# Patient Record
Sex: MALE | Race: WHITE | Employment: OTHER | URBAN - METROPOLITAN AREA
[De-identification: names, ages, dates, MRNs, and addresses within clinical notes are randomized per-mention and may not be internally consistent; named-entity substitution may affect disease eponyms.]

---

## 2018-05-24 ENCOUNTER — HOSPITAL ENCOUNTER (OUTPATIENT)
Dept: MRI IMAGING | Age: 73
Discharge: HOME | End: 2018-05-24
Attending: ORTHOPAEDIC SURGERY
Payer: MEDICARE

## 2018-05-24 DIAGNOSIS — M47.816 LUMBAR SPONDYLOSIS: ICD-10-CM

## 2018-05-24 PROCEDURE — 72148 MRI LUMBAR SPINE W/O DYE: CPT

## 2018-08-27 ENCOUNTER — HOSPITAL ENCOUNTER (OUTPATIENT)
Dept: CT IMAGING | Age: 73
Discharge: HOME | End: 2018-08-27
Attending: FAMILY MEDICINE
Payer: MEDICARE

## 2018-08-27 DIAGNOSIS — Z72.0 TOBACCO USE: ICD-10-CM

## 2018-08-27 PROCEDURE — G0297 LDCT FOR LUNG CA SCREEN: HCPCS

## 2018-09-17 NOTE — H&P
Michael Neves M.D.  [phone]            History and Physical       NAME:  Rachael Miranda   :      MRN:   [id number]       Referring Physician:  Zbigniew De Los Santos DO      Consult Date: 2018 5:30 PM    Chief Complaint:  Change in bowel function    History of Present Illness:  Mr. La Woodruff is a 75year old gentleman who is being seen today in consultation per request of Dr. Chris Mcdaniel for change in bowel habits. States he noted that he became lethargic and lack of appetitie. This was associated with bloating. This occured about 3-4 weeks ago. He has always had regular and daily BM's first thing in the mornings. However over the past few weeks he noted increase in frequency of stools and going about 3-4 times/day. Stools were less in quantity. Denies any blood in stool. Denies any abdominal pain or weight loss. This has eased back to its normal behavior now. Denies any changes to his medication regimen. He was given Abx a few day prior to all this happening for a toe infection. Denies any fevers or chills. His last colonscopy was over 20 years ago. PMH:  No past medical history on file. PSH:  No past surgical history on file. Allergies: Allergies not on file    Home Medications:  Cannot display prior to admission medications because the patient has not been admitted in this contact. Hospital Medications:  No current facility-administered medications for this encounter. No current outpatient prescriptions on file. Social History:  Social History   Substance Use Topics    Smoking status: Not on file    Smokeless tobacco: Not on file    Alcohol use Not on file       Family History:  No family history on file.           Review of Systems:      Constitutional: negative fever, negative chills, negative weight loss  Eyes:   negative visual changes  ENT:   negative sore throat, tongue or lip swelling  Respiratory:  negative cough, negative dyspnea  Cards:  negative for chest pain, palpitations, lower extremity edema  GI:   See HPI  :  negative for frequency, dysuria  Integument:  negative for rash and pruritus  Heme:  negative for easy bruising and gum/nose bleeding  Musculoskel: negative for myalgias,  back pain and muscle weakness  Neuro: negative for headaches, dizziness, vertigo  Psych:  negative for feelings of anxiety, depression       Objective:   No data found. EXAM:     NEURO-a&o   HEENT-wnl   LUNGS-clear    COR-regular rate and rhythym     ABD-soft , no tenderness, no rebound, good bs     EXT-no edema     Data Review     No results for input(s): WBC, HGB, HCT, PLT, HGBEXT, HCTEXT, PLTEXT in the last 72 hours. No results for input(s): NA, K, CL, CO2, BUN, CREA, GLU, PHOS, CA in the last 72 hours. No results for input(s): SGOT, GPT, AP, TBIL, TP, ALB, GLOB, GGT, AML, LPSE in the last 72 hours. No lab exists for component: AMYP, HLPSE  No results for input(s): INR, PTP, APTT in the last 72 hours. No lab exists for component: INREXT    There is no problem list on file for this patient. Assessment:   · Change in bowel function   Plan:   · Colonoscopy today.      Signed By: Jw Son MD     9/17/2018  5:30 PM

## 2018-09-20 ENCOUNTER — HOSPITAL ENCOUNTER (OUTPATIENT)
Age: 73
Setting detail: OUTPATIENT SURGERY
Discharge: HOME | End: 2018-09-20
Attending: INTERNAL MEDICINE | Admitting: INTERNAL MEDICINE
Payer: MEDICARE

## 2018-09-20 ENCOUNTER — ANESTHESIA EVENT (OUTPATIENT)
Dept: ENDOSCOPY | Age: 73
End: 2018-09-20
Payer: MEDICARE

## 2018-09-20 ENCOUNTER — ANESTHESIA (OUTPATIENT)
Dept: ENDOSCOPY | Age: 73
End: 2018-09-20
Payer: MEDICARE

## 2018-09-20 VITALS
HEART RATE: 80 BPM | WEIGHT: 232 LBS | TEMPERATURE: 98 F | HEIGHT: 72 IN | RESPIRATION RATE: 23 BRPM | DIASTOLIC BLOOD PRESSURE: 88 MMHG | OXYGEN SATURATION: 100 % | BODY MASS INDEX: 31.42 KG/M2 | SYSTOLIC BLOOD PRESSURE: 106 MMHG

## 2018-09-20 PROCEDURE — 76040000019: Performed by: INTERNAL MEDICINE

## 2018-09-20 PROCEDURE — 88305 TISSUE EXAM BY PATHOLOGIST: CPT | Performed by: INTERNAL MEDICINE

## 2018-09-20 PROCEDURE — 74011250636 HC RX REV CODE- 250/636: Performed by: INTERNAL MEDICINE

## 2018-09-20 PROCEDURE — 76060000031 HC ANESTHESIA FIRST 0.5 HR: Performed by: INTERNAL MEDICINE

## 2018-09-20 PROCEDURE — 74011250636 HC RX REV CODE- 250/636

## 2018-09-20 PROCEDURE — 77030009426 HC FCPS BIOP ENDOSC BSC -B: Performed by: INTERNAL MEDICINE

## 2018-09-20 PROCEDURE — 77030013992 HC SNR POLYP ENDOSC BSC -B: Performed by: INTERNAL MEDICINE

## 2018-09-20 RX ORDER — AMLODIPINE BESYLATE 10 MG/1
10 TABLET ORAL DAILY
COMMUNITY
End: 2020-01-01

## 2018-09-20 RX ORDER — MIDAZOLAM HYDROCHLORIDE 1 MG/ML
.25-5 INJECTION, SOLUTION INTRAMUSCULAR; INTRAVENOUS
Status: DISCONTINUED | OUTPATIENT
Start: 2018-09-20 | End: 2018-09-20 | Stop reason: HOSPADM

## 2018-09-20 RX ORDER — PRAVASTATIN SODIUM 20 MG/1
20 TABLET ORAL
COMMUNITY

## 2018-09-20 RX ORDER — SODIUM CHLORIDE 9 MG/ML
50 INJECTION, SOLUTION INTRAVENOUS CONTINUOUS
Status: DISCONTINUED | OUTPATIENT
Start: 2018-09-20 | End: 2018-09-20 | Stop reason: HOSPADM

## 2018-09-20 RX ORDER — ATROPINE SULFATE 0.1 MG/ML
0.4 INJECTION INTRAVENOUS
Status: DISCONTINUED | OUTPATIENT
Start: 2018-09-20 | End: 2018-09-20 | Stop reason: HOSPADM

## 2018-09-20 RX ORDER — NALOXONE HYDROCHLORIDE 0.4 MG/ML
0.4 INJECTION, SOLUTION INTRAMUSCULAR; INTRAVENOUS; SUBCUTANEOUS
Status: DISCONTINUED | OUTPATIENT
Start: 2018-09-20 | End: 2018-09-20 | Stop reason: HOSPADM

## 2018-09-20 RX ORDER — METOPROLOL SUCCINATE 200 MG/1
200 TABLET, EXTENDED RELEASE ORAL DAILY
COMMUNITY
End: 2020-01-01

## 2018-09-20 RX ORDER — FLUMAZENIL 0.1 MG/ML
0.2 INJECTION INTRAVENOUS
Status: DISCONTINUED | OUTPATIENT
Start: 2018-09-20 | End: 2018-09-20 | Stop reason: HOSPADM

## 2018-09-20 RX ORDER — PROPOFOL 10 MG/ML
INJECTION, EMULSION INTRAVENOUS
Status: DISCONTINUED | OUTPATIENT
Start: 2018-09-20 | End: 2018-09-20 | Stop reason: HOSPADM

## 2018-09-20 RX ORDER — EPINEPHRINE 0.1 MG/ML
1 INJECTION INTRACARDIAC; INTRAVENOUS
Status: DISCONTINUED | OUTPATIENT
Start: 2018-09-20 | End: 2018-09-20 | Stop reason: HOSPADM

## 2018-09-20 RX ORDER — DEXTROMETHORPHAN/PSEUDOEPHED 2.5-7.5/.8
1.2 DROPS ORAL
Status: DISCONTINUED | OUTPATIENT
Start: 2018-09-20 | End: 2018-09-20 | Stop reason: HOSPADM

## 2018-09-20 RX ORDER — BENAZEPRIL HYDROCHLORIDE 20 MG/1
20 TABLET ORAL DAILY
COMMUNITY
End: 2020-01-01

## 2018-09-20 RX ORDER — PROPOFOL 10 MG/ML
INJECTION, EMULSION INTRAVENOUS AS NEEDED
Status: DISCONTINUED | OUTPATIENT
Start: 2018-09-20 | End: 2018-09-20 | Stop reason: HOSPADM

## 2018-09-20 RX ADMIN — PROPOFOL 100 MG: 10 INJECTION, EMULSION INTRAVENOUS at 09:24

## 2018-09-20 RX ADMIN — SODIUM CHLORIDE 50 ML/HR: 900 INJECTION, SOLUTION INTRAVENOUS at 08:40

## 2018-09-20 RX ADMIN — PROPOFOL 125 MCG/KG/MIN: 10 INJECTION, EMULSION INTRAVENOUS at 09:25

## 2018-09-20 RX ADMIN — PROPOFOL 20 MG: 10 INJECTION, EMULSION INTRAVENOUS at 09:25

## 2018-09-20 NOTE — ANESTHESIA POSTPROCEDURE EVALUATION
Post-Anesthesia Evaluation and Assessment Patient: Ryan Mac MRN: [id number]  SSN: [id number] YOB: 1945  Age: 75 y.o. Sex: male Cardiovascular Function/Vital Signs Visit Vitals  /65  Pulse 89  Temp 36.7 °C (98 °F)  Resp (!) 31  
 Ht 6' (1.829 m)  Wt 105.2 kg (232 lb)  SpO2 100%  BMI 31.46 kg/m2 Patient is status post MAC anesthesia for Procedure(s): 
COLONOSCOPY 
ENDOSCOPIC POLYPECTOMY 
COLON BIOPSY. Nausea/Vomiting: None Postoperative hydration reviewed and adequate. Pain: 
Pain Scale 1: Numeric (0 - 10) (09/20/18 1009) Pain Intensity 1: 0 (09/20/18 1009) Managed Neurological Status: At baseline Mental Status and Level of Consciousness: Arousable Pulmonary Status:  
O2 Device: Room air (09/20/18 1009) Adequate oxygenation and airway patent Complications related to anesthesia: None Post-anesthesia assessment completed. No concerns Signed By: Robert Williamson MD   
 September 20, 2018

## 2018-09-20 NOTE — PROCEDURES
Craig Boeck, M.D.  [phone]            2018          Colonoscopy Operative Report  Cornelio Burris  :  1945  [place] Medical Record Number:  [id number]      Indications:  Change in bowel habits     :  Jeannette Ford MD    Referring Provider: Sandie Whyte DO    Sedation:  MAC anesthesia Propofol    Pre-Procedural Exam:      Airway: clear,  No airway problems anticipated  Heart: RRR, without gallops or rubs  Lungs: clear bilaterally without wheezes, crackles, or rhonchi  Abdomen: soft, nontender, nondistended, bowel sounds present  Mental Status: awake, alert and oriented to person, place and time     Procedure Details:  After informed consent was obtained with all risks and benefits of procedure explained and preoperative exam completed, the patient was taken to the endoscopy suite and placed in the left lateral decubitus position. Upon sequential sedation as per above, a digital rectal exam was performed. The Olympus videocolonoscope  was inserted in the rectum and carefully advanced to the cecum, which was identified by the ileocecal valve and appendiceal orifice. The quality of preparation was excellent. The colonoscope was slowly withdrawn with careful inspection and evaluation between folds. Retroflexion in the rectum was performed.     Findings:     Cecum: normal  Ascending Colon: 5  Sessile polyp(s), the largest 8 mm in size;  Transverse Colon: normal  Descending Colon: 5  Sessile polyp(s), the largest 8 mm in size;  Sigmoid: normal  Rectum: normal    Interventions:  biopsy of colon randomly to rule out microscopic colitis  10 complete polypectomy were performed using cold snare  and the polyps were  retrieved    Specimen Removed:  specimen #1, 8 mm in size, located in the ascending colon and the descending colon removed by cold snare and retrieved for pathology  random biopsies obtained to rule out microscopic colitis    Complications: None.     EBL:  None. Impression:  10 polyps removed using a cold snare. Random colon biopsies were also obtained to rule out microscopic colitis    Recommendations:  -Await pathology. -Repeat colonoscopy in 3 years.   -High fiber diet.    -Resume normal medication(s). Discharge Disposition:  Home in the company of a  when able to ambulate.     Maine [place] MD  9/20/2018  9:50 AM

## 2018-09-20 NOTE — PERIOP NOTES
2634 Procedure being performed under MAC; FERNIE Longo at bedside monitoring patient. See anesthesia notes. 7548 Endoscope was pre-cleaned at bedside immediately following procedure by Jarrod Barahona. 4461 Patient received Propofol, per FERNIE Longo. Care of patient assumed from the anesthesia provider. Patient tolerated procedure well. Abdomen remains soft and non tender post procedure, no complaints or indication of discomfort noted at this time. See anesthesia note. Patient transferred to Endoscopy Recovery and report given to recovery nurse.

## 2018-09-20 NOTE — ROUTINE PROCESS
Leland Console  1945  [id number]    Situation:  Verbal report received from: Yaya Osorio RN  Procedure: Procedure(s):  COLONOSCOPY  ENDOSCOPIC POLYPECTOMY    Background:    Preoperative diagnosis: CHANGE IN BOWELS  Postoperative diagnosis: Ascending and descending colon polyp removed by cold snare x 10    :  Dr. Zeinab Meraz  Assistant(s): Endoscopy Technician-1: Andre Benjamin  Endoscopy RN-1: Jennifer Orozco RN    Specimens:   ID Type Source Tests Collected by Time Destination   1 : Ascending and Descending colon polyps Preservative   Ludy Rashid MD 9/20/2018 0935 Pathology   2 : Random colon polyp Preservative   Ludy Rashid MD 9/20/2018 3745 Pathology       Assessment:  Intra-procedure medications     Anesthesia gave intra-procedure sedation and medications, see anesthesia flow sheet yes    Intravenous fluids: NS@ KVO     Vital signs stable     Abdominal assessment: round and soft     Recommendation:  Discharge patient per MD order. Family or Friend   Permission to share finding with family or friend yes    Endoscopy discharge instructions have been reviewed and given to patient and spouse. The patient and spouse verbalized understanding and acceptance of instructions.

## 2018-09-20 NOTE — DISCHARGE INSTRUCTIONS
[facility]. Yong Horta M.D.  [phone]          COLON DISCHARGE INSTRUCTIONS       2018    Iesha Aldana  :  1945  [place] Medical Record Number:  [id number]      COLONOSCOPY FINDINGS:  Your colonoscopy showed: 10 polyps. DISCOMFORT:  Redness at IV site- apply warm compress to area; if redness or soreness persist- contact your physician  There may be a slight amount of blood passed from the rectum  Gaseous discomfort- walking, belching will help relieve any discomfort  You may not operate a vehicle for 12 hours  You may not engage in an occupation involving machinery or appliances for rest of today  You may not drink alcoholic beverages for at least 12 hours  Avoid making any critical decisions for at least 24 hour  DIET:   High fiber diet. - however -  remember your colon is empty and a heavy meal will produce gas. Avoid these foods:  vegetables, fried / greasy foods, carbonated drinks for today     ACTIVITY:  You may resume your normal daily activities it is recommended that you spend the remainder of the day resting -  avoid any strenuous activity. CALL M.D. ANY SIGN OF:   Increasing pain, nausea, vomiting  Abdominal distension (swelling)  New increased bleeding (oral or rectal)  Fever (chills)  Pain in chest area  Bloody discharge from nose or mouth   Shortness of breath    Follow-up Instructions:   Call Dr. Gerhardt Daunt if any questions or problems. Telephone # [phone]  Biopsy results will be available in  5 to 7 days  Should have a repeat colonoscopy in 3 years.      May resume Xarelto in 48 hours if no bleeding  See Dr. Gerhardt Daunt in the office in 2 months

## 2018-09-20 NOTE — IP AVS SNAPSHOT
[address] [phone] Patient: Esteban Zafar MRN: [id number] WKK: About your hospitalization You were admitted on:  2018 You last received care in the:  [facility] ENDOSCOPY You were discharged on:  2018 Why you were hospitalized Your primary diagnosis was:  Not on File Follow-up Information None Discharge Orders None A check lubna indicates which time of day the medication should be taken. My Medications ASK your doctor about these medications Instructions Each Dose to Equal  
 Morning Noon Evening Bedtime  
 amLODIPine 10 mg tablet Commonly known as:  Robyn Pace Your last dose was: Your next dose is: Take 10 mg by mouth daily. 10 mg  
    
   
   
   
  
 benazepril 20 mg tablet Commonly known as:  LOTENSIN Your last dose was: Your next dose is: Take 20 mg by mouth daily. 20 mg  
    
   
   
   
  
 metoprolol succinate 200 mg XL tablet Commonly known as:  TOPROL-XL Your last dose was: Your next dose is: Take 200 mg by mouth daily. 200 mg  
    
   
   
   
  
 pravastatin 20 mg tablet Commonly known as:  PRAVACHOL Your last dose was: Your next dose is: Take 20 mg by mouth nightly. 20 mg XARELTO 20 mg Tab tablet Generic drug:  rivaroxaban Your last dose was: Your next dose is: Take 20 mg by mouth daily. 20 mg Discharge Instructions [address] [facility]. [facility] Dave Spear M.D. 
[phone] COLON DISCHARGE INSTRUCTIONS 
    
2018 Esteban Zafar :  1945 [place] Medical Record Number:  [id number] COLONOSCOPY FINDINGS: 
Your colonoscopy showed: 10 polyps.  
 
DISCOMFORT: 
Redness at IV site- apply warm compress to area; if redness or soreness persist- contact your physician There may be a slight amount of blood passed from the rectum Gaseous discomfort- walking, belching will help relieve any discomfort You may not operate a vehicle for 12 hours You may not engage in an occupation involving machinery or appliances for rest of today You may not drink alcoholic beverages for at least 12 hours Avoid making any critical decisions for at least 24 hour DIET: 
 High fiber diet.  however -  remember your colon is empty and a heavy meal will produce gas. Avoid these foods:  vegetables, fried / greasy foods, carbonated drinks for today ACTIVITY: 
You may resume your normal daily activities it is recommended that you spend the remainder of the day resting -  avoid any strenuous activity. CALL M.D. ANY SIGN OF: Increasing pain, nausea, vomiting Abdominal distension (swelling) New increased bleeding (oral or rectal) Fever (chills) Pain in chest area Bloody discharge from nose or mouth Shortness of breath Follow-up Instructions: 
 Call Dr. Joanne Villar if any questions or problems. Telephone # [phone] Biopsy results will be available in  5 to 7 days Should have a repeat colonoscopy in 3 years. May resume Xarelto in 48 hours if no bleeding See Dr. Joanne Villar in the office in 2 months Introducing [facility]! [employer] introduces OpenDNS patient portal. Now you can access parts of your medical record, email your doctor's office, and request medication refills online. 1. In your internet browser, go to [email or web address] 2. Click on the First Time User? Click Here link in the Sign In box. You will see the New Member Sign Up page. 3. Enter your OpenDNS Access Code exactly as it appears below. You will not need to use this code after youve completed the sign-up process.  If you do not sign up before the expiration date, you must request a new code. · Bravof[employer] Access Code: [email or web address] Expires: 11/25/2018  7:49 AM 
 
4. Enter the last four digits of your Social Security Number (xxxx) and Date of Birth (mm/dd/yyyy) as indicated and click Submit. You will be taken to the next sign-up page. 5. Create a Bravofly ID. This will be your Bravofly login ID and cannot be changed, so think of one that is secure and easy to remember. 6. Create a Bravofly password. You can change your password at any time. 7. Enter your Password Reset Question and Answer. This can be used at a later time if you forget your password. 8. Enter your e-mail address. You will receive e-mail notification when new information is available in [address] 9. Click Sign Up. You can now view and download portions of your medical record. 10. Click the Download Summary menu link to download a portable copy of your medical information. If you have questions, please visit the Frequently Asked Questions section of the Bravofly website. Remember, Bravofly is NOT to be used for urgent needs. For medical emergencies, dial 911. Now available from your iPhone and Android! Introducing Jamie Pickens As a [employer] patient, I wanted to make you aware of our electronic visit tool called Jamie Pickens. [employer] 24/7 allows you to connect within minutes with a medical provider 24 hours a day, seven days a week via a mobile device or tablet or logging into a secure website from your computer. You can access Jamie [employer] from anywhere in the [place]. A virtual visit might be right for you when you have a simple condition and feel like you just dont want to get out of bed, or cant get away from work for an appointment, when your regular [employer] provider is not available (evenings, weekends or holidays), or when youre out of town and need minor care.   Electronic visits cost only $49 and if the Jamie Pickens provider determines a prescription is needed to treat your condition, one can be electronically transmitted to a nearby pharmacy*. Please take a moment to enroll today if you have not already done so. The enrollment process is free and takes just a few minutes. To enroll, please download the [employer] 24/7 hugo to your tablet or phone, or visit [email or web address] to enroll on your computer. And, as an [address] [place] [address] patient with a [employer] account, the results of your visits will be scanned into your electronic medical record and your primary care provider will be able to view the scanned results. We urge you to continue to see your regular [employer] provider for your ongoing medical care. And while your primary care provider may not be the one available when you seek a [employer] virtual visit, the peace of mind you get from getting a real diagnosis real time can be priceless. For more information on [employer], view our Frequently Asked Questions (FAQs) at [email or web address]. Sincerely, 
 
Elizabeth Peraza MD 
Chief Medical Officer [employer] *:  certain medications cannot be prescribed via [employer] Providers Seen During Your Hospitalization Provider Specialty Primary office phone Namita Martinez MD Gastroenterology [phone] Your Primary Care Physician (PCP) Primary Care Physician Office Phone Office Fax Elizabeth Fritz [phone] You are allergic to the following Allergen Reactions Codeine Rash Recent Documentation Height Weight BMI Smoking Status 1.829 m 105.2 kg 31.46 kg/m2 Smoker, Current Status Unknown Emergency Contacts Name Discharge Info Relation Home Work Mobile Mandie Peñaloza DISCHARGE CAREGIVER [3] Spouse [3] [phone] Patient Belongings  The following personal items are in your possession at time of discharge: 
  Dental Appliances: None Visual Aid: Glasses, With patient Please provide this summary of care documentation to your next provider. Signatures-by signing, you are acknowledging that this After Visit Summary has been reviewed with you and you have received a copy. Patient Signature:  ____________________________________________________________ Date:  ____________________________________________________________  
  
[facility] Provider Signature:  ____________________________________________________________ Date:  ____________________________________________________________

## 2018-09-20 NOTE — ANESTHESIA PREPROCEDURE EVALUATION
Anesthetic History No history of anesthetic complications Review of Systems / Medical History Patient summary reviewed and pertinent labs reviewed Pulmonary Within defined limits Neuro/Psych Within defined limits Cardiovascular Hypertension Dysrhythmias : atrial fibrillation Exercise tolerance: >4 METS 
  
GI/Hepatic/Renal 
Within defined limits Endo/Other Arthritis Other Findings Physical Exam 
 
Airway Mallampati: III 
TM Distance: 4 - 6 cm Neck ROM: normal range of motion Mouth opening: Normal 
 
 Cardiovascular Rhythm: irregular Rate: normal 
 
 
 
 Dental 
No notable dental hx Pulmonary Breath sounds clear to auscultation Abdominal 
GI exam deferred Other Findings Anesthetic Plan ASA: 3 Anesthesia type: MAC Induction: Intravenous Anesthetic plan and risks discussed with: Patient

## 2019-03-05 ENCOUNTER — HOSPITAL ENCOUNTER (OUTPATIENT)
Dept: CT IMAGING | Age: 74
Discharge: HOME | End: 2019-03-05
Attending: INTERNAL MEDICINE
Payer: MEDICARE

## 2019-03-05 DIAGNOSIS — R91.1 SOLITARY PULMONARY NODULE: ICD-10-CM

## 2019-03-05 PROCEDURE — 71250 CT THORAX DX C-: CPT

## 2020-01-01 ENCOUNTER — DOCUMENTATION ONLY (OUTPATIENT)
Dept: CARDIOLOGY CLINIC | Age: 75
End: 2020-01-01

## 2020-01-01 ENCOUNTER — APPOINTMENT (OUTPATIENT)
Dept: GENERAL RADIOLOGY | Age: 75
DRG: 870 | End: 2020-01-01
Attending: INTERNAL MEDICINE
Payer: MEDICARE

## 2020-01-01 ENCOUNTER — APPOINTMENT (OUTPATIENT)
Dept: INTERVENTIONAL RADIOLOGY/VASCULAR | Age: 75
DRG: 870 | End: 2020-01-01
Attending: INTERNAL MEDICINE
Payer: MEDICARE

## 2020-01-01 ENCOUNTER — PATIENT OUTREACH (OUTPATIENT)
Dept: CASE MANAGEMENT | Age: 75
End: 2020-01-01

## 2020-01-01 ENCOUNTER — APPOINTMENT (OUTPATIENT)
Dept: CT IMAGING | Age: 75
DRG: 870 | End: 2020-01-01
Attending: INTERNAL MEDICINE
Payer: MEDICARE

## 2020-01-01 ENCOUNTER — APPOINTMENT (OUTPATIENT)
Dept: GENERAL RADIOLOGY | Age: 75
DRG: 603 | End: 2020-01-01
Attending: INTERNAL MEDICINE
Payer: MEDICARE

## 2020-01-01 ENCOUNTER — HOSPITAL ENCOUNTER (INPATIENT)
Age: 75
LOS: 1 days | DRG: 871 | End: 2020-12-03
Attending: INTERNAL MEDICINE | Admitting: INTERNAL MEDICINE
Payer: OTHER MISCELLANEOUS

## 2020-01-01 ENCOUNTER — TELEPHONE (OUTPATIENT)
Dept: CARDIOLOGY CLINIC | Age: 75
End: 2020-01-01

## 2020-01-01 ENCOUNTER — ANESTHESIA EVENT (OUTPATIENT)
Dept: CARDIOLOGY UNIT | Age: 75
DRG: 870 | End: 2020-01-01
Payer: MEDICARE

## 2020-01-01 ENCOUNTER — ANESTHESIA EVENT (OUTPATIENT)
Dept: ENDOSCOPY | Age: 75
DRG: 870 | End: 2020-01-01
Payer: MEDICARE

## 2020-01-01 ENCOUNTER — APPOINTMENT (OUTPATIENT)
Dept: GENERAL RADIOLOGY | Age: 75
DRG: 603 | End: 2020-01-01
Attending: FAMILY MEDICINE
Payer: MEDICARE

## 2020-01-01 ENCOUNTER — HOSPITAL ENCOUNTER (INPATIENT)
Age: 75
LOS: 9 days | Discharge: NURSING, REHAB OR LONG-TERM CARE | DRG: 603 | End: 2020-08-20
Attending: EMERGENCY MEDICINE | Admitting: INTERNAL MEDICINE
Payer: MEDICARE

## 2020-01-01 ENCOUNTER — APPOINTMENT (OUTPATIENT)
Dept: GENERAL RADIOLOGY | Age: 75
DRG: 603 | End: 2020-01-01
Attending: EMERGENCY MEDICINE
Payer: MEDICARE

## 2020-01-01 ENCOUNTER — APPOINTMENT (OUTPATIENT)
Dept: ULTRASOUND IMAGING | Age: 75
DRG: 870 | End: 2020-01-01
Attending: INTERNAL MEDICINE
Payer: MEDICARE

## 2020-01-01 ENCOUNTER — APPOINTMENT (OUTPATIENT)
Dept: GENERAL RADIOLOGY | Age: 75
DRG: 870 | End: 2020-01-01
Attending: EMERGENCY MEDICINE
Payer: MEDICARE

## 2020-01-01 ENCOUNTER — ANESTHESIA (OUTPATIENT)
Dept: CARDIOLOGY UNIT | Age: 75
DRG: 870 | End: 2020-01-01
Payer: MEDICARE

## 2020-01-01 ENCOUNTER — OFFICE VISIT (OUTPATIENT)
Dept: CARDIOLOGY CLINIC | Age: 75
End: 2020-01-01

## 2020-01-01 ENCOUNTER — APPOINTMENT (OUTPATIENT)
Dept: NON INVASIVE DIAGNOSTICS | Age: 75
DRG: 870 | End: 2020-01-01
Attending: INTERNAL MEDICINE
Payer: MEDICARE

## 2020-01-01 ENCOUNTER — APPOINTMENT (OUTPATIENT)
Dept: ENDOSCOPY | Age: 75
DRG: 870 | End: 2020-01-01
Attending: INTERNAL MEDICINE
Payer: MEDICARE

## 2020-01-01 ENCOUNTER — HOSPITAL ENCOUNTER (OUTPATIENT)
Dept: VASCULAR SURGERY | Age: 75
Discharge: HOME | End: 2020-05-01
Attending: FAMILY MEDICINE
Payer: MEDICARE

## 2020-01-01 ENCOUNTER — APPOINTMENT (OUTPATIENT)
Dept: NON INVASIVE DIAGNOSTICS | Age: 75
DRG: 603 | End: 2020-01-01
Attending: NURSE PRACTITIONER
Payer: MEDICARE

## 2020-01-01 ENCOUNTER — HOSPITAL ENCOUNTER (INPATIENT)
Age: 75
LOS: 17 days | Discharge: HOSPICE | DRG: 870 | End: 2020-12-02
Attending: STUDENT IN AN ORGANIZED HEALTH CARE EDUCATION/TRAINING PROGRAM | Admitting: INTERNAL MEDICINE
Payer: MEDICARE

## 2020-01-01 ENCOUNTER — APPOINTMENT (OUTPATIENT)
Dept: CT IMAGING | Age: 75
DRG: 870 | End: 2020-01-01
Attending: EMERGENCY MEDICINE
Payer: MEDICARE

## 2020-01-01 ENCOUNTER — OFFICE VISIT (OUTPATIENT)
Dept: CARDIOLOGY CLINIC | Age: 75
End: 2020-01-01
Payer: MEDICARE

## 2020-01-01 ENCOUNTER — HOSPITAL ENCOUNTER (OUTPATIENT)
Dept: GENERAL RADIOLOGY | Age: 75
Discharge: HOME | End: 2020-06-05
Payer: MEDICARE

## 2020-01-01 ENCOUNTER — ANESTHESIA (OUTPATIENT)
Dept: ENDOSCOPY | Age: 75
DRG: 870 | End: 2020-01-01
Payer: MEDICARE

## 2020-01-01 VITALS
HEART RATE: 101 BPM | SYSTOLIC BLOOD PRESSURE: 108 MMHG | TEMPERATURE: 99 F | OXYGEN SATURATION: 90 % | DIASTOLIC BLOOD PRESSURE: 50 MMHG | RESPIRATION RATE: 30 BRPM

## 2020-01-01 VITALS
WEIGHT: 213.63 LBS | SYSTOLIC BLOOD PRESSURE: 134 MMHG | BODY MASS INDEX: 27.42 KG/M2 | RESPIRATION RATE: 30 BRPM | HEART RATE: 96 BPM | HEIGHT: 74 IN | TEMPERATURE: 99.1 F | DIASTOLIC BLOOD PRESSURE: 69 MMHG | OXYGEN SATURATION: 94 %

## 2020-01-01 VITALS
DIASTOLIC BLOOD PRESSURE: 82 MMHG | WEIGHT: 242.6 LBS | SYSTOLIC BLOOD PRESSURE: 138 MMHG | HEART RATE: 104 BPM | HEIGHT: 72 IN | BODY MASS INDEX: 32.86 KG/M2

## 2020-01-01 VITALS
WEIGHT: 227.29 LBS | HEART RATE: 91 BPM | BODY MASS INDEX: 30.79 KG/M2 | OXYGEN SATURATION: 96 % | DIASTOLIC BLOOD PRESSURE: 65 MMHG | HEIGHT: 72 IN | SYSTOLIC BLOOD PRESSURE: 114 MMHG | TEMPERATURE: 98.2 F | RESPIRATION RATE: 18 BRPM

## 2020-01-01 VITALS
TEMPERATURE: 96.9 F | HEIGHT: 72 IN | HEART RATE: 78 BPM | WEIGHT: 207 LBS | OXYGEN SATURATION: 98 % | BODY MASS INDEX: 28.04 KG/M2 | SYSTOLIC BLOOD PRESSURE: 110 MMHG | DIASTOLIC BLOOD PRESSURE: 78 MMHG

## 2020-01-01 DIAGNOSIS — N39.0 URINARY TRACT INFECTION ASSOCIATED WITH INDWELLING URETHRAL CATHETER, INITIAL ENCOUNTER (HCC): ICD-10-CM

## 2020-01-01 DIAGNOSIS — T83.511A URINARY TRACT INFECTION ASSOCIATED WITH INDWELLING URETHRAL CATHETER, INITIAL ENCOUNTER (HCC): ICD-10-CM

## 2020-01-01 DIAGNOSIS — E78.5 DYSLIPIDEMIA: ICD-10-CM

## 2020-01-01 DIAGNOSIS — I10 HTN (HYPERTENSION), BENIGN: ICD-10-CM

## 2020-01-01 DIAGNOSIS — E83.52 HYPERCALCEMIA: ICD-10-CM

## 2020-01-01 DIAGNOSIS — I82.A22 DVT OF AXILLARY VEIN, CHRONIC, LEFT (HCC): ICD-10-CM

## 2020-01-01 DIAGNOSIS — L03.114 CELLULITIS OF LEFT UPPER EXTREMITY: Primary | ICD-10-CM

## 2020-01-01 DIAGNOSIS — I48.19 PERSISTENT ATRIAL FIBRILLATION (HCC): Primary | ICD-10-CM

## 2020-01-01 DIAGNOSIS — I10 ESSENTIAL HYPERTENSION: ICD-10-CM

## 2020-01-01 DIAGNOSIS — I48.20 CHRONIC ATRIAL FIBRILLATION (HCC): ICD-10-CM

## 2020-01-01 DIAGNOSIS — A41.9 SEPSIS, DUE TO UNSPECIFIED ORGANISM, UNSPECIFIED WHETHER ACUTE ORGAN DYSFUNCTION PRESENT (HCC): Primary | ICD-10-CM

## 2020-01-01 DIAGNOSIS — N28.9 RENAL INSUFFICIENCY: ICD-10-CM

## 2020-01-01 DIAGNOSIS — R60.0 LOCALIZED EDEMA: ICD-10-CM

## 2020-01-01 LAB
25(OH)D3 SERPL-MCNC: 20.7 NG/ML (ref 30–100)
ABO + RH BLD: NORMAL
ACE SERPL-CCNC: 29 U/L
ALBUMIN SERPL ELPH-MCNC: 3.2 G/DL
ALBUMIN SERPL-MCNC: 1.4 G/DL (ref 3.5–5)
ALBUMIN SERPL-MCNC: 1.4 G/DL (ref 3.5–5)
ALBUMIN SERPL-MCNC: 1.6 G/DL (ref 3.5–5)
ALBUMIN SERPL-MCNC: 1.7 G/DL (ref 3.5–5)
ALBUMIN SERPL-MCNC: 1.9 G/DL (ref 3.5–5)
ALBUMIN SERPL-MCNC: 2.1 G/DL (ref 3.5–5)
ALBUMIN SERPL-MCNC: 2.2 G/DL (ref 3.5–5)
ALBUMIN SERPL-MCNC: 2.5 G/DL (ref 3.5–5)
ALBUMIN SERPL-MCNC: 2.6 G/DL (ref 3.5–5)
ALBUMIN SERPL-MCNC: 2.7 G/DL (ref 3.5–5)
ALBUMIN SERPL-MCNC: 2.8 G/DL (ref 3.5–5)
ALBUMIN SERPL-MCNC: 2.9 G/DL (ref 3.5–5)
ALBUMIN SERPL-MCNC: 2.9 G/DL (ref 3.5–5)
ALBUMIN SERPL-MCNC: 3.1 G/DL (ref 3.5–5)
ALBUMIN SERPL-MCNC: 3.2 G/DL (ref 3.5–5)
ALBUMIN/GLOB SERPL: 0.3 {RATIO} (ref 1.1–2.2)
ALBUMIN/GLOB SERPL: 0.4 {RATIO} (ref 1.1–2.2)
ALBUMIN/GLOB SERPL: 0.7 {RATIO} (ref 1.1–2.2)
ALBUMIN/GLOB SERPL: 0.7 {RATIO} (ref 1.1–2.2)
ALBUMIN/GLOB SERPL: 0.9 {RATIO} (ref 1.1–2.2)
ALBUMIN/GLOB SERPL: 1 {RATIO}
ALP SERPL-CCNC: 104 U/L (ref 45–117)
ALP SERPL-CCNC: 105 U/L (ref 45–117)
ALP SERPL-CCNC: 105 U/L (ref 45–117)
ALP SERPL-CCNC: 113 U/L (ref 45–117)
ALP SERPL-CCNC: 115 U/L (ref 45–117)
ALP SERPL-CCNC: 120 U/L (ref 45–117)
ALP SERPL-CCNC: 122 U/L (ref 45–117)
ALP SERPL-CCNC: 125 U/L (ref 45–117)
ALP SERPL-CCNC: 130 U/L (ref 45–117)
ALP SERPL-CCNC: 141 U/L (ref 45–117)
ALP SERPL-CCNC: 97 U/L (ref 45–117)
ALPHA1 GLOB SERPL ELPH-MCNC: 0.3 G/DL
ALPHA2 GLOB SERPL ELPH-MCNC: 1.1 G/DL
ALT SERPL-CCNC: 10 U/L (ref 12–78)
ALT SERPL-CCNC: 11 U/L (ref 12–78)
ALT SERPL-CCNC: 12 U/L (ref 12–78)
ALT SERPL-CCNC: 13 U/L (ref 12–78)
ALT SERPL-CCNC: 19 U/L (ref 12–78)
ALT SERPL-CCNC: 20 U/L (ref 12–78)
ALT SERPL-CCNC: 21 U/L (ref 12–78)
ALT SERPL-CCNC: 22 U/L (ref 12–78)
ALT SERPL-CCNC: 47 U/L (ref 12–78)
ALT SERPL-CCNC: 61 U/L (ref 12–78)
ALT SERPL-CCNC: 62 U/L (ref 12–78)
AMMONIA PLAS-SCNC: 11 UMOL/L
AMYLASE FLD-CCNC: 25 U/L
ANION GAP SERPL CALC-SCNC: 10 MMOL/L (ref 5–15)
ANION GAP SERPL CALC-SCNC: 11 MMOL/L (ref 5–15)
ANION GAP SERPL CALC-SCNC: 12 MMOL/L (ref 5–15)
ANION GAP SERPL CALC-SCNC: 13 MMOL/L (ref 5–15)
ANION GAP SERPL CALC-SCNC: 5 MMOL/L (ref 5–15)
ANION GAP SERPL CALC-SCNC: 5 MMOL/L (ref 5–15)
ANION GAP SERPL CALC-SCNC: 6 MMOL/L (ref 5–15)
ANION GAP SERPL CALC-SCNC: 7 MMOL/L (ref 5–15)
ANION GAP SERPL CALC-SCNC: 8 MMOL/L (ref 5–15)
ANION GAP SERPL CALC-SCNC: 9 MMOL/L (ref 5–15)
APPEARANCE UR: ABNORMAL
APPEARANCE UR: ABNORMAL
APTT PPP: 35.5 SEC (ref 23–35.7)
APTT PPP: 49.8 SEC (ref 23–35.7)
APTT PPP: 65.2 SEC (ref 23–35.7)
ARTERIAL PATENCY WRIST A: ABNORMAL
ARTERIAL PATENCY WRIST A: NORMAL
ARTERIAL PATENCY WRIST A: YES
ARTERIAL PATENCY WRIST A: YES
AST SERPL W P-5'-P-CCNC: 173 U/L (ref 15–37)
AST SERPL W P-5'-P-CCNC: 25 U/L (ref 15–37)
AST SERPL W P-5'-P-CCNC: 28 U/L (ref 15–37)
AST SERPL W P-5'-P-CCNC: 373 U/L (ref 15–37)
AST SERPL W P-5'-P-CCNC: 381 U/L (ref 15–37)
AST SERPL W P-5'-P-CCNC: 42 U/L (ref 15–37)
AST SERPL W P-5'-P-CCNC: 43 U/L (ref 15–37)
AST SERPL-CCNC: 21 U/L (ref 15–37)
AST SERPL-CCNC: 22 U/L (ref 15–37)
AST SERPL-CCNC: 23 U/L (ref 15–37)
AST SERPL-CCNC: 25 U/L (ref 15–37)
ATRIAL RATE: 125 BPM
ATRIAL RATE: 136 BPM
ATRIAL RATE: 141 BPM
B-GLOBULIN SERPL ELPH-MCNC: 0.6 G/DL
BACTERIA SPEC CULT: ABNORMAL
BACTERIA SPEC CULT: NORMAL
BACTERIA URNS QL MICRO: ABNORMAL /HPF
BACTERIA URNS QL MICRO: NEGATIVE /HPF
BASE DEFICIT BLDA-SCNC: 0.1 MMOL/L (ref 0–2)
BASE DEFICIT BLDA-SCNC: 0.3 MMOL/L (ref 0–2)
BASE DEFICIT BLDA-SCNC: 12.7 MMOL/L (ref 0–2)
BASE DEFICIT BLDA-SCNC: 13.7 MMOL/L (ref 0–2)
BASE DEFICIT BLDA-SCNC: 16 MMOL/L (ref 0–2)
BASE DEFICIT BLDA-SCNC: 2 MMOL/L (ref 0–2)
BASE DEFICIT BLDA-SCNC: 2.1 MMOL/L (ref 0–2)
BASE DEFICIT BLDA-SCNC: 2.5 MMOL/L (ref 0–2)
BASE DEFICIT BLDA-SCNC: 5.6 MMOL/L (ref 0–2)
BASE DEFICIT BLDA-SCNC: 5.7 MMOL/L (ref 0–2)
BASE DEFICIT BLDA-SCNC: 6.2 MMOL/L (ref 0–2)
BASE DEFICIT BLDA-SCNC: 8.8 MMOL/L (ref 0–2)
BASE EXCESS BLDA CALC-SCNC: 1.8 MMOL/L (ref 0–2)
BASOPHILS # BLD: 0 K/UL (ref 0–0.1)
BASOPHILS # BLD: 0.1 K/UL (ref 0–0.1)
BASOPHILS # BLD: 0.2 K/UL (ref 0–0.1)
BASOPHILS # BLD: 0.3 K/UL (ref 0–0.1)
BASOPHILS # BLD: 0.4 K/UL (ref 0–0.1)
BASOPHILS # BLD: 0.5 K/UL (ref 0–0.1)
BASOPHILS NFR BLD: 0 % (ref 0–1)
BASOPHILS NFR BLD: 1 % (ref 0–1)
BASOPHILS NFR BLD: 2 % (ref 0–1)
BASOPHILS NFR BLD: 2 % (ref 0–1)
BASOPHILS NFR BLD: 4 % (ref 0–1)
BASOPHILS NFR BLD: 6 % (ref 0–1)
BASOPHILS NFR BLD: 9 % (ref 0–1)
BDY SITE: ABNORMAL
BDY SITE: NORMAL
BILIRUB DIRECT SERPL-MCNC: 0.2 MG/DL (ref 0–0.2)
BILIRUB DIRECT SERPL-MCNC: 0.5 MG/DL (ref 0–0.2)
BILIRUB DIRECT SERPL-MCNC: 0.8 MG/DL (ref 0–0.2)
BILIRUB SERPL-MCNC: 0.4 MG/DL (ref 0.2–1)
BILIRUB SERPL-MCNC: 0.5 MG/DL (ref 0.2–1)
BILIRUB SERPL-MCNC: 0.6 MG/DL (ref 0.2–1)
BILIRUB SERPL-MCNC: 0.6 MG/DL (ref 0.2–1)
BILIRUB SERPL-MCNC: 0.7 MG/DL (ref 0.2–1)
BILIRUB SERPL-MCNC: 0.8 MG/DL (ref 0.2–1)
BILIRUB SERPL-MCNC: 1.1 MG/DL (ref 0.2–1)
BILIRUB SERPL-MCNC: 1.4 MG/DL (ref 0.2–1)
BILIRUB SERPL-MCNC: 1.4 MG/DL (ref 0.2–1)
BILIRUB UR QL: NEGATIVE
BILIRUB UR QL: NEGATIVE
BLASTS NFR BLD MANUAL: 2 %
BLD PROD TYP BPU: NORMAL
BLOOD GROUP ANTIBODIES SERPL: NEGATIVE
BNP SERPL-MCNC: 1781 PG/ML
BNP SERPL-MCNC: 2010 PG/ML
BNP SERPL-MCNC: 2686 PG/ML
BNP SERPL-MCNC: 3248 PG/ML
BNP SERPL-MCNC: ABNORMAL PG/ML
BNP SERPL-MCNC: ABNORMAL PG/ML
BODY FLD TYPE: NORMAL
BPU ID: NORMAL
BUN SERPL-MCNC: 11 MG/DL (ref 6–20)
BUN SERPL-MCNC: 11 MG/DL (ref 6–20)
BUN SERPL-MCNC: 12 MG/DL (ref 6–20)
BUN SERPL-MCNC: 12 MG/DL (ref 6–20)
BUN SERPL-MCNC: 14 MG/DL (ref 6–20)
BUN SERPL-MCNC: 16 MG/DL (ref 6–20)
BUN SERPL-MCNC: 17 MG/DL (ref 6–20)
BUN SERPL-MCNC: 24 MG/DL (ref 6–20)
BUN SERPL-MCNC: 25 MG/DL (ref 6–20)
BUN SERPL-MCNC: 26 MG/DL (ref 6–20)
BUN SERPL-MCNC: 26 MG/DL (ref 6–20)
BUN SERPL-MCNC: 32 MG/DL (ref 6–20)
BUN SERPL-MCNC: 35 MG/DL (ref 6–20)
BUN SERPL-MCNC: 36 MG/DL (ref 6–20)
BUN SERPL-MCNC: 38 MG/DL (ref 6–20)
BUN SERPL-MCNC: 39 MG/DL (ref 6–20)
BUN SERPL-MCNC: 42 MG/DL (ref 6–20)
BUN SERPL-MCNC: 51 MG/DL (ref 6–20)
BUN SERPL-MCNC: 52 MG/DL (ref 6–20)
BUN SERPL-MCNC: 53 MG/DL (ref 6–20)
BUN SERPL-MCNC: 53 MG/DL (ref 6–20)
BUN SERPL-MCNC: 54 MG/DL (ref 6–20)
BUN SERPL-MCNC: 58 MG/DL (ref 6–20)
BUN SERPL-MCNC: 58 MG/DL (ref 6–20)
BUN SERPL-MCNC: 59 MG/DL (ref 6–20)
BUN SERPL-MCNC: 60 MG/DL (ref 6–20)
BUN SERPL-MCNC: 60 MG/DL (ref 6–20)
BUN SERPL-MCNC: 61 MG/DL (ref 6–20)
BUN/CREAT SERPL: 12 (ref 12–20)
BUN/CREAT SERPL: 12 (ref 12–20)
BUN/CREAT SERPL: 13 (ref 12–20)
BUN/CREAT SERPL: 14 (ref 12–20)
BUN/CREAT SERPL: 15 (ref 12–20)
BUN/CREAT SERPL: 16 (ref 12–20)
BUN/CREAT SERPL: 16 (ref 12–20)
BUN/CREAT SERPL: 18 (ref 12–20)
BUN/CREAT SERPL: 21 (ref 12–20)
BUN/CREAT SERPL: 22 (ref 12–20)
BUN/CREAT SERPL: 26 (ref 12–20)
BUN/CREAT SERPL: 30 (ref 12–20)
BUN/CREAT SERPL: 34 (ref 12–20)
BUN/CREAT SERPL: 35 (ref 12–20)
BUN/CREAT SERPL: 38 (ref 12–20)
BUN/CREAT SERPL: 39 (ref 12–20)
BUN/CREAT SERPL: 39 (ref 12–20)
BUN/CREAT SERPL: 43 (ref 12–20)
BUN/CREAT SERPL: 46 (ref 12–20)
BUN/CREAT SERPL: 47 (ref 12–20)
BUN/CREAT SERPL: 47 (ref 12–20)
CA-I BLD-MCNC: 10 MG/DL (ref 8.5–10.1)
CA-I BLD-MCNC: 10.4 MG/DL (ref 8.5–10.1)
CA-I BLD-MCNC: 10.7 MG/DL (ref 8.5–10.1)
CA-I BLD-MCNC: 11.8 MG/DL (ref 8.5–10.1)
CA-I BLD-MCNC: 12.2 MG/DL (ref 8.5–10.1)
CA-I BLD-MCNC: 13 MG/DL (ref 8.5–10.1)
CA-I BLD-MCNC: 13.7 MG/DL (ref 8.5–10.1)
CA-I BLD-MCNC: 6.1 MG/DL (ref 8.5–10.1)
CA-I BLD-MCNC: 6.2 MG/DL (ref 8.5–10.1)
CA-I BLD-MCNC: 6.3 MG/DL (ref 8.5–10.1)
CA-I BLD-MCNC: 6.4 MG/DL (ref 8.5–10.1)
CA-I BLD-MCNC: 6.4 MG/DL (ref 8.5–10.1)
CA-I BLD-MCNC: 6.5 MG/DL (ref 8.5–10.1)
CA-I BLD-MCNC: 6.6 MG/DL (ref 8.5–10.1)
CA-I BLD-MCNC: 7 MG/DL (ref 8.5–10.1)
CA-I BLD-MCNC: 7.4 MG/DL (ref 8.5–10.1)
CA-I BLD-MCNC: 7.9 MG/DL (ref 8.5–10.1)
CA-I BLD-MCNC: 8.6 MG/DL (ref 8.5–10.1)
CA-I BLD-MCNC: 8.6 MG/DL (ref 8.5–10.1)
CA-I BLD-MCNC: 9.8 MG/DL (ref 8.5–10.1)
CA-I BLD-MCNC: 9.9 MG/DL (ref 8.5–10.1)
CALCIUM SERPL-MCNC: 10.2 MG/DL (ref 8.5–10.1)
CALCIUM SERPL-MCNC: 8.5 MG/DL (ref 8.5–10.1)
CALCIUM SERPL-MCNC: 8.6 MG/DL (ref 8.5–10.1)
CALCIUM SERPL-MCNC: 8.7 MG/DL (ref 8.5–10.1)
CALCIUM SERPL-MCNC: 9 MG/DL (ref 8.5–10.1)
CALCIUM SERPL-MCNC: 9 MG/DL (ref 8.5–10.1)
CALCIUM SERPL-MCNC: 9.5 MG/DL (ref 8.5–10.1)
CALCIUM SERPL-MCNC: 9.7 MG/DL (ref 8.5–10.1)
CALCIUM SERPL-MCNC: 9.8 MG/DL (ref 8.5–10.1)
CALCIUM UR-MCNC: 9.2 MG/DL
CALCULATED R AXIS, ECG10: 73 DEGREES
CALCULATED R AXIS, ECG10: 81 DEGREES
CALCULATED R AXIS, ECG10: 86 DEGREES
CALCULATED T AXIS, ECG11: -71 DEGREES
CALCULATED T AXIS, ECG11: 43 DEGREES
CALCULATED T AXIS, ECG11: 49 DEGREES
CANCER AG19-9 SERPL-ACNC: NORMAL U/ML
CEA SERPL-MCNC: 0.5 NG/ML
CHLORIDE SERPL-SCNC: 103 MMOL/L (ref 97–108)
CHLORIDE SERPL-SCNC: 104 MMOL/L (ref 97–108)
CHLORIDE SERPL-SCNC: 104 MMOL/L (ref 97–108)
CHLORIDE SERPL-SCNC: 105 MMOL/L (ref 97–108)
CHLORIDE SERPL-SCNC: 108 MMOL/L (ref 97–108)
CHLORIDE SERPL-SCNC: 108 MMOL/L (ref 97–108)
CHLORIDE SERPL-SCNC: 109 MMOL/L (ref 97–108)
CHLORIDE SERPL-SCNC: 110 MMOL/L (ref 97–108)
CHLORIDE SERPL-SCNC: 110 MMOL/L (ref 97–108)
CHLORIDE SERPL-SCNC: 111 MMOL/L (ref 97–108)
CHLORIDE SERPL-SCNC: 112 MMOL/L (ref 97–108)
CHLORIDE SERPL-SCNC: 113 MMOL/L (ref 97–108)
CHLORIDE SERPL-SCNC: 113 MMOL/L (ref 97–108)
CHLORIDE SERPL-SCNC: 114 MMOL/L (ref 97–108)
CHLORIDE SERPL-SCNC: 115 MMOL/L (ref 97–108)
CHLORIDE SERPL-SCNC: 116 MMOL/L (ref 97–108)
CHLORIDE SERPL-SCNC: 117 MMOL/L (ref 97–108)
CHLORIDE SERPL-SCNC: 118 MMOL/L (ref 97–108)
CHLORIDE SERPL-SCNC: 119 MMOL/L (ref 97–108)
CHLORIDE SERPL-SCNC: 124 MMOL/L (ref 97–108)
CHLORIDE SERPL-SCNC: 125 MMOL/L (ref 97–108)
CHLORIDE SERPL-SCNC: 126 MMOL/L (ref 97–108)
CHLORIDE SERPL-SCNC: 127 MMOL/L (ref 97–108)
CK SERPL-CCNC: 15 U/L (ref 39–308)
CO2 SERPL-SCNC: 15 MMOL/L (ref 21–32)
CO2 SERPL-SCNC: 16 MMOL/L (ref 21–32)
CO2 SERPL-SCNC: 17 MMOL/L (ref 21–32)
CO2 SERPL-SCNC: 19 MMOL/L (ref 21–32)
CO2 SERPL-SCNC: 19 MMOL/L (ref 21–32)
CO2 SERPL-SCNC: 20 MMOL/L (ref 21–32)
CO2 SERPL-SCNC: 20 MMOL/L (ref 21–32)
CO2 SERPL-SCNC: 21 MMOL/L (ref 21–32)
CO2 SERPL-SCNC: 21 MMOL/L (ref 21–32)
CO2 SERPL-SCNC: 22 MMOL/L (ref 21–32)
CO2 SERPL-SCNC: 23 MMOL/L (ref 21–32)
CO2 SERPL-SCNC: 24 MMOL/L (ref 21–32)
CO2 SERPL-SCNC: 25 MMOL/L (ref 21–32)
CO2 SERPL-SCNC: 25 MMOL/L (ref 21–32)
CO2 SERPL-SCNC: 26 MMOL/L (ref 21–32)
CO2 SERPL-SCNC: 27 MMOL/L (ref 21–32)
CO2 SERPL-SCNC: 28 MMOL/L (ref 21–32)
CO2 SERPL-SCNC: 28 MMOL/L (ref 21–32)
CO2 SERPL-SCNC: 30 MMOL/L (ref 21–32)
COLONY COUNT,CNT: ABNORMAL
COLOR UR: ABNORMAL
COLOR UR: ABNORMAL
COMMENT, HOLDF: NORMAL
COMMENT, HOLDF: NORMAL
CREAT SERPL-MCNC: 0.78 MG/DL (ref 0.7–1.3)
CREAT SERPL-MCNC: 0.8 MG/DL (ref 0.7–1.3)
CREAT SERPL-MCNC: 0.82 MG/DL (ref 0.7–1.3)
CREAT SERPL-MCNC: 0.85 MG/DL (ref 0.7–1.3)
CREAT SERPL-MCNC: 0.94 MG/DL (ref 0.7–1.3)
CREAT SERPL-MCNC: 0.95 MG/DL (ref 0.7–1.3)
CREAT SERPL-MCNC: 1.03 MG/DL (ref 0.7–1.3)
CREAT SERPL-MCNC: 1.08 MG/DL (ref 0.7–1.3)
CREAT SERPL-MCNC: 1.09 MG/DL (ref 0.7–1.3)
CREAT SERPL-MCNC: 1.12 MG/DL (ref 0.7–1.3)
CREAT SERPL-MCNC: 1.12 MG/DL (ref 0.7–1.3)
CREAT SERPL-MCNC: 1.29 MG/DL (ref 0.7–1.3)
CREAT SERPL-MCNC: 1.32 MG/DL (ref 0.7–1.3)
CREAT SERPL-MCNC: 1.32 MG/DL (ref 0.7–1.3)
CREAT SERPL-MCNC: 1.4 MG/DL (ref 0.7–1.3)
CREAT SERPL-MCNC: 1.41 MG/DL (ref 0.7–1.3)
CREAT SERPL-MCNC: 1.43 MG/DL (ref 0.7–1.3)
CREAT SERPL-MCNC: 1.59 MG/DL (ref 0.7–1.3)
CREAT SERPL-MCNC: 1.67 MG/DL (ref 0.7–1.3)
CREAT SERPL-MCNC: 1.74 MG/DL (ref 0.7–1.3)
CREAT SERPL-MCNC: 1.86 MG/DL (ref 0.7–1.3)
CREAT SERPL-MCNC: 1.9 MG/DL (ref 0.7–1.3)
CREAT SERPL-MCNC: 1.94 MG/DL (ref 0.7–1.3)
CREAT SERPL-MCNC: 1.95 MG/DL (ref 0.7–1.3)
CREAT SERPL-MCNC: 1.96 MG/DL (ref 0.7–1.3)
CREAT SERPL-MCNC: 1.97 MG/DL (ref 0.7–1.3)
CREAT SERPL-MCNC: 2.03 MG/DL (ref 0.7–1.3)
CREAT SERPL-MCNC: 2.04 MG/DL (ref 0.7–1.3)
CREAT SERPL-MCNC: 2.04 MG/DL (ref 0.7–1.3)
CREAT SERPL-MCNC: 2.05 MG/DL (ref 0.7–1.3)
CREAT SERPL-MCNC: 2.06 MG/DL (ref 0.7–1.3)
CREAT SERPL-MCNC: 2.09 MG/DL (ref 0.7–1.3)
CREAT SERPL-MCNC: 2.1 MG/DL (ref 0.7–1.3)
CREAT SERPL-MCNC: 2.18 MG/DL (ref 0.7–1.3)
CREAT UR-MCNC: 35 MG/DL
CROSSMATCH RESULT,%XM: NORMAL
CRP SERPL-MCNC: 10.5 MG/DL (ref 0–0.6)
CRP SERPL-MCNC: 2.84 MG/DL (ref 0–0.6)
CRP SERPL-MCNC: 2.87 MG/DL (ref 0–0.6)
CRP SERPL-MCNC: 20.1 MG/DL (ref 0–0.6)
CRP SERPL-MCNC: 28.3 MG/DL (ref 0–0.6)
CRP SERPL-MCNC: 3.07 MG/DL (ref 0–0.6)
CRP SERPL-MCNC: 3.13 MG/DL (ref 0–0.6)
CRP SERPL-MCNC: 3.61 MG/DL (ref 0–0.6)
CRP SERPL-MCNC: 31.4 MG/DL (ref 0–0.6)
CRP SERPL-MCNC: 35.9 MG/DL (ref 0–0.6)
CRP SERPL-MCNC: 4.59 MG/DL (ref 0–0.6)
CRP SERPL-MCNC: 6.69 MG/DL (ref 0–0.6)
DATE LAST DOSE: 0
DATE LAST DOSE: 0
DATE LAST DOSE: ABNORMAL
DATE LAST DOSE: NORMAL
DIAGNOSIS, 93000: NORMAL
DIFFERENTIAL METHOD BLD: ABNORMAL
DIGOXIN SERPL-MCNC: 0.4 NG/ML (ref 0.9–2)
DIGOXIN SERPL-MCNC: 0.4 NG/ML (ref 0.9–2)
DIGOXIN SERPL-MCNC: 1.3 NG/ML (ref 0.9–2)
DIGOXIN SERPL-MCNC: 1.9 NG/ML (ref 0.9–2)
DIGOXIN SERPL-MCNC: 2.3 NG/ML (ref 0.9–2)
DIGOXIN SERPL-MCNC: 2.4 NG/ML (ref 0.9–2)
ECHO AO ROOT DIAM: 3.19 CM
ECHO AV AREA PEAK VELOCITY: 1.46 CM2
ECHO AV AREA VTI: 1.41 CM2
ECHO AV AREA/BSA PEAK VELOCITY: 0.6 CM2/M2
ECHO AV AREA/BSA VTI: 0.6 CM2/M2
ECHO AV MEAN GRADIENT: 5.88 MMHG
ECHO AV PEAK GRADIENT: 12 MMHG
ECHO AV PEAK GRADIENT: 13.66 MMHG
ECHO AV PEAK VELOCITY: 184.82 CM/S
ECHO AV VTI: 25.97 CM
ECHO EST RA PRESSURE: 15 MMHG
ECHO IVC PROX: 2 CM
ECHO LA AREA 4C: 17.11 CM2
ECHO LA MAJOR AXIS: 3.99 CM
ECHO LA MINOR AXIS: 1.77 CM
ECHO LA VOL 2C: 38.99 ML (ref 18–58)
ECHO LA VOL 4C: 38.42 ML (ref 18–58)
ECHO LA VOL BP: 45.44 ML (ref 18–58)
ECHO LA VOL/BSA BIPLANE: 20.21 ML/M2 (ref 16–28)
ECHO LA VOLUME INDEX A2C: 17.34 ML/M2 (ref 16–28)
ECHO LA VOLUME INDEX A4C: 17.09 ML/M2 (ref 16–28)
ECHO LV E' LATERAL VELOCITY: 10.93 CENTIMETER/SECOND
ECHO LV E' SEPTAL VELOCITY: 10.18 CENTIMETER/SECOND
ECHO LV E' SEPTAL VELOCITY: 13 CM/S
ECHO LV EDV A2C: 86.9 CM3
ECHO LV EJECTION FRACTION A2C: 31 %
ECHO LV EJECTION FRACTION BIPLANE: 59.4 % (ref 55–100)
ECHO LV ESV A2C: 28.1 CM3
ECHO LV INTERNAL DIMENSION DIASTOLIC: 4.43 CM (ref 4.2–5.9)
ECHO LV INTERNAL DIMENSION DIASTOLIC: 5.22 CM (ref 4.2–5.9)
ECHO LV INTERNAL DIMENSION SYSTOLIC: 3.04 CM
ECHO LV INTERNAL DIMENSION SYSTOLIC: 3.57 CM
ECHO LV IVSD: 0.72 CM (ref 0.6–1)
ECHO LV IVSD: 1.02 CM (ref 0.6–1)
ECHO LV MASS 2D: 132.5 G (ref 88–224)
ECHO LV MASS 2D: 158.9 G (ref 88–224)
ECHO LV MASS INDEX 2D: 58.9 G/M2 (ref 49–115)
ECHO LV MASS INDEX 2D: 72.4 G/M2 (ref 49–115)
ECHO LV POSTERIOR WALL DIASTOLIC: 0.76 CM (ref 0.6–1)
ECHO LV POSTERIOR WALL DIASTOLIC: 1.07 CM (ref 0.6–1)
ECHO LVOT DIAM: 1.99 CM
ECHO LVOT PEAK GRADIENT: 2 MMHG
ECHO LVOT PEAK GRADIENT: 2.99 MMHG
ECHO LVOT PEAK VELOCITY: 86.47 CM/S
ECHO LVOT SV: 36.7 ML
ECHO LVOT VTI: 11.79 CM
ECHO MV A VELOCITY: 0.35 CENTIMETER/SECOND
ECHO MV A VELOCITY: 33.3 CM/S
ECHO MV AREA PHT: 3.75 CM2
ECHO MV E DECELERATION TIME (DT): 0.2 S
ECHO MV E DECELERATION TIME (DT): 130 MS
ECHO MV E VELOCITY: 112 CM/S
ECHO MV E VELOCITY: 82.97 CENTIMETER/SECOND
ECHO MV E/A RATIO: 3.36
ECHO MV E/E' SEPTAL: 8.62
ECHO MV PRESSURE HALF TIME (PHT): 0.06 S
ECHO MV REGURGITANT VTIA: 186 CM
ECHO PV MAX VELOCITY: 102.57 CM/S
ECHO PV PEAK INSTANTANEOUS GRADIENT SYSTOLIC: 4.21 MMHG
ECHO PV PEAK INSTANTANEOUS GRADIENT SYSTOLIC: 6 MMHG
ECHO PV REGURGITANT MAX VELOCITY: 126 CM/S
ECHO PV REGURGITANT MAX VELOCITY: 176 CM/S
ECHO PV REGURGITANT MAX VELOCITY: 534 CM/S
ECHO PV REGURGITANT MAX VELOCITY: 67.9 CM/S
ECHO RIGHT VENTRICULAR SYSTOLIC PRESSURE (RVSP): 61 MMHG
ECHO RV INTERNAL DIMENSION: 4.39 CM
ECHO RV INTERNAL DIMENSION: 4.98 CM
ECHO RV TAPSE: 1.4 CM (ref 1.5–2)
ECHO RV TAPSE: 1.69 CM (ref 1.5–2)
ECHO TV MAX VELOCITY: 339 CM/S
ECHO TV MEAN GRADIENT: 81 MMHG
ECHO TV REGURGITANT MAX VELOCITY: 271.79 CM/S
ECHO TV REGURGITANT PEAK GRADIENT: 29.55 MMHG
ECHO TV REGURGITANT PEAK GRADIENT: 46 MMHG
EOSINOPHIL # BLD: 0 K/UL (ref 0–0.4)
EOSINOPHIL # BLD: 0.1 K/UL (ref 0–0.4)
EOSINOPHIL # BLD: 0.2 K/UL (ref 0–0.4)
EOSINOPHIL # BLD: 0.2 K/UL (ref 0–0.4)
EOSINOPHIL # BLD: 0.3 K/UL (ref 0–0.4)
EOSINOPHIL # BLD: 0.4 K/UL (ref 0–0.4)
EOSINOPHIL # BLD: 0.4 K/UL (ref 0–0.4)
EOSINOPHIL # BLD: 0.5 K/UL (ref 0–0.4)
EOSINOPHIL NFR BLD: 0 % (ref 0–7)
EOSINOPHIL NFR BLD: 1 % (ref 0–7)
EOSINOPHIL NFR BLD: 1 % (ref 0–7)
EOSINOPHIL NFR BLD: 2 % (ref 0–7)
EOSINOPHIL NFR BLD: 2 % (ref 0–7)
EOSINOPHIL NFR BLD: 3 % (ref 0–7)
EOSINOPHIL NFR BLD: 4 % (ref 0–7)
EOSINOPHIL NFR BLD: 5 % (ref 0–7)
EOSINOPHIL NFR BLD: 6 % (ref 0–7)
EOSINOPHIL NFR BLD: 7 % (ref 0–7)
EOSINOPHIL NFR BLD: 9 % (ref 0–7)
EPAP/CPAP/PEEP, PAPEEP: 5
ERYTHROCYTE [DISTWIDTH] IN BLOOD BY AUTOMATED COUNT: 15.6 % (ref 11.5–14.5)
ERYTHROCYTE [DISTWIDTH] IN BLOOD BY AUTOMATED COUNT: 15.9 % (ref 11.5–14.5)
ERYTHROCYTE [DISTWIDTH] IN BLOOD BY AUTOMATED COUNT: 16 % (ref 11.5–14.5)
ERYTHROCYTE [DISTWIDTH] IN BLOOD BY AUTOMATED COUNT: 16.1 % (ref 11.5–14.5)
ERYTHROCYTE [DISTWIDTH] IN BLOOD BY AUTOMATED COUNT: 16.1 % (ref 11.5–14.5)
ERYTHROCYTE [DISTWIDTH] IN BLOOD BY AUTOMATED COUNT: 16.2 % (ref 11.5–14.5)
ERYTHROCYTE [DISTWIDTH] IN BLOOD BY AUTOMATED COUNT: 16.4 % (ref 11.5–14.5)
ERYTHROCYTE [DISTWIDTH] IN BLOOD BY AUTOMATED COUNT: 18.3 % (ref 11.5–14.5)
ERYTHROCYTE [DISTWIDTH] IN BLOOD BY AUTOMATED COUNT: 18.5 % (ref 11.5–14.5)
ERYTHROCYTE [DISTWIDTH] IN BLOOD BY AUTOMATED COUNT: 18.5 % (ref 11.5–14.5)
ERYTHROCYTE [DISTWIDTH] IN BLOOD BY AUTOMATED COUNT: 18.8 % (ref 11.5–14.5)
ERYTHROCYTE [DISTWIDTH] IN BLOOD BY AUTOMATED COUNT: 18.9 % (ref 11.5–14.5)
ERYTHROCYTE [DISTWIDTH] IN BLOOD BY AUTOMATED COUNT: 19 % (ref 11.5–14.5)
ERYTHROCYTE [DISTWIDTH] IN BLOOD BY AUTOMATED COUNT: 19 % (ref 11.5–14.5)
ERYTHROCYTE [DISTWIDTH] IN BLOOD BY AUTOMATED COUNT: 19.1 % (ref 11.5–14.5)
ERYTHROCYTE [DISTWIDTH] IN BLOOD BY AUTOMATED COUNT: 19.2 % (ref 11.5–14.5)
ERYTHROCYTE [DISTWIDTH] IN BLOOD BY AUTOMATED COUNT: 19.4 % (ref 11.5–14.5)
ERYTHROCYTE [DISTWIDTH] IN BLOOD BY AUTOMATED COUNT: 19.4 % (ref 11.5–14.5)
ERYTHROCYTE [DISTWIDTH] IN BLOOD BY AUTOMATED COUNT: 19.5 % (ref 11.5–14.5)
ERYTHROCYTE [DISTWIDTH] IN BLOOD BY AUTOMATED COUNT: 19.5 % (ref 11.5–14.5)
ERYTHROCYTE [DISTWIDTH] IN BLOOD BY AUTOMATED COUNT: 19.6 % (ref 11.5–14.5)
ERYTHROCYTE [DISTWIDTH] IN BLOOD BY AUTOMATED COUNT: 19.9 % (ref 11.5–14.5)
ERYTHROCYTE [DISTWIDTH] IN BLOOD BY AUTOMATED COUNT: 21.4 % (ref 11.5–14.5)
ERYTHROCYTE [DISTWIDTH] IN BLOOD BY AUTOMATED COUNT: 22.1 % (ref 11.5–14.5)
ERYTHROCYTE [DISTWIDTH] IN BLOOD BY AUTOMATED COUNT: 22.7 % (ref 11.5–14.5)
FERRITIN SERPL-MCNC: 1237 NG/ML (ref 26–388)
FERRITIN SERPL-MCNC: 458 NG/ML (ref 26–388)
FIBRINOGEN PPP-MCNC: 529 MG/DL (ref 220–535)
FIO2 ON VENT: 21 %
FIO2 ON VENT: 28 %
FIO2 ON VENT: 28 %
FIO2 ON VENT: 30 %
FIO2 ON VENT: 45 %
FOLATE SERPL-MCNC: 19 NG/ML (ref 5–21)
FOLATE SERPL-MCNC: 9.4 NG/ML (ref 5–21)
FOLATE SERPL-MCNC: 9.8 NG/ML (ref 5–21)
GAMMA GLOB SERPL ELPH-MCNC: 1.2 G/DL
GAS FLOW.O2 SETTING OXYMISER: 12 L/MIN
GAS FLOW.O2 SETTING OXYMISER: 15 L/MIN
GAS FLOW.O2 SETTING OXYMISER: 18 L/MIN
GAS FLOW.O2 SETTING OXYMISER: 20 L/MIN
GAS FLOW.O2 SETTING OXYMISER: 26 L/MIN
GAS FLOW.O2 SETTING OXYMISER: 30 L/MIN
GAS FLOW.O2 SETTING OXYMISER: 30 L/MIN
GLOBULIN SER CALC-MCNC: 3.2 G/DL
GLOBULIN SER CALC-MCNC: 3.2 G/DL (ref 2–4)
GLOBULIN SER CALC-MCNC: 3.8 G/DL (ref 2–4)
GLOBULIN SER CALC-MCNC: 3.8 G/DL (ref 2–4)
GLOBULIN SER CALC-MCNC: 4.4 G/DL (ref 2–4)
GLOBULIN SER CALC-MCNC: 4.5 G/DL (ref 2–4)
GLOBULIN SER CALC-MCNC: 4.6 G/DL (ref 2–4)
GLOBULIN SER CALC-MCNC: 4.6 G/DL (ref 2–4)
GLOBULIN SER CALC-MCNC: 4.8 G/DL (ref 2–4)
GLOBULIN SER CALC-MCNC: 4.9 G/DL (ref 2–4)
GLOBULIN SER CALC-MCNC: 5.7 G/DL (ref 2–4)
GLOBULIN SER CALC-MCNC: 6 G/DL (ref 2–4)
GLUCOSE BLD STRIP.AUTO-MCNC: 100 MG/DL (ref 65–100)
GLUCOSE BLD STRIP.AUTO-MCNC: 102 MG/DL (ref 65–100)
GLUCOSE BLD STRIP.AUTO-MCNC: 103 MG/DL (ref 65–100)
GLUCOSE BLD STRIP.AUTO-MCNC: 104 MG/DL (ref 65–100)
GLUCOSE BLD STRIP.AUTO-MCNC: 105 MG/DL (ref 65–100)
GLUCOSE BLD STRIP.AUTO-MCNC: 107 MG/DL (ref 65–100)
GLUCOSE BLD STRIP.AUTO-MCNC: 108 MG/DL (ref 65–100)
GLUCOSE BLD STRIP.AUTO-MCNC: 111 MG/DL (ref 65–100)
GLUCOSE BLD STRIP.AUTO-MCNC: 112 MG/DL (ref 65–100)
GLUCOSE BLD STRIP.AUTO-MCNC: 113 MG/DL (ref 65–100)
GLUCOSE BLD STRIP.AUTO-MCNC: 114 MG/DL (ref 65–100)
GLUCOSE BLD STRIP.AUTO-MCNC: 114 MG/DL (ref 65–100)
GLUCOSE BLD STRIP.AUTO-MCNC: 115 MG/DL (ref 65–100)
GLUCOSE BLD STRIP.AUTO-MCNC: 118 MG/DL (ref 65–100)
GLUCOSE BLD STRIP.AUTO-MCNC: 119 MG/DL (ref 65–100)
GLUCOSE BLD STRIP.AUTO-MCNC: 121 MG/DL (ref 65–100)
GLUCOSE BLD STRIP.AUTO-MCNC: 124 MG/DL (ref 65–100)
GLUCOSE BLD STRIP.AUTO-MCNC: 125 MG/DL (ref 65–100)
GLUCOSE BLD STRIP.AUTO-MCNC: 127 MG/DL (ref 65–100)
GLUCOSE BLD STRIP.AUTO-MCNC: 129 MG/DL (ref 65–100)
GLUCOSE BLD STRIP.AUTO-MCNC: 129 MG/DL (ref 65–100)
GLUCOSE BLD STRIP.AUTO-MCNC: 130 MG/DL (ref 65–100)
GLUCOSE BLD STRIP.AUTO-MCNC: 131 MG/DL (ref 65–100)
GLUCOSE BLD STRIP.AUTO-MCNC: 131 MG/DL (ref 65–100)
GLUCOSE BLD STRIP.AUTO-MCNC: 135 MG/DL (ref 65–100)
GLUCOSE BLD STRIP.AUTO-MCNC: 135 MG/DL (ref 65–100)
GLUCOSE BLD STRIP.AUTO-MCNC: 139 MG/DL (ref 65–100)
GLUCOSE BLD STRIP.AUTO-MCNC: 142 MG/DL (ref 65–100)
GLUCOSE BLD STRIP.AUTO-MCNC: 144 MG/DL (ref 65–100)
GLUCOSE BLD STRIP.AUTO-MCNC: 146 MG/DL (ref 65–100)
GLUCOSE BLD STRIP.AUTO-MCNC: 146 MG/DL (ref 65–100)
GLUCOSE BLD STRIP.AUTO-MCNC: 160 MG/DL (ref 65–100)
GLUCOSE BLD STRIP.AUTO-MCNC: 163 MG/DL (ref 65–100)
GLUCOSE BLD STRIP.AUTO-MCNC: 163 MG/DL (ref 65–100)
GLUCOSE BLD STRIP.AUTO-MCNC: 166 MG/DL (ref 65–100)
GLUCOSE BLD STRIP.AUTO-MCNC: 179 MG/DL (ref 65–100)
GLUCOSE BLD STRIP.AUTO-MCNC: 186 MG/DL (ref 65–100)
GLUCOSE BLD STRIP.AUTO-MCNC: 186 MG/DL (ref 65–100)
GLUCOSE BLD STRIP.AUTO-MCNC: 187 MG/DL (ref 65–100)
GLUCOSE BLD STRIP.AUTO-MCNC: 190 MG/DL (ref 65–100)
GLUCOSE BLD STRIP.AUTO-MCNC: 194 MG/DL (ref 65–100)
GLUCOSE BLD STRIP.AUTO-MCNC: 195 MG/DL (ref 65–100)
GLUCOSE BLD STRIP.AUTO-MCNC: 53 MG/DL (ref 65–100)
GLUCOSE BLD STRIP.AUTO-MCNC: 65 MG/DL (ref 65–100)
GLUCOSE BLD STRIP.AUTO-MCNC: 78 MG/DL (ref 65–100)
GLUCOSE BLD STRIP.AUTO-MCNC: 83 MG/DL (ref 65–100)
GLUCOSE BLD STRIP.AUTO-MCNC: 87 MG/DL (ref 65–100)
GLUCOSE BLD STRIP.AUTO-MCNC: 87 MG/DL (ref 65–100)
GLUCOSE BLD STRIP.AUTO-MCNC: 90 MG/DL (ref 65–100)
GLUCOSE BLD STRIP.AUTO-MCNC: 95 MG/DL (ref 65–100)
GLUCOSE BLD STRIP.AUTO-MCNC: 95 MG/DL (ref 65–100)
GLUCOSE FLD-MCNC: 120 MG/DL
GLUCOSE SERPL-MCNC: 102 MG/DL (ref 65–100)
GLUCOSE SERPL-MCNC: 109 MG/DL (ref 65–100)
GLUCOSE SERPL-MCNC: 113 MG/DL (ref 65–100)
GLUCOSE SERPL-MCNC: 114 MG/DL (ref 65–100)
GLUCOSE SERPL-MCNC: 119 MG/DL (ref 65–100)
GLUCOSE SERPL-MCNC: 120 MG/DL (ref 65–100)
GLUCOSE SERPL-MCNC: 122 MG/DL (ref 65–100)
GLUCOSE SERPL-MCNC: 122 MG/DL (ref 65–100)
GLUCOSE SERPL-MCNC: 124 MG/DL (ref 65–100)
GLUCOSE SERPL-MCNC: 125 MG/DL (ref 65–100)
GLUCOSE SERPL-MCNC: 127 MG/DL (ref 65–100)
GLUCOSE SERPL-MCNC: 128 MG/DL (ref 65–100)
GLUCOSE SERPL-MCNC: 130 MG/DL (ref 65–100)
GLUCOSE SERPL-MCNC: 132 MG/DL (ref 65–100)
GLUCOSE SERPL-MCNC: 140 MG/DL (ref 65–100)
GLUCOSE SERPL-MCNC: 147 MG/DL (ref 65–100)
GLUCOSE SERPL-MCNC: 154 MG/DL (ref 65–100)
GLUCOSE SERPL-MCNC: 157 MG/DL (ref 65–100)
GLUCOSE SERPL-MCNC: 158 MG/DL (ref 65–100)
GLUCOSE SERPL-MCNC: 162 MG/DL (ref 65–100)
GLUCOSE SERPL-MCNC: 168 MG/DL (ref 65–100)
GLUCOSE SERPL-MCNC: 40 MG/DL (ref 65–100)
GLUCOSE SERPL-MCNC: 61 MG/DL (ref 65–100)
GLUCOSE SERPL-MCNC: 74 MG/DL (ref 65–100)
GLUCOSE SERPL-MCNC: 74 MG/DL (ref 65–100)
GLUCOSE SERPL-MCNC: 75 MG/DL (ref 65–100)
GLUCOSE SERPL-MCNC: 78 MG/DL (ref 65–100)
GLUCOSE SERPL-MCNC: 81 MG/DL (ref 65–100)
GLUCOSE SERPL-MCNC: 87 MG/DL (ref 65–100)
GLUCOSE SERPL-MCNC: 88 MG/DL (ref 65–100)
GLUCOSE SERPL-MCNC: 89 MG/DL (ref 65–100)
GLUCOSE SERPL-MCNC: 92 MG/DL (ref 65–100)
GLUCOSE SERPL-MCNC: 98 MG/DL (ref 65–100)
GLUCOSE SERPL-MCNC: 98 MG/DL (ref 65–100)
GLUCOSE UR STRIP.AUTO-MCNC: 50 MG/DL
GLUCOSE UR STRIP.AUTO-MCNC: NEGATIVE MG/DL
GRAM STN SPEC: NORMAL
HAPTOGLOB SERPL-MCNC: 255 MG/DL (ref 30–200)
HAPTOGLOB SERPL-MCNC: 363 MG/DL (ref 30–200)
HCO3 BLDA-SCNC: 12 MMOL/L (ref 22–26)
HCO3 BLDA-SCNC: 13 MMOL/L (ref 22–26)
HCO3 BLDA-SCNC: 14 MMOL/L (ref 22–26)
HCO3 BLDA-SCNC: 17 MMOL/L (ref 22–26)
HCO3 BLDA-SCNC: 19 MMOL/L (ref 22–26)
HCO3 BLDA-SCNC: 19 MMOL/L (ref 22–26)
HCO3 BLDA-SCNC: 20 MMOL/L (ref 22–26)
HCO3 BLDA-SCNC: 22 MMOL/L (ref 22–26)
HCO3 BLDA-SCNC: 23 MMOL/L (ref 22–26)
HCO3 BLDA-SCNC: 23 MMOL/L (ref 22–26)
HCO3 BLDA-SCNC: 24 MMOL/L (ref 22–26)
HCO3 BLDA-SCNC: 24 MMOL/L (ref 22–26)
HCO3 BLDA-SCNC: 26 MMOL/L (ref 22–26)
HCT VFR BLD AUTO: 21.4 % (ref 36.6–50.3)
HCT VFR BLD AUTO: 21.9 % (ref 36.6–50.3)
HCT VFR BLD AUTO: 23.2 % (ref 36.6–50.3)
HCT VFR BLD AUTO: 24.5 % (ref 36.6–50.3)
HCT VFR BLD AUTO: 25.9 % (ref 36.6–50.3)
HCT VFR BLD AUTO: 27.1 % (ref 36.6–50.3)
HCT VFR BLD AUTO: 27.3 % (ref 36.6–50.3)
HCT VFR BLD AUTO: 27.4 % (ref 36.6–50.3)
HCT VFR BLD AUTO: 27.7 % (ref 36.6–50.3)
HCT VFR BLD AUTO: 27.8 % (ref 36.6–50.3)
HCT VFR BLD AUTO: 27.8 % (ref 36.6–50.3)
HCT VFR BLD AUTO: 28.1 % (ref 36.6–50.3)
HCT VFR BLD AUTO: 28.2 % (ref 36.6–50.3)
HCT VFR BLD AUTO: 28.3 % (ref 36.6–50.3)
HCT VFR BLD AUTO: 28.4 % (ref 36.6–50.3)
HCT VFR BLD AUTO: 28.7 % (ref 36.6–50.3)
HCT VFR BLD AUTO: 28.9 % (ref 36.6–50.3)
HCT VFR BLD AUTO: 29 % (ref 36.6–50.3)
HCT VFR BLD AUTO: 29.5 % (ref 36.6–50.3)
HCT VFR BLD AUTO: 29.5 % (ref 36.6–50.3)
HCT VFR BLD AUTO: 29.6 % (ref 36.6–50.3)
HCT VFR BLD AUTO: 29.7 % (ref 36.6–50.3)
HCT VFR BLD AUTO: 30.1 % (ref 36.6–50.3)
HCT VFR BLD AUTO: 30.4 % (ref 36.6–50.3)
HCT VFR BLD AUTO: 30.5 % (ref 36.6–50.3)
HCT VFR BLD AUTO: 31.3 % (ref 36.6–50.3)
HCT VFR BLD AUTO: 31.4 % (ref 36.6–50.3)
HCT VFR BLD AUTO: 31.6 % (ref 36.6–50.3)
HCT VFR BLD AUTO: 31.8 % (ref 36.6–50.3)
HCT VFR BLD AUTO: 33 % (ref 36.6–50.3)
HEALTH STATUS, XMCV2T: NORMAL
HEMOCCULT STL QL: NEGATIVE
HGB BLD-MCNC: 10 G/DL (ref 12.1–17)
HGB BLD-MCNC: 10.3 G/DL (ref 12.1–17)
HGB BLD-MCNC: 6.7 G/DL (ref 12.1–17)
HGB BLD-MCNC: 6.8 G/DL (ref 12.1–17)
HGB BLD-MCNC: 6.8 G/DL (ref 12.1–17)
HGB BLD-MCNC: 7.3 G/DL (ref 12.1–17)
HGB BLD-MCNC: 8.1 G/DL (ref 12.1–17)
HGB BLD-MCNC: 8.4 G/DL (ref 12.1–17)
HGB BLD-MCNC: 8.5 G/DL (ref 12.1–17)
HGB BLD-MCNC: 8.5 G/DL (ref 12.1–17)
HGB BLD-MCNC: 8.6 G/DL (ref 12.1–17)
HGB BLD-MCNC: 8.7 G/DL (ref 12.1–17)
HGB BLD-MCNC: 8.8 G/DL (ref 12.1–17)
HGB BLD-MCNC: 8.9 G/DL (ref 12.1–17)
HGB BLD-MCNC: 9 G/DL (ref 12.1–17)
HGB BLD-MCNC: 9 G/DL (ref 12.1–17)
HGB BLD-MCNC: 9.1 G/DL (ref 12.1–17)
HGB BLD-MCNC: 9.3 G/DL (ref 12.1–17)
HGB BLD-MCNC: 9.4 G/DL (ref 12.1–17)
HGB BLD-MCNC: 9.5 G/DL (ref 12.1–17)
HGB BLD-MCNC: 9.6 G/DL (ref 12.1–17)
HGB BLD-MCNC: 9.7 G/DL (ref 12.1–17)
HGB UR QL STRIP: ABNORMAL
HGB UR QL STRIP: ABNORMAL
HYALINE CASTS URNS QL MICRO: >20 /LPF (ref 0–5)
IGA SERPL-MCNC: 306 MG/DL (ref 70–400)
IGA SERPL-MCNC: 313 MG/DL
IGG SERPL-MCNC: 1271 MG/DL
IGG SERPL-MCNC: 1680 MG/DL (ref 700–1600)
IGM SERPL-MCNC: 109 MG/DL
IGM SERPL-MCNC: 145 MG/DL (ref 40–230)
IMM GRANULOCYTES # BLD AUTO: 0 K/UL
IMM GRANULOCYTES # BLD AUTO: 2 K/UL (ref 0–0.04)
IMM GRANULOCYTES # BLD AUTO: 2.6 K/UL (ref 0–0.04)
IMM GRANULOCYTES # BLD AUTO: 3.9 K/UL (ref 0–0.04)
IMM GRANULOCYTES NFR BLD AUTO: 0 %
IMM GRANULOCYTES NFR BLD AUTO: 0 % (ref 0–0.5)
IMM GRANULOCYTES NFR BLD AUTO: 16 % (ref 0–0.5)
IMM GRANULOCYTES NFR BLD AUTO: 20 % (ref 0–0.5)
IMM GRANULOCYTES NFR BLD AUTO: 25 % (ref 0–0.5)
INR PPP: 1.5 (ref 0.9–1.1)
INR PPP: 1.5 (ref 0.9–1.1)
INR PPP: 1.9 (ref 0.9–1.1)
INR PPP: 2 (ref 0.9–1.1)
IPAP/PIP, IPAPIP: 0
IRON SATN MFR SERPL: 18 % (ref 20–50)
IRON SATN MFR SERPL: 21 % (ref 20–50)
IRON SATN MFR SERPL: 29 % (ref 20–50)
IRON SERPL-MCNC: 20 UG/DL (ref 35–150)
IRON SERPL-MCNC: 28 UG/DL (ref 35–150)
IRON SERPL-MCNC: 29 UG/DL (ref 35–150)
KAPPA LC FREE SER-MCNC: NORMAL MG/L
KAPPA LC FREE/LAMBDA FREE SER: NORMAL {RATIO}
KETONES UR QL STRIP.AUTO: 5 MG/DL
KETONES UR QL STRIP.AUTO: NEGATIVE MG/DL
LA VOL DISK BP: 40.92 ML (ref 18–58)
LACTATE SERPL-SCNC: 1.2 MMOL/L (ref 0.4–2)
LACTATE SERPL-SCNC: 2.6 MMOL/L (ref 0.4–2)
LACTATE SERPL-SCNC: 2.7 MMOL/L (ref 0.4–2)
LACTATE SERPL-SCNC: 3.8 MMOL/L (ref 0.4–2)
LACTATE SERPL-SCNC: 4.2 MMOL/L (ref 0.4–2)
LACTATE SERPL-SCNC: 4.5 MMOL/L (ref 0.4–2)
LACTATE SERPL-SCNC: 6.1 MMOL/L (ref 0.4–2)
LACTATE SERPL-SCNC: 6.3 MMOL/L (ref 0.4–2)
LAMBDA LC FREE SERPL-MCNC: NORMAL MG/L
LDH FLD L TO P-CCNC: 155 U/L
LDH SERPL L TO P-CCNC: 162 U/L (ref 85–241)
LEUKOCYTE ESTERASE UR QL STRIP.AUTO: ABNORMAL
LEUKOCYTE ESTERASE UR QL STRIP.AUTO: ABNORMAL
LVOT MG: 1.28 MMHG
LYMPHOCYTES # BLD: 1.1 K/UL (ref 0.8–3.5)
LYMPHOCYTES # BLD: 1.4 K/UL (ref 0.8–3.5)
LYMPHOCYTES # BLD: 1.5 K/UL (ref 0.8–3.5)
LYMPHOCYTES # BLD: 1.5 K/UL (ref 0.8–3.5)
LYMPHOCYTES # BLD: 1.8 K/UL (ref 0.8–3.5)
LYMPHOCYTES # BLD: 1.8 K/UL (ref 0.8–3.5)
LYMPHOCYTES # BLD: 1.9 K/UL (ref 0.8–3.5)
LYMPHOCYTES # BLD: 1.9 K/UL (ref 0.8–3.5)
LYMPHOCYTES # BLD: 2 K/UL (ref 0.8–3.5)
LYMPHOCYTES # BLD: 2.2 K/UL (ref 0.8–3.5)
LYMPHOCYTES # BLD: 2.3 K/UL (ref 0.8–3.5)
LYMPHOCYTES # BLD: 2.3 K/UL (ref 0.8–3.5)
LYMPHOCYTES # BLD: 2.4 K/UL (ref 0.8–3.5)
LYMPHOCYTES # BLD: 2.7 K/UL (ref 0.8–3.5)
LYMPHOCYTES # BLD: 3 K/UL (ref 0.8–3.5)
LYMPHOCYTES # BLD: 3.2 K/UL (ref 0.8–3.5)
LYMPHOCYTES # BLD: 3.8 K/UL (ref 0.8–3.5)
LYMPHOCYTES NFR BLD: 10 % (ref 12–49)
LYMPHOCYTES NFR BLD: 13 % (ref 12–49)
LYMPHOCYTES NFR BLD: 20 % (ref 12–49)
LYMPHOCYTES NFR BLD: 22 % (ref 12–49)
LYMPHOCYTES NFR BLD: 23 % (ref 12–49)
LYMPHOCYTES NFR BLD: 27 % (ref 12–49)
LYMPHOCYTES NFR BLD: 28 % (ref 12–49)
LYMPHOCYTES NFR BLD: 28 % (ref 12–49)
LYMPHOCYTES NFR BLD: 31 % (ref 12–49)
LYMPHOCYTES NFR BLD: 34 % (ref 12–49)
LYMPHOCYTES NFR BLD: 36 % (ref 12–49)
LYMPHOCYTES NFR BLD: 40 % (ref 12–49)
LYMPHOCYTES NFR BLD: 42 % (ref 12–49)
LYMPHOCYTES NFR BLD: 43 % (ref 12–49)
LYMPHOCYTES NFR BLD: 43 % (ref 12–49)
LYMPHOCYTES NFR BLD: 46 % (ref 12–49)
LYMPHOCYTES NFR BLD: 9 % (ref 12–49)
M PROTEIN SERPL ELPH-MCNC: 0.5 G/DL
MAGNESIUM SERPL-MCNC: 1.5 MG/DL (ref 1.6–2.4)
MAGNESIUM SERPL-MCNC: 1.7 MG/DL (ref 1.6–2.4)
MAGNESIUM SERPL-MCNC: 1.7 MG/DL (ref 1.6–2.4)
MAGNESIUM SERPL-MCNC: 1.8 MG/DL (ref 1.6–2.4)
MAGNESIUM SERPL-MCNC: 1.9 MG/DL (ref 1.6–2.4)
MAGNESIUM SERPL-MCNC: 2 MG/DL (ref 1.6–2.4)
MAGNESIUM SERPL-MCNC: 2.1 MG/DL (ref 1.6–2.4)
MCH RBC QN AUTO: 23.8 PG (ref 26–34)
MCH RBC QN AUTO: 24.3 PG (ref 26–34)
MCH RBC QN AUTO: 24.8 PG (ref 26–34)
MCH RBC QN AUTO: 24.9 PG (ref 26–34)
MCH RBC QN AUTO: 25.2 PG (ref 26–34)
MCH RBC QN AUTO: 25.5 PG (ref 26–34)
MCH RBC QN AUTO: 25.6 PG (ref 26–34)
MCH RBC QN AUTO: 25.6 PG (ref 26–34)
MCH RBC QN AUTO: 25.7 PG (ref 26–34)
MCH RBC QN AUTO: 25.8 PG (ref 26–34)
MCH RBC QN AUTO: 26 PG (ref 26–34)
MCH RBC QN AUTO: 26.1 PG (ref 26–34)
MCH RBC QN AUTO: 26.2 PG (ref 26–34)
MCH RBC QN AUTO: 26.4 PG (ref 26–34)
MCH RBC QN AUTO: 26.5 PG (ref 26–34)
MCH RBC QN AUTO: 26.7 PG (ref 26–34)
MCH RBC QN AUTO: 27.3 PG (ref 26–34)
MCH RBC QN AUTO: 27.4 PG (ref 26–34)
MCH RBC QN AUTO: 27.6 PG (ref 26–34)
MCH RBC QN AUTO: 27.7 PG (ref 26–34)
MCH RBC QN AUTO: 27.9 PG (ref 26–34)
MCH RBC QN AUTO: 28 PG (ref 26–34)
MCH RBC QN AUTO: 28.1 PG (ref 26–34)
MCH RBC QN AUTO: 28.3 PG (ref 26–34)
MCH RBC QN AUTO: 28.7 PG (ref 26–34)
MCHC RBC AUTO-ENTMCNC: 28.7 G/DL (ref 30–36.5)
MCHC RBC AUTO-ENTMCNC: 29.3 G/DL (ref 30–36.5)
MCHC RBC AUTO-ENTMCNC: 29.8 G/DL (ref 30–36.5)
MCHC RBC AUTO-ENTMCNC: 29.8 G/DL (ref 30–36.5)
MCHC RBC AUTO-ENTMCNC: 29.9 G/DL (ref 30–36.5)
MCHC RBC AUTO-ENTMCNC: 30 G/DL (ref 30–36.5)
MCHC RBC AUTO-ENTMCNC: 30.1 G/DL (ref 30–36.5)
MCHC RBC AUTO-ENTMCNC: 30.3 G/DL (ref 30–36.5)
MCHC RBC AUTO-ENTMCNC: 30.4 G/DL (ref 30–36.5)
MCHC RBC AUTO-ENTMCNC: 30.4 G/DL (ref 30–36.5)
MCHC RBC AUTO-ENTMCNC: 30.8 G/DL (ref 30–36.5)
MCHC RBC AUTO-ENTMCNC: 30.9 G/DL (ref 30–36.5)
MCHC RBC AUTO-ENTMCNC: 30.9 G/DL (ref 30–36.5)
MCHC RBC AUTO-ENTMCNC: 31 G/DL (ref 30–36.5)
MCHC RBC AUTO-ENTMCNC: 31.1 G/DL (ref 30–36.5)
MCHC RBC AUTO-ENTMCNC: 31.2 G/DL (ref 30–36.5)
MCHC RBC AUTO-ENTMCNC: 31.3 G/DL (ref 30–36.5)
MCHC RBC AUTO-ENTMCNC: 31.7 G/DL (ref 30–36.5)
MCHC RBC AUTO-ENTMCNC: 31.7 G/DL (ref 30–36.5)
MCHC RBC AUTO-ENTMCNC: 31.8 G/DL (ref 30–36.5)
MCHC RBC AUTO-ENTMCNC: 31.8 G/DL (ref 30–36.5)
MCHC RBC AUTO-ENTMCNC: 31.9 G/DL (ref 30–36.5)
MCV RBC AUTO: 79.8 FL (ref 80–99)
MCV RBC AUTO: 80.3 FL (ref 80–99)
MCV RBC AUTO: 81.1 FL (ref 80–99)
MCV RBC AUTO: 81.1 FL (ref 80–99)
MCV RBC AUTO: 81.5 FL (ref 80–99)
MCV RBC AUTO: 82 FL (ref 80–99)
MCV RBC AUTO: 82.5 FL (ref 80–99)
MCV RBC AUTO: 82.5 FL (ref 80–99)
MCV RBC AUTO: 82.9 FL (ref 80–99)
MCV RBC AUTO: 83.3 FL (ref 80–99)
MCV RBC AUTO: 83.3 FL (ref 80–99)
MCV RBC AUTO: 85.3 FL (ref 80–99)
MCV RBC AUTO: 86.3 FL (ref 80–99)
MCV RBC AUTO: 87.1 FL (ref 80–99)
MCV RBC AUTO: 87.1 FL (ref 80–99)
MCV RBC AUTO: 87.2 FL (ref 80–99)
MCV RBC AUTO: 87.3 FL (ref 80–99)
MCV RBC AUTO: 88.1 FL (ref 80–99)
MCV RBC AUTO: 88.1 FL (ref 80–99)
MCV RBC AUTO: 88.3 FL (ref 80–99)
MCV RBC AUTO: 89.1 FL (ref 80–99)
MCV RBC AUTO: 89.2 FL (ref 80–99)
MCV RBC AUTO: 89.6 FL (ref 80–99)
MCV RBC AUTO: 89.7 FL (ref 80–99)
MCV RBC AUTO: 89.8 FL (ref 80–99)
MCV RBC AUTO: 90.5 FL (ref 80–99)
MCV RBC AUTO: 91.3 FL (ref 80–99)
METAMYELOCYTES NFR BLD MANUAL: 1 %
METAMYELOCYTES NFR BLD MANUAL: 16 %
METAMYELOCYTES NFR BLD MANUAL: 2 %
METAMYELOCYTES NFR BLD MANUAL: 3 %
METAMYELOCYTES NFR BLD MANUAL: 7 %
MONOCYTES # BLD: 0.1 K/UL (ref 0–1)
MONOCYTES # BLD: 0.1 K/UL (ref 0–1)
MONOCYTES # BLD: 0.2 K/UL (ref 0–1)
MONOCYTES # BLD: 0.3 K/UL (ref 0–1)
MONOCYTES # BLD: 0.4 K/UL (ref 0–1)
MONOCYTES # BLD: 0.5 K/UL (ref 0–1)
MONOCYTES # BLD: 0.7 K/UL (ref 0–1)
MONOCYTES # BLD: 0.8 K/UL (ref 0–1)
MONOCYTES # BLD: 0.8 K/UL (ref 0–1)
MONOCYTES # BLD: 0.9 K/UL (ref 0–1)
MONOCYTES # BLD: 1 K/UL (ref 0–1)
MONOCYTES # BLD: 1.2 K/UL (ref 0–1)
MONOCYTES # BLD: 1.2 K/UL (ref 0–1)
MONOCYTES # BLD: 1.5 K/UL (ref 0–1)
MONOCYTES # BLD: 1.6 K/UL (ref 0–1)
MONOCYTES # BLD: 1.7 K/UL (ref 0–1)
MONOCYTES # BLD: 3.3 K/UL (ref 0–1)
MONOCYTES NFR BLD: 1 % (ref 5–13)
MONOCYTES NFR BLD: 12 % (ref 5–13)
MONOCYTES NFR BLD: 14 % (ref 5–13)
MONOCYTES NFR BLD: 14 % (ref 5–13)
MONOCYTES NFR BLD: 15 % (ref 5–13)
MONOCYTES NFR BLD: 15 % (ref 5–13)
MONOCYTES NFR BLD: 16 % (ref 5–13)
MONOCYTES NFR BLD: 17 % (ref 5–13)
MONOCYTES NFR BLD: 2 % (ref 5–13)
MONOCYTES NFR BLD: 2 % (ref 5–13)
MONOCYTES NFR BLD: 23 % (ref 5–13)
MONOCYTES NFR BLD: 25 % (ref 5–13)
MONOCYTES NFR BLD: 27 % (ref 5–13)
MONOCYTES NFR BLD: 27 % (ref 5–13)
MONOCYTES NFR BLD: 3 % (ref 5–13)
MONOCYTES NFR BLD: 4 % (ref 5–13)
MONOCYTES NFR BLD: 5 % (ref 5–13)
MONOCYTES NFR BLD: 7 % (ref 5–13)
MONOCYTES NFR BLD: 7 % (ref 5–13)
MONOCYTES NFR BLD: 8 % (ref 5–13)
MUCOUS THREADS URNS QL MICRO: ABNORMAL /LPF
MUCOUS THREADS URNS QL MICRO: ABNORMAL /LPF
MYELOCYTES NFR BLD MANUAL: 1 %
MYELOCYTES NFR BLD MANUAL: 1 %
MYELOCYTES NFR BLD MANUAL: 11 %
MYELOCYTES NFR BLD MANUAL: 2 %
MYELOCYTES NFR BLD MANUAL: 24 %
MYELOCYTES NFR BLD MANUAL: 24 %
MYELOCYTES NFR BLD MANUAL: 3 %
MYELOCYTES NFR BLD MANUAL: 5 %
NEUTS BAND NFR BLD MANUAL: 1 % (ref 0–6)
NEUTS BAND NFR BLD MANUAL: 11 % (ref 0–6)
NEUTS BAND NFR BLD MANUAL: 2 % (ref 0–6)
NEUTS BAND NFR BLD MANUAL: 2 % (ref 0–6)
NEUTS BAND NFR BLD MANUAL: 4 % (ref 0–6)
NEUTS BAND NFR BLD MANUAL: 9 % (ref 0–6)
NEUTS SEG # BLD: 1.7 K/UL (ref 1.8–8)
NEUTS SEG # BLD: 1.8 K/UL (ref 1.8–8)
NEUTS SEG # BLD: 1.9 K/UL (ref 1.8–8)
NEUTS SEG # BLD: 10.8 K/UL (ref 1.8–8)
NEUTS SEG # BLD: 11.2 K/UL (ref 1.8–8)
NEUTS SEG # BLD: 11.4 K/UL (ref 1.8–8)
NEUTS SEG # BLD: 12.8 K/UL (ref 1.8–8)
NEUTS SEG # BLD: 15.5 K/UL (ref 1.8–8)
NEUTS SEG # BLD: 2 K/UL (ref 1.8–8)
NEUTS SEG # BLD: 2 K/UL (ref 1.8–8)
NEUTS SEG # BLD: 2.1 K/UL (ref 1.8–8)
NEUTS SEG # BLD: 2.4 K/UL (ref 1.8–8)
NEUTS SEG # BLD: 2.4 K/UL (ref 1.8–8)
NEUTS SEG # BLD: 2.5 K/UL (ref 1.8–8)
NEUTS SEG # BLD: 2.8 K/UL (ref 1.8–8)
NEUTS SEG # BLD: 3.4 K/UL (ref 1.8–8)
NEUTS SEG # BLD: 7.2 K/UL (ref 1.8–8)
NEUTS SEG # BLD: 7.9 K/UL (ref 1.8–8)
NEUTS SEG # BLD: 9.2 K/UL (ref 1.8–8)
NEUTS SEG NFR BLD: 26 % (ref 32–75)
NEUTS SEG NFR BLD: 26 % (ref 32–75)
NEUTS SEG NFR BLD: 28 % (ref 32–75)
NEUTS SEG NFR BLD: 31 % (ref 32–75)
NEUTS SEG NFR BLD: 32 % (ref 32–75)
NEUTS SEG NFR BLD: 33 % (ref 32–75)
NEUTS SEG NFR BLD: 34 % (ref 32–75)
NEUTS SEG NFR BLD: 36 % (ref 32–75)
NEUTS SEG NFR BLD: 37 % (ref 32–75)
NEUTS SEG NFR BLD: 37 % (ref 32–75)
NEUTS SEG NFR BLD: 44 % (ref 32–75)
NEUTS SEG NFR BLD: 47 % (ref 32–75)
NEUTS SEG NFR BLD: 49 % (ref 32–75)
NEUTS SEG NFR BLD: 55 % (ref 32–75)
NEUTS SEG NFR BLD: 62 % (ref 32–75)
NEUTS SEG NFR BLD: 63 % (ref 32–75)
NEUTS SEG NFR BLD: 66 % (ref 32–75)
NEUTS SEG NFR BLD: 72 % (ref 32–75)
NEUTS SEG NFR BLD: 75 % (ref 32–75)
NEUTS SEG NFR BLD: 82 % (ref 32–75)
NEUTS SEG NFR BLD: 84 % (ref 32–75)
NEUTS SEG NFR BLD: 87 % (ref 32–75)
NITRITE UR QL STRIP.AUTO: NEGATIVE
NITRITE UR QL STRIP.AUTO: NEGATIVE
NRBC # BLD: 0 K/UL (ref 0–0.01)
NRBC # BLD: 0.03 K/UL (ref 0–0.01)
NRBC # BLD: 0.12 K/UL (ref 0–0.01)
NRBC # BLD: 0.15 K/UL
NRBC # BLD: 0.15 K/UL (ref 0–0.01)
NRBC # BLD: 0.19 K/UL (ref 0–0.01)
NRBC # BLD: 0.27 K/UL
NRBC # BLD: 0.27 K/UL (ref 0–0.01)
NRBC # BLD: 0.36 K/UL
NRBC # BLD: 0.49 K/UL (ref 0–0.01)
NRBC # BLD: 0.6 K/UL (ref 0–0.01)
NRBC # BLD: 0.71 K/UL
NRBC # BLD: 0.78 K/UL (ref 0–0.01)
NRBC BLD MANUAL-RTO: 3 PER 100 WBC
NRBC BLD MANUAL-RTO: 4 PER 100 WBC
NRBC BLD MANUAL-RTO: 5 PER 100 WBC
NRBC BLD MANUAL-RTO: 7 PER 100 WBC
NRBC BLD-RTO: 0 PER 100 WBC
NRBC BLD-RTO: 0.2 PER 100 WBC
NRBC BLD-RTO: 1.4 PER 100 WBC
NRBC BLD-RTO: 1.8 PER 100 WBC
NRBC BLD-RTO: 2.2 PER 100 WBC
NRBC BLD-RTO: 2.8 PER 100 WBC
NRBC BLD-RTO: 4 PER 100 WBC
NRBC BLD-RTO: 6.9 PER 100 WBC
NRBC BLD-RTO: 6.9 PER 100 WBC
OTHER URINE MICRO,5051: 7
PCO2 BLDA: 23 MMHG (ref 35–45)
PCO2 BLDA: 30 MMHG (ref 35–45)
PCO2 BLDA: 30 MMHG (ref 35–45)
PCO2 BLDA: 31 MMHG (ref 35–45)
PCO2 BLDA: 32 MMHG (ref 35–45)
PCO2 BLDA: 33 MMHG (ref 35–45)
PCO2 BLDA: 36 MMHG (ref 35–45)
PCO2 BLDA: 38 MMHG (ref 35–45)
PCO2 BLDA: 40 MMHG (ref 35–45)
PCO2 BLDA: 42 MMHG (ref 35–45)
PCO2 BLDA: 42 MMHG (ref 35–45)
PCO2 BLDA: 48 MMHG (ref 35–45)
PCO2 BLDA: 9 MMHG (ref 35–45)
PERFORMED BY, TECHID: ABNORMAL
PERFORMED BY, TECHID: NORMAL
PH BLDA: 7.21 [PH] (ref 7.35–7.45)
PH BLDA: 7.23 [PH] (ref 7.35–7.45)
PH BLDA: 7.26 [PH] (ref 7.35–7.45)
PH BLDA: 7.35 [PH] (ref 7.35–7.45)
PH BLDA: 7.35 [PH] (ref 7.35–7.45)
PH BLDA: 7.38 [PH] (ref 7.35–7.45)
PH BLDA: 7.39 [PH] (ref 7.35–7.45)
PH BLDA: 7.39 [PH] (ref 7.35–7.45)
PH BLDA: 7.4 [PH] (ref 7.35–7.45)
PH BLDA: 7.42 [PH] (ref 7.35–7.45)
PH BLDA: 7.45 [PH] (ref 7.35–7.45)
PH BLDA: 7.48 [PH] (ref 7.35–7.45)
PH BLDA: 7.52 [PH] (ref 7.35–7.45)
PH FLD: 7 [PH]
PH UR STRIP: 5 [PH] (ref 5–8)
PH UR STRIP: 5 [PH] (ref 5–8)
PHOSPHATE SERPL-MCNC: 1.5 MG/DL (ref 2.6–4.7)
PHOSPHATE SERPL-MCNC: 1.8 MG/DL (ref 2.6–4.7)
PHOSPHATE SERPL-MCNC: 1.8 MG/DL (ref 2.6–4.7)
PHOSPHATE SERPL-MCNC: 2.4 MG/DL (ref 2.6–4.7)
PHOSPHATE SERPL-MCNC: 2.4 MG/DL (ref 2.6–4.7)
PHOSPHATE SERPL-MCNC: 2.5 MG/DL (ref 2.6–4.7)
PHOSPHATE SERPL-MCNC: 3.3 MG/DL (ref 2.6–4.7)
PHOSPHATE SERPL-MCNC: 3.3 MG/DL (ref 2.6–4.7)
PHOSPHATE SERPL-MCNC: 3.4 MG/DL (ref 2.6–4.7)
PHOSPHATE SERPL-MCNC: 3.6 MG/DL (ref 2.6–4.7)
PHOSPHATE SERPL-MCNC: 3.6 MG/DL (ref 2.6–4.7)
PHOSPHATE SERPL-MCNC: 3.7 MG/DL (ref 2.6–4.7)
PHOSPHATE SERPL-MCNC: 3.7 MG/DL (ref 2.6–4.7)
PHOSPHATE SERPL-MCNC: 3.8 MG/DL (ref 2.6–4.7)
PHOSPHATE SERPL-MCNC: 3.9 MG/DL (ref 2.6–4.7)
PHOSPHATE SERPL-MCNC: 4.2 MG/DL (ref 2.6–4.7)
PHOSPHATE SERPL-MCNC: 5 MG/DL (ref 2.6–4.7)
PHOSPHATE SERPL-MCNC: 5.4 MG/DL (ref 2.6–4.7)
PHOSPHATE SERPL-MCNC: 5.6 MG/DL (ref 2.6–4.7)
PLATELET # BLD AUTO: 100 K/UL (ref 150–400)
PLATELET # BLD AUTO: 105 K/UL (ref 150–400)
PLATELET # BLD AUTO: 113 K/UL (ref 150–400)
PLATELET # BLD AUTO: 122 K/UL (ref 150–400)
PLATELET # BLD AUTO: 163 K/UL (ref 150–400)
PLATELET # BLD AUTO: 169 K/UL (ref 150–400)
PLATELET # BLD AUTO: 199 K/UL (ref 150–400)
PLATELET # BLD AUTO: 205 K/UL (ref 150–400)
PLATELET # BLD AUTO: 214 K/UL (ref 150–400)
PLATELET # BLD AUTO: 240 K/UL (ref 150–400)
PLATELET # BLD AUTO: 258 K/UL (ref 150–400)
PLATELET # BLD AUTO: 267 K/UL (ref 150–400)
PLATELET # BLD AUTO: 273 K/UL (ref 150–400)
PLATELET # BLD AUTO: 301 K/UL (ref 150–400)
PLATELET # BLD AUTO: 303 K/UL (ref 150–400)
PLATELET # BLD AUTO: 32 K/UL (ref 150–400)
PLATELET # BLD AUTO: 35 K/UL (ref 150–400)
PLATELET # BLD AUTO: 41 K/UL (ref 150–400)
PLATELET # BLD AUTO: 47 K/UL (ref 150–400)
PLATELET # BLD AUTO: 58 K/UL (ref 150–400)
PLATELET # BLD AUTO: 62 K/UL (ref 150–400)
PLATELET # BLD AUTO: 71 K/UL (ref 150–400)
PLATELET # BLD AUTO: 76 K/UL (ref 150–400)
PLATELET # BLD AUTO: 90 K/UL (ref 150–400)
PLATELET # BLD AUTO: 92 K/UL (ref 150–400)
PLATELET COMMENTS,PCOM: ABNORMAL
PMV BLD AUTO: 10 FL (ref 8.9–12.9)
PMV BLD AUTO: 10.2 FL (ref 8.9–12.9)
PMV BLD AUTO: 10.2 FL (ref 8.9–12.9)
PMV BLD AUTO: 10.3 FL (ref 8.9–12.9)
PMV BLD AUTO: 10.4 FL (ref 8.9–12.9)
PMV BLD AUTO: 10.4 FL (ref 8.9–12.9)
PMV BLD AUTO: 10.5 FL (ref 8.9–12.9)
PMV BLD AUTO: 10.5 FL (ref 8.9–12.9)
PMV BLD AUTO: 10.6 FL (ref 8.9–12.9)
PMV BLD AUTO: 10.7 FL (ref 8.9–12.9)
PMV BLD AUTO: 10.9 FL (ref 8.9–12.9)
PMV BLD AUTO: 9.8 FL (ref 8.9–12.9)
PO2 BLDA: 100 MMHG (ref 75–100)
PO2 BLDA: 106 MMHG (ref 75–100)
PO2 BLDA: 140 MMHG (ref 75–100)
PO2 BLDA: 162 MMHG (ref 75–100)
PO2 BLDA: 40 MMHG (ref 75–100)
PO2 BLDA: 56 MMHG (ref 75–100)
PO2 BLDA: 61 MMHG (ref 75–100)
PO2 BLDA: 71 MMHG (ref 75–100)
PO2 BLDA: 76 MMHG (ref 75–100)
PO2 BLDA: 88 MMHG (ref 75–100)
PO2 BLDA: 88 MMHG (ref 75–100)
PO2 BLDA: 89 MMHG (ref 75–100)
PO2 BLDA: 93 MMHG (ref 75–100)
POTASSIUM SERPL-SCNC: 2.3 MMOL/L (ref 3.5–5.1)
POTASSIUM SERPL-SCNC: 2.5 MMOL/L (ref 3.5–5.1)
POTASSIUM SERPL-SCNC: 2.6 MMOL/L (ref 3.5–5.1)
POTASSIUM SERPL-SCNC: 2.6 MMOL/L (ref 3.5–5.1)
POTASSIUM SERPL-SCNC: 2.8 MMOL/L (ref 3.5–5.1)
POTASSIUM SERPL-SCNC: 2.9 MMOL/L (ref 3.5–5.1)
POTASSIUM SERPL-SCNC: 3 MMOL/L (ref 3.5–5.1)
POTASSIUM SERPL-SCNC: 3.1 MMOL/L (ref 3.5–5.1)
POTASSIUM SERPL-SCNC: 3.1 MMOL/L (ref 3.5–5.1)
POTASSIUM SERPL-SCNC: 3.2 MMOL/L (ref 3.5–5.1)
POTASSIUM SERPL-SCNC: 3.2 MMOL/L (ref 3.5–5.1)
POTASSIUM SERPL-SCNC: 3.3 MMOL/L (ref 3.5–5.1)
POTASSIUM SERPL-SCNC: 3.4 MMOL/L (ref 3.5–5.1)
POTASSIUM SERPL-SCNC: 3.4 MMOL/L (ref 3.5–5.1)
POTASSIUM SERPL-SCNC: 3.6 MMOL/L (ref 3.5–5.1)
POTASSIUM SERPL-SCNC: 3.7 MMOL/L (ref 3.5–5.1)
POTASSIUM SERPL-SCNC: 3.8 MMOL/L (ref 3.5–5.1)
POTASSIUM SERPL-SCNC: 3.9 MMOL/L (ref 3.5–5.1)
POTASSIUM SERPL-SCNC: 4.4 MMOL/L (ref 3.5–5.1)
POTASSIUM SERPL-SCNC: 4.6 MMOL/L (ref 3.5–5.1)
PRESSURE SUPPORT SETTING VENT: 10 CM[H2O]
PRESSURE SUPPORT SETTING VENT: 10 CM[H2O]
PRESSURE SUPPORT SETTING VENT: 15 CM[H2O]
PRESSURE SUPPORT SETTING VENT: 15 CM[H2O]
PRESSURE SUPPORT SETTING VENT: 8 CM[H2O]
PROCALCITONIN SERPL-MCNC: 0.18 NG/ML
PROCALCITONIN SERPL-MCNC: 0.22 NG/ML
PROCALCITONIN SERPL-MCNC: 0.33 NG/ML
PROCALCITONIN SERPL-MCNC: 0.62 NG/ML
PROCALCITONIN SERPL-MCNC: 1.09 NG/ML
PROCALCITONIN SERPL-MCNC: 1.54 NG/ML
PROCALCITONIN SERPL-MCNC: 12.3 NG/ML
PROCALCITONIN SERPL-MCNC: 3.48 NG/ML
PROCALCITONIN SERPL-MCNC: 5.42 NG/ML
PROCALCITONIN SERPL-MCNC: 5.52 NG/ML
PROCALCITONIN SERPL-MCNC: 6.39 NG/ML
PROCALCITONIN SERPL-MCNC: 8.18 NG/ML
PROMYELOCYTES NFR BLD MANUAL: 14 %
PROMYELOCYTES NFR BLD MANUAL: 4 %
PROMYELOCYTES NFR BLD MANUAL: 8 %
PROT FLD-MCNC: 2.6 G/DL
PROT PATTERN SERPL IFE-IMP: ABNORMAL
PROT SERPL-MCNC: 6 G/DL (ref 6.4–8.2)
PROT SERPL-MCNC: 6.2 G/DL (ref 6.4–8.2)
PROT SERPL-MCNC: 6.2 G/DL (ref 6.4–8.2)
PROT SERPL-MCNC: 6.4 G/DL
PROT SERPL-MCNC: 6.4 G/DL (ref 6.4–8.2)
PROT SERPL-MCNC: 6.5 G/DL (ref 6.4–8.2)
PROT SERPL-MCNC: 6.5 G/DL (ref 6.4–8.2)
PROT SERPL-MCNC: 6.8 G/DL (ref 6.4–8.2)
PROT SERPL-MCNC: 7.8 G/DL (ref 6.4–8.2)
PROT SERPL-MCNC: 8.2 G/DL (ref 6.4–8.2)
PROT UR STRIP-MCNC: 100 MG/DL
PROT UR STRIP-MCNC: 100 MG/DL
PROT UR-MCNC: 70 MG/DL (ref 0–11.9)
PROT/CREAT UR-RTO: 2
PROTHROMBIN TIME: 17.6 SEC (ref 11.9–14.7)
PROTHROMBIN TIME: 18.4 SEC (ref 11.9–14.7)
PROTHROMBIN TIME: 21.9 SEC (ref 11.9–14.7)
PROTHROMBIN TIME: 22.6 SEC (ref 11.9–14.7)
PSA SERPL-MCNC: 0.1 NG/ML (ref 0.01–4)
PTH-INTACT SERPL-MCNC: 234.8 PG/ML (ref 18.4–88)
PTH-INTACT SERPL-MCNC: <6.3 PG/ML (ref 18.4–88)
PTH-INTACT SERPL-MCNC: <6.3 PG/ML (ref 18.4–88)
Q-T INTERVAL, ECG07: 338 MS
Q-T INTERVAL, ECG07: 358 MS
Q-T INTERVAL, ECG07: 410 MS
QRS DURATION, ECG06: 104 MS
QRS DURATION, ECG06: 108 MS
QRS DURATION, ECG06: 114 MS
QTC CALCULATION (BEZET), ECG08: 473 MS
QTC CALCULATION (BEZET), ECG08: 514 MS
QTC CALCULATION (BEZET), ECG08: 598 MS
RBC # BLD AUTO: 2.7 M/UL (ref 4.1–5.7)
RBC # BLD AUTO: 2.86 M/UL (ref 4.1–5.7)
RBC # BLD AUTO: 2.94 M/UL (ref 4.1–5.7)
RBC # BLD AUTO: 2.97 M/UL (ref 4.1–5.7)
RBC # BLD AUTO: 3.04 M/UL (ref 4.1–5.7)
RBC # BLD AUTO: 3.1 M/UL (ref 4.1–5.7)
RBC # BLD AUTO: 3.14 M/UL (ref 4.1–5.7)
RBC # BLD AUTO: 3.15 M/UL (ref 4.1–5.7)
RBC # BLD AUTO: 3.15 M/UL (ref 4.1–5.7)
RBC # BLD AUTO: 3.17 M/UL (ref 4.1–5.7)
RBC # BLD AUTO: 3.33 M/UL (ref 4.1–5.7)
RBC # BLD AUTO: 3.36 M/UL (ref 4.1–5.7)
RBC # BLD AUTO: 3.36 M/UL (ref 4.1–5.7)
RBC # BLD AUTO: 3.39 M/UL (ref 4.1–5.7)
RBC # BLD AUTO: 3.41 M/UL (ref 4.1–5.7)
RBC # BLD AUTO: 3.42 M/UL (ref 4.1–5.7)
RBC # BLD AUTO: 3.44 M/UL (ref 4.1–5.7)
RBC # BLD AUTO: 3.45 M/UL (ref 4.1–5.7)
RBC # BLD AUTO: 3.48 M/UL (ref 4.1–5.7)
RBC # BLD AUTO: 3.49 M/UL (ref 4.1–5.7)
RBC # BLD AUTO: 3.56 M/UL (ref 4.1–5.7)
RBC # BLD AUTO: 3.62 M/UL (ref 4.1–5.7)
RBC # BLD AUTO: 3.63 M/UL (ref 4.1–5.7)
RBC # BLD AUTO: 3.65 M/UL (ref 4.1–5.7)
RBC # BLD AUTO: 3.65 M/UL (ref 4.1–5.7)
RBC # BLD AUTO: 3.72 M/UL (ref 4.1–5.7)
RBC # BLD AUTO: 3.96 M/UL (ref 4.1–5.7)
RBC #/AREA URNS HPF: >100 /HPF (ref 0–5)
RBC #/AREA URNS HPF: ABNORMAL /HPF (ref 0–5)
RBC MORPH BLD: ABNORMAL
REPORTED DOSE,DOSE: 0 UNITS
REPORTED DOSE,DOSE: 0 UNITS
REPORTED DOSE,DOSE: ABNORMAL UNITS
REPORTED DOSE,DOSE: NORMAL UNITS
REPORTED DOSE/TIME,TMG: ABNORMAL
RETICS # AUTO: 0.04 M/UL (ref 0.06–0.1)
RETICS # AUTO: 0.07 M/UL (ref 0.03–0.1)
RETICS/RBC NFR AUTO: 1.2 % (ref 0.7–2.1)
RETICS/RBC NFR AUTO: 2.3 % (ref 0.7–2.1)
SAMPLES BEING HELD,HOLD: NORMAL
SAMPLES BEING HELD,HOLD: NORMAL
SAO2 % BLD: 100 %
SAO2 % BLD: 74 %
SAO2 % BLD: 88 %
SAO2 % BLD: 93 %
SAO2 % BLD: 95 %
SAO2 % BLD: 97 %
SAO2 % BLD: 98 %
SAO2 % BLD: 99 %
SAO2 % BLD: 99 %
SAO2% DEVICE SAO2% SENSOR NAME: ABNORMAL
SAO2% DEVICE SAO2% SENSOR NAME: NORMAL
SARS-COV-2, COV2: NORMAL
SARS-COV-2, COV2: NOT DETECTED
SARS-COV-2, COV2NT: NOT DETECTED
SERVICE CMNT-IMP: ABNORMAL
SERVICE CMNT-IMP: NORMAL
SODIUM SERPL-SCNC: 135 MMOL/L (ref 136–145)
SODIUM SERPL-SCNC: 135 MMOL/L (ref 136–145)
SODIUM SERPL-SCNC: 136 MMOL/L (ref 136–145)
SODIUM SERPL-SCNC: 137 MMOL/L (ref 136–145)
SODIUM SERPL-SCNC: 137 MMOL/L (ref 136–145)
SODIUM SERPL-SCNC: 138 MMOL/L (ref 136–145)
SODIUM SERPL-SCNC: 138 MMOL/L (ref 136–145)
SODIUM SERPL-SCNC: 139 MMOL/L (ref 136–145)
SODIUM SERPL-SCNC: 139 MMOL/L (ref 136–145)
SODIUM SERPL-SCNC: 140 MMOL/L (ref 136–145)
SODIUM SERPL-SCNC: 141 MMOL/L (ref 136–145)
SODIUM SERPL-SCNC: 141 MMOL/L (ref 136–145)
SODIUM SERPL-SCNC: 143 MMOL/L (ref 136–145)
SODIUM SERPL-SCNC: 144 MMOL/L (ref 136–145)
SODIUM SERPL-SCNC: 144 MMOL/L (ref 136–145)
SODIUM SERPL-SCNC: 145 MMOL/L (ref 136–145)
SODIUM SERPL-SCNC: 146 MMOL/L (ref 136–145)
SODIUM SERPL-SCNC: 148 MMOL/L (ref 136–145)
SODIUM SERPL-SCNC: 149 MMOL/L (ref 136–145)
SODIUM SERPL-SCNC: 150 MMOL/L (ref 136–145)
SODIUM SERPL-SCNC: 151 MMOL/L (ref 136–145)
SODIUM SERPL-SCNC: 152 MMOL/L (ref 136–145)
SODIUM SERPL-SCNC: 152 MMOL/L (ref 136–145)
SODIUM SERPL-SCNC: 153 MMOL/L (ref 136–145)
SODIUM SERPL-SCNC: 154 MMOL/L (ref 136–145)
SOURCE, COVRS: NORMAL
SP GR UR REFRACTOMETRY: 1.01 (ref 1–1.03)
SP GR UR REFRACTOMETRY: 1.01 (ref 1–1.03)
SPECIAL REQUESTS,SREQ: ABNORMAL
SPECIAL REQUESTS,SREQ: NORMAL
SPECIMEN EXP DATE BLD: NORMAL
SPECIMEN SITE: NORMAL
SPECIMEN SITE: NORMAL
SPECIMEN SOURCE FLD: NORMAL
SPECIMEN SOURCE, FCOV2M: NORMAL
SPECIMEN TYPE, XMCV1T: NORMAL
STATUS OF UNIT,%ST: NORMAL
T4 FREE SERPL-MCNC: 0.9 NG/DL (ref 0.8–1.5)
THERAPEUTIC RANGE,PTTT: ABNORMAL SEC (ref 68–109)
THERAPEUTIC RANGE,PTTT: ABNORMAL SEC (ref 68–109)
THERAPEUTIC RANGE,PTTT: NORMAL SEC (ref 68–109)
TIBC SERPL-MCNC: 100 UG/DL (ref 250–450)
TIBC SERPL-MCNC: 113 UG/DL (ref 250–450)
TIBC SERPL-MCNC: 132 UG/DL (ref 250–450)
TRANSFUSION STATUS PATIENT QL: NORMAL
TROPONIN I SERPL-MCNC: <0.05 NG/ML
TROPONIN I SERPL-MCNC: <0.05 NG/ML
TSH SERPL DL<=0.05 MIU/L-ACNC: 2.54 UIU/ML (ref 0.36–3.74)
TSH SERPL DL<=0.05 MIU/L-ACNC: 4.33 UIU/ML (ref 0.36–3.74)
TSH SERPL DL<=0.05 MIU/L-ACNC: 4.39 UIU/ML (ref 0.36–3.74)
UNIT DIVISION, %UDIV: 0
URATE SERPL-MCNC: 9.7 MG/DL (ref 3.5–7.2)
UROBILINOGEN UR QL STRIP.AUTO: 0.1 EU/DL (ref 0.1–1)
UROBILINOGEN UR QL STRIP.AUTO: 2 EU/DL (ref 0.1–1)
VANCOMYCIN SERPL-MCNC: 13.4 UG/ML
VANCOMYCIN SERPL-MCNC: 19.6 UG/ML
VANCOMYCIN SERPL-MCNC: 19.6 UG/ML
VANCOMYCIN SERPL-MCNC: 23.8 UG/ML
VANCOMYCIN TROUGH SERPL-MCNC: 14.6 UG/ML (ref 5–10)
VANCOMYCIN TROUGH SERPL-MCNC: 19.4 UG/ML (ref 5–10)
VENTILATION MODE VENT: ABNORMAL
VENTILATION MODE VENT: NORMAL
VENTRICULAR RATE, ECG03: 105 BPM
VENTRICULAR RATE, ECG03: 128 BPM
VENTRICULAR RATE, ECG03: 139 BPM
VIT B12 SERPL-MCNC: 1533 PG/ML (ref 193–986)
VIT B12 SERPL-MCNC: >2000 PG/ML (ref 193–986)
VIT B12 SERPL-MCNC: >2000 PG/ML (ref 193–986)
VT SETTING VENT: 500 ML
VT SETTING VENT: 550 ML
WBC # BLD AUTO: 11.4 K/UL (ref 4.1–11.1)
WBC # BLD AUTO: 12.5 K/UL (ref 4.1–11.1)
WBC # BLD AUTO: 13.1 K/UL (ref 4.1–11.1)
WBC # BLD AUTO: 13.3 K/UL (ref 4.1–11.1)
WBC # BLD AUTO: 14.2 K/UL (ref 4.1–11.1)
WBC # BLD AUTO: 14.9 K/UL (ref 4.1–11.1)
WBC # BLD AUTO: 14.9 K/UL (ref 4.1–11.1)
WBC # BLD AUTO: 15.6 K/UL (ref 4.1–11.1)
WBC # BLD AUTO: 15.8 K/UL (ref 4.1–11.1)
WBC # BLD AUTO: 16 K/UL (ref 4.1–11.1)
WBC # BLD AUTO: 20.1 K/UL (ref 4.1–11.1)
WBC # BLD AUTO: 20.7 K/UL (ref 4.1–11.1)
WBC # BLD AUTO: 4.8 K/UL (ref 4.1–11.1)
WBC # BLD AUTO: 5 K/UL (ref 4.1–11.1)
WBC # BLD AUTO: 5.1 K/UL (ref 4.1–11.1)
WBC # BLD AUTO: 5.2 K/UL (ref 4.1–11.1)
WBC # BLD AUTO: 5.4 K/UL (ref 4.1–11.1)
WBC # BLD AUTO: 5.4 K/UL (ref 4.1–11.1)
WBC # BLD AUTO: 5.5 K/UL (ref 4.1–11.1)
WBC # BLD AUTO: 5.5 K/UL (ref 4.1–11.1)
WBC # BLD AUTO: 6.1 K/UL (ref 4.1–11.1)
WBC # BLD AUTO: 6.2 K/UL (ref 4.1–11.1)
WBC # BLD AUTO: 6.4 K/UL (ref 4.1–11.1)
WBC # BLD AUTO: 6.4 K/UL (ref 4.1–11.1)
WBC # BLD AUTO: 6.5 K/UL (ref 4.1–11.1)
WBC # BLD AUTO: 6.7 K/UL (ref 4.1–11.1)
WBC # BLD AUTO: 8.9 K/UL (ref 4.1–11.1)
WBC MORPH BLD: ABNORMAL
WBC URNS QL MICRO: >100 /HPF (ref 0–4)
WBC URNS QL MICRO: ABNORMAL /HPF (ref 0–4)

## 2020-01-01 PROCEDURE — 74011250636 HC RX REV CODE- 250/636: Performed by: INTERNAL MEDICINE

## 2020-01-01 PROCEDURE — 36415 COLL VENOUS BLD VENIPUNCTURE: CPT

## 2020-01-01 PROCEDURE — 74011000250 HC RX REV CODE- 250: Performed by: NURSE PRACTITIONER

## 2020-01-01 PROCEDURE — 80069 RENAL FUNCTION PANEL: CPT

## 2020-01-01 PROCEDURE — 84443 ASSAY THYROID STIM HORMONE: CPT

## 2020-01-01 PROCEDURE — 74011250637 HC RX REV CODE- 250/637: Performed by: NURSE PRACTITIONER

## 2020-01-01 PROCEDURE — 74011000258 HC RX REV CODE- 258: Performed by: EMERGENCY MEDICINE

## 2020-01-01 PROCEDURE — 74011250637 HC RX REV CODE- 250/637: Performed by: INTERNAL MEDICINE

## 2020-01-01 PROCEDURE — 74011000250 HC RX REV CODE- 250: Performed by: INTERNAL MEDICINE

## 2020-01-01 PROCEDURE — 85018 HEMOGLOBIN: CPT

## 2020-01-01 PROCEDURE — 0D9670Z DRAINAGE OF STOMACH WITH DRAINAGE DEVICE, VIA NATURAL OR ARTIFICIAL OPENING: ICD-10-PCS | Performed by: RADIOLOGY

## 2020-01-01 PROCEDURE — 65660000000 HC RM CCU STEPDOWN

## 2020-01-01 PROCEDURE — 71045 X-RAY EXAM CHEST 1 VIEW: CPT

## 2020-01-01 PROCEDURE — 94003 VENT MGMT INPAT SUBQ DAY: CPT

## 2020-01-01 PROCEDURE — 99231 SBSQ HOSP IP/OBS SF/LOW 25: CPT | Performed by: INTERNAL MEDICINE

## 2020-01-01 PROCEDURE — 82140 ASSAY OF AMMONIA: CPT

## 2020-01-01 PROCEDURE — 83605 ASSAY OF LACTIC ACID: CPT

## 2020-01-01 PROCEDURE — 80162 ASSAY OF DIGOXIN TOTAL: CPT

## 2020-01-01 PROCEDURE — 74011250636 HC RX REV CODE- 250/636: Performed by: EMERGENCY MEDICINE

## 2020-01-01 PROCEDURE — 83735 ASSAY OF MAGNESIUM: CPT

## 2020-01-01 PROCEDURE — P9059 PLASMA, FRZ BETWEEN 8-24HOUR: HCPCS

## 2020-01-01 PROCEDURE — 96365 THER/PROPH/DIAG IV INF INIT: CPT

## 2020-01-01 PROCEDURE — 94760 N-INVAS EAR/PLS OXIMETRY 1: CPT

## 2020-01-01 PROCEDURE — 85025 COMPLETE CBC W/AUTO DIFF WBC: CPT

## 2020-01-01 PROCEDURE — 74011000258 HC RX REV CODE- 258: Performed by: INTERNAL MEDICINE

## 2020-01-01 PROCEDURE — C9113 INJ PANTOPRAZOLE SODIUM, VIA: HCPCS | Performed by: INTERNAL MEDICINE

## 2020-01-01 PROCEDURE — 05HM33Z INSERTION OF INFUSION DEVICE INTO RIGHT INTERNAL JUGULAR VEIN, PERCUTANEOUS APPROACH: ICD-10-PCS | Performed by: RADIOLOGY

## 2020-01-01 PROCEDURE — 83970 ASSAY OF PARATHORMONE: CPT

## 2020-01-01 PROCEDURE — 80053 COMPREHEN METABOLIC PANEL: CPT

## 2020-01-01 PROCEDURE — 65610000006 HC RM INTENSIVE CARE

## 2020-01-01 PROCEDURE — 83883 ASSAY NEPHELOMETRY NOT SPEC: CPT

## 2020-01-01 PROCEDURE — 82803 BLOOD GASES ANY COMBINATION: CPT

## 2020-01-01 PROCEDURE — 86923 COMPATIBILITY TEST ELECTRIC: CPT

## 2020-01-01 PROCEDURE — 84156 ASSAY OF PROTEIN URINE: CPT

## 2020-01-01 PROCEDURE — 93306 TTE W/DOPPLER COMPLETE: CPT

## 2020-01-01 PROCEDURE — 94640 AIRWAY INHALATION TREATMENT: CPT

## 2020-01-01 PROCEDURE — 70450 CT HEAD/BRAIN W/O DYE: CPT

## 2020-01-01 PROCEDURE — 84145 PROCALCITONIN (PCT): CPT

## 2020-01-01 PROCEDURE — 85384 FIBRINOGEN ACTIVITY: CPT

## 2020-01-01 PROCEDURE — P9047 ALBUMIN (HUMAN), 25%, 50ML: HCPCS | Performed by: INTERNAL MEDICINE

## 2020-01-01 PROCEDURE — C1751 CATH, INF, PER/CENT/MIDLINE: HCPCS

## 2020-01-01 PROCEDURE — 99233 SBSQ HOSP IP/OBS HIGH 50: CPT | Performed by: INTERNAL MEDICINE

## 2020-01-01 PROCEDURE — 93005 ELECTROCARDIOGRAM TRACING: CPT

## 2020-01-01 PROCEDURE — 80202 ASSAY OF VANCOMYCIN: CPT

## 2020-01-01 PROCEDURE — 36600 WITHDRAWAL OF ARTERIAL BLOOD: CPT

## 2020-01-01 PROCEDURE — 77010033678 HC OXYGEN DAILY

## 2020-01-01 PROCEDURE — 74011000258 HC RX REV CODE- 258: Performed by: NURSE ANESTHETIST, CERTIFIED REGISTERED

## 2020-01-01 PROCEDURE — 83880 ASSAY OF NATRIURETIC PEPTIDE: CPT

## 2020-01-01 PROCEDURE — 87070 CULTURE OTHR SPECIMN AEROBIC: CPT

## 2020-01-01 PROCEDURE — 97116 GAIT TRAINING THERAPY: CPT

## 2020-01-01 PROCEDURE — 82962 GLUCOSE BLOOD TEST: CPT

## 2020-01-01 PROCEDURE — 82150 ASSAY OF AMYLASE: CPT

## 2020-01-01 PROCEDURE — P9016 RBC LEUKOCYTES REDUCED: HCPCS

## 2020-01-01 PROCEDURE — 84153 ASSAY OF PSA TOTAL: CPT

## 2020-01-01 PROCEDURE — C1894 INTRO/SHEATH, NON-LASER: HCPCS

## 2020-01-01 PROCEDURE — 86140 C-REACTIVE PROTEIN: CPT

## 2020-01-01 PROCEDURE — G8417 CALC BMI ABV UP PARAM F/U: HCPCS | Performed by: SPECIALIST

## 2020-01-01 PROCEDURE — 80048 BASIC METABOLIC PNL TOTAL CA: CPT

## 2020-01-01 PROCEDURE — 77030012699 HC VLV SUC CNTRL OCOA -A: Performed by: INTERNAL MEDICINE

## 2020-01-01 PROCEDURE — 97530 THERAPEUTIC ACTIVITIES: CPT

## 2020-01-01 PROCEDURE — 99223 1ST HOSP IP/OBS HIGH 75: CPT | Performed by: INTERNAL MEDICINE

## 2020-01-01 PROCEDURE — 97110 THERAPEUTIC EXERCISES: CPT

## 2020-01-01 PROCEDURE — 73090 X-RAY EXAM OF FOREARM: CPT

## 2020-01-01 PROCEDURE — 85610 PROTHROMBIN TIME: CPT

## 2020-01-01 PROCEDURE — 96375 TX/PRO/DX INJ NEW DRUG ADDON: CPT

## 2020-01-01 PROCEDURE — G0463 HOSPITAL OUTPT CLINIC VISIT: HCPCS | Performed by: SPECIALIST

## 2020-01-01 PROCEDURE — 86900 BLOOD TYPING SEROLOGIC ABO: CPT

## 2020-01-01 PROCEDURE — 74011250636 HC RX REV CODE- 250/636: Performed by: NURSE PRACTITIONER

## 2020-01-01 PROCEDURE — 86301 IMMUNOASSAY TUMOR CA 19-9: CPT

## 2020-01-01 PROCEDURE — 74011250636 HC RX REV CODE- 250/636: Performed by: HOSPITALIST

## 2020-01-01 PROCEDURE — 76937 US GUIDE VASCULAR ACCESS: CPT

## 2020-01-01 PROCEDURE — 71250 CT THORAX DX C-: CPT

## 2020-01-01 PROCEDURE — 83615 LACTATE (LD) (LDH) ENZYME: CPT

## 2020-01-01 PROCEDURE — 82728 ASSAY OF FERRITIN: CPT

## 2020-01-01 PROCEDURE — 77030005305 HC CATH NG LPZ BARD -A: Performed by: INTERNAL MEDICINE

## 2020-01-01 PROCEDURE — 3E033XZ INTRODUCTION OF VASOPRESSOR INTO PERIPHERAL VEIN, PERCUTANEOUS APPROACH: ICD-10-PCS | Performed by: INTERNAL MEDICINE

## 2020-01-01 PROCEDURE — 65270000029 HC RM PRIVATE

## 2020-01-01 PROCEDURE — G8752 SYS BP LESS 140: HCPCS | Performed by: SPECIALIST

## 2020-01-01 PROCEDURE — 0B968ZZ DRAINAGE OF RIGHT LOWER LOBE BRONCHUS, VIA NATURAL OR ARTIFICIAL OPENING ENDOSCOPIC: ICD-10-PCS | Performed by: INTERNAL MEDICINE

## 2020-01-01 PROCEDURE — 84100 ASSAY OF PHOSPHORUS: CPT

## 2020-01-01 PROCEDURE — 97162 PT EVAL MOD COMPLEX 30 MIN: CPT

## 2020-01-01 PROCEDURE — 94400 HC END TIDAL CO2 RESPONSE CURVE: CPT

## 2020-01-01 PROCEDURE — 80076 HEPATIC FUNCTION PANEL: CPT

## 2020-01-01 PROCEDURE — 74011250636 HC RX REV CODE- 250/636

## 2020-01-01 PROCEDURE — 94664 DEMO&/EVAL PT USE INHALER: CPT

## 2020-01-01 PROCEDURE — 76060000032 HC ANESTHESIA 0.5 TO 1 HR: Performed by: INTERNAL MEDICINE

## 2020-01-01 PROCEDURE — 30233R1 TRANSFUSION OF NONAUTOLOGOUS PLATELETS INTO PERIPHERAL VEIN, PERCUTANEOUS APPROACH: ICD-10-PCS | Performed by: INTERNAL MEDICINE

## 2020-01-01 PROCEDURE — 82306 VITAMIN D 25 HYDROXY: CPT

## 2020-01-01 PROCEDURE — 82550 ASSAY OF CK (CPK): CPT

## 2020-01-01 PROCEDURE — 77030011256 HC DRSG MEPILEX <16IN NO BORD MOLN -A

## 2020-01-01 PROCEDURE — 83986 ASSAY PH BODY FLUID NOS: CPT

## 2020-01-01 PROCEDURE — 87635 SARS-COV-2 COVID-19 AMP PRB: CPT

## 2020-01-01 PROCEDURE — 30233K1 TRANSFUSION OF NONAUTOLOGOUS FROZEN PLASMA INTO PERIPHERAL VEIN, PERCUTANEOUS APPROACH: ICD-10-PCS | Performed by: INTERNAL MEDICINE

## 2020-01-01 PROCEDURE — 82784 ASSAY IGA/IGD/IGG/IGM EACH: CPT

## 2020-01-01 PROCEDURE — G9717 DOC PT DX DEP/BP F/U NT REQ: HCPCS | Performed by: SPECIALIST

## 2020-01-01 PROCEDURE — 99232 SBSQ HOSP IP/OBS MODERATE 35: CPT | Performed by: INTERNAL MEDICINE

## 2020-01-01 PROCEDURE — 2709999900 HC NON-CHARGEABLE SUPPLY: Performed by: INTERNAL MEDICINE

## 2020-01-01 PROCEDURE — 88108 CYTOPATH CONCENTRATE TECH: CPT

## 2020-01-01 PROCEDURE — 82746 ASSAY OF FOLIC ACID SERUM: CPT

## 2020-01-01 PROCEDURE — 87040 BLOOD CULTURE FOR BACTERIA: CPT

## 2020-01-01 PROCEDURE — 77030041074 HC DRSG AG OPTIFRM MDII -A

## 2020-01-01 PROCEDURE — G8536 NO DOC ELDER MAL SCRN: HCPCS | Performed by: SPECIALIST

## 2020-01-01 PROCEDURE — 84550 ASSAY OF BLOOD/URIC ACID: CPT

## 2020-01-01 PROCEDURE — 83540 ASSAY OF IRON: CPT

## 2020-01-01 PROCEDURE — 74011000258 HC RX REV CODE- 258: Performed by: NURSE PRACTITIONER

## 2020-01-01 PROCEDURE — 99285 EMERGENCY DEPT VISIT HI MDM: CPT

## 2020-01-01 PROCEDURE — 99214 OFFICE O/P EST MOD 30 MIN: CPT | Performed by: SPECIALIST

## 2020-01-01 PROCEDURE — 97535 SELF CARE MNGMENT TRAINING: CPT

## 2020-01-01 PROCEDURE — 97165 OT EVAL LOW COMPLEX 30 MIN: CPT

## 2020-01-01 PROCEDURE — 30233N1 TRANSFUSION OF NONAUTOLOGOUS RED BLOOD CELLS INTO PERIPHERAL VEIN, PERCUTANEOUS APPROACH: ICD-10-PCS | Performed by: INTERNAL MEDICINE

## 2020-01-01 PROCEDURE — 87086 URINE CULTURE/COLONY COUNT: CPT

## 2020-01-01 PROCEDURE — 36430 TRANSFUSION BLD/BLD COMPNT: CPT

## 2020-01-01 PROCEDURE — 87106 FUNGI IDENTIFICATION YEAST: CPT

## 2020-01-01 PROCEDURE — 85730 THROMBOPLASTIN TIME PARTIAL: CPT

## 2020-01-01 PROCEDURE — 77030027138 HC INCENT SPIROMETER -A

## 2020-01-01 PROCEDURE — 87102 FUNGUS ISOLATION CULTURE: CPT

## 2020-01-01 PROCEDURE — 1101F PT FALLS ASSESS-DOCD LE1/YR: CPT | Performed by: SPECIALIST

## 2020-01-01 PROCEDURE — 85027 COMPLETE CBC AUTOMATED: CPT

## 2020-01-01 PROCEDURE — 82378 CARCINOEMBRYONIC ANTIGEN: CPT

## 2020-01-01 PROCEDURE — 74019 RADEX ABDOMEN 2 VIEWS: CPT

## 2020-01-01 PROCEDURE — 5A1955Z RESPIRATORY VENTILATION, GREATER THAN 96 CONSECUTIVE HOURS: ICD-10-PCS | Performed by: INTERNAL MEDICINE

## 2020-01-01 PROCEDURE — 84157 ASSAY OF PROTEIN OTHER: CPT

## 2020-01-01 PROCEDURE — 87077 CULTURE AEROBIC IDENTIFY: CPT

## 2020-01-01 PROCEDURE — 83010 ASSAY OF HAPTOGLOBIN QUANT: CPT

## 2020-01-01 PROCEDURE — 84165 PROTEIN E-PHORESIS SERUM: CPT

## 2020-01-01 PROCEDURE — 03HY32Z INSERTION OF MONITORING DEVICE INTO UPPER ARTERY, PERCUTANEOUS APPROACH: ICD-10-PCS | Performed by: INTERNAL MEDICINE

## 2020-01-01 PROCEDURE — 77030029684 HC NEB SM VOL KT MONA -A

## 2020-01-01 PROCEDURE — 82272 OCCULT BLD FECES 1-3 TESTS: CPT

## 2020-01-01 PROCEDURE — 74018 RADEX ABDOMEN 1 VIEW: CPT

## 2020-01-01 PROCEDURE — 74011250636 HC RX REV CODE- 250/636: Performed by: NURSE ANESTHETIST, CERTIFIED REGISTERED

## 2020-01-01 PROCEDURE — 82164 ANGIOTENSIN I ENZYME TEST: CPT

## 2020-01-01 PROCEDURE — 82945 GLUCOSE OTHER FLUID: CPT

## 2020-01-01 PROCEDURE — 74011250636 HC RX REV CODE- 250/636: Performed by: FAMILY MEDICINE

## 2020-01-01 PROCEDURE — 76770 US EXAM ABDO BACK WALL COMP: CPT

## 2020-01-01 PROCEDURE — 36573 INSJ PICC RS&I 5 YR+: CPT | Performed by: FAMILY MEDICINE

## 2020-01-01 PROCEDURE — 84484 ASSAY OF TROPONIN QUANT: CPT

## 2020-01-01 PROCEDURE — 82310 ASSAY OF CALCIUM: CPT

## 2020-01-01 PROCEDURE — 0BH17EZ INSERTION OF ENDOTRACHEAL AIRWAY INTO TRACHEA, VIA NATURAL OR ARTIFICIAL OPENING: ICD-10-PCS | Performed by: INTERNAL MEDICINE

## 2020-01-01 PROCEDURE — 76040000007: Performed by: INTERNAL MEDICINE

## 2020-01-01 PROCEDURE — 74011000258 HC RX REV CODE- 258: Performed by: STUDENT IN AN ORGANIZED HEALTH CARE EDUCATION/TRAINING PROGRAM

## 2020-01-01 PROCEDURE — 81001 URINALYSIS AUTO W/SCOPE: CPT

## 2020-01-01 PROCEDURE — 85045 AUTOMATED RETICULOCYTE COUNT: CPT

## 2020-01-01 PROCEDURE — 77030041070 HC DRSG ALG MDII -A

## 2020-01-01 PROCEDURE — 74011250636 HC RX REV CODE- 250/636: Performed by: STUDENT IN AN ORGANIZED HEALTH CARE EDUCATION/TRAINING PROGRAM

## 2020-01-01 PROCEDURE — 87116 MYCOBACTERIA CULTURE: CPT

## 2020-01-01 PROCEDURE — G8754 DIAS BP LESS 90: HCPCS | Performed by: SPECIALIST

## 2020-01-01 PROCEDURE — 82607 VITAMIN B-12: CPT

## 2020-01-01 PROCEDURE — P9035 PLATELET PHERES LEUKOREDUCED: HCPCS

## 2020-01-01 PROCEDURE — 84439 ASSAY OF FREE THYROXINE: CPT

## 2020-01-01 PROCEDURE — 74011000250 HC RX REV CODE- 250

## 2020-01-01 PROCEDURE — 77030040393 HC DRSG OPTIFOAM GENT MDII -B

## 2020-01-01 PROCEDURE — 88305 TISSUE EXAM BY PATHOLOGIST: CPT

## 2020-01-01 PROCEDURE — 87015 SPECIMEN INFECT AGNT CONCNTJ: CPT

## 2020-01-01 PROCEDURE — 93971 EXTREMITY STUDY: CPT

## 2020-01-01 PROCEDURE — C1729 CATH, DRAINAGE: HCPCS

## 2020-01-01 PROCEDURE — 87186 SC STD MICRODIL/AGAR DIL: CPT

## 2020-01-01 PROCEDURE — G8427 DOCREV CUR MEDS BY ELIG CLIN: HCPCS | Performed by: SPECIALIST

## 2020-01-01 PROCEDURE — 02HV33Z INSERTION OF INFUSION DEVICE INTO SUPERIOR VENA CAVA, PERCUTANEOUS APPROACH: ICD-10-PCS | Performed by: INTERNAL MEDICINE

## 2020-01-01 PROCEDURE — 87205 SMEAR GRAM STAIN: CPT

## 2020-01-01 PROCEDURE — 94002 VENT MGMT INPAT INIT DAY: CPT

## 2020-01-01 RX ORDER — SERTRALINE HYDROCHLORIDE 50 MG/1
50 TABLET, FILM COATED ORAL DAILY
COMMUNITY

## 2020-01-01 RX ORDER — LORAZEPAM 2 MG/ML
1 INJECTION INTRAMUSCULAR
Status: DISCONTINUED | OUTPATIENT
Start: 2020-01-01 | End: 2020-01-01 | Stop reason: HOSPADM

## 2020-01-01 RX ORDER — IPRATROPIUM BROMIDE AND ALBUTEROL SULFATE 2.5; .5 MG/3ML; MG/3ML
3 SOLUTION RESPIRATORY (INHALATION)
Status: DISCONTINUED | OUTPATIENT
Start: 2020-01-01 | End: 2020-01-01

## 2020-01-01 RX ORDER — POTASSIUM CHLORIDE 7.45 MG/ML
10 INJECTION INTRAVENOUS
Status: ACTIVE | OUTPATIENT
Start: 2020-01-01 | End: 2020-01-01

## 2020-01-01 RX ORDER — SODIUM CHLORIDE 9 MG/ML
75 INJECTION, SOLUTION INTRAVENOUS CONTINUOUS
Status: DISCONTINUED | OUTPATIENT
Start: 2020-01-01 | End: 2020-01-01

## 2020-01-01 RX ORDER — DILTIAZEM HCL/D5W 125 MG/125
15 PLASTIC BAG, INJECTION (ML) INTRAVENOUS ONCE
Status: COMPLETED | OUTPATIENT
Start: 2020-01-01 | End: 2020-01-01

## 2020-01-01 RX ORDER — SODIUM BICARBONATE 1 MEQ/ML
100 SYRINGE (ML) INTRAVENOUS
Status: COMPLETED | OUTPATIENT
Start: 2020-01-01 | End: 2020-01-01

## 2020-01-01 RX ORDER — POTASSIUM CHLORIDE 7.45 MG/ML
10 INJECTION INTRAVENOUS
Status: COMPLETED | OUTPATIENT
Start: 2020-01-01 | End: 2020-01-01

## 2020-01-01 RX ORDER — PROPOFOL 10 MG/ML
INJECTION, EMULSION INTRAVENOUS
Status: COMPLETED
Start: 2020-01-01 | End: 2020-01-01

## 2020-01-01 RX ORDER — NOREPINEPHRINE BITARTRATE/D5W 8 MG/250ML
.5-16 PLASTIC BAG, INJECTION (ML) INTRAVENOUS
Status: DISCONTINUED | OUTPATIENT
Start: 2020-01-01 | End: 2020-01-01

## 2020-01-01 RX ORDER — DOCUSATE SODIUM 100 MG/1
100 CAPSULE, LIQUID FILLED ORAL 2 TIMES DAILY
Status: DISCONTINUED | OUTPATIENT
Start: 2020-01-01 | End: 2020-01-01 | Stop reason: HOSPADM

## 2020-01-01 RX ORDER — FUROSEMIDE 10 MG/ML
40 INJECTION INTRAMUSCULAR; INTRAVENOUS EVERY 12 HOURS
Status: DISCONTINUED | OUTPATIENT
Start: 2020-01-01 | End: 2020-01-01

## 2020-01-01 RX ORDER — TRAMADOL HYDROCHLORIDE 50 MG/1
50 TABLET ORAL
Status: DISCONTINUED | OUTPATIENT
Start: 2020-01-01 | End: 2020-01-01 | Stop reason: HOSPADM

## 2020-01-01 RX ORDER — SCOLOPAMINE TRANSDERMAL SYSTEM 1 MG/1
1 PATCH, EXTENDED RELEASE TRANSDERMAL
Status: DISCONTINUED | OUTPATIENT
Start: 2020-01-01 | End: 2020-01-01 | Stop reason: HOSPADM

## 2020-01-01 RX ORDER — DIGOXIN 125 MCG
0.12 TABLET ORAL DAILY
Status: DISCONTINUED | OUTPATIENT
Start: 2020-01-01 | End: 2020-01-01

## 2020-01-01 RX ORDER — GUAIFENESIN 100 MG/5ML
81 LIQUID (ML) ORAL DAILY
Status: DISCONTINUED | OUTPATIENT
Start: 2020-01-01 | End: 2020-01-01

## 2020-01-01 RX ORDER — LOPERAMIDE HCL 2 MG
2 TABLET ORAL
COMMUNITY

## 2020-01-01 RX ORDER — FUROSEMIDE 10 MG/ML
40 INJECTION INTRAMUSCULAR; INTRAVENOUS ONCE
Status: COMPLETED | OUTPATIENT
Start: 2020-01-01 | End: 2020-01-01

## 2020-01-01 RX ORDER — SODIUM CHLORIDE AND POTASSIUM CHLORIDE .9; .15 G/100ML; G/100ML
SOLUTION INTRAVENOUS CONTINUOUS
Status: DISCONTINUED | OUTPATIENT
Start: 2020-01-01 | End: 2020-01-01

## 2020-01-01 RX ORDER — DEXTROSE MONOHYDRATE 100 MG/ML
30 INJECTION, SOLUTION INTRAVENOUS CONTINUOUS
Status: DISCONTINUED | OUTPATIENT
Start: 2020-01-01 | End: 2020-01-01

## 2020-01-01 RX ORDER — SPIRONOLACTONE 25 MG/1
25 TABLET ORAL 2 TIMES DAILY
Status: DISCONTINUED | OUTPATIENT
Start: 2020-01-01 | End: 2020-01-01

## 2020-01-01 RX ORDER — METOCLOPRAMIDE HYDROCHLORIDE 5 MG/ML
10 INJECTION INTRAMUSCULAR; INTRAVENOUS EVERY 6 HOURS
Status: DISCONTINUED | OUTPATIENT
Start: 2020-01-01 | End: 2020-01-01

## 2020-01-01 RX ORDER — DOCUSATE SODIUM 50 MG/5ML
100 LIQUID ORAL DAILY
Status: DISCONTINUED | OUTPATIENT
Start: 2020-01-01 | End: 2020-01-01

## 2020-01-01 RX ORDER — METOPROLOL TARTRATE 50 MG/1
50 TABLET ORAL 2 TIMES DAILY
Status: DISCONTINUED | OUTPATIENT
Start: 2020-01-01 | End: 2020-01-01 | Stop reason: HOSPADM

## 2020-01-01 RX ORDER — SODIUM CHLORIDE 0.9 % (FLUSH) 0.9 %
5-40 SYRINGE (ML) INJECTION EVERY 8 HOURS
Status: DISCONTINUED | OUTPATIENT
Start: 2020-01-01 | End: 2020-01-01 | Stop reason: HOSPADM

## 2020-01-01 RX ORDER — ACETAMINOPHEN 650 MG/1
650 SUPPOSITORY RECTAL
Status: DISCONTINUED | OUTPATIENT
Start: 2020-01-01 | End: 2020-01-01 | Stop reason: HOSPADM

## 2020-01-01 RX ORDER — DIGOXIN 0.25 MG/ML
INJECTION INTRAMUSCULAR; INTRAVENOUS
Status: COMPLETED
Start: 2020-01-01 | End: 2020-01-01

## 2020-01-01 RX ORDER — METOPROLOL TARTRATE 25 MG/1
50 TABLET, FILM COATED ORAL 2 TIMES DAILY
Qty: 60 TAB | Refills: 0 | Status: SHIPPED
Start: 2020-01-01

## 2020-01-01 RX ORDER — DOCUSATE SODIUM 100 MG/1
100 CAPSULE, LIQUID FILLED ORAL 2 TIMES DAILY
COMMUNITY

## 2020-01-01 RX ORDER — ACETAMINOPHEN 650 MG/1
650 SUPPOSITORY RECTAL
Status: DISCONTINUED | OUTPATIENT
Start: 2020-01-01 | End: 2020-01-01

## 2020-01-01 RX ORDER — DOXYCYCLINE 100 MG/1
100 TABLET ORAL 2 TIMES DAILY
COMMUNITY
End: 2020-01-01

## 2020-01-01 RX ORDER — CALCIUM CARBONATE 200(500)MG
400 TABLET,CHEWABLE ORAL EVERY 6 HOURS
Status: DISPENSED | OUTPATIENT
Start: 2020-01-01 | End: 2020-01-01

## 2020-01-01 RX ORDER — POTASSIUM CHLORIDE 7.45 MG/ML
INJECTION INTRAVENOUS
Status: COMPLETED
Start: 2020-01-01 | End: 2020-01-01

## 2020-01-01 RX ORDER — LORAZEPAM 2 MG/ML
INJECTION INTRAMUSCULAR
Status: COMPLETED
Start: 2020-01-01 | End: 2020-01-01

## 2020-01-01 RX ORDER — DIGOXIN 0.25 MG/ML
125 INJECTION INTRAMUSCULAR; INTRAVENOUS DAILY
Status: DISCONTINUED | OUTPATIENT
Start: 2020-01-01 | End: 2020-01-01

## 2020-01-01 RX ORDER — TRAMADOL HYDROCHLORIDE 50 MG/1
50 TABLET ORAL
Status: ON HOLD | COMMUNITY
End: 2020-01-01 | Stop reason: SDUPTHER

## 2020-01-01 RX ORDER — PRAVASTATIN SODIUM 40 MG/1
20 TABLET ORAL
Status: DISCONTINUED | OUTPATIENT
Start: 2020-01-01 | End: 2020-01-01

## 2020-01-01 RX ORDER — FUROSEMIDE 10 MG/ML
20 INJECTION INTRAMUSCULAR; INTRAVENOUS DAILY
Status: DISCONTINUED | OUTPATIENT
Start: 2020-01-01 | End: 2020-01-01

## 2020-01-01 RX ORDER — SODIUM CHLORIDE 9 MG/ML
250 INJECTION, SOLUTION INTRAVENOUS AS NEEDED
Status: DISCONTINUED | OUTPATIENT
Start: 2020-01-01 | End: 2020-01-01 | Stop reason: SDUPTHER

## 2020-01-01 RX ORDER — DOCUSATE SODIUM 100 MG/1
100 CAPSULE, LIQUID FILLED ORAL 2 TIMES DAILY
Status: DISCONTINUED | OUTPATIENT
Start: 2020-01-01 | End: 2020-01-01

## 2020-01-01 RX ORDER — DOXYCYCLINE HYCLATE 100 MG
100 TABLET ORAL EVERY 12 HOURS
Qty: 14 TAB | Refills: 0 | Status: SHIPPED
Start: 2020-01-01 | End: 2020-01-01

## 2020-01-01 RX ORDER — POTASSIUM CHLORIDE 1.5 G/1.77G
40 POWDER, FOR SOLUTION ORAL EVERY 8 HOURS
Status: DISCONTINUED | OUTPATIENT
Start: 2020-01-01 | End: 2020-01-01

## 2020-01-01 RX ORDER — METOPROLOL TARTRATE 25 MG/1
25 TABLET, FILM COATED ORAL 2 TIMES DAILY
Status: ON HOLD | COMMUNITY
End: 2020-01-01 | Stop reason: SDUPTHER

## 2020-01-01 RX ORDER — MORPHINE SULFATE 2 MG/ML
2 INJECTION, SOLUTION INTRAMUSCULAR; INTRAVENOUS
Status: DISCONTINUED | OUTPATIENT
Start: 2020-01-01 | End: 2020-01-01 | Stop reason: HOSPADM

## 2020-01-01 RX ORDER — IPRATROPIUM BROMIDE 0.5 MG/2.5ML
0.5 SOLUTION RESPIRATORY (INHALATION)
Status: DISCONTINUED | OUTPATIENT
Start: 2020-01-01 | End: 2020-01-01 | Stop reason: HOSPADM

## 2020-01-01 RX ORDER — PANTOPRAZOLE SODIUM 40 MG/1
40 GRANULE, DELAYED RELEASE ORAL
Status: DISCONTINUED | OUTPATIENT
Start: 2020-01-01 | End: 2020-01-01

## 2020-01-01 RX ORDER — SERTRALINE HYDROCHLORIDE 50 MG/1
50 TABLET, FILM COATED ORAL DAILY
Status: DISCONTINUED | OUTPATIENT
Start: 2020-01-01 | End: 2020-01-01 | Stop reason: HOSPADM

## 2020-01-01 RX ORDER — POTASSIUM CHLORIDE 7.45 MG/ML
10 INJECTION INTRAVENOUS
Status: DISPENSED | OUTPATIENT
Start: 2020-01-01 | End: 2020-01-01

## 2020-01-01 RX ORDER — POTASSIUM CHLORIDE 1.5 G/1.77G
20 POWDER, FOR SOLUTION ORAL EVERY 12 HOURS
Status: DISCONTINUED | OUTPATIENT
Start: 2020-01-01 | End: 2020-01-01

## 2020-01-01 RX ORDER — POTASSIUM CHLORIDE 7.45 MG/ML
10 INJECTION INTRAVENOUS
Status: DISCONTINUED | OUTPATIENT
Start: 2020-01-01 | End: 2020-01-01

## 2020-01-01 RX ORDER — DILTIAZEM HYDROCHLORIDE 30 MG/1
60 TABLET, FILM COATED ORAL
Status: DISCONTINUED | OUTPATIENT
Start: 2020-01-01 | End: 2020-01-01

## 2020-01-01 RX ORDER — POLYETHYLENE GLYCOL 3350 17 G/17G
17 POWDER, FOR SOLUTION ORAL DAILY PRN
Status: DISCONTINUED | OUTPATIENT
Start: 2020-01-01 | End: 2020-01-01 | Stop reason: HOSPADM

## 2020-01-01 RX ORDER — SODIUM CHLORIDE 9 MG/ML
INJECTION, SOLUTION INTRAVENOUS
Status: DISCONTINUED | OUTPATIENT
Start: 2020-01-01 | End: 2020-01-01 | Stop reason: HOSPADM

## 2020-01-01 RX ORDER — FUROSEMIDE 10 MG/ML
40 INJECTION INTRAMUSCULAR; INTRAVENOUS DAILY
Status: DISCONTINUED | OUTPATIENT
Start: 2020-01-01 | End: 2020-01-01

## 2020-01-01 RX ORDER — ZOLEDRONIC ACID 5 MG/100ML
5 INJECTION, SOLUTION INTRAVENOUS ONCE
Status: DISCONTINUED | OUTPATIENT
Start: 2020-01-01 | End: 2020-01-01

## 2020-01-01 RX ORDER — METOPROLOL TARTRATE 5 MG/5ML
5 INJECTION INTRAVENOUS
Status: DISCONTINUED | OUTPATIENT
Start: 2020-01-01 | End: 2020-01-01 | Stop reason: HOSPADM

## 2020-01-01 RX ORDER — ALBUTEROL SULFATE 1.25 MG/3ML
1.25 SOLUTION RESPIRATORY (INHALATION)
Qty: 30 NEBULE | Refills: 0 | Status: SHIPPED
Start: 2020-01-01

## 2020-01-01 RX ORDER — CHLORHEXIDINE GLUCONATE 1.2 MG/ML
15 RINSE ORAL EVERY 12 HOURS
Status: DISCONTINUED | OUTPATIENT
Start: 2020-01-01 | End: 2020-01-01

## 2020-01-01 RX ORDER — ALBUMIN HUMAN 250 G/1000ML
25 SOLUTION INTRAVENOUS EVERY 6 HOURS
Status: COMPLETED | OUTPATIENT
Start: 2020-01-01 | End: 2020-01-01

## 2020-01-01 RX ORDER — FUROSEMIDE 10 MG/ML
40 INJECTION INTRAMUSCULAR; INTRAVENOUS 2 TIMES DAILY
Status: DISCONTINUED | OUTPATIENT
Start: 2020-01-01 | End: 2020-01-01

## 2020-01-01 RX ORDER — DILTIAZEM HCL/D5W 125 MG/125
5 PLASTIC BAG, INJECTION (ML) INTRAVENOUS
Status: DISCONTINUED | OUTPATIENT
Start: 2020-01-01 | End: 2020-01-01

## 2020-01-01 RX ORDER — CALCIUM CARBONATE 200(500)MG
400 TABLET,CHEWABLE ORAL 2 TIMES DAILY
Status: DISCONTINUED | OUTPATIENT
Start: 2020-01-01 | End: 2020-01-01

## 2020-01-01 RX ORDER — POTASSIUM CHLORIDE 20 MEQ/1
80 TABLET, EXTENDED RELEASE ORAL
Status: DISCONTINUED | OUTPATIENT
Start: 2020-01-01 | End: 2020-01-01

## 2020-01-01 RX ORDER — DILTIAZEM HYDROCHLORIDE 120 MG/1
240 CAPSULE, COATED, EXTENDED RELEASE ORAL DAILY
Status: DISCONTINUED | OUTPATIENT
Start: 2020-01-01 | End: 2020-01-01

## 2020-01-01 RX ORDER — ALBUMIN HUMAN 250 G/1000ML
12.5 SOLUTION INTRAVENOUS EVERY 12 HOURS
Status: COMPLETED | OUTPATIENT
Start: 2020-01-01 | End: 2020-01-01

## 2020-01-01 RX ORDER — FUROSEMIDE 20 MG/1
20 TABLET ORAL DAILY
Qty: 30 TAB | Refills: 0 | Status: SHIPPED | OUTPATIENT
Start: 2020-01-01

## 2020-01-01 RX ORDER — CALCIUM GLUCONATE 20 MG/ML
2 INJECTION, SOLUTION INTRAVENOUS EVERY 8 HOURS
Status: COMPLETED | OUTPATIENT
Start: 2020-01-01 | End: 2020-01-01

## 2020-01-01 RX ORDER — METOCLOPRAMIDE HYDROCHLORIDE 5 MG/ML
10 INJECTION INTRAMUSCULAR; INTRAVENOUS
Status: DISCONTINUED | OUTPATIENT
Start: 2020-01-01 | End: 2020-01-01

## 2020-01-01 RX ORDER — DILTIAZEM HYDROCHLORIDE 30 MG/1
30 TABLET, FILM COATED ORAL
Status: DISCONTINUED | OUTPATIENT
Start: 2020-01-01 | End: 2020-01-01

## 2020-01-01 RX ORDER — POTASSIUM CHLORIDE 750 MG/1
40 TABLET, FILM COATED, EXTENDED RELEASE ORAL
Status: COMPLETED | OUTPATIENT
Start: 2020-01-01 | End: 2020-01-01

## 2020-01-01 RX ORDER — DILTIAZEM HYDROCHLORIDE 240 MG/1
240 CAPSULE, COATED, EXTENDED RELEASE ORAL DAILY
Qty: 30 CAP | Refills: 0 | Status: SHIPPED | OUTPATIENT
Start: 2020-01-01

## 2020-01-01 RX ORDER — NYSTATIN 100000 [USP'U]/ML
1 SUSPENSION ORAL 4 TIMES DAILY
COMMUNITY

## 2020-01-01 RX ORDER — METOPROLOL TARTRATE 5 MG/5ML
5 INJECTION INTRAVENOUS EVERY 6 HOURS
Status: DISCONTINUED | OUTPATIENT
Start: 2020-01-01 | End: 2020-01-01

## 2020-01-01 RX ORDER — HYDROCORTISONE SODIUM SUCCINATE 100 MG/2ML
50 INJECTION, POWDER, FOR SOLUTION INTRAMUSCULAR; INTRAVENOUS EVERY 8 HOURS
Status: DISCONTINUED | OUTPATIENT
Start: 2020-01-01 | End: 2020-01-01

## 2020-01-01 RX ORDER — MIDAZOLAM HYDROCHLORIDE 1 MG/ML
INJECTION, SOLUTION INTRAMUSCULAR; INTRAVENOUS
Status: DISPENSED
Start: 2020-01-01 | End: 2020-01-01

## 2020-01-01 RX ORDER — LISINOPRIL 5 MG/1
2.5 TABLET ORAL 2 TIMES DAILY
COMMUNITY
End: 2020-01-01

## 2020-01-01 RX ORDER — ONDANSETRON 2 MG/ML
4 INJECTION INTRAMUSCULAR; INTRAVENOUS
Status: DISCONTINUED | OUTPATIENT
Start: 2020-01-01 | End: 2020-01-01

## 2020-01-01 RX ORDER — DIGOXIN 0.25 MG/ML
125 INJECTION INTRAMUSCULAR; INTRAVENOUS
Status: COMPLETED | OUTPATIENT
Start: 2020-01-01 | End: 2020-01-01

## 2020-01-01 RX ORDER — DILTIAZEM HYDROCHLORIDE 5 MG/ML
10 INJECTION INTRAVENOUS ONCE
Status: COMPLETED | OUTPATIENT
Start: 2020-01-01 | End: 2020-01-01

## 2020-01-01 RX ORDER — SODIUM CHLORIDE, SODIUM LACTATE, POTASSIUM CHLORIDE, CALCIUM CHLORIDE 600; 310; 30; 20 MG/100ML; MG/100ML; MG/100ML; MG/100ML
125 INJECTION, SOLUTION INTRAVENOUS CONTINUOUS
Status: DISCONTINUED | OUTPATIENT
Start: 2020-01-01 | End: 2020-01-01

## 2020-01-01 RX ORDER — FLUTICASONE PROPIONATE 50 MCG
2 SPRAY, SUSPENSION (ML) NASAL DAILY
Status: DISCONTINUED | OUTPATIENT
Start: 2020-01-01 | End: 2020-01-01 | Stop reason: HOSPADM

## 2020-01-01 RX ORDER — CALCITONIN SALMON 200 [USP'U]/ML
4 INJECTION, SOLUTION INTRAMUSCULAR; SUBCUTANEOUS EVERY 12 HOURS
Status: DISPENSED | OUTPATIENT
Start: 2020-01-01 | End: 2020-01-01

## 2020-01-01 RX ORDER — ACETAMINOPHEN 500 MG
500 TABLET ORAL
COMMUNITY

## 2020-01-01 RX ORDER — SODIUM BICARBONATE 1 MEQ/ML
100 SYRINGE (ML) INTRAVENOUS ONCE
Status: COMPLETED | OUTPATIENT
Start: 2020-01-01 | End: 2020-01-01

## 2020-01-01 RX ORDER — SODIUM CHLORIDE 9 MG/ML
125 INJECTION, SOLUTION INTRAVENOUS CONTINUOUS
Status: DISCONTINUED | OUTPATIENT
Start: 2020-01-01 | End: 2020-01-01

## 2020-01-01 RX ORDER — ONDANSETRON 2 MG/ML
4 INJECTION INTRAMUSCULAR; INTRAVENOUS
Status: DISCONTINUED | OUTPATIENT
Start: 2020-01-01 | End: 2020-01-01 | Stop reason: HOSPADM

## 2020-01-01 RX ORDER — DILTIAZEM HYDROCHLORIDE 240 MG/1
240 CAPSULE, COATED, EXTENDED RELEASE ORAL DAILY
Status: DISCONTINUED | OUTPATIENT
Start: 2020-01-01 | End: 2020-01-01 | Stop reason: HOSPADM

## 2020-01-01 RX ORDER — CALCIUM GLUCONATE 20 MG/ML
1 INJECTION, SOLUTION INTRAVENOUS ONCE
Status: COMPLETED | OUTPATIENT
Start: 2020-01-01 | End: 2020-01-01

## 2020-01-01 RX ORDER — MIDAZOLAM HYDROCHLORIDE 1 MG/ML
INJECTION, SOLUTION INTRAMUSCULAR; INTRAVENOUS AS NEEDED
Status: DISCONTINUED | OUTPATIENT
Start: 2020-01-01 | End: 2020-01-01 | Stop reason: HOSPADM

## 2020-01-01 RX ORDER — ALBUTEROL SULFATE 90 UG/1
2 AEROSOL, METERED RESPIRATORY (INHALATION)
Status: DISCONTINUED | OUTPATIENT
Start: 2020-01-01 | End: 2020-01-01

## 2020-01-01 RX ORDER — AMIODARONE HYDROCHLORIDE 150 MG/3ML
150 INJECTION, SOLUTION INTRAVENOUS ONCE
Status: DISCONTINUED | OUTPATIENT
Start: 2020-01-01 | End: 2020-01-01 | Stop reason: DRUGHIGH

## 2020-01-01 RX ORDER — CALCIUM GLUCONATE 20 MG/ML
1 INJECTION, SOLUTION INTRAVENOUS EVERY 6 HOURS
Status: COMPLETED | OUTPATIENT
Start: 2020-01-01 | End: 2020-01-01

## 2020-01-01 RX ORDER — CIPROFLOXACIN 500 MG/1
500 TABLET ORAL 2 TIMES DAILY
COMMUNITY
End: 2020-01-01

## 2020-01-01 RX ORDER — HYDROCORTISONE SODIUM SUCCINATE 100 MG/2ML
50 INJECTION, POWDER, FOR SOLUTION INTRAMUSCULAR; INTRAVENOUS EVERY 12 HOURS
Status: DISCONTINUED | OUTPATIENT
Start: 2020-01-01 | End: 2020-01-01

## 2020-01-01 RX ORDER — LEVALBUTEROL INHALATION SOLUTION 1.25 MG/3ML
1.25 SOLUTION RESPIRATORY (INHALATION)
Status: DISCONTINUED | OUTPATIENT
Start: 2020-01-01 | End: 2020-01-01 | Stop reason: HOSPADM

## 2020-01-01 RX ORDER — METOPROLOL TARTRATE 25 MG/1
25 TABLET, FILM COATED ORAL 2 TIMES DAILY
Status: DISCONTINUED | OUTPATIENT
Start: 2020-01-01 | End: 2020-01-01

## 2020-01-01 RX ORDER — DIGOXIN 125 MCG
0.12 TABLET ORAL DAILY
Qty: 30 TAB | Refills: 0 | Status: ON HOLD
Start: 2020-01-01 | End: 2020-01-01 | Stop reason: SDUPTHER

## 2020-01-01 RX ORDER — FUROSEMIDE 20 MG/1
20 TABLET ORAL
Status: DISCONTINUED | OUTPATIENT
Start: 2020-01-01 | End: 2020-01-01 | Stop reason: HOSPADM

## 2020-01-01 RX ORDER — METOPROLOL TARTRATE 50 MG/1
100 TABLET ORAL 2 TIMES DAILY
Status: DISCONTINUED | OUTPATIENT
Start: 2020-01-01 | End: 2020-01-01

## 2020-01-01 RX ORDER — DOCUSATE SODIUM 100 MG/1
100 CAPSULE, LIQUID FILLED ORAL DAILY
Status: DISCONTINUED | OUTPATIENT
Start: 2020-01-01 | End: 2020-01-01

## 2020-01-01 RX ORDER — MINERAL OIL
OIL (ML) MISCELLANEOUS AS NEEDED
Status: DISCONTINUED | OUTPATIENT
Start: 2020-01-01 | End: 2020-01-01

## 2020-01-01 RX ORDER — METOPROLOL TARTRATE 50 MG/1
50 TABLET ORAL 2 TIMES DAILY
Status: DISCONTINUED | OUTPATIENT
Start: 2020-01-01 | End: 2020-01-01

## 2020-01-01 RX ORDER — RISPERIDONE 2 MG/1
2 TABLET, FILM COATED ORAL
Status: DISCONTINUED | OUTPATIENT
Start: 2020-01-01 | End: 2020-01-01

## 2020-01-01 RX ORDER — DEXTROSE MONOHYDRATE 50 MG/ML
150 INJECTION, SOLUTION INTRAVENOUS CONTINUOUS
Status: DISCONTINUED | OUTPATIENT
Start: 2020-01-01 | End: 2020-01-01

## 2020-01-01 RX ORDER — GUAIFENESIN 600 MG/1
600 TABLET, EXTENDED RELEASE ORAL 2 TIMES DAILY
COMMUNITY

## 2020-01-01 RX ORDER — POTASSIUM CHLORIDE 1.5 G/1.77G
20 POWDER, FOR SOLUTION ORAL EVERY 8 HOURS
Status: DISCONTINUED | OUTPATIENT
Start: 2020-01-01 | End: 2020-01-01

## 2020-01-01 RX ORDER — CALCIUM GLUCONATE 20 MG/ML
2 INJECTION, SOLUTION INTRAVENOUS ONCE
Status: COMPLETED | OUTPATIENT
Start: 2020-01-01 | End: 2020-01-01

## 2020-01-01 RX ORDER — TRAMADOL HYDROCHLORIDE 50 MG/1
50 TABLET ORAL 2 TIMES DAILY
COMMUNITY
End: 2020-01-01

## 2020-01-01 RX ORDER — MORPHINE SULFATE 2 MG/ML
2 INJECTION, SOLUTION INTRAMUSCULAR; INTRAVENOUS
Status: DISCONTINUED | OUTPATIENT
Start: 2020-01-01 | End: 2020-01-01

## 2020-01-01 RX ORDER — LANOLIN ALCOHOL/MO/W.PET/CERES
100 CREAM (GRAM) TOPICAL DAILY
Status: DISCONTINUED | OUTPATIENT
Start: 2020-01-01 | End: 2020-01-01

## 2020-01-01 RX ORDER — PROPOFOL 10 MG/ML
0-50 VIAL (ML) INTRAVENOUS
Status: DISCONTINUED | OUTPATIENT
Start: 2020-01-01 | End: 2020-01-01

## 2020-01-01 RX ORDER — MAGNESIUM SULFATE 1 G/100ML
1 INJECTION INTRAVENOUS ONCE
Status: COMPLETED | OUTPATIENT
Start: 2020-01-01 | End: 2020-01-01

## 2020-01-01 RX ORDER — AMMONIUM LACTATE 12 G/100G
LOTION TOPICAL DAILY
Status: DISCONTINUED | OUTPATIENT
Start: 2020-01-01 | End: 2020-01-01 | Stop reason: HOSPADM

## 2020-01-01 RX ORDER — DIGOXIN 0.25 MG/ML
125 INJECTION INTRAMUSCULAR; INTRAVENOUS EVERY 6 HOURS
Status: COMPLETED | OUTPATIENT
Start: 2020-01-01 | End: 2020-01-01

## 2020-01-01 RX ORDER — ALBUTEROL SULFATE 90 UG/1
2 AEROSOL, METERED RESPIRATORY (INHALATION)
COMMUNITY

## 2020-01-01 RX ORDER — PRAVASTATIN SODIUM 20 MG/1
20 TABLET ORAL
Status: DISCONTINUED | OUTPATIENT
Start: 2020-01-01 | End: 2020-01-01 | Stop reason: HOSPADM

## 2020-01-01 RX ORDER — ASPIRIN 325 MG/1
100 TABLET, FILM COATED ORAL DAILY
Status: DISCONTINUED | OUTPATIENT
Start: 2020-01-01 | End: 2020-01-01

## 2020-01-01 RX ORDER — SPIRONOLACTONE 25 MG/1
25 TABLET ORAL
Status: COMPLETED | OUTPATIENT
Start: 2020-01-01 | End: 2020-01-01

## 2020-01-01 RX ORDER — AMMONIUM LACTATE 12 G/100G
LOTION TOPICAL
Qty: 1 BOTTLE | Refills: 0 | Status: SHIPPED
Start: 2020-01-01

## 2020-01-01 RX ORDER — FOLIC ACID 1 MG/1
1 TABLET ORAL DAILY
COMMUNITY

## 2020-01-01 RX ORDER — IPRATROPIUM BROMIDE AND ALBUTEROL SULFATE 2.5; .5 MG/3ML; MG/3ML
3 SOLUTION RESPIRATORY (INHALATION) EVERY 6 HOURS
Qty: 30 NEBULE | Refills: 0 | Status: SHIPPED
Start: 2020-01-01 | End: 2020-01-01

## 2020-01-01 RX ORDER — LEVALBUTEROL INHALATION SOLUTION 1.25 MG/3ML
1.25 SOLUTION RESPIRATORY (INHALATION)
Status: DISCONTINUED | OUTPATIENT
Start: 2020-01-01 | End: 2020-01-01

## 2020-01-01 RX ORDER — GUAIFENESIN 600 MG/1
600 TABLET, EXTENDED RELEASE ORAL EVERY 12 HOURS
Status: DISCONTINUED | OUTPATIENT
Start: 2020-01-01 | End: 2020-01-01 | Stop reason: HOSPADM

## 2020-01-01 RX ORDER — ASPIRIN 81 MG/1
TABLET ORAL DAILY
COMMUNITY
End: 2020-01-01

## 2020-01-01 RX ORDER — DEXTROSE MONOHYDRATE 50 MG/ML
100 INJECTION, SOLUTION INTRAVENOUS CONTINUOUS
Status: DISCONTINUED | OUTPATIENT
Start: 2020-01-01 | End: 2020-01-01

## 2020-01-01 RX ORDER — FUROSEMIDE 10 MG/ML
40 INJECTION INTRAMUSCULAR; INTRAVENOUS
Status: COMPLETED | OUTPATIENT
Start: 2020-01-01 | End: 2020-01-01

## 2020-01-01 RX ORDER — CALCIUM GLUCONATE 20 MG/ML
1 INJECTION, SOLUTION INTRAVENOUS EVERY 8 HOURS
Status: DISPENSED | OUTPATIENT
Start: 2020-01-01 | End: 2020-01-01

## 2020-01-01 RX ORDER — POTASSIUM CHLORIDE 1500 MG/1
10 TABLET, FILM COATED, EXTENDED RELEASE ORAL 2 TIMES DAILY
COMMUNITY
End: 2020-01-01

## 2020-01-01 RX ORDER — FUROSEMIDE 20 MG/1
20 TABLET ORAL DAILY
Status: DISCONTINUED | OUTPATIENT
Start: 2020-01-01 | End: 2020-01-01

## 2020-01-01 RX ORDER — ACETAMINOPHEN 325 MG/1
650 TABLET ORAL
Status: DISCONTINUED | OUTPATIENT
Start: 2020-01-01 | End: 2020-01-01

## 2020-01-01 RX ORDER — TRAMADOL HYDROCHLORIDE 50 MG/1
50 TABLET ORAL
Qty: 30 TAB | Refills: 0 | Status: SHIPPED | OUTPATIENT
Start: 2020-01-01 | End: 2020-01-01

## 2020-01-01 RX ORDER — POTASSIUM CHLORIDE 750 MG/1
20 TABLET, FILM COATED, EXTENDED RELEASE ORAL
Status: COMPLETED | OUTPATIENT
Start: 2020-01-01 | End: 2020-01-01

## 2020-01-01 RX ORDER — BISACODYL 5 MG
5 TABLET, DELAYED RELEASE (ENTERIC COATED) ORAL
COMMUNITY

## 2020-01-01 RX ORDER — GLYCOPYRROLATE 1 MG/1
1 TABLET ORAL 3 TIMES DAILY
Status: DISPENSED | OUTPATIENT
Start: 2020-01-01 | End: 2020-01-01

## 2020-01-01 RX ORDER — IPRATROPIUM BROMIDE 0.5 MG/2.5ML
0.5 SOLUTION RESPIRATORY (INHALATION)
Status: DISCONTINUED | OUTPATIENT
Start: 2020-01-01 | End: 2020-01-01

## 2020-01-01 RX ORDER — FUROSEMIDE 20 MG/1
TABLET ORAL
COMMUNITY
Start: 2020-01-01 | End: 2020-01-01

## 2020-01-01 RX ORDER — DILTIAZEM HCL/D5W 125 MG/125
0-15 PLASTIC BAG, INJECTION (ML) INTRAVENOUS
Status: DISCONTINUED | OUTPATIENT
Start: 2020-01-01 | End: 2020-01-01

## 2020-01-01 RX ORDER — DEXTROSE 50 % IN WATER (D50W) INTRAVENOUS SYRINGE
Status: COMPLETED
Start: 2020-01-01 | End: 2020-01-01

## 2020-01-01 RX ORDER — ALBUMIN HUMAN 250 G/1000ML
25 SOLUTION INTRAVENOUS ONCE
Status: COMPLETED | OUTPATIENT
Start: 2020-01-01 | End: 2020-01-01

## 2020-01-01 RX ORDER — HEPARIN SODIUM 10000 [USP'U]/ML
5000 INJECTION, SOLUTION INTRAVENOUS; SUBCUTANEOUS EVERY 8 HOURS
Status: DISCONTINUED | OUTPATIENT
Start: 2020-01-01 | End: 2020-01-01

## 2020-01-01 RX ORDER — PROPOFOL 10 MG/ML
INJECTION, EMULSION INTRAVENOUS AS NEEDED
Status: DISCONTINUED | OUTPATIENT
Start: 2020-01-01 | End: 2020-01-01 | Stop reason: HOSPADM

## 2020-01-01 RX ORDER — FOLIC ACID 1 MG/1
1 TABLET ORAL DAILY
Status: DISCONTINUED | OUTPATIENT
Start: 2020-01-01 | End: 2020-01-01

## 2020-01-01 RX ORDER — METOPROLOL TARTRATE 5 MG/5ML
2.5 INJECTION INTRAVENOUS EVERY 6 HOURS
Status: DISCONTINUED | OUTPATIENT
Start: 2020-01-01 | End: 2020-01-01

## 2020-01-01 RX ORDER — SERTRALINE HYDROCHLORIDE 50 MG/1
50 TABLET, FILM COATED ORAL DAILY
Status: DISCONTINUED | OUTPATIENT
Start: 2020-01-01 | End: 2020-01-01

## 2020-01-01 RX ORDER — ACETAMINOPHEN 325 MG/1
650 TABLET ORAL
Status: DISCONTINUED | OUTPATIENT
Start: 2020-01-01 | End: 2020-01-01 | Stop reason: HOSPADM

## 2020-01-01 RX ORDER — VANCOMYCIN/0.9 % SOD CHLORIDE 1.5G/250ML
1500 PLASTIC BAG, INJECTION (ML) INTRAVENOUS EVERY 12 HOURS
Status: DISCONTINUED | OUTPATIENT
Start: 2020-01-01 | End: 2020-01-01

## 2020-01-01 RX ORDER — ASPIRIN 325 MG/1
100 TABLET, FILM COATED ORAL DAILY
Status: DISCONTINUED | OUTPATIENT
Start: 2020-01-01 | End: 2020-01-01 | Stop reason: HOSPADM

## 2020-01-01 RX ORDER — SODIUM CHLORIDE TAB 1 GM 1 G
1 TAB MISCELLANEOUS 2 TIMES DAILY
COMMUNITY
End: 2020-01-01

## 2020-01-01 RX ORDER — LIDOCAINE HYDROCHLORIDE 10 MG/ML
INJECTION INFILTRATION; PERINEURAL AS NEEDED
Status: DISCONTINUED | OUTPATIENT
Start: 2020-01-01 | End: 2020-01-01

## 2020-01-01 RX ORDER — POTASSIUM CHLORIDE 1.5 G/1.77G
40 POWDER, FOR SOLUTION ORAL
Status: COMPLETED | OUTPATIENT
Start: 2020-01-01 | End: 2020-01-01

## 2020-01-01 RX ORDER — MIDAZOLAM IN 0.9 % SOD.CHLORID 1 MG/ML
0-10 PLASTIC BAG, INJECTION (ML) INTRAVENOUS
Status: DISCONTINUED | OUTPATIENT
Start: 2020-01-01 | End: 2020-01-01

## 2020-01-01 RX ORDER — HYDROXYZINE PAMOATE 25 MG/1
25 CAPSULE ORAL
Status: DISCONTINUED | OUTPATIENT
Start: 2020-01-01 | End: 2020-01-01

## 2020-01-01 RX ORDER — DOXYCYCLINE HYCLATE 100 MG
100 TABLET ORAL EVERY 12 HOURS
Status: DISCONTINUED | OUTPATIENT
Start: 2020-01-01 | End: 2020-01-01 | Stop reason: HOSPADM

## 2020-01-01 RX ORDER — ARFORMOTEROL TARTRATE 15 UG/2ML
15 SOLUTION RESPIRATORY (INHALATION)
Status: DISCONTINUED | OUTPATIENT
Start: 2020-01-01 | End: 2020-01-01 | Stop reason: HOSPADM

## 2020-01-01 RX ORDER — PROMETHAZINE HYDROCHLORIDE 25 MG/1
12.5 TABLET ORAL
Status: DISCONTINUED | OUTPATIENT
Start: 2020-01-01 | End: 2020-01-01 | Stop reason: HOSPADM

## 2020-01-01 RX ORDER — HYDROCORTISONE SODIUM SUCCINATE 100 MG/2ML
50 INJECTION, POWDER, FOR SOLUTION INTRAMUSCULAR; INTRAVENOUS EVERY 6 HOURS
Status: DISCONTINUED | OUTPATIENT
Start: 2020-01-01 | End: 2020-01-01

## 2020-01-01 RX ORDER — DEXTROSE MONOHYDRATE 50 MG/ML
50 INJECTION, SOLUTION INTRAVENOUS CONTINUOUS
Status: DISCONTINUED | OUTPATIENT
Start: 2020-01-01 | End: 2020-01-01

## 2020-01-01 RX ORDER — SODIUM CHLORIDE, SODIUM LACTATE, POTASSIUM CHLORIDE, CALCIUM CHLORIDE 600; 310; 30; 20 MG/100ML; MG/100ML; MG/100ML; MG/100ML
75 INJECTION, SOLUTION INTRAVENOUS CONTINUOUS
Status: DISCONTINUED | OUTPATIENT
Start: 2020-01-01 | End: 2020-01-01

## 2020-01-01 RX ORDER — SODIUM CHLORIDE 450 MG/100ML
100 INJECTION, SOLUTION INTRAVENOUS CONTINUOUS
Status: DISCONTINUED | OUTPATIENT
Start: 2020-01-01 | End: 2020-01-01

## 2020-01-01 RX ORDER — BUDESONIDE 0.5 MG/2ML
500 INHALANT ORAL
Status: DISCONTINUED | OUTPATIENT
Start: 2020-01-01 | End: 2020-01-01 | Stop reason: HOSPADM

## 2020-01-01 RX ORDER — DIGOXIN 125 MCG
0.12 TABLET ORAL DAILY
Status: DISCONTINUED | OUTPATIENT
Start: 2020-01-01 | End: 2020-01-01 | Stop reason: HOSPADM

## 2020-01-01 RX ORDER — BISACODYL 5 MG
5 TABLET, DELAYED RELEASE (ENTERIC COATED) ORAL DAILY PRN
Status: DISCONTINUED | OUTPATIENT
Start: 2020-01-01 | End: 2020-01-01 | Stop reason: HOSPADM

## 2020-01-01 RX ORDER — MEGESTROL ACETATE 40 MG/ML
400 SUSPENSION ORAL DAILY
COMMUNITY

## 2020-01-01 RX ORDER — ALBUMIN HUMAN 250 G/1000ML
25 SOLUTION INTRAVENOUS EVERY 6 HOURS
Status: DISCONTINUED | OUTPATIENT
Start: 2020-01-01 | End: 2020-01-01

## 2020-01-01 RX ORDER — FUROSEMIDE 10 MG/ML
INJECTION INTRAMUSCULAR; INTRAVENOUS
Status: COMPLETED
Start: 2020-01-01 | End: 2020-01-01

## 2020-01-01 RX ORDER — SODIUM CHLORIDE 0.9 % (FLUSH) 0.9 %
5-40 SYRINGE (ML) INJECTION AS NEEDED
Status: DISCONTINUED | OUTPATIENT
Start: 2020-01-01 | End: 2020-01-01 | Stop reason: HOSPADM

## 2020-01-01 RX ORDER — FOLIC ACID 1 MG/1
1 TABLET ORAL DAILY
Status: DISCONTINUED | OUTPATIENT
Start: 2020-01-01 | End: 2020-01-01 | Stop reason: HOSPADM

## 2020-01-01 RX ORDER — CETIRIZINE HCL 10 MG
10 TABLET ORAL DAILY
Status: DISCONTINUED | OUTPATIENT
Start: 2020-01-01 | End: 2020-01-01 | Stop reason: HOSPADM

## 2020-01-01 RX ORDER — LANOLIN ALCOHOL/MO/W.PET/CERES
100 CREAM (GRAM) TOPICAL DAILY
COMMUNITY

## 2020-01-01 RX ORDER — HYDROMORPHONE HYDROCHLORIDE 1 MG/ML
0.5 INJECTION, SOLUTION INTRAMUSCULAR; INTRAVENOUS; SUBCUTANEOUS ONCE
Status: COMPLETED | OUTPATIENT
Start: 2020-01-01 | End: 2020-01-01

## 2020-01-01 RX ADMIN — SODIUM CHLORIDE 40 MG: 9 INJECTION, SOLUTION INTRAMUSCULAR; INTRAVENOUS; SUBCUTANEOUS at 08:54

## 2020-01-01 RX ADMIN — POTASSIUM CHLORIDE 40 MEQ: 1.5 FOR SOLUTION ORAL at 13:16

## 2020-01-01 RX ADMIN — SODIUM CHLORIDE 40 MG: 9 INJECTION, SOLUTION INTRAMUSCULAR; INTRAVENOUS; SUBCUTANEOUS at 21:55

## 2020-01-01 RX ADMIN — PANTOPRAZOLE SODIUM 40 MG: 40 INJECTION, POWDER, FOR SOLUTION INTRAVENOUS at 21:02

## 2020-01-01 RX ADMIN — SODIUM CHLORIDE 125 ML/HR: 9 INJECTION, SOLUTION INTRAVENOUS at 22:28

## 2020-01-01 RX ADMIN — METOCLOPRAMIDE HYDROCHLORIDE 10 MG: 5 INJECTION INTRAMUSCULAR; INTRAVENOUS at 17:37

## 2020-01-01 RX ADMIN — FUROSEMIDE 40 MG: 10 INJECTION, SOLUTION INTRAMUSCULAR; INTRAVENOUS at 10:26

## 2020-01-01 RX ADMIN — DEXTROSE MONOHYDRATE 125 ML/HR: 50 INJECTION, SOLUTION INTRAVENOUS at 00:06

## 2020-01-01 RX ADMIN — TRAMADOL HYDROCHLORIDE 50 MG: 50 TABLET, FILM COATED ORAL at 16:07

## 2020-01-01 RX ADMIN — HYDROCORTISONE SODIUM SUCCINATE 50 MG: 100 INJECTION, POWDER, FOR SOLUTION INTRAMUSCULAR; INTRAVENOUS at 22:37

## 2020-01-01 RX ADMIN — FUROSEMIDE 40 MG: 10 INJECTION, SOLUTION INTRAMUSCULAR; INTRAVENOUS at 10:19

## 2020-01-01 RX ADMIN — ZOLEDRONIC ACID 4 MG: 4 INJECTION, SOLUTION, CONCENTRATE INTRAVENOUS at 15:27

## 2020-01-01 RX ADMIN — MIDAZOLAM 3 MG/HR: 5 INJECTION INTRAMUSCULAR; INTRAVENOUS at 11:00

## 2020-01-01 RX ADMIN — VANCOMYCIN HYDROCHLORIDE 1500 MG: 10 INJECTION, POWDER, LYOPHILIZED, FOR SOLUTION INTRAVENOUS at 12:27

## 2020-01-01 RX ADMIN — DOCUSATE SODIUM 100 MG: 100 CAPSULE, LIQUID FILLED ORAL at 10:17

## 2020-01-01 RX ADMIN — SODIUM CHLORIDE 800 MG: 9 INJECTION, SOLUTION INTRAVENOUS at 10:15

## 2020-01-01 RX ADMIN — MORPHINE SULFATE 2 MG: 2 INJECTION, SOLUTION INTRAMUSCULAR; INTRAVENOUS at 20:40

## 2020-01-01 RX ADMIN — SERTRALINE HYDROCHLORIDE 50 MG: 50 TABLET, FILM COATED ORAL at 09:04

## 2020-01-01 RX ADMIN — ARFORMOTEROL TARTRATE 15 MCG: 15 SOLUTION RESPIRATORY (INHALATION) at 07:26

## 2020-01-01 RX ADMIN — POTASSIUM CHLORIDE 10 MEQ: 7.46 INJECTION, SOLUTION INTRAVENOUS at 12:21

## 2020-01-01 RX ADMIN — Medication 10 ML: at 17:45

## 2020-01-01 RX ADMIN — CHLORHEXIDINE GLUCONATE 0.12% ORAL RINSE 15 ML: 1.2 LIQUID ORAL at 11:41

## 2020-01-01 RX ADMIN — DILTIAZEM HYDROCHLORIDE 30 MG: 30 TABLET, FILM COATED ORAL at 11:13

## 2020-01-01 RX ADMIN — DOCUSATE SODIUM 100 MG: 100 CAPSULE, LIQUID FILLED ORAL at 09:03

## 2020-01-01 RX ADMIN — APIXABAN 5 MG: 5 TABLET, FILM COATED ORAL at 17:07

## 2020-01-01 RX ADMIN — MORPHINE SULFATE 2 MG: 2 INJECTION, SOLUTION INTRAMUSCULAR; INTRAVENOUS at 08:12

## 2020-01-01 RX ADMIN — PANTOPRAZOLE SODIUM 40 MG: 40 INJECTION, POWDER, FOR SOLUTION INTRAVENOUS at 20:10

## 2020-01-01 RX ADMIN — PRAVASTATIN SODIUM 20 MG: 20 TABLET ORAL at 21:08

## 2020-01-01 RX ADMIN — GUAIFENESIN 600 MG: 600 TABLET ORAL at 21:16

## 2020-01-01 RX ADMIN — RISPERIDONE 2 MG: 2 TABLET ORAL at 22:27

## 2020-01-01 RX ADMIN — HYDROCORTISONE SODIUM SUCCINATE 50 MG: 100 INJECTION, POWDER, FOR SOLUTION INTRAMUSCULAR; INTRAVENOUS at 21:46

## 2020-01-01 RX ADMIN — Medication 10 ML: at 21:52

## 2020-01-01 RX ADMIN — ACETAMINOPHEN 650 MG: 325 TABLET ORAL at 04:35

## 2020-01-01 RX ADMIN — METOCLOPRAMIDE HYDROCHLORIDE 10 MG: 5 INJECTION INTRAMUSCULAR; INTRAVENOUS at 06:43

## 2020-01-01 RX ADMIN — CETIRIZINE HYDROCHLORIDE 10 MG: 10 TABLET, FILM COATED ORAL at 08:18

## 2020-01-01 RX ADMIN — POTASSIUM CHLORIDE 10 MEQ: 7.46 INJECTION, SOLUTION INTRAVENOUS at 13:43

## 2020-01-01 RX ADMIN — PRAVASTATIN SODIUM 20 MG: 40 TABLET ORAL at 00:21

## 2020-01-01 RX ADMIN — Medication 10 ML: at 22:51

## 2020-01-01 RX ADMIN — DOCUSATE SODIUM 100 MG: 100 CAPSULE, LIQUID FILLED ORAL at 17:21

## 2020-01-01 RX ADMIN — DIGOXIN 125 MCG: 250 INJECTION, SOLUTION INTRAMUSCULAR; INTRAVENOUS; PARENTERAL at 09:50

## 2020-01-01 RX ADMIN — METOCLOPRAMIDE HYDROCHLORIDE 10 MG: 5 INJECTION INTRAMUSCULAR; INTRAVENOUS at 01:56

## 2020-01-01 RX ADMIN — DEXTROSE MONOHYDRATE 150 ML/HR: 50 INJECTION, SOLUTION INTRAVENOUS at 11:08

## 2020-01-01 RX ADMIN — SODIUM BICARBONATE: 84 INJECTION, SOLUTION INTRAVENOUS at 20:20

## 2020-01-01 RX ADMIN — SODIUM CHLORIDE 1000 ML: 9 INJECTION, SOLUTION INTRAVENOUS at 15:49

## 2020-01-01 RX ADMIN — SERTRALINE HYDROCHLORIDE 50 MG: 50 TABLET ORAL at 08:59

## 2020-01-01 RX ADMIN — GLYCOPYRROLATE 1 MG: 1 TABLET ORAL at 15:12

## 2020-01-01 RX ADMIN — SPIRONOLACTONE 25 MG: 25 TABLET ORAL at 15:37

## 2020-01-01 RX ADMIN — CHLORHEXIDINE GLUCONATE 0.12% ORAL RINSE 15 ML: 1.2 LIQUID ORAL at 20:41

## 2020-01-01 RX ADMIN — FENTANYL CITRATE 100 MCG/HR: 50 INJECTION, SOLUTION INTRAMUSCULAR; INTRAVENOUS at 20:37

## 2020-01-01 RX ADMIN — FOLIC ACID 1 MG: 1 TABLET ORAL at 09:22

## 2020-01-01 RX ADMIN — HYDROCORTISONE SODIUM SUCCINATE 50 MG: 100 INJECTION, POWDER, FOR SOLUTION INTRAMUSCULAR; INTRAVENOUS at 13:44

## 2020-01-01 RX ADMIN — FENTANYL CITRATE 200 MCG/HR: 50 INJECTION, SOLUTION INTRAMUSCULAR; INTRAVENOUS at 19:19

## 2020-01-01 RX ADMIN — HYDROCORTISONE SODIUM SUCCINATE 50 MG: 100 INJECTION, POWDER, FOR SOLUTION INTRAMUSCULAR; INTRAVENOUS at 15:26

## 2020-01-01 RX ADMIN — MEROPENEM 1 G: 1 INJECTION, POWDER, FOR SOLUTION INTRAVENOUS at 00:46

## 2020-01-01 RX ADMIN — GLYCOPYRROLATE 1 MG: 1 TABLET ORAL at 10:19

## 2020-01-01 RX ADMIN — DIGOXIN 125 MCG: 250 INJECTION, SOLUTION INTRAMUSCULAR; INTRAVENOUS; PARENTERAL at 08:12

## 2020-01-01 RX ADMIN — APIXABAN 5 MG: 5 TABLET, FILM COATED ORAL at 17:14

## 2020-01-01 RX ADMIN — LORAZEPAM 1 MG: 2 INJECTION INTRAMUSCULAR; INTRAVENOUS at 18:17

## 2020-01-01 RX ADMIN — POTASSIUM CHLORIDE 10 MEQ: 7.46 INJECTION, SOLUTION INTRAVENOUS at 13:00

## 2020-01-01 RX ADMIN — MORPHINE SULFATE 2 MG: 2 INJECTION, SOLUTION INTRAMUSCULAR; INTRAVENOUS at 07:43

## 2020-01-01 RX ADMIN — FENTANYL CITRATE 90 MCG/HR: 50 INJECTION, SOLUTION INTRAMUSCULAR; INTRAVENOUS at 20:35

## 2020-01-01 RX ADMIN — ACETAMINOPHEN 650 MG: 650 SUPPOSITORY RECTAL at 05:12

## 2020-01-01 RX ADMIN — METOPROLOL TARTRATE 50 MG: 50 TABLET, FILM COATED ORAL at 17:07

## 2020-01-01 RX ADMIN — POTASSIUM CHLORIDE 10 MEQ: 7.46 INJECTION, SOLUTION INTRAVENOUS at 08:14

## 2020-01-01 RX ADMIN — METOPROLOL TARTRATE 25 MG: 25 TABLET, FILM COATED ORAL at 21:04

## 2020-01-01 RX ADMIN — DOCUSATE SODIUM 100 MG: 100 CAPSULE, LIQUID FILLED ORAL at 08:17

## 2020-01-01 RX ADMIN — SODIUM CHLORIDE 40 MG: 9 INJECTION, SOLUTION INTRAMUSCULAR; INTRAVENOUS; SUBCUTANEOUS at 09:12

## 2020-01-01 RX ADMIN — FOLIC ACID 1 MG: 1 TABLET ORAL at 10:19

## 2020-01-01 RX ADMIN — MORPHINE SULFATE 2 MG: 2 INJECTION, SOLUTION INTRAMUSCULAR; INTRAVENOUS at 02:17

## 2020-01-01 RX ADMIN — CHLORHEXIDINE GLUCONATE 0.12% ORAL RINSE 15 ML: 1.2 LIQUID ORAL at 09:24

## 2020-01-01 RX ADMIN — METOCLOPRAMIDE HYDROCHLORIDE 10 MG: 5 INJECTION INTRAMUSCULAR; INTRAVENOUS at 17:00

## 2020-01-01 RX ADMIN — SERTRALINE HYDROCHLORIDE 50 MG: 50 TABLET, FILM COATED ORAL at 08:27

## 2020-01-01 RX ADMIN — Medication 10 ML: at 21:15

## 2020-01-01 RX ADMIN — METOPROLOL TARTRATE 100 MG: 50 TABLET, FILM COATED ORAL at 08:27

## 2020-01-01 RX ADMIN — METOPROLOL TARTRATE 25 MG: 25 TABLET, FILM COATED ORAL at 08:13

## 2020-01-01 RX ADMIN — FLUTICASONE PROPIONATE 2 SPRAY: 50 SPRAY, METERED NASAL at 08:39

## 2020-01-01 RX ADMIN — ACETAMINOPHEN 650 MG: 325 TABLET ORAL at 21:08

## 2020-01-01 RX ADMIN — DOCUSATE SODIUM 100 MG: 100 CAPSULE, LIQUID FILLED ORAL at 09:22

## 2020-01-01 RX ADMIN — CALCIUM GLUCONATE 1000 MG: 20 INJECTION, SOLUTION INTRAVENOUS at 14:05

## 2020-01-01 RX ADMIN — THIAMINE HCL TAB 100 MG 100 MG: 100 TAB at 08:12

## 2020-01-01 RX ADMIN — DEXMEDETOMIDINE HYDROCHLORIDE 0.8 MCG/KG/HR: 100 INJECTION, SOLUTION, CONCENTRATE INTRAVENOUS at 14:50

## 2020-01-01 RX ADMIN — MEROPENEM 1 G: 1 INJECTION, POWDER, FOR SOLUTION INTRAVENOUS at 01:56

## 2020-01-01 RX ADMIN — SODIUM CHLORIDE 40 MG: 9 INJECTION, SOLUTION INTRAMUSCULAR; INTRAVENOUS; SUBCUTANEOUS at 20:40

## 2020-01-01 RX ADMIN — MORPHINE SULFATE 2 MG: 2 INJECTION, SOLUTION INTRAMUSCULAR; INTRAVENOUS at 07:39

## 2020-01-01 RX ADMIN — POTASSIUM CHLORIDE 10 MEQ: 7.46 INJECTION, SOLUTION INTRAVENOUS at 21:19

## 2020-01-01 RX ADMIN — FUROSEMIDE 40 MG: 10 INJECTION, SOLUTION INTRAMUSCULAR; INTRAVENOUS at 08:59

## 2020-01-01 RX ADMIN — PRAVASTATIN SODIUM 20 MG: 40 TABLET ORAL at 22:32

## 2020-01-01 RX ADMIN — METOPROLOL TARTRATE 50 MG: 50 TABLET, FILM COATED ORAL at 18:13

## 2020-01-01 RX ADMIN — POTASSIUM CHLORIDE 40 MEQ: 750 TABLET, FILM COATED, EXTENDED RELEASE ORAL at 09:15

## 2020-01-01 RX ADMIN — SODIUM CHLORIDE 40 MG: 9 INJECTION, SOLUTION INTRAMUSCULAR; INTRAVENOUS; SUBCUTANEOUS at 21:58

## 2020-01-01 RX ADMIN — SODIUM CHLORIDE 125 ML/HR: 9 INJECTION, SOLUTION INTRAVENOUS at 07:50

## 2020-01-01 RX ADMIN — MEROPENEM 1 G: 1 INJECTION, POWDER, FOR SOLUTION INTRAVENOUS at 00:31

## 2020-01-01 RX ADMIN — POTASSIUM CHLORIDE AND SODIUM CHLORIDE 75 ML/HR: 900; 150 INJECTION, SOLUTION INTRAVENOUS at 17:04

## 2020-01-01 RX ADMIN — DEXTROSE MONOHYDRATE 150 ML/HR: 50 INJECTION, SOLUTION INTRAVENOUS at 21:08

## 2020-01-01 RX ADMIN — Medication 10 ML: at 05:06

## 2020-01-01 RX ADMIN — SODIUM BICARBONATE 100 MEQ: 84 INJECTION INTRAVENOUS at 11:02

## 2020-01-01 RX ADMIN — SODIUM CHLORIDE 1000 ML: 9 INJECTION, SOLUTION INTRAVENOUS at 03:11

## 2020-01-01 RX ADMIN — HYDROCORTISONE SODIUM SUCCINATE 50 MG: 100 INJECTION, POWDER, FOR SOLUTION INTRAMUSCULAR; INTRAVENOUS at 05:47

## 2020-01-01 RX ADMIN — POTASSIUM CHLORIDE 20 MEQ: 1.5 FOR SOLUTION ORAL at 13:44

## 2020-01-01 RX ADMIN — FOLIC ACID 1 MG: 1 TABLET ORAL at 09:02

## 2020-01-01 RX ADMIN — PIPERACILLIN AND TAZOBACTAM 3.38 G: 3; .375 INJECTION, POWDER, LYOPHILIZED, FOR SOLUTION INTRAVENOUS at 03:47

## 2020-01-01 RX ADMIN — POTASSIUM CHLORIDE 10 MEQ: 7.46 INJECTION, SOLUTION INTRAVENOUS at 08:57

## 2020-01-01 RX ADMIN — DIGOXIN 125 MCG: 250 INJECTION, SOLUTION INTRAMUSCULAR; INTRAVENOUS; PARENTERAL at 17:02

## 2020-01-01 RX ADMIN — CHLORHEXIDINE GLUCONATE 0.12% ORAL RINSE 15 ML: 1.2 LIQUID ORAL at 21:55

## 2020-01-01 RX ADMIN — DOCUSATE SODIUM 100 MG: 100 CAPSULE, LIQUID FILLED ORAL at 21:55

## 2020-01-01 RX ADMIN — Medication 17.5 MCG/MIN: at 04:35

## 2020-01-01 RX ADMIN — MIDAZOLAM 4 MG/HR: 5 INJECTION INTRAMUSCULAR; INTRAVENOUS at 19:33

## 2020-01-01 RX ADMIN — FUROSEMIDE 40 MG: 10 INJECTION, SOLUTION INTRAMUSCULAR; INTRAVENOUS at 17:01

## 2020-01-01 RX ADMIN — POTASSIUM CHLORIDE 10 MEQ: 7.46 INJECTION, SOLUTION INTRAVENOUS at 16:01

## 2020-01-01 RX ADMIN — LEVALBUTEROL HYDROCHLORIDE 1.25 MG: 1.25 SOLUTION RESPIRATORY (INHALATION) at 07:50

## 2020-01-01 RX ADMIN — GUAIFENESIN 600 MG: 600 TABLET ORAL at 09:03

## 2020-01-01 RX ADMIN — CALCIUM CARBONATE (ANTACID) CHEW TAB 500 MG 400 MG: 500 CHEW TAB at 21:46

## 2020-01-01 RX ADMIN — METOPROLOL TARTRATE 75 MG: 25 TABLET, FILM COATED ORAL at 09:05

## 2020-01-01 RX ADMIN — POTASSIUM CHLORIDE 20 MEQ: 1.5 FOR SOLUTION ORAL at 21:20

## 2020-01-01 RX ADMIN — Medication 10 ML: at 13:56

## 2020-01-01 RX ADMIN — POTASSIUM CHLORIDE 10 MEQ: 7.46 INJECTION, SOLUTION INTRAVENOUS at 09:30

## 2020-01-01 RX ADMIN — Medication 10 ML: at 06:04

## 2020-01-01 RX ADMIN — CHLORHEXIDINE GLUCONATE 0.12% ORAL RINSE 15 ML: 1.2 LIQUID ORAL at 20:21

## 2020-01-01 RX ADMIN — CHLORHEXIDINE GLUCONATE 0.12% ORAL RINSE 15 ML: 1.2 LIQUID ORAL at 20:10

## 2020-01-01 RX ADMIN — DOCUSATE SODIUM 100 MG: 100 CAPSULE, LIQUID FILLED ORAL at 17:45

## 2020-01-01 RX ADMIN — DIGOXIN 125 MCG: 250 INJECTION, SOLUTION INTRAMUSCULAR; INTRAVENOUS; PARENTERAL at 15:09

## 2020-01-01 RX ADMIN — DIGOXIN 125 MCG: 0.25 INJECTION INTRAMUSCULAR; INTRAVENOUS at 11:16

## 2020-01-01 RX ADMIN — POTASSIUM CHLORIDE 10 MEQ: 7.46 INJECTION, SOLUTION INTRAVENOUS at 10:15

## 2020-01-01 RX ADMIN — POTASSIUM CHLORIDE 10 MEQ: 7.46 INJECTION, SOLUTION INTRAVENOUS at 10:45

## 2020-01-01 RX ADMIN — Medication 100 MG: at 09:05

## 2020-01-01 RX ADMIN — DEXMEDETOMIDINE HYDROCHLORIDE 0.4 MCG/KG/HR: 100 INJECTION, SOLUTION, CONCENTRATE INTRAVENOUS at 00:18

## 2020-01-01 RX ADMIN — GUAIFENESIN 600 MG: 600 TABLET ORAL at 08:18

## 2020-01-01 RX ADMIN — Medication: at 11:03

## 2020-01-01 RX ADMIN — ACETAMINOPHEN 650 MG: 325 TABLET ORAL at 12:12

## 2020-01-01 RX ADMIN — MORPHINE SULFATE 2 MG: 2 INJECTION, SOLUTION INTRAMUSCULAR; INTRAVENOUS at 06:55

## 2020-01-01 RX ADMIN — DILTIAZEM HYDROCHLORIDE 10 MG: 5 INJECTION, SOLUTION INTRAVENOUS at 02:00

## 2020-01-01 RX ADMIN — DEXTROSE MONOHYDRATE 50 ML/HR: 50 INJECTION, SOLUTION INTRAVENOUS at 13:09

## 2020-01-01 RX ADMIN — DOCUSATE SODIUM 100 MG: 100 CAPSULE, LIQUID FILLED ORAL at 21:32

## 2020-01-01 RX ADMIN — BUDESONIDE 500 MCG: 0.5 SUSPENSION RESPIRATORY (INHALATION) at 07:26

## 2020-01-01 RX ADMIN — Medication 10 ML: at 06:42

## 2020-01-01 RX ADMIN — DIGOXIN 125 MCG: 250 INJECTION, SOLUTION INTRAMUSCULAR; INTRAVENOUS; PARENTERAL at 09:22

## 2020-01-01 RX ADMIN — PIPERACILLIN AND TAZOBACTAM 3.38 G: 3; .375 INJECTION, POWDER, LYOPHILIZED, FOR SOLUTION INTRAVENOUS at 13:39

## 2020-01-01 RX ADMIN — ALBUMIN (HUMAN) 12.5 G: 0.25 INJECTION, SOLUTION INTRAVENOUS at 21:19

## 2020-01-01 RX ADMIN — CALCIUM CARBONATE (ANTACID) CHEW TAB 500 MG 400 MG: 500 CHEW TAB at 21:58

## 2020-01-01 RX ADMIN — FOLIC ACID 1 MG: 1 TABLET ORAL at 08:18

## 2020-01-01 RX ADMIN — FENTANYL CITRATE 125 MCG/HR: 50 INJECTION, SOLUTION INTRAMUSCULAR; INTRAVENOUS at 20:15

## 2020-01-01 RX ADMIN — THIAMINE HCL TAB 100 MG 100 MG: 100 TAB at 09:13

## 2020-01-01 RX ADMIN — FLUTICASONE PROPIONATE 2 SPRAY: 50 SPRAY, METERED NASAL at 08:28

## 2020-01-01 RX ADMIN — CALCITONIN SALMON 370 INT'L UNITS: 200 INJECTION, SOLUTION INTRAMUSCULAR; SUBCUTANEOUS at 21:38

## 2020-01-01 RX ADMIN — METOCLOPRAMIDE HYDROCHLORIDE 10 MG: 5 INJECTION INTRAMUSCULAR; INTRAVENOUS at 05:01

## 2020-01-01 RX ADMIN — Medication 0.5 MCG/MIN: at 20:00

## 2020-01-01 RX ADMIN — POTASSIUM CHLORIDE 10 MEQ: 7.46 INJECTION, SOLUTION INTRAVENOUS at 11:23

## 2020-01-01 RX ADMIN — POTASSIUM CHLORIDE 10 MEQ: 7.46 INJECTION, SOLUTION INTRAVENOUS at 12:00

## 2020-01-01 RX ADMIN — CALCIUM CARBONATE (ANTACID) CHEW TAB 500 MG 400 MG: 500 CHEW TAB at 08:12

## 2020-01-01 RX ADMIN — Medication 10 MCG/MIN: at 09:06

## 2020-01-01 RX ADMIN — FOLIC ACID 1 MG: 1 TABLET ORAL at 08:12

## 2020-01-01 RX ADMIN — CALCIUM CARBONATE (ANTACID) CHEW TAB 500 MG 400 MG: 500 CHEW TAB at 21:55

## 2020-01-01 RX ADMIN — RISPERIDONE 2 MG: 2 TABLET ORAL at 22:32

## 2020-01-01 RX ADMIN — PRAVASTATIN SODIUM 20 MG: 20 TABLET ORAL at 21:04

## 2020-01-01 RX ADMIN — Medication 100 MG: at 09:21

## 2020-01-01 RX ADMIN — THIAMINE HCL TAB 100 MG 100 MG: 100 TAB at 09:22

## 2020-01-01 RX ADMIN — METOPROLOL TARTRATE 50 MG: 50 TABLET, FILM COATED ORAL at 17:22

## 2020-01-01 RX ADMIN — THIAMINE HCL TAB 100 MG 100 MG: 100 TAB at 10:19

## 2020-01-01 RX ADMIN — POTASSIUM CHLORIDE 10 MEQ: 7.46 INJECTION, SOLUTION INTRAVENOUS at 14:44

## 2020-01-01 RX ADMIN — POTASSIUM CHLORIDE 10 MEQ: 7.46 INJECTION, SOLUTION INTRAVENOUS at 11:58

## 2020-01-01 RX ADMIN — FLUTICASONE PROPIONATE 2 SPRAY: 50 SPRAY, METERED NASAL at 08:18

## 2020-01-01 RX ADMIN — GLYCOPYRROLATE 1 MG: 1 TABLET ORAL at 21:56

## 2020-01-01 RX ADMIN — POTASSIUM CHLORIDE 10 MEQ: 7.46 INJECTION, SOLUTION INTRAVENOUS at 19:25

## 2020-01-01 RX ADMIN — HYDROCORTISONE SODIUM SUCCINATE 50 MG: 100 INJECTION, POWDER, FOR SOLUTION INTRAMUSCULAR; INTRAVENOUS at 05:01

## 2020-01-01 RX ADMIN — FENTANYL CITRATE 70 MCG/HR: 50 INJECTION, SOLUTION INTRAMUSCULAR; INTRAVENOUS at 10:26

## 2020-01-01 RX ADMIN — METOCLOPRAMIDE HYDROCHLORIDE 10 MG: 5 INJECTION INTRAMUSCULAR; INTRAVENOUS at 11:43

## 2020-01-01 RX ADMIN — IPRATROPIUM BROMIDE 0.5 MG: 0.5 SOLUTION RESPIRATORY (INHALATION) at 07:50

## 2020-01-01 RX ADMIN — POTASSIUM CHLORIDE 20 MEQ: 1.5 FOR SOLUTION ORAL at 21:46

## 2020-01-01 RX ADMIN — DILTIAZEM HYDROCHLORIDE 30 MG: 30 TABLET, FILM COATED ORAL at 17:07

## 2020-01-01 RX ADMIN — GUAIFENESIN 600 MG: 600 TABLET ORAL at 22:06

## 2020-01-01 RX ADMIN — METOPROLOL TARTRATE 5 MG: 5 INJECTION INTRAVENOUS at 17:41

## 2020-01-01 RX ADMIN — POTASSIUM CHLORIDE 40 MEQ: 750 TABLET, FILM COATED, EXTENDED RELEASE ORAL at 08:27

## 2020-01-01 RX ADMIN — FUROSEMIDE 20 MG: 20 TABLET ORAL at 17:22

## 2020-01-01 RX ADMIN — SODIUM CHLORIDE 100 ML/HR: 4.5 INJECTION, SOLUTION INTRAVENOUS at 13:06

## 2020-01-01 RX ADMIN — ALBUMIN (HUMAN) 25 G: 0.25 INJECTION, SOLUTION INTRAVENOUS at 05:27

## 2020-01-01 RX ADMIN — PRAVASTATIN SODIUM 20 MG: 40 TABLET ORAL at 21:34

## 2020-01-01 RX ADMIN — MORPHINE SULFATE 2 MG: 2 INJECTION, SOLUTION INTRAMUSCULAR; INTRAVENOUS at 10:04

## 2020-01-01 RX ADMIN — Medication 100 MG: at 08:38

## 2020-01-01 RX ADMIN — CALCIUM CARBONATE (ANTACID) CHEW TAB 500 MG 400 MG: 500 CHEW TAB at 09:48

## 2020-01-01 RX ADMIN — PIPERACILLIN AND TAZOBACTAM 3.38 G: 3; .375 INJECTION, POWDER, LYOPHILIZED, FOR SOLUTION INTRAVENOUS at 21:16

## 2020-01-01 RX ADMIN — POTASSIUM CHLORIDE 10 MEQ: 7.46 INJECTION, SOLUTION INTRAVENOUS at 09:59

## 2020-01-01 RX ADMIN — PROPOFOL 20 MCG/KG/MIN: 10 INJECTION, EMULSION INTRAVENOUS at 02:58

## 2020-01-01 RX ADMIN — POTASSIUM CHLORIDE 10 MEQ: 7.46 INJECTION, SOLUTION INTRAVENOUS at 11:27

## 2020-01-01 RX ADMIN — SERTRALINE HYDROCHLORIDE 50 MG: 50 TABLET, FILM COATED ORAL at 09:03

## 2020-01-01 RX ADMIN — FENTANYL CITRATE 200 MCG/HR: 50 INJECTION, SOLUTION INTRAMUSCULAR; INTRAVENOUS at 12:07

## 2020-01-01 RX ADMIN — CHLORHEXIDINE GLUCONATE 0.12% ORAL RINSE 15 ML: 1.2 LIQUID ORAL at 09:19

## 2020-01-01 RX ADMIN — LEVALBUTEROL HYDROCHLORIDE 1.25 MG: 1.25 SOLUTION RESPIRATORY (INHALATION) at 20:26

## 2020-01-01 RX ADMIN — Medication: at 08:39

## 2020-01-01 RX ADMIN — PHENYLEPHRINE HYDROCHLORIDE 30 MCG/MIN: 10 INJECTION INTRAVENOUS at 16:15

## 2020-01-01 RX ADMIN — LORAZEPAM 1 MG: 2 INJECTION INTRAMUSCULAR; INTRAVENOUS at 12:08

## 2020-01-01 RX ADMIN — CETIRIZINE HYDROCHLORIDE 10 MG: 10 TABLET, FILM COATED ORAL at 08:38

## 2020-01-01 RX ADMIN — FUROSEMIDE 40 MG: 10 INJECTION INTRAMUSCULAR; INTRAVENOUS at 07:31

## 2020-01-01 RX ADMIN — POTASSIUM CHLORIDE 10 MEQ: 7.46 INJECTION, SOLUTION INTRAVENOUS at 14:05

## 2020-01-01 RX ADMIN — FOLIC ACID 1 MG: 1 TABLET ORAL at 09:12

## 2020-01-01 RX ADMIN — DOXYCYCLINE HYCLATE 100 MG: 100 TABLET, COATED ORAL at 11:34

## 2020-01-01 RX ADMIN — MEROPENEM 1 G: 1 INJECTION, POWDER, FOR SOLUTION INTRAVENOUS at 12:13

## 2020-01-01 RX ADMIN — APIXABAN 5 MG: 5 TABLET, FILM COATED ORAL at 17:21

## 2020-01-01 RX ADMIN — VASOPRESSIN 0.04 UNITS/MIN: 20 INJECTION INTRAVENOUS at 11:36

## 2020-01-01 RX ADMIN — IPRATROPIUM BROMIDE 0.5 MG: 0.5 SOLUTION RESPIRATORY (INHALATION) at 07:21

## 2020-01-01 RX ADMIN — METOCLOPRAMIDE HYDROCHLORIDE 10 MG: 5 INJECTION INTRAMUSCULAR; INTRAVENOUS at 17:43

## 2020-01-01 RX ADMIN — Medication 10 ML: at 17:48

## 2020-01-01 RX ADMIN — POTASSIUM CHLORIDE 20 MEQ: 1.5 FOR SOLUTION ORAL at 17:43

## 2020-01-01 RX ADMIN — APIXABAN 5 MG: 5 TABLET, FILM COATED ORAL at 08:38

## 2020-01-01 RX ADMIN — ALBUMIN (HUMAN) 12.5 G: 0.25 INJECTION, SOLUTION INTRAVENOUS at 08:56

## 2020-01-01 RX ADMIN — PANTOPRAZOLE SODIUM 40 MG: 40 INJECTION, POWDER, FOR SOLUTION INTRAVENOUS at 11:23

## 2020-01-01 RX ADMIN — SODIUM CHLORIDE 100 ML/HR: 4.5 INJECTION, SOLUTION INTRAVENOUS at 16:01

## 2020-01-01 RX ADMIN — METOPROLOL TARTRATE 75 MG: 25 TABLET, FILM COATED ORAL at 09:00

## 2020-01-01 RX ADMIN — FOLIC ACID 1 MG: 1 TABLET ORAL at 10:17

## 2020-01-01 RX ADMIN — HEPARIN SODIUM 5000 UNITS: 10000 INJECTION, SOLUTION INTRAVENOUS; SUBCUTANEOUS at 17:03

## 2020-01-01 RX ADMIN — FENTANYL CITRATE 175 MCG/HR: 50 INJECTION, SOLUTION INTRAMUSCULAR; INTRAVENOUS at 16:44

## 2020-01-01 RX ADMIN — FUROSEMIDE 20 MG: 20 TABLET ORAL at 06:42

## 2020-01-01 RX ADMIN — DEXTROSE MONOHYDRATE 25 G: 25 INJECTION, SOLUTION INTRAVENOUS at 05:34

## 2020-01-01 RX ADMIN — CETIRIZINE HYDROCHLORIDE 10 MG: 10 TABLET, FILM COATED ORAL at 08:27

## 2020-01-01 RX ADMIN — FOLIC ACID 1 MG: 1 TABLET ORAL at 08:27

## 2020-01-01 RX ADMIN — HYDROCORTISONE SODIUM SUCCINATE 50 MG: 100 INJECTION, POWDER, FOR SOLUTION INTRAMUSCULAR; INTRAVENOUS at 14:04

## 2020-01-01 RX ADMIN — AMIODARONE HYDROCHLORIDE 150 MG: 50 INJECTION, SOLUTION INTRAVENOUS at 08:47

## 2020-01-01 RX ADMIN — CHLORHEXIDINE GLUCONATE 0.12% ORAL RINSE 15 ML: 1.2 LIQUID ORAL at 08:12

## 2020-01-01 RX ADMIN — CALCIUM GLUCONATE 1000 MG: 20 INJECTION, SOLUTION INTRAVENOUS at 20:18

## 2020-01-01 RX ADMIN — Medication 2.5 MCG/MIN: at 07:42

## 2020-01-01 RX ADMIN — GUAIFENESIN 600 MG: 600 TABLET ORAL at 20:44

## 2020-01-01 RX ADMIN — PIPERACILLIN AND TAZOBACTAM 3.38 G: 3; .375 INJECTION, POWDER, LYOPHILIZED, FOR SOLUTION INTRAVENOUS at 02:41

## 2020-01-01 RX ADMIN — PIPERACILLIN AND TAZOBACTAM 3.38 G: 3; .375 INJECTION, POWDER, LYOPHILIZED, FOR SOLUTION INTRAVENOUS at 04:35

## 2020-01-01 RX ADMIN — MEROPENEM 1 G: 1 INJECTION, POWDER, FOR SOLUTION INTRAVENOUS at 06:40

## 2020-01-01 RX ADMIN — SODIUM CHLORIDE 1000 ML: 900 INJECTION, SOLUTION INTRAVENOUS at 21:16

## 2020-01-01 RX ADMIN — HYDROCORTISONE SODIUM SUCCINATE 50 MG: 100 INJECTION, POWDER, FOR SOLUTION INTRAMUSCULAR; INTRAVENOUS at 21:00

## 2020-01-01 RX ADMIN — METOCLOPRAMIDE HYDROCHLORIDE 10 MG: 5 INJECTION INTRAMUSCULAR; INTRAVENOUS at 11:41

## 2020-01-01 RX ADMIN — BUDESONIDE 500 MCG: 0.5 SUSPENSION RESPIRATORY (INHALATION) at 20:34

## 2020-01-01 RX ADMIN — GLYCOPYRROLATE 1 MG: 1 TABLET ORAL at 21:55

## 2020-01-01 RX ADMIN — FUROSEMIDE 40 MG: 10 INJECTION, SOLUTION INTRAMUSCULAR; INTRAVENOUS at 21:18

## 2020-01-01 RX ADMIN — PANTOPRAZOLE SODIUM 40 MG: 40 GRANULE, DELAYED RELEASE ORAL at 06:34

## 2020-01-01 RX ADMIN — MEROPENEM 1 G: 1 INJECTION, POWDER, FOR SOLUTION INTRAVENOUS at 00:29

## 2020-01-01 RX ADMIN — APIXABAN 5 MG: 5 TABLET, FILM COATED ORAL at 08:12

## 2020-01-01 RX ADMIN — DIGOXIN 125 MCG: 250 INJECTION, SOLUTION INTRAMUSCULAR; INTRAVENOUS; PARENTERAL at 09:40

## 2020-01-01 RX ADMIN — CHLORHEXIDINE GLUCONATE 0.12% ORAL RINSE 15 ML: 1.2 LIQUID ORAL at 08:59

## 2020-01-01 RX ADMIN — FUROSEMIDE 40 MG: 10 INJECTION, SOLUTION INTRAMUSCULAR; INTRAVENOUS at 09:49

## 2020-01-01 RX ADMIN — POTASSIUM CHLORIDE 10 MEQ: 7.46 INJECTION, SOLUTION INTRAVENOUS at 17:38

## 2020-01-01 RX ADMIN — AMIODARONE HYDROCHLORIDE 150 MG: 50 INJECTION, SOLUTION INTRAVENOUS at 12:54

## 2020-01-01 RX ADMIN — CALCIUM CARBONATE (ANTACID) CHEW TAB 500 MG 400 MG: 500 CHEW TAB at 13:16

## 2020-01-01 RX ADMIN — METOCLOPRAMIDE HYDROCHLORIDE 10 MG: 5 INJECTION INTRAMUSCULAR; INTRAVENOUS at 00:56

## 2020-01-01 RX ADMIN — PIPERACILLIN AND TAZOBACTAM 2.25 G: 2; .25 INJECTION, POWDER, LYOPHILIZED, FOR SOLUTION INTRAVENOUS at 01:48

## 2020-01-01 RX ADMIN — PROPOFOL 40 MCG/KG/MIN: 10 INJECTION, EMULSION INTRAVENOUS at 20:16

## 2020-01-01 RX ADMIN — MEROPENEM 1 G: 1 INJECTION, POWDER, FOR SOLUTION INTRAVENOUS at 18:05

## 2020-01-01 RX ADMIN — FENTANYL CITRATE 100 MCG/HR: 50 INJECTION, SOLUTION INTRAMUSCULAR; INTRAVENOUS at 04:43

## 2020-01-01 RX ADMIN — RISPERIDONE 2 MG: 2 TABLET ORAL at 00:21

## 2020-01-01 RX ADMIN — CALCIUM CARBONATE (ANTACID) CHEW TAB 500 MG 400 MG: 500 CHEW TAB at 01:55

## 2020-01-01 RX ADMIN — FENTANYL CITRATE 100 MCG/HR: 50 INJECTION, SOLUTION INTRAMUSCULAR; INTRAVENOUS at 19:10

## 2020-01-01 RX ADMIN — THIAMINE HCL TAB 100 MG 100 MG: 100 TAB at 08:01

## 2020-01-01 RX ADMIN — Medication 10 ML: at 21:08

## 2020-01-01 RX ADMIN — IPRATROPIUM BROMIDE 0.5 MG: 0.5 SOLUTION RESPIRATORY (INHALATION) at 13:29

## 2020-01-01 RX ADMIN — PROPOFOL 30 MG: 10 INJECTION, EMULSION INTRAVENOUS at 15:59

## 2020-01-01 RX ADMIN — SERTRALINE HYDROCHLORIDE 50 MG: 50 TABLET, FILM COATED ORAL at 08:12

## 2020-01-01 RX ADMIN — FENTANYL CITRATE 75 MCG/HR: 50 INJECTION, SOLUTION INTRAMUSCULAR; INTRAVENOUS at 23:33

## 2020-01-01 RX ADMIN — FENTANYL CITRATE 200 MCG/HR: 50 INJECTION, SOLUTION INTRAMUSCULAR; INTRAVENOUS at 08:56

## 2020-01-01 RX ADMIN — VANCOMYCIN HYDROCHLORIDE 750 MG: 750 INJECTION, POWDER, LYOPHILIZED, FOR SOLUTION INTRAVENOUS at 10:26

## 2020-01-01 RX ADMIN — SPIRONOLACTONE 25 MG: 25 TABLET ORAL at 20:40

## 2020-01-01 RX ADMIN — PRAVASTATIN SODIUM 20 MG: 20 TABLET ORAL at 21:50

## 2020-01-01 RX ADMIN — Medication 10 ML: at 14:25

## 2020-01-01 RX ADMIN — SERTRALINE HYDROCHLORIDE 50 MG: 50 TABLET ORAL at 09:49

## 2020-01-01 RX ADMIN — HYDROCORTISONE SODIUM SUCCINATE 50 MG: 100 INJECTION, POWDER, FOR SOLUTION INTRAMUSCULAR; INTRAVENOUS at 15:24

## 2020-01-01 RX ADMIN — CHLORHEXIDINE GLUCONATE 0.12% ORAL RINSE 15 ML: 1.2 LIQUID ORAL at 09:43

## 2020-01-01 RX ADMIN — LEVALBUTEROL HYDROCHLORIDE 1.25 MG: 1.25 SOLUTION RESPIRATORY (INHALATION) at 13:28

## 2020-01-01 RX ADMIN — SODIUM CHLORIDE 75 ML/HR: 900 INJECTION, SOLUTION INTRAVENOUS at 03:14

## 2020-01-01 RX ADMIN — METOPROLOL TARTRATE 50 MG: 50 TABLET, FILM COATED ORAL at 09:02

## 2020-01-01 RX ADMIN — SODIUM CHLORIDE 800 MG: 9 INJECTION, SOLUTION INTRAVENOUS at 09:30

## 2020-01-01 RX ADMIN — APIXABAN 5 MG: 5 TABLET, FILM COATED ORAL at 18:09

## 2020-01-01 RX ADMIN — SODIUM CHLORIDE 100 ML/HR: 4.5 INJECTION, SOLUTION INTRAVENOUS at 16:32

## 2020-01-01 RX ADMIN — MORPHINE SULFATE 2 MG: 2 INJECTION, SOLUTION INTRAMUSCULAR; INTRAVENOUS at 12:26

## 2020-01-01 RX ADMIN — MORPHINE SULFATE 2 MG: 2 INJECTION, SOLUTION INTRAMUSCULAR; INTRAVENOUS at 07:58

## 2020-01-01 RX ADMIN — CALCIUM GLUCONATE 2 G: 20 INJECTION, SOLUTION INTRAVENOUS at 12:08

## 2020-01-01 RX ADMIN — FENTANYL CITRATE 150 MCG/HR: 50 INJECTION, SOLUTION INTRAMUSCULAR; INTRAVENOUS at 13:02

## 2020-01-01 RX ADMIN — SPIRONOLACTONE 25 MG: 25 TABLET ORAL at 08:13

## 2020-01-01 RX ADMIN — PANTOPRAZOLE SODIUM 40 MG: 40 INJECTION, POWDER, FOR SOLUTION INTRAVENOUS at 11:14

## 2020-01-01 RX ADMIN — APIXABAN 5 MG: 5 TABLET, FILM COATED ORAL at 17:46

## 2020-01-01 RX ADMIN — METOPROLOL TARTRATE 100 MG: 50 TABLET, FILM COATED ORAL at 17:14

## 2020-01-01 RX ADMIN — PRAVASTATIN SODIUM 20 MG: 40 TABLET ORAL at 21:55

## 2020-01-01 RX ADMIN — DIGOXIN 125 MCG: 250 INJECTION, SOLUTION INTRAMUSCULAR; INTRAVENOUS; PARENTERAL at 17:54

## 2020-01-01 RX ADMIN — POTASSIUM CHLORIDE 40 MEQ: 750 TABLET, FILM COATED, EXTENDED RELEASE ORAL at 08:38

## 2020-01-01 RX ADMIN — Medication 10 ML: at 22:07

## 2020-01-01 RX ADMIN — PRAVASTATIN SODIUM 20 MG: 40 TABLET ORAL at 21:00

## 2020-01-01 RX ADMIN — METOCLOPRAMIDE HYDROCHLORIDE 10 MG: 5 INJECTION INTRAMUSCULAR; INTRAVENOUS at 17:08

## 2020-01-01 RX ADMIN — SODIUM CHLORIDE 100 ML/HR: 4.5 INJECTION, SOLUTION INTRAVENOUS at 03:00

## 2020-01-01 RX ADMIN — Medication 10 ML: at 19:07

## 2020-01-01 RX ADMIN — SODIUM CHLORIDE 40 MG: 9 INJECTION, SOLUTION INTRAMUSCULAR; INTRAVENOUS; SUBCUTANEOUS at 09:21

## 2020-01-01 RX ADMIN — METOCLOPRAMIDE HYDROCHLORIDE 10 MG: 5 INJECTION INTRAMUSCULAR; INTRAVENOUS at 17:50

## 2020-01-01 RX ADMIN — POTASSIUM CHLORIDE 10 MEQ: 7.46 INJECTION, SOLUTION INTRAVENOUS at 08:44

## 2020-01-01 RX ADMIN — MEROPENEM 1 G: 1 INJECTION, POWDER, FOR SOLUTION INTRAVENOUS at 17:21

## 2020-01-01 RX ADMIN — Medication 100 MG: at 08:18

## 2020-01-01 RX ADMIN — PANTOPRAZOLE SODIUM 40 MG: 40 GRANULE, DELAYED RELEASE ORAL at 07:46

## 2020-01-01 RX ADMIN — IPRATROPIUM BROMIDE AND ALBUTEROL SULFATE 3 ML: .5; 3 SOLUTION RESPIRATORY (INHALATION) at 19:55

## 2020-01-01 RX ADMIN — AMIODARONE HYDROCHLORIDE 1 MG/MIN: 50 INJECTION, SOLUTION INTRAVENOUS at 09:07

## 2020-01-01 RX ADMIN — POTASSIUM CHLORIDE 10 MEQ: 7.46 INJECTION, SOLUTION INTRAVENOUS at 12:52

## 2020-01-01 RX ADMIN — SODIUM CHLORIDE, SODIUM LACTATE, POTASSIUM CHLORIDE, AND CALCIUM CHLORIDE 75 ML/HR: 600; 310; 30; 20 INJECTION, SOLUTION INTRAVENOUS at 09:16

## 2020-01-01 RX ADMIN — FENTANYL CITRATE 150 MCG/HR: 50 INJECTION, SOLUTION INTRAMUSCULAR; INTRAVENOUS at 21:14

## 2020-01-01 RX ADMIN — FENTANYL CITRATE 50 MCG/HR: 50 INJECTION, SOLUTION INTRAMUSCULAR; INTRAVENOUS at 08:12

## 2020-01-01 RX ADMIN — ALBUMIN (HUMAN) 25 G: 0.25 INJECTION, SOLUTION INTRAVENOUS at 08:57

## 2020-01-01 RX ADMIN — Medication 5 MCG/MIN: at 00:27

## 2020-01-01 RX ADMIN — SODIUM BICARBONATE 100 MEQ: 84 INJECTION INTRAVENOUS at 20:20

## 2020-01-01 RX ADMIN — PIPERACILLIN AND TAZOBACTAM 3.38 G: 3; .375 INJECTION, POWDER, LYOPHILIZED, FOR SOLUTION INTRAVENOUS at 10:17

## 2020-01-01 RX ADMIN — SODIUM CHLORIDE 40 MG: 9 INJECTION, SOLUTION INTRAMUSCULAR; INTRAVENOUS; SUBCUTANEOUS at 08:13

## 2020-01-01 RX ADMIN — FENTANYL CITRATE 125 MCG/HR: 50 INJECTION, SOLUTION INTRAMUSCULAR; INTRAVENOUS at 17:37

## 2020-01-01 RX ADMIN — HYDROXYZINE PAMOATE 25 MG: 25 CAPSULE ORAL at 18:57

## 2020-01-01 RX ADMIN — GLYCOPYRROLATE 1 MG: 1 TABLET ORAL at 21:33

## 2020-01-01 RX ADMIN — POTASSIUM CHLORIDE 10 MEQ: 7.46 INJECTION, SOLUTION INTRAVENOUS at 10:57

## 2020-01-01 RX ADMIN — APIXABAN 5 MG: 5 TABLET, FILM COATED ORAL at 17:45

## 2020-01-01 RX ADMIN — FOLIC ACID 1 MG: 1 TABLET ORAL at 09:05

## 2020-01-01 RX ADMIN — POTASSIUM CHLORIDE 10 MEQ: 7.46 INJECTION, SOLUTION INTRAVENOUS at 16:18

## 2020-01-01 RX ADMIN — PRAVASTATIN SODIUM 20 MG: 40 TABLET ORAL at 22:17

## 2020-01-01 RX ADMIN — CETIRIZINE HYDROCHLORIDE 10 MG: 10 TABLET, FILM COATED ORAL at 11:15

## 2020-01-01 RX ADMIN — VASOPRESSIN 0.04 UNITS/MIN: 20 INJECTION INTRAVENOUS at 20:33

## 2020-01-01 RX ADMIN — PRAVASTATIN SODIUM 20 MG: 40 TABLET ORAL at 21:20

## 2020-01-01 RX ADMIN — ALBUMIN (HUMAN) 12.5 G: 0.25 INJECTION, SOLUTION INTRAVENOUS at 08:24

## 2020-01-01 RX ADMIN — Medication 100 MG: at 08:12

## 2020-01-01 RX ADMIN — POTASSIUM CHLORIDE 10 MEQ: 7.46 INJECTION, SOLUTION INTRAVENOUS at 15:05

## 2020-01-01 RX ADMIN — PRAVASTATIN SODIUM 20 MG: 40 TABLET ORAL at 21:58

## 2020-01-01 RX ADMIN — VANCOMYCIN HYDROCHLORIDE 1500 MG: 10 INJECTION, POWDER, LYOPHILIZED, FOR SOLUTION INTRAVENOUS at 11:15

## 2020-01-01 RX ADMIN — DILTIAZEM HYDROCHLORIDE 60 MG: 30 TABLET, FILM COATED ORAL at 06:42

## 2020-01-01 RX ADMIN — PIPERACILLIN AND TAZOBACTAM 3.38 G: 3; .375 INJECTION, POWDER, LYOPHILIZED, FOR SOLUTION INTRAVENOUS at 20:10

## 2020-01-01 RX ADMIN — HYDROCORTISONE SODIUM SUCCINATE 50 MG: 100 INJECTION, POWDER, FOR SOLUTION INTRAMUSCULAR; INTRAVENOUS at 21:20

## 2020-01-01 RX ADMIN — DEXTROSE MONOHYDRATE 30 ML/HR: 100 INJECTION, SOLUTION INTRAVENOUS at 07:41

## 2020-01-01 RX ADMIN — HYDROCORTISONE SODIUM SUCCINATE 50 MG: 100 INJECTION, POWDER, FOR SOLUTION INTRAMUSCULAR; INTRAVENOUS at 15:11

## 2020-01-01 RX ADMIN — SODIUM CHLORIDE 40 MG: 9 INJECTION, SOLUTION INTRAMUSCULAR; INTRAVENOUS; SUBCUTANEOUS at 21:46

## 2020-01-01 RX ADMIN — PIPERACILLIN AND TAZOBACTAM 2.25 G: 2; .25 INJECTION, POWDER, LYOPHILIZED, FOR SOLUTION INTRAVENOUS at 09:07

## 2020-01-01 RX ADMIN — POTASSIUM CHLORIDE 10 MEQ: 7.46 INJECTION, SOLUTION INTRAVENOUS at 14:53

## 2020-01-01 RX ADMIN — Medication 10 ML: at 21:04

## 2020-01-01 RX ADMIN — SODIUM CHLORIDE 800 MG: 9 INJECTION, SOLUTION INTRAVENOUS at 09:24

## 2020-01-01 RX ADMIN — PROPOFOL 30 MCG/KG/MIN: 10 INJECTION, EMULSION INTRAVENOUS at 22:03

## 2020-01-01 RX ADMIN — PIPERACILLIN AND TAZOBACTAM 3.38 G: 3; .375 INJECTION, POWDER, LYOPHILIZED, FOR SOLUTION INTRAVENOUS at 02:35

## 2020-01-01 RX ADMIN — POTASSIUM CHLORIDE 10 MEQ: 7.46 INJECTION, SOLUTION INTRAVENOUS at 11:03

## 2020-01-01 RX ADMIN — POTASSIUM CHLORIDE 20 MEQ: 1.5 FOR SOLUTION ORAL at 21:30

## 2020-01-01 RX ADMIN — AMIODARONE HYDROCHLORIDE 1 MG/MIN: 50 INJECTION, SOLUTION INTRAVENOUS at 17:36

## 2020-01-01 RX ADMIN — CALCIUM CARBONATE (ANTACID) CHEW TAB 500 MG 400 MG: 500 CHEW TAB at 20:39

## 2020-01-01 RX ADMIN — Medication 5 MG/HR: at 02:34

## 2020-01-01 RX ADMIN — MIDAZOLAM 3 MG/HR: 5 INJECTION INTRAMUSCULAR; INTRAVENOUS at 06:45

## 2020-01-01 RX ADMIN — SERTRALINE HYDROCHLORIDE 50 MG: 50 TABLET ORAL at 08:01

## 2020-01-01 RX ADMIN — DIGOXIN 0.12 MG: 125 TABLET ORAL at 08:17

## 2020-01-01 RX ADMIN — ALBUMIN (HUMAN) 25 G: 0.25 INJECTION, SOLUTION INTRAVENOUS at 07:46

## 2020-01-01 RX ADMIN — PANTOPRAZOLE SODIUM 40 MG: 40 INJECTION, POWDER, FOR SOLUTION INTRAVENOUS at 09:30

## 2020-01-01 RX ADMIN — MORPHINE SULFATE 2 MG: 2 INJECTION, SOLUTION INTRAMUSCULAR; INTRAVENOUS at 17:22

## 2020-01-01 RX ADMIN — POTASSIUM CHLORIDE 10 MEQ: 7.46 INJECTION, SOLUTION INTRAVENOUS at 11:00

## 2020-01-01 RX ADMIN — METOCLOPRAMIDE HYDROCHLORIDE 10 MG: 5 INJECTION INTRAMUSCULAR; INTRAVENOUS at 05:20

## 2020-01-01 RX ADMIN — DOCUSATE SODIUM 100 MG: 100 CAPSULE, LIQUID FILLED ORAL at 21:00

## 2020-01-01 RX ADMIN — HYDROCORTISONE SODIUM SUCCINATE 50 MG: 100 INJECTION, POWDER, FOR SOLUTION INTRAMUSCULAR; INTRAVENOUS at 07:46

## 2020-01-01 RX ADMIN — PRAVASTATIN SODIUM 20 MG: 40 TABLET ORAL at 22:27

## 2020-01-01 RX ADMIN — DOCUSATE SODIUM 100 MG: 100 CAPSULE, LIQUID FILLED ORAL at 09:04

## 2020-01-01 RX ADMIN — MORPHINE SULFATE 2 MG: 2 INJECTION, SOLUTION INTRAMUSCULAR; INTRAVENOUS at 14:43

## 2020-01-01 RX ADMIN — METOCLOPRAMIDE HYDROCHLORIDE 10 MG: 5 INJECTION INTRAMUSCULAR; INTRAVENOUS at 23:51

## 2020-01-01 RX ADMIN — DILTIAZEM HYDROCHLORIDE 30 MG: 30 TABLET, FILM COATED ORAL at 08:38

## 2020-01-01 RX ADMIN — DILTIAZEM HYDROCHLORIDE 60 MG: 30 TABLET, FILM COATED ORAL at 17:22

## 2020-01-01 RX ADMIN — BUDESONIDE 500 MCG: 0.5 SUSPENSION RESPIRATORY (INHALATION) at 07:58

## 2020-01-01 RX ADMIN — FUROSEMIDE 40 MG: 10 INJECTION, SOLUTION INTRAMUSCULAR; INTRAVENOUS at 08:24

## 2020-01-01 RX ADMIN — GUAIFENESIN 600 MG: 600 TABLET ORAL at 11:03

## 2020-01-01 RX ADMIN — IPRATROPIUM BROMIDE 0.5 MG: 0.5 SOLUTION RESPIRATORY (INHALATION) at 14:23

## 2020-01-01 RX ADMIN — PIPERACILLIN AND TAZOBACTAM 3.38 G: 3; .375 INJECTION, POWDER, LYOPHILIZED, FOR SOLUTION INTRAVENOUS at 03:14

## 2020-01-01 RX ADMIN — METOCLOPRAMIDE HYDROCHLORIDE 10 MG: 5 INJECTION INTRAMUSCULAR; INTRAVENOUS at 10:19

## 2020-01-01 RX ADMIN — MORPHINE SULFATE 2 MG: 2 INJECTION, SOLUTION INTRAMUSCULAR; INTRAVENOUS at 16:35

## 2020-01-01 RX ADMIN — DIGOXIN 125 MCG: 250 INJECTION, SOLUTION INTRAMUSCULAR; INTRAVENOUS; PARENTERAL at 08:43

## 2020-01-01 RX ADMIN — Medication 100 MG: at 10:17

## 2020-01-01 RX ADMIN — GLYCOPYRROLATE 1 MG: 1 TABLET ORAL at 09:22

## 2020-01-01 RX ADMIN — Medication 10 MCG/MIN: at 01:26

## 2020-01-01 RX ADMIN — FUROSEMIDE 40 MG: 10 INJECTION, SOLUTION INTRAMUSCULAR; INTRAVENOUS at 08:56

## 2020-01-01 RX ADMIN — ALBUMIN (HUMAN) 12.5 G: 0.25 INJECTION, SOLUTION INTRAVENOUS at 20:40

## 2020-01-01 RX ADMIN — FOLIC ACID 1 MG: 1 TABLET ORAL at 09:43

## 2020-01-01 RX ADMIN — METOCLOPRAMIDE HYDROCHLORIDE 10 MG: 5 INJECTION INTRAMUSCULAR; INTRAVENOUS at 07:45

## 2020-01-01 RX ADMIN — Medication 10 ML: at 03:14

## 2020-01-01 RX ADMIN — CALCIUM GLUCONATE 2 G: 20 INJECTION, SOLUTION INTRAVENOUS at 21:31

## 2020-01-01 RX ADMIN — SODIUM CHLORIDE 40 MG: 9 INJECTION, SOLUTION INTRAMUSCULAR; INTRAVENOUS; SUBCUTANEOUS at 22:00

## 2020-01-01 RX ADMIN — PANTOPRAZOLE SODIUM 40 MG: 40 GRANULE, DELAYED RELEASE ORAL at 07:00

## 2020-01-01 RX ADMIN — DEXMEDETOMIDINE HYDROCHLORIDE 0.5 MCG/KG/HR: 100 INJECTION, SOLUTION, CONCENTRATE INTRAVENOUS at 17:26

## 2020-01-01 RX ADMIN — PHYTONADIONE 10 MG: 10 INJECTION, EMULSION INTRAMUSCULAR; INTRAVENOUS; SUBCUTANEOUS at 13:29

## 2020-01-01 RX ADMIN — FENTANYL CITRATE 150 MCG/HR: 50 INJECTION, SOLUTION INTRAMUSCULAR; INTRAVENOUS at 18:31

## 2020-01-01 RX ADMIN — POTASSIUM CHLORIDE 10 MEQ: 7.46 INJECTION, SOLUTION INTRAVENOUS at 13:39

## 2020-01-01 RX ADMIN — Medication 15 MG/HR: at 04:30

## 2020-01-01 RX ADMIN — FENTANYL CITRATE 200 MCG/HR: 50 INJECTION, SOLUTION INTRAMUSCULAR; INTRAVENOUS at 03:48

## 2020-01-01 RX ADMIN — APIXABAN 5 MG: 5 TABLET, FILM COATED ORAL at 10:17

## 2020-01-01 RX ADMIN — VANCOMYCIN HYDROCHLORIDE 2000 MG: 10 INJECTION, POWDER, LYOPHILIZED, FOR SOLUTION INTRAVENOUS at 22:09

## 2020-01-01 RX ADMIN — HYDROCORTISONE SODIUM SUCCINATE 50 MG: 100 INJECTION, POWDER, FOR SOLUTION INTRAMUSCULAR; INTRAVENOUS at 07:45

## 2020-01-01 RX ADMIN — Medication 10 ML: at 21:16

## 2020-01-01 RX ADMIN — POTASSIUM CHLORIDE 10 MEQ: 7.46 INJECTION, SOLUTION INTRAVENOUS at 09:39

## 2020-01-01 RX ADMIN — POTASSIUM CHLORIDE 10 MEQ: 7.46 INJECTION, SOLUTION INTRAVENOUS at 09:14

## 2020-01-01 RX ADMIN — PROMETHAZINE HYDROCHLORIDE 12.5 MG: 25 TABLET ORAL at 12:54

## 2020-01-01 RX ADMIN — PHENYLEPHRINE HYDROCHLORIDE 35 MCG/MIN: 10 INJECTION INTRAVENOUS at 01:53

## 2020-01-01 RX ADMIN — MEROPENEM 1 G: 1 INJECTION, POWDER, FOR SOLUTION INTRAVENOUS at 12:53

## 2020-01-01 RX ADMIN — PROPOFOL 35 MCG/KG/MIN: 10 INJECTION, EMULSION INTRAVENOUS at 09:41

## 2020-01-01 RX ADMIN — PRAVASTATIN SODIUM 20 MG: 20 TABLET ORAL at 21:16

## 2020-01-01 RX ADMIN — LEVALBUTEROL HYDROCHLORIDE 1.25 MG: 1.25 SOLUTION RESPIRATORY (INHALATION) at 07:21

## 2020-01-01 RX ADMIN — CHLORHEXIDINE GLUCONATE 0.12% ORAL RINSE 15 ML: 1.2 LIQUID ORAL at 08:24

## 2020-01-01 RX ADMIN — PRAVASTATIN SODIUM 20 MG: 20 TABLET ORAL at 21:14

## 2020-01-01 RX ADMIN — SERTRALINE HYDROCHLORIDE 50 MG: 50 TABLET ORAL at 08:55

## 2020-01-01 RX ADMIN — LORAZEPAM 1 MG: 2 INJECTION INTRAMUSCULAR; INTRAVENOUS at 07:58

## 2020-01-01 RX ADMIN — MEROPENEM 1 G: 1 INJECTION, POWDER, FOR SOLUTION INTRAVENOUS at 17:00

## 2020-01-01 RX ADMIN — METOCLOPRAMIDE HYDROCHLORIDE 10 MG: 5 INJECTION INTRAMUSCULAR; INTRAVENOUS at 05:47

## 2020-01-01 RX ADMIN — MAGNESIUM SULFATE HEPTAHYDRATE 1 G: 1 INJECTION, SOLUTION INTRAVENOUS at 09:16

## 2020-01-01 RX ADMIN — SODIUM CHLORIDE 800 MG: 9 INJECTION, SOLUTION INTRAVENOUS at 20:50

## 2020-01-01 RX ADMIN — POTASSIUM CHLORIDE 10 MEQ: 7.46 INJECTION, SOLUTION INTRAVENOUS at 15:39

## 2020-01-01 RX ADMIN — DIGOXIN 125 MCG: 250 INJECTION, SOLUTION INTRAMUSCULAR; INTRAVENOUS; PARENTERAL at 08:13

## 2020-01-01 RX ADMIN — METOPROLOL TARTRATE 25 MG: 25 TABLET, FILM COATED ORAL at 08:40

## 2020-01-01 RX ADMIN — METOPROLOL TARTRATE 100 MG: 50 TABLET, FILM COATED ORAL at 17:45

## 2020-01-01 RX ADMIN — DIGOXIN 125 MCG: 0.25 INJECTION INTRAMUSCULAR; INTRAVENOUS at 09:03

## 2020-01-01 RX ADMIN — FENTANYL CITRATE 125 MCG/HR: 50 INJECTION, SOLUTION INTRAMUSCULAR; INTRAVENOUS at 22:30

## 2020-01-01 RX ADMIN — CALCIUM GLUCONATE 1000 MG: 20 INJECTION, SOLUTION INTRAVENOUS at 13:16

## 2020-01-01 RX ADMIN — POTASSIUM CHLORIDE 10 MEQ: 7.46 INJECTION, SOLUTION INTRAVENOUS at 10:37

## 2020-01-01 RX ADMIN — PROPOFOL 30 MCG/KG/MIN: 10 INJECTION, EMULSION INTRAVENOUS at 03:54

## 2020-01-01 RX ADMIN — SODIUM CHLORIDE 800 MG: 9 INJECTION, SOLUTION INTRAVENOUS at 09:14

## 2020-01-01 RX ADMIN — CHLORHEXIDINE GLUCONATE 0.12% ORAL RINSE 15 ML: 1.2 LIQUID ORAL at 10:19

## 2020-01-01 RX ADMIN — PROPOFOL 40 MCG/KG/MIN: 10 INJECTION, EMULSION INTRAVENOUS at 01:15

## 2020-01-01 RX ADMIN — METOCLOPRAMIDE HYDROCHLORIDE 10 MG: 5 INJECTION INTRAMUSCULAR; INTRAVENOUS at 00:23

## 2020-01-01 RX ADMIN — FENTANYL CITRATE 100 MCG/HR: 50 INJECTION, SOLUTION INTRAMUSCULAR; INTRAVENOUS at 11:36

## 2020-01-01 RX ADMIN — RISPERIDONE 2 MG: 2 TABLET ORAL at 22:17

## 2020-01-01 RX ADMIN — FOLIC ACID 1 MG: 1 TABLET ORAL at 08:55

## 2020-01-01 RX ADMIN — FLUTICASONE PROPIONATE 2 SPRAY: 50 SPRAY, METERED NASAL at 09:04

## 2020-01-01 RX ADMIN — GLYCOPYRROLATE 1 MG: 1 TABLET ORAL at 09:49

## 2020-01-01 RX ADMIN — MORPHINE SULFATE 2 MG: 2 INJECTION, SOLUTION INTRAMUSCULAR; INTRAVENOUS at 19:06

## 2020-01-01 RX ADMIN — CALCIUM CARBONATE (ANTACID) CHEW TAB 500 MG 400 MG: 500 CHEW TAB at 09:12

## 2020-01-01 RX ADMIN — Medication 10 MCG/MIN: at 11:36

## 2020-01-01 RX ADMIN — DOCUSATE SODIUM 100 MG: 100 CAPSULE, LIQUID FILLED ORAL at 21:01

## 2020-01-01 RX ADMIN — MIDAZOLAM 2 MG/HR: 5 INJECTION INTRAMUSCULAR; INTRAVENOUS at 10:00

## 2020-01-01 RX ADMIN — CALCIUM CARBONATE (ANTACID) CHEW TAB 500 MG 400 MG: 500 CHEW TAB at 09:22

## 2020-01-01 RX ADMIN — ALBUMIN (HUMAN) 25 G: 0.25 INJECTION, SOLUTION INTRAVENOUS at 20:37

## 2020-01-01 RX ADMIN — DIGOXIN 125 MCG: 250 INJECTION, SOLUTION INTRAMUSCULAR; INTRAVENOUS; PARENTERAL at 13:48

## 2020-01-01 RX ADMIN — POTASSIUM CHLORIDE 10 MEQ: 7.46 INJECTION, SOLUTION INTRAVENOUS at 15:00

## 2020-01-01 RX ADMIN — IPRATROPIUM BROMIDE 0.5 MG: 0.5 SOLUTION RESPIRATORY (INHALATION) at 20:26

## 2020-01-01 RX ADMIN — LORAZEPAM 1 MG: 2 INJECTION INTRAMUSCULAR; INTRAVENOUS at 22:42

## 2020-01-01 RX ADMIN — Medication 10 ML: at 06:23

## 2020-01-01 RX ADMIN — POTASSIUM CHLORIDE 10 MEQ: 7.46 INJECTION, SOLUTION INTRAVENOUS at 12:13

## 2020-01-01 RX ADMIN — ALBUMIN (HUMAN) 25 G: 0.25 INJECTION, SOLUTION INTRAVENOUS at 00:25

## 2020-01-01 RX ADMIN — FENTANYL CITRATE 175 MCG/HR: 50 INJECTION, SOLUTION INTRAMUSCULAR; INTRAVENOUS at 05:26

## 2020-01-01 RX ADMIN — POTASSIUM CHLORIDE 10 MEQ: 7.46 INJECTION, SOLUTION INTRAVENOUS at 12:19

## 2020-01-01 RX ADMIN — MORPHINE SULFATE 2 MG: 2 INJECTION, SOLUTION INTRAMUSCULAR; INTRAVENOUS at 19:22

## 2020-01-01 RX ADMIN — PANTOPRAZOLE SODIUM 40 MG: 40 GRANULE, DELAYED RELEASE ORAL at 09:43

## 2020-01-01 RX ADMIN — MIDAZOLAM HYDROCHLORIDE 2 MG: 2 INJECTION, SOLUTION INTRAMUSCULAR; INTRAVENOUS at 15:56

## 2020-01-01 RX ADMIN — Medication 10 ML: at 21:47

## 2020-01-01 RX ADMIN — METOPROLOL TARTRATE 50 MG: 50 TABLET, FILM COATED ORAL at 08:17

## 2020-01-01 RX ADMIN — MORPHINE SULFATE 2 MG: 2 INJECTION, SOLUTION INTRAMUSCULAR; INTRAVENOUS at 11:00

## 2020-01-01 RX ADMIN — VANCOMYCIN HYDROCHLORIDE 1000 MG: 1 INJECTION, POWDER, LYOPHILIZED, FOR SOLUTION INTRAVENOUS at 12:14

## 2020-01-01 RX ADMIN — SODIUM CHLORIDE 75 ML/HR: 900 INJECTION, SOLUTION INTRAVENOUS at 18:55

## 2020-01-01 RX ADMIN — VANCOMYCIN HYDROCHLORIDE 1500 MG: 10 INJECTION, POWDER, LYOPHILIZED, FOR SOLUTION INTRAVENOUS at 11:18

## 2020-01-01 RX ADMIN — GUAIFENESIN 600 MG: 600 TABLET ORAL at 08:39

## 2020-01-01 RX ADMIN — SODIUM CHLORIDE 40 MG: 9 INJECTION, SOLUTION INTRAMUSCULAR; INTRAVENOUS; SUBCUTANEOUS at 10:19

## 2020-01-01 RX ADMIN — POTASSIUM CHLORIDE 20 MEQ: 750 TABLET, FILM COATED, EXTENDED RELEASE ORAL at 09:43

## 2020-01-01 RX ADMIN — ALBUMIN (HUMAN) 25 G: 0.25 INJECTION, SOLUTION INTRAVENOUS at 11:29

## 2020-01-01 RX ADMIN — CHLORHEXIDINE GLUCONATE 0.12% ORAL RINSE 15 ML: 1.2 LIQUID ORAL at 21:32

## 2020-01-01 RX ADMIN — Medication 100 MG: at 08:27

## 2020-01-01 RX ADMIN — ALBUMIN (HUMAN) 25 G: 0.25 INJECTION, SOLUTION INTRAVENOUS at 17:51

## 2020-01-01 RX ADMIN — APIXABAN 5 MG: 5 TABLET, FILM COATED ORAL at 18:13

## 2020-01-01 RX ADMIN — PROPOFOL 35 MCG/KG/MIN: 10 INJECTION, EMULSION INTRAVENOUS at 21:22

## 2020-01-01 RX ADMIN — CHLORHEXIDINE GLUCONATE 0.12% ORAL RINSE 15 ML: 1.2 LIQUID ORAL at 21:00

## 2020-01-01 RX ADMIN — LORAZEPAM 1 MG: 2 INJECTION INTRAMUSCULAR; INTRAVENOUS at 10:04

## 2020-01-01 RX ADMIN — PRAVASTATIN SODIUM: 40 TABLET ORAL at 21:05

## 2020-01-01 RX ADMIN — FOLIC ACID 1 MG: 1 TABLET ORAL at 09:21

## 2020-01-01 RX ADMIN — FOLIC ACID 1 MG: 1 TABLET ORAL at 08:59

## 2020-01-01 RX ADMIN — CALCIUM CARBONATE (ANTACID) CHEW TAB 500 MG 400 MG: 500 CHEW TAB at 08:55

## 2020-01-01 RX ADMIN — Medication 10 ML: at 18:56

## 2020-01-01 RX ADMIN — PIPERACILLIN AND TAZOBACTAM 3.38 G: 3; .375 INJECTION, POWDER, LYOPHILIZED, FOR SOLUTION INTRAVENOUS at 12:27

## 2020-01-01 RX ADMIN — SODIUM CHLORIDE: 9 INJECTION, SOLUTION INTRAVENOUS at 17:07

## 2020-01-01 RX ADMIN — CALCITONIN SALMON 370 INT'L UNITS: 200 INJECTION, SOLUTION INTRAMUSCULAR; SUBCUTANEOUS at 09:16

## 2020-01-01 RX ADMIN — HYDROCORTISONE SODIUM SUCCINATE 50 MG: 100 INJECTION, POWDER, FOR SOLUTION INTRAMUSCULAR; INTRAVENOUS at 17:51

## 2020-01-01 RX ADMIN — MIDAZOLAM 1 MG/HR: 5 INJECTION INTRAMUSCULAR; INTRAVENOUS at 20:41

## 2020-01-01 RX ADMIN — CALCIUM CARBONATE (ANTACID) CHEW TAB 500 MG 400 MG: 500 CHEW TAB at 10:19

## 2020-01-01 RX ADMIN — METOCLOPRAMIDE HYDROCHLORIDE 10 MG: 5 INJECTION INTRAMUSCULAR; INTRAVENOUS at 12:28

## 2020-01-01 RX ADMIN — PRAVASTATIN SODIUM 20 MG: 20 TABLET ORAL at 22:06

## 2020-01-01 RX ADMIN — POTASSIUM CHLORIDE 10 MEQ: 7.46 INJECTION, SOLUTION INTRAVENOUS at 13:21

## 2020-01-01 RX ADMIN — PANTOPRAZOLE SODIUM 40 MG: 40 INJECTION, POWDER, FOR SOLUTION INTRAVENOUS at 20:20

## 2020-01-01 RX ADMIN — HYDROCORTISONE SODIUM SUCCINATE 50 MG: 100 INJECTION, POWDER, FOR SOLUTION INTRAMUSCULAR; INTRAVENOUS at 21:55

## 2020-01-01 RX ADMIN — MEROPENEM 1 G: 1 INJECTION, POWDER, FOR SOLUTION INTRAVENOUS at 17:44

## 2020-01-01 RX ADMIN — METOCLOPRAMIDE HYDROCHLORIDE 10 MG: 5 INJECTION INTRAMUSCULAR; INTRAVENOUS at 11:59

## 2020-01-01 RX ADMIN — LORAZEPAM 1 MG: 2 INJECTION INTRAMUSCULAR; INTRAVENOUS at 04:26

## 2020-01-01 RX ADMIN — CHLORHEXIDINE GLUCONATE 0.12% ORAL RINSE 15 ML: 1.2 LIQUID ORAL at 21:01

## 2020-01-01 RX ADMIN — METOCLOPRAMIDE HYDROCHLORIDE 10 MG: 5 INJECTION INTRAMUSCULAR; INTRAVENOUS at 23:33

## 2020-01-01 RX ADMIN — POTASSIUM CHLORIDE 10 MEQ: 7.46 INJECTION, SOLUTION INTRAVENOUS at 09:43

## 2020-01-01 RX ADMIN — PIPERACILLIN AND TAZOBACTAM 3.38 G: 3; .375 INJECTION, POWDER, LYOPHILIZED, FOR SOLUTION INTRAVENOUS at 18:14

## 2020-01-01 RX ADMIN — Medication 10 MCG/MIN: at 20:01

## 2020-01-01 RX ADMIN — MIDAZOLAM 3 MG/HR: 5 INJECTION INTRAMUSCULAR; INTRAVENOUS at 01:20

## 2020-01-01 RX ADMIN — HYDROCORTISONE SODIUM SUCCINATE 50 MG: 100 INJECTION, POWDER, FOR SOLUTION INTRAMUSCULAR; INTRAVENOUS at 15:34

## 2020-01-01 RX ADMIN — TRAMADOL HYDROCHLORIDE 50 MG: 50 TABLET, FILM COATED ORAL at 09:14

## 2020-01-01 RX ADMIN — MORPHINE SULFATE 2 MG: 2 INJECTION, SOLUTION INTRAMUSCULAR; INTRAVENOUS at 15:09

## 2020-01-01 RX ADMIN — METOCLOPRAMIDE HYDROCHLORIDE 10 MG: 5 INJECTION INTRAMUSCULAR; INTRAVENOUS at 12:08

## 2020-01-01 RX ADMIN — PROPOFOL 10 MCG/KG/MIN: 10 INJECTION, EMULSION INTRAVENOUS at 18:06

## 2020-01-01 RX ADMIN — Medication: at 08:18

## 2020-01-01 RX ADMIN — DOCUSATE SODIUM 100 MG: 100 CAPSULE, LIQUID FILLED ORAL at 17:46

## 2020-01-01 RX ADMIN — DOCUSATE SODIUM 100 MG: 100 CAPSULE, LIQUID FILLED ORAL at 08:12

## 2020-01-01 RX ADMIN — MEROPENEM 1 G: 1 INJECTION, POWDER, FOR SOLUTION INTRAVENOUS at 00:25

## 2020-01-01 RX ADMIN — DIGOXIN 125 MCG: 0.25 INJECTION INTRAMUSCULAR; INTRAVENOUS at 17:21

## 2020-01-01 RX ADMIN — CHLORHEXIDINE GLUCONATE 0.12% ORAL RINSE 15 ML: 1.2 LIQUID ORAL at 20:39

## 2020-01-01 RX ADMIN — FUROSEMIDE 40 MG: 10 INJECTION, SOLUTION INTRAMUSCULAR; INTRAVENOUS at 20:41

## 2020-01-01 RX ADMIN — POTASSIUM CHLORIDE 10 MEQ: 7.46 INJECTION, SOLUTION INTRAVENOUS at 12:08

## 2020-01-01 RX ADMIN — METOCLOPRAMIDE HYDROCHLORIDE 10 MG: 5 INJECTION INTRAMUSCULAR; INTRAVENOUS at 15:37

## 2020-01-01 RX ADMIN — THIAMINE HCL TAB 100 MG 100 MG: 100 TAB at 09:43

## 2020-01-01 RX ADMIN — METOPROLOL TARTRATE 5 MG: 5 INJECTION INTRAVENOUS at 00:26

## 2020-01-01 RX ADMIN — MEROPENEM 1 G: 1 INJECTION, POWDER, FOR SOLUTION INTRAVENOUS at 11:54

## 2020-01-01 RX ADMIN — VANCOMYCIN HYDROCHLORIDE 1250 MG: 1.25 INJECTION, POWDER, LYOPHILIZED, FOR SOLUTION INTRAVENOUS at 17:35

## 2020-01-01 RX ADMIN — CALCIUM GLUCONATE 2 G: 20 INJECTION, SOLUTION INTRAVENOUS at 17:07

## 2020-01-01 RX ADMIN — PIPERACILLIN AND TAZOBACTAM 3.38 G: 3; .375 INJECTION, POWDER, LYOPHILIZED, FOR SOLUTION INTRAVENOUS at 18:54

## 2020-01-01 RX ADMIN — HYDROCORTISONE SODIUM SUCCINATE 50 MG: 100 INJECTION, POWDER, FOR SOLUTION INTRAMUSCULAR; INTRAVENOUS at 06:43

## 2020-01-01 RX ADMIN — PIPERACILLIN AND TAZOBACTAM 3.38 G: 3; .375 INJECTION, POWDER, LYOPHILIZED, FOR SOLUTION INTRAVENOUS at 04:30

## 2020-01-01 RX ADMIN — METOPROLOL TARTRATE 25 MG: 25 TABLET, FILM COATED ORAL at 03:07

## 2020-01-01 RX ADMIN — SODIUM CHLORIDE 250 ML: 9 INJECTION, SOLUTION INTRAVENOUS at 15:20

## 2020-01-01 RX ADMIN — MEROPENEM 1 G: 1 INJECTION, POWDER, FOR SOLUTION INTRAVENOUS at 05:34

## 2020-01-01 RX ADMIN — MORPHINE SULFATE 2 MG: 2 INJECTION, SOLUTION INTRAMUSCULAR; INTRAVENOUS at 05:52

## 2020-01-01 RX ADMIN — MEROPENEM 1 G: 1 INJECTION, POWDER, FOR SOLUTION INTRAVENOUS at 11:41

## 2020-01-01 RX ADMIN — HEPARIN SODIUM 5000 UNITS: 10000 INJECTION, SOLUTION INTRAVENOUS; SUBCUTANEOUS at 23:16

## 2020-01-01 RX ADMIN — FENTANYL CITRATE 150 MCG/HR: 50 INJECTION, SOLUTION INTRAMUSCULAR; INTRAVENOUS at 07:44

## 2020-01-01 RX ADMIN — METOCLOPRAMIDE HYDROCHLORIDE 10 MG: 5 INJECTION INTRAMUSCULAR; INTRAVENOUS at 05:19

## 2020-01-01 RX ADMIN — HYDROCORTISONE SODIUM SUCCINATE 50 MG: 100 INJECTION, POWDER, FOR SOLUTION INTRAMUSCULAR; INTRAVENOUS at 13:26

## 2020-01-01 RX ADMIN — POTASSIUM CHLORIDE 10 MEQ: 7.46 INJECTION, SOLUTION INTRAVENOUS at 10:31

## 2020-01-01 RX ADMIN — METOPROLOL TARTRATE 5 MG: 5 INJECTION INTRAVENOUS at 00:17

## 2020-01-01 RX ADMIN — SERTRALINE HYDROCHLORIDE 50 MG: 50 TABLET, FILM COATED ORAL at 09:21

## 2020-01-01 RX ADMIN — FUROSEMIDE 40 MG: 10 INJECTION, SOLUTION INTRAMUSCULAR; INTRAVENOUS at 07:31

## 2020-01-01 RX ADMIN — HYDROCORTISONE SODIUM SUCCINATE 50 MG: 100 INJECTION, POWDER, FOR SOLUTION INTRAMUSCULAR; INTRAVENOUS at 06:41

## 2020-01-01 RX ADMIN — APIXABAN 5 MG: 5 TABLET, FILM COATED ORAL at 09:03

## 2020-01-01 RX ADMIN — PIPERACILLIN AND TAZOBACTAM 3.38 G: 3; .375 INJECTION, POWDER, LYOPHILIZED, FOR SOLUTION INTRAVENOUS at 17:48

## 2020-01-01 RX ADMIN — PROPOFOL 30 MCG/KG/MIN: 10 INJECTION, EMULSION INTRAVENOUS at 04:21

## 2020-01-01 RX ADMIN — FUROSEMIDE 40 MG: 10 INJECTION, SOLUTION INTRAMUSCULAR; INTRAVENOUS at 21:59

## 2020-01-01 RX ADMIN — PIPERACILLIN AND TAZOBACTAM 3.38 G: 3; .375 INJECTION, POWDER, LYOPHILIZED, FOR SOLUTION INTRAVENOUS at 11:11

## 2020-01-01 RX ADMIN — MEROPENEM 1 G: 1 INJECTION, POWDER, FOR SOLUTION INTRAVENOUS at 05:22

## 2020-01-01 RX ADMIN — LORAZEPAM 1 MG: 2 INJECTION INTRAMUSCULAR; INTRAVENOUS at 13:57

## 2020-01-01 RX ADMIN — SODIUM CHLORIDE 40 MG: 9 INJECTION, SOLUTION INTRAMUSCULAR; INTRAVENOUS; SUBCUTANEOUS at 21:33

## 2020-01-01 RX ADMIN — HYDROMORPHONE HYDROCHLORIDE 0.5 MG: 1 INJECTION, SOLUTION INTRAMUSCULAR; INTRAVENOUS; SUBCUTANEOUS at 14:05

## 2020-01-01 RX ADMIN — ARFORMOTEROL TARTRATE 15 MCG: 15 SOLUTION RESPIRATORY (INHALATION) at 07:58

## 2020-01-01 RX ADMIN — POTASSIUM CHLORIDE 20 MEQ: 1.5 FOR SOLUTION ORAL at 05:53

## 2020-01-01 RX ADMIN — SERTRALINE HYDROCHLORIDE 50 MG: 50 TABLET ORAL at 09:22

## 2020-01-01 RX ADMIN — SODIUM CHLORIDE 800 MG: 9 INJECTION, SOLUTION INTRAVENOUS at 21:30

## 2020-01-01 RX ADMIN — CHLORHEXIDINE GLUCONATE 0.12% ORAL RINSE 15 ML: 1.2 LIQUID ORAL at 09:22

## 2020-01-01 RX ADMIN — DIGOXIN 125 MCG: 250 INJECTION, SOLUTION INTRAMUSCULAR; INTRAVENOUS; PARENTERAL at 08:07

## 2020-01-01 RX ADMIN — FOLIC ACID 1 MG: 1 TABLET ORAL at 08:38

## 2020-01-01 RX ADMIN — VANCOMYCIN HYDROCHLORIDE 1250 MG: 1.25 INJECTION, POWDER, LYOPHILIZED, FOR SOLUTION INTRAVENOUS at 12:21

## 2020-01-01 RX ADMIN — CALCIUM GLUCONATE 1000 MG: 20 INJECTION, SOLUTION INTRAVENOUS at 12:57

## 2020-01-01 RX ADMIN — PRAVASTATIN SODIUM 20 MG: 40 TABLET ORAL at 22:15

## 2020-01-01 RX ADMIN — SODIUM CHLORIDE 800 MG: 9 INJECTION, SOLUTION INTRAVENOUS at 21:08

## 2020-01-01 RX ADMIN — HYDROCORTISONE SODIUM SUCCINATE 50 MG: 100 INJECTION, POWDER, FOR SOLUTION INTRAMUSCULAR; INTRAVENOUS at 05:26

## 2020-01-01 RX ADMIN — CALCIUM GLUCONATE 2 G: 20 INJECTION, SOLUTION INTRAVENOUS at 08:08

## 2020-01-01 RX ADMIN — SODIUM CHLORIDE: 9 INJECTION, SOLUTION INTRAVENOUS at 15:51

## 2020-01-01 RX ADMIN — METOCLOPRAMIDE HYDROCHLORIDE 10 MG: 5 INJECTION INTRAMUSCULAR; INTRAVENOUS at 11:02

## 2020-01-01 RX ADMIN — MORPHINE SULFATE 2 MG: 2 INJECTION, SOLUTION INTRAMUSCULAR; INTRAVENOUS at 13:57

## 2020-01-01 RX ADMIN — SODIUM CHLORIDE 100 ML/HR: 4.5 INJECTION, SOLUTION INTRAVENOUS at 22:31

## 2020-01-01 RX ADMIN — FLUTICASONE PROPIONATE 2 SPRAY: 50 SPRAY, METERED NASAL at 11:15

## 2020-01-01 RX ADMIN — SERTRALINE HYDROCHLORIDE 50 MG: 50 TABLET ORAL at 09:13

## 2020-01-01 RX ADMIN — FUROSEMIDE 40 MG: 10 INJECTION, SOLUTION INTRAMUSCULAR; INTRAVENOUS at 09:21

## 2020-01-01 RX ADMIN — VANCOMYCIN HYDROCHLORIDE 750 MG: 750 INJECTION, POWDER, LYOPHILIZED, FOR SOLUTION INTRAVENOUS at 11:28

## 2020-01-01 RX ADMIN — PRAVASTATIN SODIUM 20 MG: 20 TABLET ORAL at 21:47

## 2020-01-01 RX ADMIN — ACETAMINOPHEN 650 MG: 325 TABLET ORAL at 23:08

## 2020-01-01 RX ADMIN — GLYCOPYRROLATE 1 MG: 1 TABLET ORAL at 15:37

## 2020-01-01 RX ADMIN — FENTANYL CITRATE 150 MCG/HR: 50 INJECTION, SOLUTION INTRAMUSCULAR; INTRAVENOUS at 10:45

## 2020-01-01 RX ADMIN — DEXTROSE MONOHYDRATE 125 ML/HR: 50 INJECTION, SOLUTION INTRAVENOUS at 08:19

## 2020-01-01 RX ADMIN — CHLORHEXIDINE GLUCONATE 0.12% ORAL RINSE 15 ML: 1.2 LIQUID ORAL at 21:18

## 2020-01-01 RX ADMIN — ACETAMINOPHEN 650 MG: 650 SUPPOSITORY RECTAL at 12:25

## 2020-01-01 RX ADMIN — LEVALBUTEROL HYDROCHLORIDE 1.25 MG: 1.25 SOLUTION RESPIRATORY (INHALATION) at 14:23

## 2020-01-01 RX ADMIN — PROPOFOL 30 MCG/KG/MIN: 10 INJECTION, EMULSION INTRAVENOUS at 17:40

## 2020-01-01 RX ADMIN — PRAVASTATIN SODIUM: 40 TABLET ORAL at 21:01

## 2020-01-01 RX ADMIN — FENTANYL CITRATE 125 MCG/HR: 50 INJECTION, SOLUTION INTRAMUSCULAR; INTRAVENOUS at 03:17

## 2020-01-01 RX ADMIN — MEROPENEM 1 G: 1 INJECTION, POWDER, FOR SOLUTION INTRAVENOUS at 06:43

## 2020-01-01 RX ADMIN — PROPOFOL 40 MCG/KG/MIN: 10 INJECTION, EMULSION INTRAVENOUS at 05:21

## 2020-01-01 RX ADMIN — POTASSIUM CHLORIDE 20 MEQ: 1.5 FOR SOLUTION ORAL at 05:19

## 2020-01-01 RX ADMIN — VANCOMYCIN HYDROCHLORIDE 1500 MG: 10 INJECTION, POWDER, LYOPHILIZED, FOR SOLUTION INTRAVENOUS at 23:04

## 2020-01-01 RX ADMIN — FUROSEMIDE 40 MG: 10 INJECTION, SOLUTION INTRAMUSCULAR; INTRAVENOUS at 15:24

## 2020-01-01 RX ADMIN — ACETAMINOPHEN 650 MG: 325 TABLET, FILM COATED ORAL at 14:03

## 2020-01-01 RX ADMIN — PROPOFOL 20 MCG/KG/MIN: 10 INJECTION, EMULSION INTRAVENOUS at 09:21

## 2020-01-01 RX ADMIN — DEXMEDETOMIDINE HYDROCHLORIDE 0.4 MCG/KG/HR: 100 INJECTION, SOLUTION, CONCENTRATE INTRAVENOUS at 08:41

## 2020-01-01 RX ADMIN — DOCUSATE SODIUM 100 MG: 100 CAPSULE, LIQUID FILLED ORAL at 09:21

## 2020-01-01 RX ADMIN — SERTRALINE HYDROCHLORIDE 50 MG: 50 TABLET, FILM COATED ORAL at 10:17

## 2020-01-01 RX ADMIN — ONDANSETRON 4 MG: 2 INJECTION INTRAMUSCULAR; INTRAVENOUS at 17:15

## 2020-01-01 RX ADMIN — SERTRALINE HYDROCHLORIDE 50 MG: 50 TABLET, FILM COATED ORAL at 08:18

## 2020-01-01 RX ADMIN — DOCUSATE SODIUM 100 MG: 100 CAPSULE, LIQUID FILLED ORAL at 08:27

## 2020-01-01 RX ADMIN — Medication 10 ML: at 05:28

## 2020-01-01 RX ADMIN — SERTRALINE HYDROCHLORIDE 50 MG: 50 TABLET ORAL at 08:13

## 2020-01-01 RX ADMIN — ALBUMIN (HUMAN) 25 G: 0.25 INJECTION, SOLUTION INTRAVENOUS at 00:22

## 2020-01-01 RX ADMIN — DEXTROSE MONOHYDRATE 30 ML/HR: 100 INJECTION, SOLUTION INTRAVENOUS at 00:31

## 2020-01-01 RX ADMIN — POTASSIUM CHLORIDE 10 MEQ: 7.46 INJECTION, SOLUTION INTRAVENOUS at 15:27

## 2020-01-01 RX ADMIN — HYDROCORTISONE SODIUM SUCCINATE 50 MG: 100 INJECTION, POWDER, FOR SOLUTION INTRAMUSCULAR; INTRAVENOUS at 21:33

## 2020-01-01 RX ADMIN — HYDROCORTISONE SODIUM SUCCINATE 50 MG: 100 INJECTION, POWDER, FOR SOLUTION INTRAMUSCULAR; INTRAVENOUS at 06:34

## 2020-01-01 RX ADMIN — HYDROCORTISONE SODIUM SUCCINATE 50 MG: 100 INJECTION, POWDER, FOR SOLUTION INTRAMUSCULAR; INTRAVENOUS at 11:52

## 2020-01-01 RX ADMIN — THIAMINE HCL TAB 100 MG 100 MG: 100 TAB at 08:54

## 2020-01-01 RX ADMIN — PRAVASTATIN SODIUM 20 MG: 40 TABLET ORAL at 21:46

## 2020-01-01 RX ADMIN — RISPERIDONE 2 MG: 2 TABLET ORAL at 21:01

## 2020-01-01 RX ADMIN — RISPERIDONE 2 MG: 2 TABLET ORAL at 22:15

## 2020-01-01 RX ADMIN — SPIRONOLACTONE 25 MG: 25 TABLET ORAL at 15:12

## 2020-01-01 RX ADMIN — HYDROCORTISONE SODIUM SUCCINATE 50 MG: 100 INJECTION, POWDER, FOR SOLUTION INTRAMUSCULAR; INTRAVENOUS at 22:31

## 2020-01-01 RX ADMIN — PIPERACILLIN AND TAZOBACTAM 2.25 G: 2; .25 INJECTION, POWDER, LYOPHILIZED, FOR SOLUTION INTRAVENOUS at 23:16

## 2020-01-01 RX ADMIN — SODIUM CHLORIDE 800 MG: 9 INJECTION, SOLUTION INTRAVENOUS at 21:17

## 2020-01-01 RX ADMIN — FENTANYL CITRATE 50 MCG/HR: 50 INJECTION, SOLUTION INTRAMUSCULAR; INTRAVENOUS at 17:07

## 2020-01-01 RX ADMIN — MEROPENEM 1 G: 1 INJECTION, POWDER, FOR SOLUTION INTRAVENOUS at 13:06

## 2020-01-01 RX ADMIN — POTASSIUM CHLORIDE 10 MEQ: 7.46 INJECTION, SOLUTION INTRAVENOUS at 08:50

## 2020-01-01 RX ADMIN — SODIUM CHLORIDE 125 ML/HR: 9 INJECTION, SOLUTION INTRAVENOUS at 15:21

## 2020-01-01 RX ADMIN — PROPOFOL 45 MCG/KG/MIN: 10 INJECTION, EMULSION INTRAVENOUS at 14:05

## 2020-01-01 RX ADMIN — POTASSIUM CHLORIDE 10 MEQ: 7.46 INJECTION, SOLUTION INTRAVENOUS at 13:26

## 2020-01-01 RX ADMIN — METOCLOPRAMIDE HYDROCHLORIDE 10 MG: 5 INJECTION INTRAMUSCULAR; INTRAVENOUS at 18:05

## 2020-01-01 RX ADMIN — PIPERACILLIN AND TAZOBACTAM 3.38 G: 3; .375 INJECTION, POWDER, LYOPHILIZED, FOR SOLUTION INTRAVENOUS at 15:19

## 2020-01-01 RX ADMIN — SODIUM BICARBONATE: 84 INJECTION, SOLUTION INTRAVENOUS at 09:10

## 2020-01-01 RX ADMIN — METOCLOPRAMIDE HYDROCHLORIDE 10 MG: 5 INJECTION INTRAMUSCULAR; INTRAVENOUS at 18:31

## 2020-01-01 RX ADMIN — HYDROCORTISONE SODIUM SUCCINATE 50 MG: 100 INJECTION, POWDER, FOR SOLUTION INTRAMUSCULAR; INTRAVENOUS at 14:06

## 2020-01-01 RX ADMIN — DILTIAZEM HYDROCHLORIDE 60 MG: 30 TABLET, FILM COATED ORAL at 06:43

## 2020-01-01 RX ADMIN — PROPOFOL 30 MCG/KG/MIN: 10 INJECTION, EMULSION INTRAVENOUS at 08:12

## 2020-01-01 RX ADMIN — CALCIUM GLUCONATE 1000 MG: 20 INJECTION, SOLUTION INTRAVENOUS at 09:02

## 2020-01-01 RX ADMIN — THIAMINE HCL TAB 100 MG 100 MG: 100 TAB at 09:49

## 2020-01-01 RX ADMIN — SODIUM CHLORIDE 100 ML/HR: 4.5 INJECTION, SOLUTION INTRAVENOUS at 02:30

## 2020-01-01 RX ADMIN — HYDROCORTISONE SODIUM SUCCINATE 50 MG: 100 INJECTION, POWDER, FOR SOLUTION INTRAMUSCULAR; INTRAVENOUS at 06:00

## 2020-01-01 RX ADMIN — MIDAZOLAM 1 MG/HR: 5 INJECTION INTRAMUSCULAR; INTRAVENOUS at 11:10

## 2020-01-01 RX ADMIN — SODIUM CHLORIDE 40 MG: 9 INJECTION, SOLUTION INTRAMUSCULAR; INTRAVENOUS; SUBCUTANEOUS at 09:49

## 2020-01-01 RX ADMIN — ARFORMOTEROL TARTRATE 15 MCG: 15 SOLUTION RESPIRATORY (INHALATION) at 20:34

## 2020-01-01 RX ADMIN — METOCLOPRAMIDE HYDROCHLORIDE 10 MG: 5 INJECTION INTRAMUSCULAR; INTRAVENOUS at 15:11

## 2020-01-01 RX ADMIN — DILTIAZEM HYDROCHLORIDE 60 MG: 30 TABLET, FILM COATED ORAL at 11:16

## 2020-01-01 RX ADMIN — Medication 10 ML: at 06:00

## 2020-01-01 RX ADMIN — POTASSIUM CHLORIDE 10 MEQ: 7.46 INJECTION, SOLUTION INTRAVENOUS at 07:52

## 2020-01-01 RX ADMIN — FOLIC ACID 1 MG: 1 TABLET ORAL at 08:01

## 2020-01-01 RX ADMIN — MEROPENEM 1 G: 1 INJECTION, POWDER, FOR SOLUTION INTRAVENOUS at 05:20

## 2020-01-01 RX ADMIN — Medication 16 MCG/MIN: at 10:37

## 2020-01-01 RX ADMIN — MEROPENEM 1 G: 1 INJECTION, POWDER, FOR SOLUTION INTRAVENOUS at 05:53

## 2020-01-01 RX ADMIN — HYDROCORTISONE SODIUM SUCCINATE 50 MG: 100 INJECTION, POWDER, FOR SOLUTION INTRAMUSCULAR; INTRAVENOUS at 05:20

## 2020-01-01 RX ADMIN — Medication: at 09:03

## 2020-01-01 RX ADMIN — GLYCOPYRROLATE 1 MG: 1 TABLET ORAL at 15:09

## 2020-01-01 RX ADMIN — PIPERACILLIN AND TAZOBACTAM 3.38 G: 3; .375 INJECTION, POWDER, LYOPHILIZED, FOR SOLUTION INTRAVENOUS at 12:50

## 2020-01-01 RX ADMIN — METOPROLOL TARTRATE 75 MG: 25 TABLET, FILM COATED ORAL at 07:46

## 2020-01-01 RX ADMIN — MEROPENEM 1 G: 1 INJECTION, POWDER, FOR SOLUTION INTRAVENOUS at 11:12

## 2020-01-01 RX ADMIN — Medication 10 ML: at 05:29

## 2020-01-01 RX ADMIN — FENTANYL CITRATE 125 MCG/HR: 50 INJECTION, SOLUTION INTRAMUSCULAR; INTRAVENOUS at 15:09

## 2020-01-01 RX ADMIN — CALCIUM CARBONATE (ANTACID) CHEW TAB 500 MG 400 MG: 500 CHEW TAB at 21:30

## 2020-01-01 RX ADMIN — Medication 10 MG/HR: at 05:24

## 2020-01-01 RX ADMIN — GLYCOPYRROLATE 1 MG: 1 TABLET ORAL at 15:24

## 2020-01-01 RX ADMIN — IPRATROPIUM BROMIDE AND ALBUTEROL SULFATE 3 ML: .5; 3 SOLUTION RESPIRATORY (INHALATION) at 13:02

## 2020-01-01 RX ADMIN — SODIUM CHLORIDE 800 MG: 9 INJECTION, SOLUTION INTRAVENOUS at 11:23

## 2020-01-01 RX ADMIN — Medication 16 MCG/MIN: at 14:41

## 2020-01-01 RX ADMIN — PROPOFOL 40 MCG/KG/MIN: 10 INJECTION, EMULSION INTRAVENOUS at 16:59

## 2020-01-01 RX ADMIN — PHENYLEPHRINE HYDROCHLORIDE 100 MCG/MIN: 10 INJECTION INTRAVENOUS at 07:51

## 2020-01-01 RX ADMIN — CHLORHEXIDINE GLUCONATE 0.12% ORAL RINSE 15 ML: 1.2 LIQUID ORAL at 21:46

## 2020-01-01 RX ADMIN — DILTIAZEM HYDROCHLORIDE 240 MG: 240 CAPSULE, COATED, EXTENDED RELEASE ORAL at 08:44

## 2020-01-01 RX ADMIN — ALBUMIN (HUMAN) 25 G: 0.25 INJECTION, SOLUTION INTRAVENOUS at 18:14

## 2020-01-01 RX ADMIN — FOLIC ACID 1 MG: 1 TABLET ORAL at 09:49

## 2020-01-01 RX ADMIN — Medication 10 ML: at 13:43

## 2020-01-01 RX ADMIN — SODIUM CHLORIDE, POTASSIUM CHLORIDE, SODIUM LACTATE AND CALCIUM CHLORIDE 125 ML/HR: 600; 310; 30; 20 INJECTION, SOLUTION INTRAVENOUS at 05:24

## 2020-01-01 RX ADMIN — GLYCOPYRROLATE 1 MG: 1 TABLET ORAL at 21:19

## 2020-01-01 RX ADMIN — MEROPENEM 1 G: 1 INJECTION, POWDER, FOR SOLUTION INTRAVENOUS at 17:50

## 2020-01-01 RX ADMIN — ACETAMINOPHEN 650 MG: 325 TABLET, FILM COATED ORAL at 00:27

## 2020-01-01 RX ADMIN — HYDROCORTISONE SODIUM SUCCINATE 50 MG: 100 INJECTION, POWDER, FOR SOLUTION INTRAMUSCULAR; INTRAVENOUS at 22:00

## 2020-01-01 RX ADMIN — PROPOFOL 45 MCG/KG/MIN: 10 INJECTION, EMULSION INTRAVENOUS at 09:37

## 2020-01-01 RX ADMIN — SERTRALINE HYDROCHLORIDE 50 MG: 50 TABLET ORAL at 09:43

## 2020-01-01 RX ADMIN — HYDROCORTISONE SODIUM SUCCINATE 50 MG: 100 INJECTION, POWDER, FOR SOLUTION INTRAMUSCULAR; INTRAVENOUS at 13:43

## 2020-01-01 RX ADMIN — PIPERACILLIN AND TAZOBACTAM 3.38 G: 3; .375 INJECTION, POWDER, LYOPHILIZED, FOR SOLUTION INTRAVENOUS at 21:01

## 2020-01-01 RX ADMIN — SODIUM CHLORIDE 40 MG: 9 INJECTION, SOLUTION INTRAMUSCULAR; INTRAVENOUS; SUBCUTANEOUS at 11:10

## 2020-01-01 RX ADMIN — DOCUSATE SODIUM 100 MG: 100 CAPSULE, LIQUID FILLED ORAL at 18:13

## 2020-01-01 RX ADMIN — FUROSEMIDE 20 MG: 20 TABLET ORAL at 11:14

## 2020-01-01 RX ADMIN — POTASSIUM CHLORIDE 10 MEQ: 7.46 INJECTION, SOLUTION INTRAVENOUS at 16:25

## 2020-01-01 RX ADMIN — METOPROLOL TARTRATE 2.5 MG: 5 INJECTION INTRAVENOUS at 14:41

## 2020-01-01 RX ADMIN — CHLORHEXIDINE GLUCONATE 0.12% ORAL RINSE 15 ML: 1.2 LIQUID ORAL at 08:56

## 2020-01-01 RX ADMIN — PROPOFOL 20 MG: 10 INJECTION, EMULSION INTRAVENOUS at 15:56

## 2020-01-01 RX ADMIN — CHLORHEXIDINE GLUCONATE 0.12% ORAL RINSE 15 ML: 1.2 LIQUID ORAL at 21:57

## 2020-01-01 RX ADMIN — MORPHINE SULFATE 2 MG: 2 INJECTION, SOLUTION INTRAMUSCULAR; INTRAVENOUS at 12:08

## 2020-01-01 RX ADMIN — HYDROCORTISONE SODIUM SUCCINATE 50 MG: 100 INJECTION, POWDER, FOR SOLUTION INTRAMUSCULAR; INTRAVENOUS at 00:25

## 2020-01-01 RX ADMIN — APIXABAN 5 MG: 5 TABLET, FILM COATED ORAL at 08:17

## 2020-01-01 RX ADMIN — DOCUSATE SODIUM 100 MG: 100 CAPSULE, LIQUID FILLED ORAL at 20:21

## 2020-01-01 RX ADMIN — LORAZEPAM 1 MG: 2 INJECTION INTRAMUSCULAR; INTRAVENOUS at 16:35

## 2020-01-01 RX ADMIN — HYDROCORTISONE SODIUM SUCCINATE 50 MG: 100 INJECTION, POWDER, FOR SOLUTION INTRAMUSCULAR; INTRAVENOUS at 13:48

## 2020-01-01 RX ADMIN — SPIRONOLACTONE 25 MG: 25 TABLET ORAL at 21:36

## 2020-01-01 RX ADMIN — ALBUMIN (HUMAN) 25 G: 0.25 INJECTION, SOLUTION INTRAVENOUS at 11:54

## 2020-01-01 RX ADMIN — SERTRALINE HYDROCHLORIDE 50 MG: 50 TABLET, FILM COATED ORAL at 08:38

## 2020-01-01 RX ADMIN — METOPROLOL TARTRATE 75 MG: 25 TABLET, FILM COATED ORAL at 18:09

## 2020-01-01 RX ADMIN — APIXABAN 5 MG: 5 TABLET, FILM COATED ORAL at 08:27

## 2020-01-01 RX ADMIN — FENTANYL CITRATE 150 MCG/HR: 50 INJECTION, SOLUTION INTRAMUSCULAR; INTRAVENOUS at 14:05

## 2020-01-01 RX ADMIN — MEROPENEM 1 G: 1 INJECTION, POWDER, FOR SOLUTION INTRAVENOUS at 11:56

## 2020-01-01 RX ADMIN — GLYCOPYRROLATE 1 MG: 1 TABLET ORAL at 11:59

## 2020-01-01 RX ADMIN — POTASSIUM CHLORIDE 20 MEQ: 1.5 FOR SOLUTION ORAL at 05:21

## 2020-01-01 RX ADMIN — THIAMINE HCL TAB 100 MG 100 MG: 100 TAB at 08:59

## 2020-01-01 RX ADMIN — POTASSIUM CHLORIDE 40 MEQ: 750 TABLET, FILM COATED, EXTENDED RELEASE ORAL at 09:02

## 2020-01-01 RX ADMIN — METOPROLOL TARTRATE 50 MG: 50 TABLET, FILM COATED ORAL at 08:38

## 2020-01-01 RX ADMIN — MEROPENEM 1 G: 1 INJECTION, POWDER, FOR SOLUTION INTRAVENOUS at 00:17

## 2020-01-01 RX ADMIN — FUROSEMIDE 20 MG: 10 INJECTION, SOLUTION INTRAMUSCULAR; INTRAVENOUS at 20:43

## 2020-01-01 RX ADMIN — POTASSIUM CHLORIDE 10 MEQ: 7.46 INJECTION, SOLUTION INTRAVENOUS at 18:00

## 2020-01-01 RX ADMIN — SODIUM CHLORIDE 40 MG: 9 INJECTION, SOLUTION INTRAMUSCULAR; INTRAVENOUS; SUBCUTANEOUS at 08:56

## 2020-01-01 RX ADMIN — CHLORHEXIDINE GLUCONATE 0.12% ORAL RINSE 15 ML: 1.2 LIQUID ORAL at 09:49

## 2020-01-01 RX ADMIN — HYDROXYZINE PAMOATE 25 MG: 25 CAPSULE ORAL at 22:17

## 2020-01-01 RX ADMIN — HYDROCORTISONE SODIUM SUCCINATE 50 MG: 100 INJECTION, POWDER, FOR SOLUTION INTRAMUSCULAR; INTRAVENOUS at 05:18

## 2020-01-01 RX ADMIN — FUROSEMIDE 20 MG: 20 TABLET ORAL at 09:02

## 2020-01-01 RX ADMIN — VANCOMYCIN HYDROCHLORIDE 1500 MG: 10 INJECTION, POWDER, LYOPHILIZED, FOR SOLUTION INTRAVENOUS at 22:47

## 2020-01-01 RX ADMIN — TRAMADOL HYDROCHLORIDE 50 MG: 50 TABLET, FILM COATED ORAL at 11:16

## 2020-01-01 RX ADMIN — HYDROCORTISONE SODIUM SUCCINATE 50 MG: 100 INJECTION, POWDER, FOR SOLUTION INTRAMUSCULAR; INTRAVENOUS at 13:06

## 2020-01-01 RX ADMIN — ONDANSETRON 4 MG: 2 INJECTION INTRAMUSCULAR; INTRAVENOUS at 18:09

## 2020-01-01 RX ADMIN — APIXABAN 5 MG: 5 TABLET, FILM COATED ORAL at 09:04

## 2020-01-01 RX ADMIN — HYDROCORTISONE SODIUM SUCCINATE 50 MG: 100 INJECTION, POWDER, FOR SOLUTION INTRAMUSCULAR; INTRAVENOUS at 05:27

## 2020-01-01 RX ADMIN — Medication 100 MG: at 09:02

## 2020-01-01 RX ADMIN — CALCITONIN SALMON 370 INT'L UNITS: 200 INJECTION, SOLUTION INTRAMUSCULAR; SUBCUTANEOUS at 20:50

## 2020-01-01 RX ADMIN — SODIUM CHLORIDE 3 G: 900 INJECTION INTRAVENOUS at 08:41

## 2020-01-01 RX ADMIN — FENTANYL CITRATE 150 MCG/HR: 50 INJECTION, SOLUTION INTRAMUSCULAR; INTRAVENOUS at 05:33

## 2020-01-01 RX ADMIN — PANTOPRAZOLE SODIUM 40 MG: 40 INJECTION, POWDER, FOR SOLUTION INTRAVENOUS at 09:10

## 2020-01-01 RX ADMIN — SERTRALINE HYDROCHLORIDE 50 MG: 50 TABLET ORAL at 10:19

## 2020-01-01 RX ADMIN — RISPERIDONE 2 MG: 2 TABLET ORAL at 21:05

## 2020-01-01 RX ADMIN — CETIRIZINE HYDROCHLORIDE 10 MG: 10 TABLET, FILM COATED ORAL at 09:03

## 2020-01-01 RX ADMIN — CHLORHEXIDINE GLUCONATE 0.12% ORAL RINSE 15 ML: 1.2 LIQUID ORAL at 08:01

## 2020-01-01 RX ADMIN — POTASSIUM CHLORIDE 10 MEQ: 7.46 INJECTION, SOLUTION INTRAVENOUS at 16:49

## 2020-01-01 RX ADMIN — HYDROCORTISONE SODIUM SUCCINATE 50 MG: 100 INJECTION, POWDER, FOR SOLUTION INTRAMUSCULAR; INTRAVENOUS at 21:18

## 2020-01-01 RX ADMIN — CHLORHEXIDINE GLUCONATE 0.12% ORAL RINSE 15 ML: 1.2 LIQUID ORAL at 09:30

## 2020-01-01 RX ADMIN — APIXABAN 5 MG: 5 TABLET, FILM COATED ORAL at 09:21

## 2020-06-03 NOTE — TELEPHONE ENCOUNTER
Dr Camilo Raines is calling on behalf of the patient as he is needing a new patient as soon as possible for Afib. Dr. Camilo Raines would like for patient to be seen in office. Please call Dr. Camilo Raines office would like you to call them back to get the patient scheduled. Please advise.      Phone: [phone]

## 2020-06-05 NOTE — PROGRESS NOTES
Lizeth Ibarra is a 75 y.o. male Chief Complaint Patient presents with  
 Other Poss Afib, edema Chest pain No 
 
SOB No 
 
Dizziness No 
 
Swelling patient has swelling in both feet Refills No 
 
Visit Vitals /82 (BP 1 Location: Left arm, BP Patient Position: Sitting) Pulse (!) 104 Ht 6' (1.829 m) Wt 242 lb 9.6 oz (110 kg) BMI 32.90 kg/m² 1. Have you been to the ER, urgent care clinic since your last visit? Hospitalized since your last visit? no 
 
2. Have you seen or consulted any other health care providers outside of the [address] [place] [address] since your last visit? Include any pap smears or colon screening.   no

## 2020-06-05 NOTE — PROGRESS NOTES
LAST OFFICE VISIT : Visit date not found ICD-10-CM ICD-9-CM 1. Persistent atrial fibrillation (HCC) I48.19 427.31  
2. Dyslipidemia E78.5 272.4 3. Essential hypertension I10 401.9 Kaleta Osgood is a 75 y.o. male with  referred for atrial fibrillation dyslipidemia hypertension. .  The patient denies chest pain/ shortness of breath, orthopnea, PND, LE edema, palpitations, syncope, presyncope or fatigue. Cardiac risk factors: smoking/ tobacco exposure, dyslipidemia, obesity, male gender, hypertension I have personally obtained the history from the patient. [facility] is a pleasant gentleman who I am seeing for Dr. Lio Reddy for atrial fibrillation. He was followed by Dr. Nohemi Tovar in the past.  He has a chronic history of atrial fibrillation dating back at least 15 to 20 years. He states that he cannot recall ever having attempted cardioversion. He is done well over the years and has been following Dr. Chun Tai and has had no problems he states. He has not had any recent cardiac testing he cannot recall when he had a echo or a stress test. 
 
He tells me he came off of Xarelto about a year ago when he is taking aspirin and he felt that was at work adequate coverage. He did that because he has bruises on all extremities are pretty significant. He did go to the emergency room at [facility] times with with him this year for congestion and he says they did extensive work-ups and he cannot provide me what the outcome was from that his work-ups. I will need to obtain that information He does say that he continues to smoke about a half a pack per day he has hypertension high cholesterol and he is male gender puts him at risk for heart disease He will use a stationary bike at times but not sure the frequency with which he uses that. He also has a left leg swelling that he says is been going on for about a year.   Usually look a little better in the morning and then when he is standing throughout the day the edema will get worse only in the left leg. He is scheduled to see the vascular surgeon. He does have a popliteal cyst I believe in that leg as well. He is taking Norvasc and has been doing that for a while so I am unsure this may be contributing to his edema but it did not appear to be a significant in his right leg. He does golf regularly and has not complained of any chest discomfort. ACTIVE PROBLEM LIST There are no active problems to display for this patient. PAST MEDICAL HISTORY Past Medical History:  
Diagnosis Date  Arthritis  Atrial fibrillation ([address]  High cholesterol  Hypertension PAST SURGICAL HISTORY Past Surgical History:  
Procedure Laterality Date  COLONOSCOPY N/A 9/20/2018 COLONOSCOPY performed by Rafa Sandhu MD at [facility] HX COLONOSCOPY    
 HX HIP REPLACEMENT Bilateral   
 HX TONSILLECTOMY ALLERGIES Allergies Allergen Reactions  Codeine Rash FAMILY HISTORY No family history on file. negative for cardiac disease SOCIAL HISTORY Social History Socioeconomic History  Marital status:  Spouse name: Not on file  Number of children: Not on file  Years of education: Not on file  Highest education level: Not on file Tobacco Use  Smoking status: Smoker, Current Status Unknown Packs/day: 0.25 Years: 60.00 Pack years: 15.00  Smokeless tobacco: Never Used Substance and Sexual Activity  Alcohol use: Yes Alcohol/week: 8.0 standard drinks Types: 8 Shots of liquor per week  Drug use: No  
 
 
 
MEDICATIONS Current Outpatient Medications Medication Sig  furosemide (LASIX) 20 mg tablet TK 3 TS PO QD  
 potassium chloride SR (K-TAB) 20 mEq tablet Take 10 mEq by mouth two (2) times a day.  aspirin delayed-release 81 mg tablet Take  by mouth daily.   
 apixaban (ELIQUIS) 5 mg tablet Take 1 Tab by mouth two (2) times a day.  metoprolol succinate (TOPROL-XL) 200 mg XL tablet Take 200 mg by mouth daily.  benazepril (LOTENSIN) 20 mg tablet Take 20 mg by mouth daily.  pravastatin (PRAVACHOL) 20 mg tablet Take 20 mg by mouth nightly.  amLODIPine (NORVASC) 10 mg tablet Take 10 mg by mouth daily. No current facility-administered medications for this visit. I have reviewed the nurses notes, vitals, problem list, allergy list, medical history, family, social history and medications. REVIEW OF SYMPTOMS General: Pt denies excessive weight gain or loss. Pt is able to conduct ADL's HEENT: Denies blurred vision, headaches, hearing loss, epistaxis and difficulty swallowing. Respiratory: Denies cough, congestion, shortness of breath, RODRIGUEZ, wheezing or stridor. Cardiovascular: Denies precordial pain, palpitations, edema or PND Gastrointestinal: Denies poor appetite, indigestion, abdominal pain or blood in stool Genitourinary: Denies hematuria, dysuria, increased urinary frequency Musculoskeletal: Denies joint pain or swelling from muscles or joints Neurologic: Denies tremor, paresthesias, headache, or sensory motor disturbance Psychiatric: Denies confusion, insomnia, depression Integumentray: Denies rash, itching or ulcers. Hematologic: Denies easy bruising, bleeding PHYSICAL EXAMINATION Vitals:  
 06/05/20 1358 BP: 138/82 Pulse: (!) 104 Weight: 242 lb 9.6 oz (110 kg) Height: 6' (1.829 m) General: Well developed, in no acute distress. Deconditioned HEENT: No jaundice, oral mucosa moist, no oral ulcers Neck: Supple, no stiffness, no lymphadenopathy, supple Heart: Irregular Respiratory: Rhonchi at both bases Abdomen:   Soft, non-tender, bowel sounds are active.  Protuberant Extremities:  + edema, multiple ecchymotic areas over upper and lower extremities Musculoskeletal: No clubbing, no deformities Neuro: A&Ox3, speech clear, gait stable, cooperative, no focal neurologic deficits Skin: Moderate multiple ecchymotic areas over extremities EKG: Atrial fibrillation with RVR   109 DIAGNOSTIC DATA None LABORATORY DATA No results found for: WBC, HGBPOC, HGB, HGBP, HCTPOC, HCT, PHCT, RBCH, PLT, MCV, HGBEXT, HCTEXT, PLTEXT, HGBEXT, HCTEXT, PLTEXT No results found for: NA, K, CL, CO2, AGAP, GLU, BUN, CREA, BUCR, GFRAA, GFRNA, CA, TBIL, TBILI, AP, TP, ALB, GLOB, AGRAT, ALT  
 
 
 ASSESSMENT/RECOMMENDATIONS:.  
  
1. Atrial fibrillation  
-His weight is slightly elevated I would add diltiazem at this time but there were multiple issues I was trying to attack it once and will gradually adjust his meds. He is on 200 metoprolol ready and I was thinking possibly of stopping his Norvasc may help the edema and place him on diltiazem. -He is a chads vas score of 3 with 3.2 risk of stroke per year and I reviewed with him the importance of being anticoagulated to reduce his risk of stroke or possible embolization to extremity. He is agreeable to go on Eliquis at 5 mg twice daily.-We will have him see Dr. posada regarding possible watchman device 2. HTN 
-Blood pressure is borderline at this time. I will not make any adjustments in his medicines 
-Encouraged low-sodium diet 3. Dyslipidemia 
-I have no recent cholesterol on him so we will obtain from Dr. Jones Cos office. He states he did have a cholesterol checked recently and he is on Pravachol. 4.  Lower extremity edema limited to left leg. He says is been going on for about a year and ultrasounds demonstrated no clots he is scheduled to see a vascular surgeon this coming week. Other considerations is placing a compression hose on the left leg. Unclear if it may be related to the popliteal cyst. 
 
5.  Tobacco dependency 
-Reviewed with him the importance of refraining from smoking.   Clearly indicated to him that should his cholesterol be within normal limits and his blood pressure be within goal and he continues to smoke he still places himself at a risk of heart attack. Follow up in 6 weeks. Orders Placed This Encounter  AMB POC EKG ROUTINE W/ 12 LEADS, INTER & REP Order Specific Question:   Reason for Exam: Answer:   afib  furosemide (LASIX) 20 mg tablet Sig: TK 3 TS PO QD  
 potassium chloride SR (K-TAB) 20 mEq tablet Sig: Take 10 mEq by mouth two (2) times a day.  aspirin delayed-release 81 mg tablet Sig: Take  by mouth daily.  apixaban (ELIQUIS) 5 mg tablet Sig: Take 1 Tab by mouth two (2) times a day. Dispense:  60 Tab Refill:  4 Follow-up and Dispositions  · Return in about 6 weeks (around 7/17/2020). I have discussed the diagnosis with  Latrell Bermeo and the intended plan as seen in the above orders. Questions were answered concerning future plans. I have discussed medication side effects and warnings with the patient as well. Thank you,  Luigi Mcdowell DO for involving me in the care of  Latrell Bermeo. Please do not hesitate to contact me for further questions/concerns. Michael Sam MD, [facility] - [address], [address]     
[phone] / [phone] Fax

## 2020-06-11 NOTE — TELEPHONE ENCOUNTER
Patient is calling as he was told by [employer] that someone from Dr. Adrianne Kayser office has to call Nena Espinoza and give verbal permission in order for them to refill the Eliquis and cover the cost. Please advise. Leti advised the patient for someone to call [phone] to get this approved.     Phone: [phone]

## 2020-06-17 NOTE — TELEPHONE ENCOUNTER
Adrián Aguillon MD  You 5 minutes ago (3:44 PM)      Is he on eliquis and insurance will not pay? ???

## 2020-06-29 NOTE — PROGRESS NOTES
Mr. Rosy Whitehead will need Covid 19 test completed on July 2, 2020 for his exercise nuclear stress test. LM to call back to choose a location.

## 2020-06-30 NOTE — PROGRESS NOTES
LM for patient to call back and set up covid testing for his NM exercise stress test for 7-. Will need to have done 7/2/2020.

## 2020-07-09 NOTE — PROGRESS NOTES
Mr. Jennifer Rausch is scheduled for testing here 7/10/2020. Testing cancelled.   Mr. Jennifer Rausch is currently an in patient at [address] [place] [address].

## 2020-08-11 PROBLEM — L03.114 CELLULITIS OF LEFT UPPER EXTREMITY: Status: ACTIVE | Noted: 2020-01-01

## 2020-08-12 PROBLEM — J44.9 COPD (CHRONIC OBSTRUCTIVE PULMONARY DISEASE) (HCC): Status: ACTIVE | Noted: 2020-01-01

## 2020-08-12 PROBLEM — F32.A DEPRESSION: Status: ACTIVE | Noted: 2020-01-01

## 2020-08-12 PROBLEM — I48.20 CHRONIC ATRIAL FIBRILLATION (HCC): Status: ACTIVE | Noted: 2020-01-01

## 2020-08-12 PROBLEM — E78.5 DYSLIPIDEMIA: Status: ACTIVE | Noted: 2020-01-01

## 2020-08-12 PROBLEM — I10 HTN (HYPERTENSION), BENIGN: Status: ACTIVE | Noted: 2020-01-01

## 2020-08-12 NOTE — PROGRESS NOTES
[address] Dosing Services: Antimicrobial Stewardship Progress Note Consult for antibiotic dosing of vancomycin by Dr Eileen Licona Pharmacist reviewed antibiotic appropriateness for  67year old , male  for indication of left arm skin and soft tissue infection. Day of Therapy 1 Plan: 
Vancomycin therapy: 
Start Vancomycin therapy, with loading dose of 2000mg then 1.5gm ivpb q12h. Dose calculated to approximate a therapeutic trough of 10-15mcg/mL. Plan for level: after 3rd maintenance dose Pharmacy to follow daily and will make changes to dose and/or frequency based on clinical status. Date Dose & Interval Measured (mcg/mL) Extrapolated (mcg/mL) ? 8/11/20 ? 2gm x1 ? ?  
?8/12/20 ?1.5gm q12h ? ?  
? ? ? ? Other Antimicrobial 
(not dosed by pharmacist) Zosyn 3.375gm ivpb q8h Cultures Paired blood cx's ordered Serum Creatinine Lab Results Component Value Date/Time Creatinine 1.09 08/11/2020 09:11 PM  
   
Creatinine Clearance Estimated Creatinine Clearance: 71.6 mL/min (based on SCr of 1.09 mg/dL). Procalcitonin  No results found for: PCT Temp 98 °F (36.7 °C) WBC Lab Results Component Value Date/Time WBC 5.2 08/11/2020 09:11 PM  
   
H/H Lab Results Component Value Date/Time HGB 10.0 (L) 08/11/2020 09:11 PM  
  
Platelets Lab Results Component Value Date/Time PLATELET 489 19/68/8107 09:11 PM  
 
  
 
 
Pharmacist: Tramaine Leger PHARMD Contact information:

## 2020-08-12 NOTE — ED TRIAGE NOTES
Triage: Pt was sent here from [facility]. Pt has infection to left arm after exposure to pathogen after petting dog swimming in river.

## 2020-08-12 NOTE — PROGRESS NOTES
Bedside shift change report given to Alex Rn (oncoming nurse) by Jose Isaacs RN (offgoing nurse). Report included the following information SBAR, Kardex, MAR, Accordion and Recent Results.

## 2020-08-12 NOTE — PROGRESS NOTES
PICC Placement Note PRE-PROCEDURE VERIFICATION Correct Procedure: yes Correct Site:  yes Temperature: Temp: 97.6 °F (36.4 °C), Temperature Source: Temp Source: Oral 
Recent Labs 08/12/20 
9284 BUN 17 CREA 1.12  WBC 5.4 Allergies: Codeine Education materials for PICC Care given: yes. See Patient Education activity for further details. PICC Booklet placed at bedside: yes Closed Ended PICC Catheters: 
Flush Lumens as Follows: 
Intermittent Medication:   Flush before and after each medication with 10 ml NS. Unused Ports:  Flush every 8 hours with 10 ml NS. 
TPN Ports:  Flush every 24 hours with 20 ml NS prior to hanging new bag. Blood Draws: Stop infusion, draw off and waste 10 ml of blood. Draw sample with 10cc syringe or greater. DO NOT USE VACUTAINER . Transfer with appropriate device to lab  tubes. Flush with 20 ml NS. Dressing Change:  Every 7 days, and PRN using sterile technique if integrity of dressing is compromised. Initial dressing change for central line 24-48 hours post insertion if gauze is used. Apply new dressing per policy. PROCEDURE DETAIL Consent was obtained and all questions were answered related to risks and benefits. A double lumen PICC line was inserted, as a sterile procedure using ultrasound and modified Seldinger technique for antibiotic therapy. The following documentation is in addition to the PICC properties in the lines/airways flowsheet : 
Lot #: [id number] Lidocaine 1% administered intradermally :yes Internal Catheter Total Length: 42 (cm) Vein Selection for PICC:right basilic Central Line Bundle followed yes Complication Related to Insertion:no X-ray: yes. The x-ray results state the tip location is on the right side and the tip overlies the lower superior vena cava. Line is okay to use: yes Allen Flores RN

## 2020-08-12 NOTE — ED NOTES
Verbal report received from [facility]. Assumed pt care. Pt's resting in bed at this time. No acute distress noted. Call light in reach. Will continue to monitor.

## 2020-08-12 NOTE — ED NOTES
Bedside and Verbal shift change report given to Mayela Mcmillan RN (oncoming nurse) by Zuly Taylor RN (offgoing nurse). Report included the following information SBAR, Kardex, ED Summary, MAR, Accordion and Recent Results.

## 2020-08-12 NOTE — PROGRESS NOTES
8/12/2020 
10:58 AM 
Case management note Reason for Admission:   Cellulitis Patient came from [facility] for cellulitis Patient went to [facility] on July 5 for cellulitis, admitted for 12 days. Patient then went to [facility] in [facility] about 9 days ago Wife indicated he did not make much progress at rehab and SNF. Patient had been able to drive and play golf before this incident with cellulitis. RUR Score:     11% PCP: First and Last name:  Dr. Marli Arriaza 
 Name of Practice:  
 Are you a current patient: Yes/No: yes Approximate date of last visit: 3 months Can you participate in a virtual visit if needed: not at this time Do you (patient/family) have any concerns for transition/discharge? Yes Patient will need skilled care on discharge Plan for utilizing home health:    
 
Current Advanced Directive/Advance Care Plan:  AD on file Transition of Care Plan: 1. Return to skilled care 2. Long term planning 3. PT/OT eval 
4. CM to follow for discharge needs Care Management Interventions PCP Verified by CM: Yes(dr. stephen siegrist) Mode of Transport at Discharge: Other (see comment) Current Support Network: [address] [place] [address] Confirm Follow Up Transport: Family The Plan for Transition of Care is Related to the Following Treatment Goals : cellulitis Discharge Location Discharge Placement: Skilled nursing facility [address] [place] [address]

## 2020-08-12 NOTE — CONSULTS
Assessment:  
68 yo man with recent skin infection admitted now with concern for worsening infection Plan: LUE with sloughing skin from recent infection No evidence of infection or cellulitis No debridement or surgical intervention needed Would benefit from wound care clinic visits once discharged From surgical standpoint no need for admission if outpatient wound follow up established Wound care orders entered by wound care team 
Please call with further questions or concerns Signed By: Jacquie Ott MD 
[facility] [phone] August 12, 2020 General Surgery Consult Subjective:  
  
Alisia Jacobson is a 67 y.o.  male who presents with LUE wounds. Pt reports that he had an injury in the river in July. He was admitted to [facility] initially after the injury for wound care and IV abx. He was discharged from there to [facility]. He was brought into the hospital last night for concerns of infection. The patient is not able to give much history. Past Medical History:  
Diagnosis Date  Arthritis  Atrial fibrillation ([facility] [address]  COPD (chronic obstructive pulmonary disease) ([facility] [address] 8/12/2020  High cholesterol  Hypertension Past Surgical History:  
Procedure Laterality Date  COLONOSCOPY N/A 9/20/2018 COLONOSCOPY performed by Brandt Littlejohn MD at [address] [facility] HX COLONOSCOPY    
 HX HIP REPLACEMENT Bilateral   
 HX TONSILLECTOMY Family History Problem Relation Age of Onset  Stroke Neg Hx Social History Socioeconomic History  Marital status:  Spouse name: Not on file  Number of children: Not on file  Years of education: Not on file  Highest education level: Not on file Tobacco Use  Smoking status: Smoker, Current Status Unknown Packs/day: 0.25 Years: 60.00 Pack years: 15.00  Smokeless tobacco: Never Used Substance and Sexual Activity  Alcohol use: Yes   Alcohol/week: 8.0 standard drinks Types: 8 Shots of liquor per week  Drug use: No  
  
Current Facility-Administered Medications Medication Dose Route Frequency  sodium chloride (NS) flush 5-40 mL  5-40 mL IntraVENous Q8H  
 sodium chloride (NS) flush 5-40 mL  5-40 mL IntraVENous PRN  
 acetaminophen (TYLENOL) tablet 650 mg  650 mg Oral Q6H PRN Or  
 acetaminophen (TYLENOL) suppository 650 mg  650 mg Rectal Q6H PRN  polyethylene glycol (MIRALAX) packet 17 g  17 g Oral DAILY PRN  promethazine (PHENERGAN) tablet 12.5 mg  12.5 mg Oral Q6H PRN Or  
 ondansetron (ZOFRAN) injection 4 mg  4 mg IntraVENous Q6H PRN  
 albuterol-ipratropium (DUO-NEB) 2.5 MG-0.5 MG/3 ML  3 mL Nebulization Q6H PRN  
 0.9% sodium chloride infusion  75 mL/hr IntraVENous CONTINUOUS  
 [START ON 8/13/2020] Vancomcyin trough 8/13 prior to 1100 dose   Other ONCE  piperacillin-tazobactam (ZOSYN) 3.375 g in 0.9% sodium chloride (MBP/ADV) 100 mL  3.375 g IntraVENous Q8H  
 vancomycin (VANCOCIN) 1500 mg in  ml infusion  1,500 mg IntraVENous Q12H Current Outpatient Medications Medication Sig  
 acetaminophen (TYLENOL) 500 mg tablet Take 500 mg by mouth every four (4) hours as needed for Pain.  albuterol (PROVENTIL HFA, VENTOLIN HFA, PROAIR HFA) 90 mcg/actuation inhaler Take 2 Puffs by inhalation every four (4) hours as needed for Wheezing.  bisacodyL (DULCOLAX) 5 mg EC tablet Take 5 mg by mouth every four (4) hours as needed for Constipation.  ciprofloxacin HCl (CIPRO) 500 mg tablet Take 500 mg by mouth two (2) times a day.  lactobacillus rhamnosus gg 10 billion cell (CULTURELLE) 10 billion cell capsule Take 1 Cap by mouth every twelve (12) hours as needed.  docusate sodium (COLACE) 100 mg capsule Take 100 mg by mouth two (2) times a day.  doxycycline (ADOXA) 100 mg tablet Take 100 mg by mouth two (2) times a day.  folic acid (FOLVITE) 1 mg tablet Take 1 mg by mouth daily.   
 guaiFENesin ER (MUCINEX) 600 mg ER tablet Take 600 mg by mouth two (2) times a day.  loperamide (Imodium A-D) 2 mg tablet Take 2 mg by mouth every four (4) hours as needed for Diarrhea.  lisinopriL (PRINIVIL, ZESTRIL) 5 mg tablet Take 2.5 mg by mouth two (2) times a day.  megestroL (MEGACE) 400 mg/10 mL (40 mg/mL) suspension Take 400 mg by mouth daily.  metoprolol tartrate (LOPRESSOR) 25 mg tablet Take 25 mg by mouth two (2) times a day.  nystatin (MYCOSTATIN) 100,000 unit/mL suspension Take 1 tsp by mouth four (4) times daily. swish and spit  sodium chloride 1 gram tablet Take 1 g by mouth two (2) times a day.  thiamine HCL (B-1) 100 mg tablet Take 100 mg by mouth daily.  traMADoL (ULTRAM) 50 mg tablet Take 50 mg by mouth every four (4) hours as needed for Pain.  traMADoL (ULTRAM) 50 mg tablet Take 50 mg by mouth two (2) times a day.  sertraline (ZOLOFT) 50 mg tablet Take 50 mg by mouth daily.  apixaban (ELIQUIS) 5 mg tablet Take 1 Tab by mouth two (2) times a day.  pravastatin (PRAVACHOL) 20 mg tablet Take 20 mg by mouth nightly. Allergies Allergen Reactions  Codeine Rash Review of Systems:   
 []     Unable to obtain  ROS due to  []    mental status change  []    sedated   []    intubated 
 [x]    Total of 12 system negative, unless specified below or in HPI: 
Constitutional: negative fever, negative chills, negative weight loss Eyes:   negative visual changes ENT:   negative sore throat, tongue or lip swelling Respiratory:  negative cough, negative dyspnea Cards:  negative for chest pain, palpitations, lower extremity edema GI:   negative for nausea, vomiting, diarrhea, and abdominal pain :  negative for frequency, dysuria Integument:  Scabbing and skin tears Heme:  negative for easy bruising and gum/nose bleeding Musculoskel: negative for myalgias,  back pain and muscle weakness Neuro:  negative for headaches, dizziness, vertigo Psych:  negative for feelings of anxiety, depression Objective:  
 
  
Patient Vitals for the past 8 hrs: 
 BP Temp Pulse Resp SpO2  
20 0900 97/72      
20 0810 (!) 86/74 97.6 °F (36.4 °C) (!) 109 18 100 % Temp (24hrs), Av.8 °F (36.6 °C), Min:97.6 °F (36.4 °C), Max:98 °F (36.7 °C) Physical Exam: 
General:  Alert, cooperative, no distress, appears stated age. Eyes:  Conjunctivae clear. PERRL, EOMs intact. Nose: Nares normal. Septum midline. Mucosa normal. No drainage or sinus tenderness. Mouth/Throat: Lips, mucosa, and tongue normal. Teeth and gums normal.  
Neck: Supple, symmetrical, trachea midline Back:   Symmetric, no curvature. ROM normal. No CVA tenderness. Lungs:   Clear to auscultation bilaterally. Heart:  Regular rate and rhythm. Abdomen:   Soft, non-tender. Bowel sounds normal. No masses,  No organomegaly. Extremities: Extremities normal, atraumatic, no cyanosis or edema. Skin: Skin thin, bruising all over, multiple skin tears, LUE multiple dark scabs, multiple skin tears, new healing skin beneath scabs, no erythema no drainage no evidence of infection Labs:  
Recent Labs 20 
5700 WBC 5.4 HGB 9.4* HCT 30.4*  Recent Labs 20 
0058 20 
2111  137  
K 3.7 3.9 * 108 CO2 22 23 GLU 92 98 BUN 17 16 CREA 1.12 1.09  
CA 8.7 9.0 ALB  --  1.9* TBILI  --  0.4 ALT  --  22 No results for input(s): INR, INREXT in the last 72 hours.

## 2020-08-12 NOTE — PROGRESS NOTES
Patient stated his rash of a reaction to Codeine is very minor and that he is okay with taking dilaudid.

## 2020-08-12 NOTE — PROGRESS NOTES
[address] Dosing Services: Antimicrobial Stewardship Progress Note 8/12/2020 Consult for antibiotic dosing of vancomycin by Dr Ino Esrtella Pharmacist reviewed antibiotic appropriateness for  67year old , male  for indication of left arm skin and soft tissue infection. Day of Therapy 2 Treatment failure on  PTA abx- ciprofloxacin plus doxycycline Plan: 
Vancomycin therapy: 
Start Vancomycin therapy, with loading dose of 2000 mg then 1.5 gm ivpb q12h. Dose calculated to approximate a therapeutic trough of 15-20 mcg/mL. Plan for level: 
Current vancomycin dose ~15 mg/kg Q12 hrs predicts a trough of ~20 mcg/ml based on calculated pharmacokinetics. Vancomycin trough goal increased to 15-20 mcg/ml d/t severity of infection and recent hospital admission, suggest addition of clindamycin if infection is necrotizing. Recommend consider alternative antibiotic to zosyn if creatinine continues to trend up w/ concomitant vancomycin therapy. Steady state trough level ordered for tomorrow AM. Reviewed MD notes- culture data may be available from [facility] and MD is working to obtain this Pharmacy to follow daily and will make changes to dose and/or frequency based on clinical status. Date Dose & Interval Measured (mcg/mL) Extrapolated (mcg/mL) ? 8/11/20 ? 2gm x1 ? ?  
?8/12/20 ?1.5gm q12h ? ?  
? ? ? ? Other Antimicrobial 
(not dosed by pharmacist) Zosyn 3.375gm ivpb q8h Cultures 8/11 Blood (paired): NGTD, Prelim Serum Creatinine Lab Results Component Value Date/Time Creatinine 1.12 08/12/2020 12:58 AM  
   
Creatinine Clearance Estimated Creatinine Clearance: 69.7 mL/min (based on SCr of 1.12 mg/dL). Procalcitonin  No results found for: PCT Temp  
97.6 °F (36.4 °C) WBC Lab Results Component Value Date/Time WBC 5.4 08/12/2020 12:58 AM  
   
H/H Lab Results Component Value Date/Time HGB 9.4 (L) 08/12/2020 12:58 AM  
  
Platelets Lab Results Component Value Date/Time PLATELET 377 02/61/4518 12:58 AM  
 
  
 
Thanks, Pharmacist: Bee Johansen, [address] Contact information:

## 2020-08-12 NOTE — H&P
[address] 
[phone] Admission History and Physical 
 
 
NAME:       Andreas Lamar :       1945 MRN:      [id number] PCP:      Gallito Duarte DO Date of service:   2020 Chief  Complaint:  Worsening cellulitis LUE History Of Presenting Illness:    
 
Mr. Samantha Harmon is a 67 y.o. male who is being admitted for Cellulitis of left upper extremity. Mr. Samantha Harmon presented to our Emergency Department today complaining of a worsening left upper extremity redness, swelling and pain. He says he sustained an injury while in a river and was admitted at [facility]  where he was treated with a course of IV antibiotics. he was then sent to [facility] retrea where he has been getting oral antibiotics. Apparently cultures done recently prompted a change on the regimen but due to the worsening clinical course, he was referred to the ED for admission and IV antibiotics. He says the pain is moderate, aching and associated with swelling. He will be admitted for further management. Allergies Allergen Reactions  Codeine Rash Prior to Admission medications Medication Sig Start Date End Date Taking? Authorizing Provider  
acetaminophen (TYLENOL) 500 mg tablet Take 500 mg by mouth every four (4) hours as needed for Pain. Yes Provider, Historical  
albuterol (PROVENTIL HFA, VENTOLIN HFA, PROAIR HFA) 90 mcg/actuation inhaler Take 2 Puffs by inhalation every four (4) hours as needed for Wheezing. Yes Provider, Historical  
bisacodyL (DULCOLAX) 5 mg EC tablet Take 5 mg by mouth every four (4) hours as needed for Constipation. Yes Provider, Historical  
ciprofloxacin HCl (CIPRO) 500 mg tablet Take 500 mg by mouth two (2) times a day.    Yes Provider, Historical  
lactobacillus rhamnosus gg 10 billion cell (CULTURELLE) 10 billion cell capsule Take 1 Cap by mouth every twelve (12) hours as needed. Yes Provider, Historical  
docusate sodium (COLACE) 100 mg capsule Take 100 mg by mouth two (2) times a day. Yes Provider, Historical  
doxycycline (ADOXA) 100 mg tablet Take 100 mg by mouth two (2) times a day. Yes Provider, Historical  
folic acid (FOLVITE) 1 mg tablet Take 1 mg by mouth daily. Yes Provider, Historical  
guaiFENesin ER (MUCINEX) 600 mg ER tablet Take 600 mg by mouth two (2) times a day. Yes Provider, Historical  
loperamide (Imodium A-D) 2 mg tablet Take 2 mg by mouth every four (4) hours as needed for Diarrhea. Yes Provider, Historical  
lisinopriL (PRINIVIL, ZESTRIL) 5 mg tablet Take 2.5 mg by mouth two (2) times a day. Yes Provider, Historical  
megestroL (MEGACE) 400 mg/10 mL (40 mg/mL) suspension Take 400 mg by mouth daily. Yes Provider, Historical  
metoprolol tartrate (LOPRESSOR) 25 mg tablet Take 25 mg by mouth two (2) times a day. Yes Provider, Historical  
nystatin (MYCOSTATIN) 100,000 unit/mL suspension Take 1 tsp by mouth four (4) times daily. swish and spit   Yes Provider, Historical  
sodium chloride 1 gram tablet Take 1 g by mouth two (2) times a day. Yes Provider, Historical  
thiamine HCL (B-1) 100 mg tablet Take 100 mg by mouth daily. Yes Provider, Historical  
traMADoL (ULTRAM) 50 mg tablet Take 50 mg by mouth every four (4) hours as needed for Pain. Yes Provider, Historical  
traMADoL (ULTRAM) 50 mg tablet Take 50 mg by mouth two (2) times a day. Yes Provider, Historical  
sertraline (ZOLOFT) 50 mg tablet Take 50 mg by mouth daily. Yes Provider, Historical  
apixaban (ELIQUIS) 5 mg tablet Take 1 Tab by mouth two (2) times a day. 6/5/20  Yes Janeth Stevens MD  
pravastatin (PRAVACHOL) 20 mg tablet Take 20 mg by mouth nightly. Yes Provider, Historical  
 
 
Past Medical History:  
Diagnosis Date  Arthritis  [facility]  COPD (chronic obstructive pulmonary disease) ([facility] 8/12/2020  High cholesterol  Hypertension Past Surgical History:  
Procedure Laterality Date  COLONOSCOPY N/A 9/20/2018 COLONOSCOPY performed by Bruce Foote MD at [address] [facility] HX COLONOSCOPY    
 HX HIP REPLACEMENT Bilateral   
 HX TONSILLECTOMY Social History Tobacco Use  Smoking status: Smoker, Current Status Unknown Packs/day: 0.25 Years: 60.00 Pack years: 15.00  Smokeless tobacco: Never Used Substance Use Topics  Alcohol use: Yes Alcohol/week: 8.0 standard drinks Types: 8 Shots of liquor per week Family History Problem Relation Age of Onset  Stroke Neg Hx Review of Systems: 
 
Constitutional ROS: no fever, chills, rigors or night sweats Respiratory ROS: no cough, sputum, hemoptysis, dyspnea or pleuritic pain. Cardiovascular ROS: no chest pain, palpitations, orthopnea, PND or syncope Endocrine ROS: no polydispsia, polyuria, heat or cold intolerance or major weight change. Gastrointestinal ROS: no dysphagia, abdominal pain, nausea, vomiting or diarrhea Genito-Urinary ROS: no dysuria, frequency, hematuria, retention or flank pain Musculoskeletal ROS: no joint pain, swelling or muscular tenderness Neurological ROS: no headache, confusion, focal weakness or any other neurological symptoms Psychiatric ROS: no depression, anxiety, mood swings Dermatological ROS: rash, excoriations LUE Heme-Lymph ROS: no swollen glands, bleeding Examination: 
 
Constitutional:   
Visit Vitals /70 Pulse (!) 102 Temp 98 °F (36.7 °C) Resp 16 Ht 6' (1.829 m) Wt 103 kg (227 lb) SpO2 99% BMI 30.79 kg/m² General:  Weak and ill looking patient in no acute distress Eyes: Pink conjunctivae, PERRLA with no discharge. Normal eye movements Ear, Nose, Mouth & Throat: No ottorrhea, rhinorrhea, non tender sinuses, dry mucous membranes Respiratory:  No accessory muscle use, clear breath sounds without crackles or wheezes Cardiovascular:  No JVD or murmurs, atrial fibrillation, without thrills, bruits or peripheral edema. GI & :  Soft abdomen, non-tender, non-distended, normoactive bowel sounds with no palpable organomegaly Heme:  No cervical or axillary adenopathy. Musculoskeletal:  No cyanosis, clubbing, atrophy or deformities Skin:  necrotic ulcerations with swelling and tender left forearm with cellulitic changes Neurological: Awake and alert but he seems forgetful. CNs 2-12 are grossly intact and otherwise non focal 
Psychiatric:  Has a fair insight to his illness  
________________________________________________________________________ Data Review: 
 
Labs: 
 
Recent Labs 08/12/20 
6500 WBC 5.4 HGB 9.4* HCT 30.4*  Recent Labs 08/12/20 
0058 08/11/20 
2111  137  
K 3.7 3.9 * 108 CO2 22 23 GLU 92 98 BUN 17 16 CREA 1.12 1.09  
CA 8.7 9.0 ALB  --  1.9* ALT  --  22 No components found for: [place] No results for input(s): PH, PCO2, PO2, HCO3, FIO2 in the last 72 hours. No results for input(s): INR, INREXT in the last 72 hours. Imaging Studies:   
 
Chest and forearm X-rays - reviewed I have also reviewed available old medical records. Assessment & Impression: 
  
Mr. Marsha Ramsay is a 67 y.o. male being evaluated for:  
 
Principal Problem: 
  Cellulitis of left upper extremity (8/11/2020) Active Problems: 
  Chronic atrial fibrillation ([facility] (8/12/2020) HTN (hypertension), benign (8/12/2020) COPD (chronic obstructive pulmonary disease) ([facility] (8/12/2020) Dyslipidemia (8/12/2020) Depression (8/12/2020) Plan of management: 
 
Cellulitis of left upper extremity (8/11/2020) POA: worsening despite outpatient treatments. Admit to hospital. Start empiric IV Zosyn and vancomycin. Obtain records and cultures from [facility]. Consult general surgery and wound care Chronic atrial fibrillation ([facility] (8/12/2020) POA: rate controlled. Resume Metoprolol and Eliquis HTN (hypertension), benign (8/12/2020) POA: BP stable. Resume Lisinopril, Metoprolol COPD (chronic obstructive pulmonary disease) ([facility] (8/12/2020) POA: not wheezing. Duoneb PRN Dyslipidemia (8/12/2020) POA: resume Pravastatin Depression (8/12/2020) POA: resume Zoloft Code Status:  Full Surrogate decision maker: Family Risk of deterioration: high Total time spent for the care of the patient: 79 Minutes Care Plan discussed with: Patient, Nursing Staff and ED physician Discussed:  Code Status, Care Plan and D/C Planning Prophylaxis:  Eliquis Probable Disposition:  SNF/LTC 
        
___________________________________________________ Attending Physician: Jesusita Munoz MD  
 
 
 None

## 2020-08-12 NOTE — ED PROVIDER NOTES
Patient is a 69-year-old male with history of atrial fibrillation on Eliquis, hyperlipidemia, hypertension, COPD presented to emergency department via EMS from [facility] for was worsening skin infection on the left arm. Patient was admitted to [facility] on July 3 for the infection, she with IV antibiotics, and discharged to SNF where he has been since. He has been on multiple rounds of antibiotics, most recently changed to clindamycin. He notes that he had a small abrasion on his left forearm, and was petting his dog who had been swimming in the river when the infection presented. He denies fever, chills, sweats, chest pain, shortness of breath, abdominal pain, nausea, vomiting, diarrhea, leg swelling, or any other medical needs at this time. Past Medical History:  
Diagnosis Date  Arthritis  Atrial fibrillation ([facility]  High cholesterol  Hypertension Past Surgical History:  
Procedure Laterality Date  COLONOSCOPY N/A 9/20/2018 COLONOSCOPY performed by Dheeraj Garber MD at [facility] HX COLONOSCOPY    
 HX HIP REPLACEMENT Bilateral   
 HX TONSILLECTOMY No family history on file. Social History Socioeconomic History  Marital status:  Spouse name: Not on file  Number of children: Not on file  Years of education: Not on file  Highest education level: Not on file Occupational History  Not on file Social Needs  Financial resource strain: Not on file  Food insecurity Worry: Not on file Inability: Not on file  Transportation needs Medical: Not on file Non-medical: Not on file Tobacco Use  Smoking status: Smoker, Current Status Unknown Packs/day: 0.25 Years: 60.00 Pack years: 15.00  Smokeless tobacco: Never Used Substance and Sexual Activity  Alcohol use: Yes Alcohol/week: 8.0 standard drinks Types: 8 Shots of liquor per week  Drug use: No  
 Sexual activity: Not on file Lifestyle  Physical activity Days per week: Not on file Minutes per session: Not on file  Stress: Not on file Relationships  Social connections Talks on phone: Not on file Gets together: Not on file Attends Tenriism service: Not on file Active member of club or organization: Not on file Attends meetings of clubs or organizations: Not on file Relationship status: Not on file  Intimate partner violence Fear of current or ex partner: Not on file Emotionally abused: Not on file Physically abused: Not on file Forced sexual activity: Not on file Other Topics Concern  Not on file Social History Narrative  Not on file ALLERGIES: Codeine Review of Systems Constitutional: Negative for chills and fever. HENT: Negative for ear pain and sore throat. Eyes: Negative for visual disturbance. Respiratory: Negative for cough and shortness of breath. Cardiovascular: Negative for chest pain. Gastrointestinal: Negative for abdominal pain. Genitourinary: Negative for flank pain. Musculoskeletal: Negative for back pain. Skin: Positive for color change and wound. Neurological: Negative for dizziness and headaches. Psychiatric/Behavioral: Negative for confusion. Vitals:  
 08/11/20 2009 BP: 109/78 Pulse: (!) 102 Resp: 16 Temp: 98 °F (36.7 °C) SpO2: 100% Weight: 103 kg (227 lb) Height: 6' (1.829 m) Physical Exam 
Vitals signs and nursing note reviewed. Constitutional:   
   General: He is not in acute distress. Appearance: Normal appearance. He is not ill-appearing. HENT:  
   Head: Normocephalic and atraumatic. Mouth/Throat:  
   Pharynx: Oropharynx is clear. Eyes:  
   Extraocular Movements: Extraocular movements intact. Conjunctiva/sclera: Conjunctivae normal.  
Neck: Musculoskeletal: No neck rigidity.   
Cardiovascular:  
   Rate and Rhythm: Regular rhythm. Tachycardia present. Pulmonary:  
   Effort: Pulmonary effort is normal. No respiratory distress. Breath sounds: Rales (Auscultated in bilateral lung fields) present. No wheezing. Abdominal:  
   General: There is no distension. Palpations: Abdomen is soft. Tenderness: There is no abdominal tenderness. There is no guarding. Musculoskeletal: Normal range of motion. Right lower leg: No edema. Left lower leg: No edema. Skin: 
   General: Skin is warm and dry. Neurological:  
   General: No focal deficit present. Mental Status: He is alert and oriented to person, place, and time. Psychiatric:     
   Mood and Affect: Mood normal.  
 
  
 
 
 
 
 
 
 
MDM Number of Diagnoses or Management Options Cellulitis of left upper extremity:  
Diagnosis management comments: Patient is alert, afebrile, mildly tachycardic, blood pressure stable, reminder of vitals are stable. His admitted at [facility] for infection of the left forearm and the knee in July and has been residing at [facility] since, he was sent to the ED this evening via EMS from nursing facility for worsening infection. Said multiple rounds of IV antibiotics, per review of paperwork sent with patient he was recently changed from doxycycline to clindamycin yesterday. He has diffuse erythema, skin darkening, tenderness, and open wounds in various stages of healing to the left anterior and posterior forearm. See above photos for details. He also has a history of COPD, denies shortness of breath, but diffuse crackles are heard in lung fields. Chest x-ray negative for acute process. White blood cell count and lactate within normal limits, blood culture pending. X-ray left forearm negative for osteomyelitis. Patient is admitted to hospital for further work-up, observation, and management. Hospitalist notified and agrees.   Attending ED provider is aware of patient and has had the opportunity to personally evaluate the patient, and agrees with admission and current plan. Patient informed of current management plan. All questions answered at this time. Will continue to monitor patient while in ED. I have called his wife, Deysi Felton, and have updated her on plan for patient admission and clinical status. CONSULT NOTE: 
12:24 AM NEEL Valencia spoke with Dr. Leona Acevedo, Consult for Plastic Surgery. Discussed available diagnostic tests and clinical findings. He is in agreement with care plans as outlined. Dr. Leona Acevedo for the use of patient's arm and feels that a cellulitis is improving and he does not need emergent plastic surgery care, advises medical admission to [facility] rather than transfer to [facility], and that he would benefit most from wound care and can follow-up with him as an outpatient. Amount and/or Complexity of Data Reviewed Clinical lab tests: reviewed Tests in the radiology section of CPT®: reviewed Tests in the medicine section of CPT®: reviewed Discuss the patient with other providers: yes (ED attending Dr. Lidia Rocha ) 
 
 
ED Course as of Aug 11 2216 Tue Aug 11, 2020  
2136 XR FOREARM LEFT [EH] 2136 IMPRESSION: Diffuse soft tissue nodularity may be related to underlying 
infection. Clinical correlation is recommended. No acute osseous abnormality.  
 [EH] 2150 CBC WITH AUTOMATED DIFF(!):   
WBC 5.2  
RBC 3.49(!) HGB 10.0(!) HCT 31.3(!) MCV 89.7 MCH 28.7 MCHC 31.9  
RDW 16.2(!) PLATELET 419 MPV 10.2 NRBC 0.0 ABSOLUTE NRBC 0.00 NEUTROPHILS PENDING  
LYMPHOCYTES PENDING  
MONOCYTES PENDING  
EOSINOPHILS PENDING  
BASOPHILS PENDING  
IMMATURE GRANULOCYTES PENDING  
ABS. NEUTROPHILS PENDING  
ABS. LYMPHOCYTES PENDING  
ABS. MONOCYTES PENDING  
ABS. EOSINOPHILS PENDING  
ABS. BASOPHILS PENDING  
ABS. IMM. GRANS. PENDING  
DF PENDING [EH] 2216 Impression: No acute process. 
   
XR CHEST PORTABLE [EH] 2510 METABOLIC PANEL, COMPREHENSIVE(!):   
Sodium 137 Potassium 3.9 Chloride 108 CO2 23 Anion gap 6 Glucose 98 BUN 16  
Creatinine 1.09  
BUN/Creatinine ratio 15 GFR est AA >60  
GFR est non-AA >60 Calcium 9.0 Bilirubin, total 0.4 ALT 22 AST 25 Alk. phosphatase 141(!) Protein, total 6.8 Albumin 1.9(!) Globulin 4.9(!) A-G Ratio 0.4(!) [EH] 2216 LACTIC ACID, PLASMA:   
Lactic acid 1.2 [EH] ED Course User Index [EH] NEEL Bernabe Hospitalist Perfect Serve for Admission 10:16 PM 
 
ED Room Number: PD93/49 Patient Name and age:  Caity Abdullahi 67 y.o.  male Working Diagnosis: 1. Cellulitis of left upper extremity COVID-19 Suspicion:  no 
 
Code Status:  Full Code Readmission: no 
Isolation Requirements:  no 
Recommended Level of Care:  med/surg Department:[facility] ED - [phone] Other: Patient is a 44-year-old male with severe atrial fibrillation on Eliquis, hypertension, COPD presenting to emergency department from [facility] for worsening left forearm skin infection and failed outpatient therapy. Infection presented July 3 when he was admitted to [facility] for IV antibiotics, and he has been residing in SNF since discharge. Most recently on clindamycin. Left forearm is diffusely erythematous and discolored with open wounds. I taken a photo and attached it to my note. He is currently afebrile, stable blood pressure, mildly tachycardic. I started him on IV fluids and IV antibiotics. White blood cell count and lactate within normal limits. X-ray negative for osteomyelitis. Believe he may benefit from surgical debridement during his stay pending response to IV antibiotics. Procedures
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
## 2020-08-13 NOTE — PROGRESS NOTES
Bedside shift change report given to ANDREWS Martinez (oncoming nurse) by Casper Ritchie RN (offgoing nurse). Report included the following information SBAR, Kardex, MAR, Accordion and Recent Results.

## 2020-08-13 NOTE — PROGRESS NOTES
Problem: Falls - Risk of 
Goal: *Absence of Falls Description: Document Arminantwon Maradiaga Fall Risk and appropriate interventions in the flowsheet. Outcome: Progressing Towards Goal 
Note: Fall Risk Interventions: 
Mobility Interventions: Bed/chair exit alarm Elimination Interventions: Call light in reach, Bed/chair exit alarm Problem: Patient Education: Go to Patient Education Activity Goal: Patient/Family Education Outcome: Progressing Towards Goal 
  
Problem: Pressure Injury - Risk of 
Goal: *Prevention of pressure injury Description: Document Bret Scale and appropriate interventions in the flowsheet. Outcome: Progressing Towards Goal 
Note: Pressure Injury Interventions: Activity Interventions: Increase time out of bed Mobility Interventions: Assess need for specialty bed, PT/OT evaluation Nutrition Interventions: Document food/fluid/supplement intake Friction and Shear Interventions: HOB 30 degrees or less, Lift team/patient mobility team 
 
  
 
 
 
  
Problem: Patient Education: Go to Patient Education Activity Goal: Patient/Family Education Outcome: Progressing Towards Goal 
  
Problem: Cellulitis Care Plan (Adult) Goal: *Control of acute pain Outcome: Progressing Towards Goal 
Goal: *Skin integrity maintained Outcome: Progressing Towards Goal 
Goal: *Absence of infection signs and symptoms Outcome: Progressing Towards Goal 
  
Problem: Patient Education: Go to Patient Education Activity Goal: Patient/Family Education Outcome: Progressing Towards Goal

## 2020-08-13 NOTE — PROGRESS NOTES
0255: pt HR in 130s when tech taking vitals; Hr on palpation is irregular; Pt has history of A-fib and takes Metoprolol 25mg BID which is not ordered for him in hospital. Pt not on telemetry. Notified Dr. Clinton Britt and asked about need for ECG. He said is was known A-fib and he ordered Metoprolol with does now.

## 2020-08-13 NOTE — PROGRESS NOTES
0700: Bedside shift change report given to Willi Whaley RN (oncoming nurse) by Michaela Dubon RN (offgoing nurse). Report included the following information SBAR, Kardex and Intake/Output.

## 2020-08-13 NOTE — PROGRESS NOTES
Discussed with nursing. We need culture results either from [facility] or [facility]. Cont empiric IV vanc and Zosyn for now. Monitor renal function.

## 2020-08-13 NOTE — PROGRESS NOTES
[facility] Dosing Services: Antimicrobial Stewardship Daily Doc Consult for antibiotic dosing of Vancomycin by Dr. Cuauhtemoc Carlos. Indication: left arm skin and soft tissue infection Day of Therapy: 3 
(failed outpatient therapy with ciprofloxacin plus doxycycline) Vancomycin therapy: 
Current maintenance dose: 1,500 mg IV every 12 hours Dose calculated to approximate a therapeutic trough of 15-20 mcg/mL. Assessment/Plan: Afebrile; WBCs WNL; SCr stable. Level 14.6 mcg/mL on 8/13/2020 - extrapolates to therapeutic trough 15.2 mcg/mL. Continue current regimen. Dose administration notes:   Doses given appropriately as scheduled Date Dose & Interval Measured (mcg/mL) Extrapolated (mcg/mL) 8/13/2020 1,500 mg IV every 12 hours 14.6 15.2 Other Antimicrobial  
(not dosed by pharmacist) Piperacillin/Tazobactam 3.375 grams IV every 8 hours Cultures 8/11/2020 Blood: no growth x 2 days (pending) Serum Creatinine Lab Results Component Value Date/Time Creatinine 0.95 08/13/2020 12:56 AM  
   
Creatinine Clearance Estimated Creatinine Clearance: 82.2 mL/min (based on SCr of 0.95 mg/dL). Temp Temp: 98.1 °F (36.7 °C) WBC Lab Results Component Value Date/Time WBC 5.5 08/13/2020 12:56 AM  
  
H/H Lab Results Component Value Date/Time HGB 8.9 (L) 08/13/2020 12:56 AM  
  
Platelets Lab Results Component Value Date/Time PLATELET 990 18/05/4850 12:56 AM  
  
Pharmacist Charbel Pepper

## 2020-08-13 NOTE — PROGRESS NOTES
Transition of Care Plan:  RUR-13%-low      
 1. Return to skilled care-undecided as to which snf. Wife was given a list. 
2. Long term planning 3. PT/OT evals 4. PICC line placed 5. Wound care following 6. CM following for any needs prior to d/c JALEEL Oro

## 2020-08-14 NOTE — WOUND CARE
Wound care - follow up  visit for wound assessment RICHMOND present on admission. Alert no distress- Pain medication per staff prior to care given, Assessed with Dr Grace Lafleur Skin is thin and fragile - multiple areas of purple bruises and scabs - arms and legs - skin is dry and peeling on arms and legs, Left arm- dressing removed with saline-  
Scattered areas of partial thickness and full thickness skin loss, red base- serena wound dry with thick adherent scabs from elbows to hand. 
 Left shin- 2x2 cm partial thickness skin tear- present on admission- moderate amount sanguinous drainage Skin is thin and fragile- tears and bleeds easily 
 Sacral area intact- incontinent of stool Treatment Wounds cleansed Skin moisturized 
mep transfer to left arm- foam with alginate to left leg  
 . 
Wound care modified PMT- nutrition- PT consults Will follow 112 [facility]

## 2020-08-14 NOTE — PROGRESS NOTES
Jc Velazquez [facility] [address] 
[address] [facility], [address] [place] [address] 
[phone] Medical Progress Note NAME: Arabella Morejon :  1945 MRM:  [id number] Date/Time: 2020 Assessment / Plan:  
 
Cellulitis of left upper extremity: complicated hx. Reportedly fresh water infection. Evaluated by gen surgy, who did not think active infection. Per pt and wife at bedside, there was wound culture drawn at [facility] or [facility] which grew bacteria requiring hospitalization for IV abx. I have discussed with the nurse and we are trying to get OSH records. For now, continue vanc and Zosyn however monitor renal function closely Chronic atrial fibrillation: Continue metoprolol and Eliquis. Monitor HR 
  
Hypertension: BP controlled. Cont metoprolol 
  
COPD: no wheezing. Cont duonebs PRN 
  
Dyslipidemia: Continue pravastatin 
  
Depression: Continue Zoloft 
  
 
Total time spent: 35 minutes Time spent in the care of this patient including reviewing records, discussing with nursing and/or other providers on the treatment team, obtaining history and examining the patient, and discussing treatment plans. Care Plan discussed with: Patient, Nursing Staff and >50% of time spent in counseling and coordination of care Discussed:  Care Plan and D/C Planning Prophylaxis:  Eliquis Disposition:  SNF/LTC Subjective: Chief Complaint:  Follow up left arm wound Chart/notes/labs/studies reviewed, patient examined. Feels okay. No fevers. Less confused per family at bedside Objective:  
 
 
Vitals:  
 
  
Last 24hrs VS reviewed since prior progress note. Most recent are: 
 
Visit Vitals /83 (BP 1 Location: Right arm, BP Patient Position: At rest) Pulse (!) 119 Temp 98.1 °F (36.7 °C) Resp 17 Ht 6' (1.829 m) Wt 103 kg (227 lb) SpO2 98% BMI 30.79 kg/m² SpO2 Readings from Last 6 Encounters:  
20 98% 09/20/18 100% Intake/Output Summary (Last 24 hours) at 8/13/2020 2308 Last data filed at 8/13/2020 4724 Gross per 24 hour Intake 1307.5 ml Output 300 ml Net 1007.5 ml Exam:  
 
Physical Exam: 
 
Gen:  Well-developed, well-nourished, in no acute distress HEENT:  Sclerae nonicteric, hearing intact to voice, mucous membranes moist 
Neck:  Supple, without masses. Resp:  No accessory muscle use, CTAB without wheezes, rales, or rhonchi 
Card: irregular, HR 09, without m/r/g. No LE edema. Abd:  +bowel sounds, soft, NTTP, nondistended. No HSM. Neuro: Face symmetric, tongue midline, speech fluent, follows commands appropriately SKIN: LUE dressing C/D/I Psych:  Alert, oriented x 2. Poor insight Medications Reviewed: (see below) Lab Data Reviewed: (see below) 
 
______________________________________________________________________ Medications:  
 
Current Facility-Administered Medications Medication Dose Route Frequency  metoprolol tartrate (LOPRESSOR) tablet 25 mg  25 mg Oral BID  
 apixaban (ELIQUIS) tablet 5 mg  5 mg Oral BID  
 bisacodyL (DULCOLAX) tablet 5 mg  5 mg Oral DAILY PRN  
 docusate sodium (COLACE) capsule 100 mg  100 mg Oral BID  folic acid (FOLVITE) tablet 1 mg  1 mg Oral DAILY  lactobac ac& pc-s.therm-b.anim (DAGOBERTO Q/RISAQUAD)  1 Cap Oral Q12H PRN  pravastatin (PRAVACHOL) tablet 20 mg  20 mg Oral QHS  sertraline (ZOLOFT) tablet 50 mg  50 mg Oral DAILY  traMADoL (ULTRAM) tablet 50 mg  50 mg Oral Q4H PRN  thiamine mononitrate (B-1) tablet 100 mg  100 mg Oral DAILY  sodium chloride (NS) flush 5-40 mL  5-40 mL IntraVENous Q8H  
 sodium chloride (NS) flush 5-40 mL  5-40 mL IntraVENous PRN  
 acetaminophen (TYLENOL) tablet 650 mg  650 mg Oral Q6H PRN Or  
 acetaminophen (TYLENOL) suppository 650 mg  650 mg Rectal Q6H PRN  polyethylene glycol (MIRALAX) packet 17 g  17 g Oral DAILY PRN  promethazine (PHENERGAN) tablet 12.5 mg 12.5 mg Oral Q6H PRN Or  
 ondansetron (ZOFRAN) injection 4 mg  4 mg IntraVENous Q6H PRN  
 albuterol-ipratropium (DUO-NEB) 2.5 MG-0.5 MG/3 ML  3 mL Nebulization Q6H PRN  
 0.9% sodium chloride infusion  75 mL/hr IntraVENous CONTINUOUS  
 alteplase (CATHFLO) 1 mg in sterile water (preservative free) 1 mL injection  1 mg InterCATHeter PRN  piperacillin-tazobactam (ZOSYN) 3.375 g in 0.9% sodium chloride (MBP/ADV) 100 mL  3.375 g IntraVENous Q8H  
 vancomycin (VANCOCIN) 1500 mg in  ml infusion  1,500 mg IntraVENous Q12H Lab Review:  
 
Recent Labs 08/13/20 0056 08/12/20 0058 08/11/20 2111 WBC 5.5 5.4 5.2 HGB 8.9* 9.4* 10.0* HCT 28.4* 30.4* 31.3*  
 301 303 Recent Labs 08/13/20 0056 08/12/20 0058 08/11/20 2111  139 137  
K 3.6 3.7 3.9 * 109* 108 CO2 21 22 23 GLU 98 92 98 BUN 14 17 16 CREA 0.95 1.12 1.09  
CA 8.5 8.7 9.0 MG 1.7  --   --   
PHOS 3.9  --   --   
ALB 1.7*  --  1.9* ALT 20  --  22 No components found for: [place] No results for input(s): PH, PCO2, PO2, HCO3, FIO2 in the last 72 hours. No results for input(s): INR, INREXT in the last 72 hours. No results found for: SDES Lab Results Component Value Date/Time Culture result: NO GROWTH 2 DAYS 08/11/2020 09:11 PM  
 
        
___________________________________________________ Attending Physician: Gareth Romo MD

## 2020-08-14 NOTE — PROGRESS NOTES
Problem: Mobility Impaired (Adult and Pediatric) Goal: *Acute Goals and Plan of Care (Insert Text) Description: FUNCTIONAL STATUS PRIOR TO ADMISSION: The patient was functional at the wheelchair level and required moderate assistance for transfers to the chair. HOME SUPPORT PRIOR TO ADMISSION: The patient lived with wife prior to initial hospitalization but has been at [facility] prior to this admission. Physical Therapy Goals Initiated 8/14/2020 1. Patient will move from supine to sit and sit to supine  in bed with minimal assistance/contact guard assist within 7 day(s). 2.  Patient will transfer from bed to chair and chair to bed with minimal assistance/contact guard assist using the least restrictive device within 7 day(s). 3.  Patient will perform sit to stand with minimal assistance/contact guard assist within 7 day(s). 4.  Patient will ambulate with minimal assistance/contact guard assist for 150 feet with the least restrictive device within 7 day(s). 5.  Patient will ascend/descend 4 stairs with 2 handrail(s) with minimal assistance/contact guard assist within 7 day(s). 8/14/2020 1502 by Lazaro Pena, PT, DPT Outcome: Progressing Towards Goal 
Note: PHYSICAL THERAPY EVALUATION Patient: Haley Ibarra (15 y.o. male) Date: 8/14/2020 Primary Diagnosis: Cellulitis of left upper extremity [L03.114] Precautions:     
 
 
ASSESSMENT Based on the objective data described below, the patient presents with decreased strength, balance and endurance following admission for Left UE cellulitis. Patient with prolonged hospital course, orginally sustained wound with resulting infection in early July, he then was discharged to inpatient rehab and then to SNF. He is here from SNF following increased changes in left UE and now with cellulitis. [place] overall MIN A-MOD A for mobility. ABle to perform short distance ambulation with RW.   His vitals were stable throughout although increased dyspnea noted which was improved with a seated rest break. Still below his baseline for mobility recommend SNF at discharge. Nir Gilliland Current Level of Function Impacting Discharge (mobility/balance): Min-MOD A with RW 
 
Functional Outcome Measure: The patient scored Total: 30/100 on the Barthel Index which is indicative of 70% impaired ability to care for basic self needs/dependency on others. Other factors to consider for discharge:  
  
Patient will benefit from skilled therapy intervention to address the above noted impairments. PLAN : 
Recommendations and Planned Interventions: bed mobility training, transfer training, gait training, therapeutic exercises, and therapeutic activities Frequency/Duration: Patient will be followed by physical therapy:  5 times a week to address goals. Recommendation for discharge: (in order for the patient to meet his/her long term goals) Therapy up to 5 days/week in SNF setting This discharge recommendation: A follow-up discussion with the attending provider and/or case management is planned IF patient discharges home will need the following DME: to be determined (TBD) SUBJECTIVE:  
Patient stated . OBJECTIVE DATA SUMMARY:  
HISTORY:   
Past Medical History:  
Diagnosis Date Arthritis Atrial fibrillation ([facility] COPD (chronic obstructive pulmonary disease) ([facility] 8/12/2020 High cholesterol History of vascular access device 08/12/2020 Rt Basilic 5fr dual PICC at 42cm arm cir 34.5cm Hypertension Past Surgical History:  
Procedure Laterality Date COLONOSCOPY N/A 9/20/2018 COLONOSCOPY performed by Luke Rain MD at [facility] [address]  
 HX COLONOSCOPY    
 HX HIP REPLACEMENT Bilateral   
 HX TONSILLECTOMY Personal factors and/or comorbidities impacting plan of care: see above Home Situation Home Environment: Skilled nursing facility # Steps to Enter: 6 Rails to Enter: Yes One/Two Story Residence: Two story Living Alone: No 
Support Systems: Inpatient rehab Patient Expects to be Discharged to[de-identified] Rehabilitation facility Current DME Used/Available at Home: Walker, rolling Tub or Shower Type: Shower EXAMINATION/PRESENTATION/DECISION MAKING:  
Critical Behavior: 
Neurologic State: Alert, Eyes open spontaneously Orientation Level: Oriented X4 Cognition: Follows commands, Appropriate safety awareness, Appropriate for age attention/concentration, Appropriate decision making Hearing: Auditory Auditory Impairment: None Range Of Motion: 
AROM: Generally decreased, functional 
  
  
  
PROM: Generally decreased, functional 
  
  
  
Strength:   
Strength: Grossly decreased, non-functional 
  
  
  
  
  
  
Tone & Sensation:  
Tone: Normal 
  
  
  
  
  
  
  
  
   
Coordination: 
Coordination: Generally decreased, functional 
Vision:  
  
Functional Mobility: 
Bed Mobility: 
Rolling: Moderate assistance; Additional time Supine to Sit: Moderate assistance; Additional time Transfers: 
Sit to Stand: Minimum assistance;Assist x2; Additional time Stand to Sit: Minimum assistance;Assist x2; Additional time Bed to Chair: Minimum assistance;Assist x2; Additional time Balance:  
Sitting: Intact Standing: Impaired Standing - Static: Constant support; Fair 
Standing - Dynamic : Constant support; Adela Chaparro Ambulation/Gait Training: 
Distance (ft): 5 Feet (ft) Assistive Device: Walker, rolling;Gait belt Ambulation - Level of Assistance: Minimal assistance;Assist x2; Additional time Gait Description (WDL): Exceptions to [facility] Gait Abnormalities: Decreased step clearance; Step to gait Functional Measure: 
Barthel Index: 
 
Bathin Bladder: 5 Bowels: 5 Groomin Dressin Feedin Mobility: 5 Stairs: 0 Toilet Use: 0 Transfer (Bed to Chair and Back): 5 Total: 30/100 The Barthel ADL Index: Guidelines 1.  The index should be used as a record of what a patient does, not as a record of what a patient could do. 2. The main aim is to establish degree of independence from any help, physical or verbal, however minor and for whatever reason. 3. The need for supervision renders the patient not independent. 4. A patient's performance should be established using the best available evidence. Asking the patient, friends/relatives and nurses are the usual sources, but direct observation and common sense are also important. However direct testing is not needed. 5. Usually the patient's performance over the preceding 24-48 hours is important, but occasionally longer periods will be relevant. 6. Middle categories imply that the patient supplies over 50 per cent of the effort. 7. Use of aids to be independent is allowed. Renford Brittle., Barthel, D.W. (7231). Functional evaluation: the Barthel Index. [facility] (14)2. HILARY Alba, Nohemi Hewitt., Kam Sales., Joes, [facility] (1999). Measuring the change indisability after inpatient rehabilitation; comparison of the responsiveness of the Barthel Index and Functional Craig Measure. Journal of Neurology, Neurosurgery, and Psychiatry, 66(4), 258-767. Clara Nunes, N.J.A, JUSTYN Loja.DIEGO, & Dirk Aguirre MANTHONY. (2004.) Assessment of post-stroke quality of life in cost-effectiveness studies: The usefulness of the Barthel Index and the EuroQoL-5D. [facility], 13, 064-11 Physical Therapy Evaluation Charge Determination History Examination Presentation Decision-Making HIGH Complexity :3+ comorbidities / personal factors will impact the outcome/ POC  MEDIUM Complexity : 3 Standardized tests and measures addressing body structure, function, activity limitation and / or participation in recreation  MEDIUM Complexity : Evolving with changing characteristics  Other outcome measures barthel index  MEDIUM Based on the above components, the patient evaluation is determined to be of the following complexity level: MEDIUM Pain Rating: 
None reported Activity Tolerance:  
Fair Please refer to the flowsheet for vital signs taken during this treatment. After treatment patient left in no apparent distress:  
Sitting in chair, Call bell within reach, Bed / chair alarm activated, and Caregiver / family present COMMUNICATION/EDUCATION:  
The patients plan of care was discussed with: Occupational therapist and Registered nurse. Fall prevention education was provided and the patient/caregiver indicated understanding., Patient/family have participated as able in goal setting and plan of care. , and Patient/family agree to work toward stated goals and plan of care. Thank you for this referral. 
Hedy Moritz, PT, DPT Time Calculation: 18 mins 8/14/2020 1502 by Danni Orellana PT, DPT Outcome: Progressing Towards Goal

## 2020-08-14 NOTE — PROGRESS NOTES
Bedside and Verbal shift change report given to [employer] [place], [place] (oncoming nurse) by Erendira and ANDREWS Sun (offgoing nurse). Report included the following information SBAR, Intake/Output, MAR and Recent Results. Noted attempted dressing change to LUE this AM and pt refused. RDL PICC, CHG bath completed. Pt encouraged to use call light for assistance toileting.

## 2020-08-14 NOTE — PROGRESS NOTES
Jc Velazquez [facility] 
[address] [facility], [address] [place] [address] 
[phone] Medical Progress Note NAME: Debbie Pringle :  1945 MRM:  [id number] Date/Time: 2020 Assessment / Plan:  
 
Cellulitis of left upper extremity: complicated hx. Reportedly fresh water infection. Evaluated by gen surgy, who did not think active infection. Discussed with Dr. David Snowden. Wound evaluated with wound care nurse today, I agree with her assessment. Per pt and wife at bedside, there was wound culture drawn at [facility] or [facility] which grew bacteria requiring hospitalization for IV abx. We have attempted on several occasions to obtain this to no avail. Micro data received from [facility] include blood and urine cultures (no growth) however no wound cultures noted. We will attempt to get records from [address] [place] [address]. Discussed with nursing admin. Stop IV vancomycin. Was started on Zosyn PTA but will likely stop this soon as well. Pt's skin is quite friable and bleeds easily on Eliquis. Will need good wound care. Chronic atrial fibrillation: rate suboptimally controlled. Increase metoprolol. Continue Eliquis.  
  
Hypertension: BP controlled. Cont metoprolol, now at higher dose 
  
COPD: no wheezing. Cont duonebs PRN 
  
Dyslipidemia: Continue pravastatin 
  
Depression: Continue Zoloft Anemia: check iron panel, ferritin. B12, folate. Atypical lymphocytes noted. Recommend outpatient follow up 
  
 
Total time spent: 35 minutes, d/w nursing staff on multiple occasions Time spent in the care of this patient including reviewing records, discussing with nursing and/or other providers on the treatment team, obtaining history and examining the patient, and discussing treatment plans. Care Plan discussed with: Patient, Nursing Staff and >50% of time spent in counseling and coordination of care Discussed:  Care Plan and D/C Planning Prophylaxis:  Eliquis Disposition:  SNF/LTC Subjective: Chief Complaint:  Follow up left arm wound Chart/notes/labs/studies reviewed, patient examined. No fevers, chills. LUE no swelling or bleeding or drainage Objective:  
 
 
Vitals:  
 
  
Last 24hrs VS reviewed since prior progress note. Most recent are: 
 
Visit Vitals BP 99/68 (BP 1 Location: Right arm, BP Patient Position: At rest) Pulse (!) 103 Temp 97.4 °F (36.3 °C) Resp 20 Ht 6' (1.829 m) Wt 103 kg (227 lb) SpO2 98% BMI 30.79 kg/m² SpO2 Readings from Last 6 Encounters:  
08/14/20 98% 09/20/18 100% Intake/Output Summary (Last 24 hours) at 8/14/2020 0815 Last data filed at 8/14/2020 1406 Gross per 24 hour Intake  Output 125 ml Net -125 ml Exam:  
 
Physical Exam: 
 
Gen:  Well-developed, well-nourished, in no acute distress HEENT:  Sclerae nonicteric, hearing intact to voice, mucous membranes moist 
Neck:  Supple, without masses. Resp:  No accessory muscle use, CTAB without wheezes, rales, or rhonchi 
Card: irregular, HR 09, without m/r/g. No LE edema. Abd:  +bowel sounds, soft, NTTP, nondistended. No HSM. Neuro: Face symmetric, tongue midline, speech fluent, follows commands appropriately SKIN:  
 
 
 
 
 
 
 
Psych:  Alert, oriented to self and hospital. Limited insight Medications Reviewed: (see below) Lab Data Reviewed: (see below) 
 
______________________________________________________________________ Medications:  
 
Current Facility-Administered Medications Medication Dose Route Frequency  potassium chloride SR (KLOR-CON 10) tablet 40 mEq  40 mEq Oral NOW  magnesium sulfate 1 g/100 ml IVPB (premix or compounded)  1 g IntraVENous ONCE  
 lactated Ringers infusion  75 mL/hr IntraVENous CONTINUOUS  
 metoprolol tartrate (LOPRESSOR) tablet 25 mg  25 mg Oral BID  
 apixaban (ELIQUIS) tablet 5 mg  5 mg Oral BID  
 bisacodyL (DULCOLAX) tablet 5 mg  5 mg Oral DAILY PRN  
 docusate sodium (COLACE) capsule 100 mg  100 mg Oral BID  folic acid (FOLVITE) tablet 1 mg  1 mg Oral DAILY  lactobac ac& pc-s.therm-b.anim (DAGOBERTO Q/RISAQUAD)  1 Cap Oral Q12H PRN  pravastatin (PRAVACHOL) tablet 20 mg  20 mg Oral QHS  sertraline (ZOLOFT) tablet 50 mg  50 mg Oral DAILY  traMADoL (ULTRAM) tablet 50 mg  50 mg Oral Q4H PRN  thiamine mononitrate (B-1) tablet 100 mg  100 mg Oral DAILY  sodium chloride (NS) flush 5-40 mL  5-40 mL IntraVENous Q8H  
 sodium chloride (NS) flush 5-40 mL  5-40 mL IntraVENous PRN  
 acetaminophen (TYLENOL) tablet 650 mg  650 mg Oral Q6H PRN Or  
 acetaminophen (TYLENOL) suppository 650 mg  650 mg Rectal Q6H PRN  polyethylene glycol (MIRALAX) packet 17 g  17 g Oral DAILY PRN  promethazine (PHENERGAN) tablet 12.5 mg  12.5 mg Oral Q6H PRN Or  
 ondansetron (ZOFRAN) injection 4 mg  4 mg IntraVENous Q6H PRN  
 albuterol-ipratropium (DUO-NEB) 2.5 MG-0.5 MG/3 ML  3 mL Nebulization Q6H PRN  
 alteplase (CATHFLO) 1 mg in sterile water (preservative free) 1 mL injection  1 mg InterCATHeter PRN  piperacillin-tazobactam (ZOSYN) 3.375 g in 0.9% sodium chloride (MBP/ADV) 100 mL  3.375 g IntraVENous Q8H  
 vancomycin (VANCOCIN) 1500 mg in  ml infusion  1,500 mg IntraVENous Q12H Lab Review:  
 
Recent Labs 08/14/20 
3061 08/13/20 
0056 08/12/20 
7611 WBC 5.5 5.5 5.4 HGB 8.6* 8.9* 9.4* HCT 27.1* 28.4* 30.4*  273 301 Recent Labs 08/14/20 
6935 08/14/20 
5084 08/13/20 
0056 08/12/20 
0058 08/11/20 
2111   --  139 139 137  
K 3.3*  --  3.6 3.7 3.9 *  --  110* 109* 108 CO2 21  --  21 22 23 GLU 89  --  98 92 98 BUN 11  --  14 17 16 CREA 0.80  --  0.95 1.12 1.09  
CA 8.6  --  8.5 8.7 9.0 MG  --  1.8 1.7  --   --   
PHOS  --  3.8 3.9  --   --   
ALB 1.6*  --  1.7*  --  1.9* ALT 21  --  20  --  22 No components found for: [place] No results for input(s): PH, PCO2, PO2, HCO3, FIO2 in the last 72 hours. No results for input(s): INR, INREXT, INREXT in the last 72 hours. No results found for: SDES Lab Results Component Value Date/Time Culture result: NO GROWTH 2 DAYS 08/11/2020 09:11 PM  
 
        
___________________________________________________ Attending Physician: Philomena Enciso MD

## 2020-08-14 NOTE — PROGRESS NOTES
Transition of Care Plan:  RUR-14%-low      
1. Return to skilled care-undecided as to which snf. Wife was given a list--need choices 2. Long term planning 3. PICC line placed 4. Wound care following 5. CM following for any needs prior to d/c JALEEL Lozano

## 2020-08-14 NOTE — PROGRESS NOTES
Comprehensive Nutrition Assessment Type and Reason for Visit: Initial, Consult, Wound, RD nutrition re-screen/LOS Nutrition Recommendations/Plan: 1. Continue regular diet. Nutrition Assessment:     
8/14: 68 y/o male admitted with cellulitis of L upper extremity. PMH inculdes HTN, COPD. BMI 30.8 c/w class I obesity. Pt reports appetite has been good until today. Says he has been eating well for most meals but didn't feel like eating any breakfast yet. C/o a little nausea. Reports poor appetite x3 weeks at the beginning of July d/t being sick and on abx. Says he weighed 250# before that. Recorded weights show 6% weight loss x1.5 months which is considered significant for time frame. Says his weight has been stable since his appetite returned. Normally eats 3 meals/d at home. Discussed sources of protein and its importance for healing. Pt receptive, says he usually eats plenty of protein. No nutrition-related questions at this time. Labs- K 3.3. Meds- colace, folic acid, zosyn, vanco. 
 
Intakes: 
Patient Vitals for the past 168 hrs: 
 % Diet Eaten 08/12/20 1943 60 % Weight hx: Wt Readings from Last 10 Encounters:  
08/11/20 103 kg (227 lb) 06/05/20 110 kg (242 lb 9.6 oz)  
09/20/18 105.2 kg (232 lb) Malnutrition Assessment: 
Malnutrition Status: none Estimated Daily Nutrient Needs: 
Energy (kcal):  1112(5031 x 1.3 AF) Protein (g):  103(1-1.2 g/kg) Fluid (ml/day):  2336(1mL/kcal) Nutrition Related Findings:  LBM 8/14 (loose); 1+ pitting RLE, 1+ non-pitting LUE Wounds:   
Skin tears, arm cellulitis Current Nutrition Therapies: DIET REGULAR 
DIET ONE TIME MESSAGE 
DIET ONE TIME MESSAGE Anthropometric Measures: 
· Height:  6' (182.9 cm) · Current Body Wt:  103 kg (227 lb) · Admission Body Wt:      
· Usual Body Wt:       
· Ideal Body Wt:  178 lbs:  127.5 % · Adjusted Body Weight:   ; Weight Adjustment for: · Adjusted BMI:      
· BMI Category:  Obese class 1 (BMI 30.0-34. 9) Nutrition Diagnosis:  
· Unintended weight loss related to inadequate protein-energy intake as evidenced by (6% wt loss x 1.5 months) Nutrition Interventions:  
Food and/or Nutrient Delivery: Continue current diet Nutrition Education and Counseling: No recommendations at this time Coordination of Nutrition Care: Continued inpatient monitoring Goals: 
PO intake >50% meals with weight maintenance next 2-4 days Nutrition Monitoring and Evaluation:  
Behavioral-Environmental Outcomes:   
Food/Nutrient Intake Outcomes: Food and nutrient intake Physical Signs/Symptoms Outcomes: Weight, Skin, Nausea/vomiting Discharge Planning:   
Continue current diet Electronically signed by Ayde Garay RDN on 8/14/2020 at 11:23 AM 
 
Contact: [phone]

## 2020-08-14 NOTE — PROGRESS NOTES
Problem: Self Care Deficits Care Plan (Adult) Goal: *Acute Goals and Plan of Care (Insert Text) Description: FUNCTIONAL STATUS PRIOR TO ADMISSION: Patient was independent and active without use of DME. Prior to early July indep with ADL tasks, driving and pet care. Admitted to OSH>[facility]. He reports difficulty with endurance for IPR. While at SNF he reports ambulating with RW in room. HOME SUPPORT: The patient lived with wife but did not require assist. 
 
Occupational Therapy Goals Initiated 8/14/2020 1. Patient will perform grooming in standing with supervision/set-up within 7 day(s). 2.  Patient will perform lower body dressing with AD moderate assistance  within 7 day(s). 3.  Patient will perform toilet transfers with minimal assistance/contact guard assist within 7 day(s). 4.  Patient will perform all aspects of toileting with minimal assistance/contact guard assist within 7 day(s). 5.  Patient will participate in upper extremity therapeutic exercise/activities within comfortable/safe ROM with supervision/set-up for 8 minutes within 7 day(s). 6.  Patient will utilize energy conservation techniques during functional activities with verbal cues within 7 day(s). Outcome: Progressing Towards Goal 
 OCCUPATIONAL THERAPY EVALUATION Patient: Will Lynn (73 y.o. male) Date: 8/14/2020 Primary Diagnosis: Cellulitis of left upper extremity [L03.114] Precautions: Fall ASSESSMENT Based on the objective data described below, the patient presents with impaired activity tolerance, mobility, balance and full use of LUE impacting his ability to complete basic ADL tasks s/p admission for cellulitis in LUE. See above for previous hospitalization/rehab. Initially mild confusion with reporting PLOF and medical hx, confusion decreased during session. He is presenting below baseline for ADL tasks, recommend dc to SNF for additional skilled OT services. Current Level of Function Impacting Discharge (ADLs/self-care): UB care min A, LB care max A, toileting max A Functional Outcome Measure: The patient scored 30/100 on the Barthel index outcome measure which is indicative of severe impairment in ADL tasks and transfers. Other factors to consider for discharge: Extended time in hospital/rehab, cellulitis with wrapping limiting use of LUE, deconditioned, bedroom/bathroom upstairs at home. Patient will benefit from skilled therapy intervention to address the above noted impairments. PLAN : 
Recommendations and Planned Interventions: self care training, functional mobility training, therapeutic exercise, balance training, therapeutic activities, endurance activities, patient education, home safety training, and family training/education Frequency/Duration: Patient will be followed by occupational therapy 5 times a week to address goals. Recommendation for discharge: (in order for the patient to meet his/her long term goals) Therapy up to 5 days/week in SNF setting This discharge recommendation: 
Has been made in collaboration with the attending provider and/or case management IF patient discharges home will need the following DME: TBD with dc planning SUBJECTIVE:  
Patient stated . OBJECTIVE DATA SUMMARY:  
HISTORY:  
Past Medical History:  
Diagnosis Date Arthritis Atrial fibrillation ([facility] [address] COPD (chronic obstructive pulmonary disease) ([facility] [address] 8/12/2020 High cholesterol History of vascular access device 08/12/2020 Rt Basilic 5fr dual PICC at 42cm arm cir 34.5cm Hypertension Past Surgical History:  
Procedure Laterality Date COLONOSCOPY N/A 9/20/2018 COLONOSCOPY performed by Daniel Truong MD at [facility] [address]  
 HX COLONOSCOPY    
 HX HIP REPLACEMENT Bilateral   
 HX TONSILLECTOMY Expanded or extensive additional review of patient history:  
 
Home Situation Home Environment: Skilled nursing facility # Steps to Enter: 6 Rails to Enter: Yes One/Two Story Residence: Two story Living Alone: No 
Support Systems: Inpatient rehab Patient Expects to be Discharged to[de-identified] Rehabilitation facility Current DME Used/Available at Home: Walker, rolling Tub or Shower Type: Shower Hand dominance: Right EXAMINATION OF PERFORMANCE DEFICITS: 
Cognitive/Behavioral Status: 
  
  
  
  
  
  
 
Skin: LUE wrapped, R shin with dressing Hearing: Auditory Auditory Impairment: None Vision/Perceptual:   
    
    
    
  
    
    
  
 
Range of Motion: 
AROM: Generally decreased, functional 
PROM: Generally decreased, functional 
  
  
  
  
  
  
 
Strength: 
Strength: Grossly decreased, non-functional 
  
  
  
  
 
Coordination: 
Coordination: Generally decreased, functional 
Fine Motor Skills-Upper: Left Intact; Right Intact Gross Motor Skills-Upper: Left Intact; Right Intact Tone & Sensation: 
Tone: Normal 
  
  
  
  
  
  
  
 
Balance: 
Sitting: Intact Standing: Impaired Standing - Static: Constant support; Fair 
Standing - Dynamic : Constant support; Perry Chaparro Functional Mobility and Transfers for ADLs: 
Bed Mobility: 
Rolling: Moderate assistance; Additional time Supine to Sit: Moderate assistance; Additional time Transfers: 
Sit to Stand: Minimum assistance;Assist x2; Additional time Stand to Sit: Minimum assistance;Assist x2; Additional time Bed to Chair: Minimum assistance;Assist x2; Additional time ADL Assessment: 
Feeding: Setup Oral Facial Hygiene/Grooming: Setup(seated) Bathing: Maximum assistance Upper Body Dressing: Minimum assistance Lower Body Dressing: Maximum assistance Toileting: Maximum assistance ADL Intervention and task modifications: 
  
Patient instructed and indicated understanding the benefits of maintaining activity tolerance, functional mobility, and independence with self care tasks during acute stay  to ensure safe return home and to baseline. Encouraged patient to increase frequency and duration OOB, be out of bed for all meals, perform daily ADLs (as approved by RN/MD regarding bathing etc), and performing functional mobility to/from bathroom. Provided education through multi-modal cues for RW safety including proper positioning, hand placement,  and safety. Patient able to perform sit <> stand with mod assistance. Fair understanding of RW use and safety. Ed increasing pulmonary function with focus on PLB, incentive spirometer, time OOB and posture, patient with fair understanding. Lower Body Dressing Assistance Socks: Maximum assistance Functional Measure: 
Barthel Index: 
 
Bathin Bladder: 5 Bowels: 5 Groomin Dressin Feedin Mobility: 5 Stairs: 0 Toilet Use: 0 Transfer (Bed to Chair and Back): 5 Total: 30/100 The Barthel ADL Index: Guidelines 1. The index should be used as a record of what a patient does, not as a record of what a patient could do. 2. The main aim is to establish degree of independence from any help, physical or verbal, however minor and for whatever reason. 3. The need for supervision renders the patient not independent. 4. A patient's performance should be established using the best available evidence. Asking the patient, friends/relatives and nurses are the usual sources, but direct observation and common sense are also important. However direct testing is not needed. 5. Usually the patient's performance over the preceding 24-48 hours is important, but occasionally longer periods will be relevant. 6. Middle categories imply that the patient supplies over 50 per cent of the effort. 7. Use of aids to be independent is allowed. Lin Crimes., Barthel, D.W. ([id number]). Functional evaluation: the Barthel Index. [facility] (14)2. HILARY Nicolas, Therese Nava., Margaret Sims., Charlie, [address] ().  Measuring the change indisability after inpatient rehabilitation; comparison of the responsiveness of the Barthel Index and Functional [place] Measure. Journal of Neurology, Neurosurgery, and Psychiatry, 66(4), 194-336. CARMELA Reveles.ROSARIO, NIRMALA Loja, & Zoë Lea M.A. (2004.) Assessment of post-stroke quality of life in cost-effectiveness studies: The usefulness of the Barthel Index and the EuroQoL-5D. [facility], 13, [phone] Occupational Therapy Evaluation Charge Determination History Examination Decision-Making LOW Complexity : Brief history review  LOW Complexity : 1-3 performance deficits relating to physical, cognitive , or psychosocial skils that result in activity limitations and / or participation restrictions  LOW Complexity : No comorbidities that affect functional and no verbal or physical assistance needed to complete eval tasks Based on the above components, the patient evaluation is determined to be of the following complexity level: LOW Pain Rating: 
No c/o pain Activity Tolerance:  
Fair, Poor, SpO2 stable on RA, and requires rest breaks Please refer to the flowsheet for vital signs taken during this treatment. After treatment patient left in no apparent distress:   
Sitting in chair, Call bell within reach, Bed / chair alarm activated, and Caregiver / family present COMMUNICATION/EDUCATION:  
The patients plan of care was discussed with: Physical therapist and Registered nurse. Home safety education was provided and the patient/caregiver indicated understanding. and Patient/family agree to work toward stated goals and plan of care. This patients plan of care is appropriate for delegation to [facility]. Thank you for this referral. 
Nasima Mota OT Time Calculation: 35 mins

## 2020-08-15 NOTE — PROGRESS NOTES
Problem: Falls - Risk of 
Goal: *Absence of Falls Description: Document Maninder Yip Fall Risk and appropriate interventions in the flowsheet. Outcome: Progressing Towards Goal 
Note: Fall Risk Interventions: 
Mobility Interventions: Communicate number of staff needed for ambulation/transfer, Patient to call before getting OOB Medication Interventions: Bed/chair exit alarm, Patient to call before getting OOB, Teach patient to arise slowly Elimination Interventions: Bed/chair exit alarm, Call light in reach, Urinal in reach Problem: Pressure Injury - Risk of 
Goal: *Prevention of pressure injury Description: Document Bret Scale and appropriate interventions in the flowsheet. Outcome: Progressing Towards Goal 
Note: Pressure Injury Interventions: 
  
 
Moisture Interventions: Absorbent underpads, Apply protective barrier, creams and emollients, Minimize layers, Moisture barrier, Offer toileting Q_hr Activity Interventions: Increase time out of bed, Pressure redistribution bed/mattress(bed type) Mobility Interventions: HOB 30 degrees or less, Pressure redistribution bed/mattress (bed type) Nutrition Interventions: Document food/fluid/supplement intake Friction and Shear Interventions: HOB 30 degrees or less Problem: Cellulitis Care Plan (Adult) Goal: *Control of acute pain Outcome: Progressing Towards Goal

## 2020-08-15 NOTE — PROGRESS NOTES
Problem: Falls - Risk of 
Goal: *Absence of Falls Outcome: Progressing Towards Goal 
Note: Fall Risk Interventions: 
Mobility Interventions: Communicate number of staff needed for ambulation/transfer, Patient to call before getting OOB Medication Interventions: Bed/chair exit alarm, Patient to call before getting OOB, Teach patient to arise slowly Elimination Interventions: Bed/chair exit alarm, Call light in reach, Urinal in reach

## 2020-08-15 NOTE — PROGRESS NOTES
Bedside and Verbal shift change report given to ANDREWS Cooper (oncoming nurse) by Car Rust RN (offgoing nurse). Report included the following information SBAR, Kardex and MAR.

## 2020-08-15 NOTE — ROUTINE PROCESS
Bedside and Verbal shift change report given to [place] E Jasmyn Macias (oncoming nurse) by Ibeth Tam (offgoing nurse). Report included the following information SBAR and Kardex.

## 2020-08-15 NOTE — ROUTINE PROCESS
Spoke to [place] [employer] [facility] about wound culture if they got it/sent it. She stated pt is not cooperating with the wound staff/MD so wound culture not done.

## 2020-08-15 NOTE — PROGRESS NOTES
8/15/2020 Case Management Note 2:15 PM 
Spoke again to patient's wife, will fill out choice letter and send over to the [id number] of [facility] and the [address] of [facility]. ISRAEL Mcdaniel 
 
1:28 PM 
Patient's wife returned call, her biggest concern is the wound care. I let her know I would do some research to find out which SNFs might have better wound care. I will call her back and let her know. ISRAEL Mcdaniel 
 
11:28 AM 
Left a message for patient's wife. ISRAEL Mcdaniel 
 
11:22 AM 
Noted consult for SNF at discharge; wife has been given SNF choice list and I will call her today and place referrals. Please note it is unlikely that we will hear back from any SNFs until Monday so we can plan for discharge then (SNFs can take patients on weekends, but their admissions teams do not typically work weekends so it is hard to get a response on a referral.) Will complete consult for now and continue to follow.  
 
ISRAEL Mcdaniel

## 2020-08-15 NOTE — PROGRESS NOTES
Jc Velazquez [facility] 
[address] [facility], [place], [address] [place] [address] 
[phone] Medical Progress Note NAME: Bi Jay :  1945 MRM:  [id number] Date/Time: 8/15/2020 Assessment / Plan:  
 
Cellulitis of left upper extremity: complicated hx. Reportedly fresh water infection (dog playing in river water jumped on arm with skin tear. Evaluated by gen surgy, who did not think active infection. Discussed with Dr. Chantel Costa, and I agree with her assessment after I personally evaluated pt's wound with our wound care nurse on . Reviewed records from [facility] and [facility]. Blood, urine, and wound cultures all negative. Discussed with staff at [facility] on 8/15. No wound cultures drawn there. Pt's skin is extraordinarily friable, on Eliquis. Discussed with wound care nurse today, recommending using [facility] transfer. ED on admission had consulted plastics surgery at [facility] who did not believe pt required transfer there. We will plan to monitor off abx over the weekend and anticipate discharge on Monday with good wound care instructions, as this is essential, to mitigate infection risk. Plan for placement to SNF. Chronic atrial fibrillation: rate suboptimally controlled and we have been increasing metoprolol. Continue Eliquis.  
  
Hypertension: BP controlled. Cont metoprolol, now at higher dose 
  
COPD: no wheezing. Cont duonebs PRN 
  
Dyslipidemia: Continue pravastatin 
  
Depression: Continue Zoloft Anemia: check iron panel, ferritin. B12, folate. Atypical lymphocytes noted. Recommend outpatient follow up 
  
 
Total time spent: 65 minutes, d/w wound care, staff at [facility], wife Time spent in the care of this patient including reviewing records, discussing with nursing and/or other providers on the treatment team, obtaining history and examining the patient, and discussing treatment plans.  
               
Care Plan discussed with: Patient, Nursing Staff and >50% of time spent in counseling and coordination of care Discussed:  Care Plan and D/C Planning Prophylaxis:  Eliquis Disposition:  SNF/LTC Subjective: Chief Complaint:  Follow up left arm wound Chart/notes/labs/studies reviewed, patient examined. No fevers, chills. LUE no swelling or bleeding or drainage Objective:  
 
 
Vitals:  
 
  
Last 24hrs VS reviewed since prior progress note. Most recent are: 
 
Visit Vitals /78 (BP 1 Location: Right arm, BP Patient Position: At rest) Pulse 89 Temp 98 °F (36.7 °C) Resp 18 Ht 6' (1.829 m) Wt 103 kg (227 lb) SpO2 96% BMI 30.79 kg/m² SpO2 Readings from Last 6 Encounters:  
08/15/20 96% 09/20/18 100% Intake/Output Summary (Last 24 hours) at 8/15/2020 1655 Last data filed at 8/15/2020 1454 Gross per 24 hour Intake 920 ml Output 1730 ml Net -810 ml Exam:  
 
Physical Exam: 
 
Gen:  Well-developed, well-nourished, in no acute distress HEENT:  Sclerae nonicteric, hearing intact to voice, mucous membranes moist 
Neck:  Supple, without masses. Resp:  No accessory muscle use, CTAB without wheezes, rales, or rhonchi 
Card: irregular, HR 09, without m/r/g. No LE edema. Abd:  +bowel sounds, soft, NTTP, nondistended. No HSM. Neuro: Face symmetric, tongue midline, speech fluent, follows commands appropriately SKIN: L arm dressing C/D/I (see media tab for exposed wound) Psych:  Alert, oriented to self and hospital. Limited insight Medications Reviewed: (see below) Lab Data Reviewed: (see below) 
 
______________________________________________________________________ Medications:  
 
Current Facility-Administered Medications Medication Dose Route Frequency  metoprolol tartrate (LOPRESSOR) tablet 75 mg  75 mg Oral BID  
 apixaban (ELIQUIS) tablet 5 mg  5 mg Oral BID  
 bisacodyL (DULCOLAX) tablet 5 mg  5 mg Oral DAILY PRN  
 docusate sodium (COLACE) capsule 100 mg  100 mg Oral BID  folic acid (FOLVITE) tablet 1 mg  1 mg Oral DAILY  lactobac ac& pc-s.therm-b.anim (DAGOBERTO Q/RISAQUAD)  1 Cap Oral Q12H PRN  pravastatin (PRAVACHOL) tablet 20 mg  20 mg Oral QHS  sertraline (ZOLOFT) tablet 50 mg  50 mg Oral DAILY  traMADoL (ULTRAM) tablet 50 mg  50 mg Oral Q4H PRN  thiamine mononitrate (B-1) tablet 100 mg  100 mg Oral DAILY  sodium chloride (NS) flush 5-40 mL  5-40 mL IntraVENous Q8H  
 sodium chloride (NS) flush 5-40 mL  5-40 mL IntraVENous PRN  
 acetaminophen (TYLENOL) tablet 650 mg  650 mg Oral Q6H PRN Or  
 acetaminophen (TYLENOL) suppository 650 mg  650 mg Rectal Q6H PRN  polyethylene glycol (MIRALAX) packet 17 g  17 g Oral DAILY PRN  promethazine (PHENERGAN) tablet 12.5 mg  12.5 mg Oral Q6H PRN Or  
 ondansetron (ZOFRAN) injection 4 mg  4 mg IntraVENous Q6H PRN  
 albuterol-ipratropium (DUO-NEB) 2.5 MG-0.5 MG/3 ML  3 mL Nebulization Q6H PRN  
 alteplase (CATHFLO) 1 mg in sterile water (preservative free) 1 mL injection  1 mg InterCATHeter PRN Lab Review:  
 
Recent Labs 08/15/20 
9945 08/14/20 
5881 08/13/20 
8790 WBC 6.2 5.5 5.5 HGB 8.8* 8.6* 8.9* HCT 28.3* 27.1* 28.4*  
 267 273 Recent Labs 08/15/20 
8399 08/14/20 
6619 08/14/20 
9904 08/13/20 
8941  138  --  139  
K 3.6 3.3*  --  3.6 * 112*  --  110* CO2 20* 21  --  21  
GLU 74 89  --  98 BUN 11 11  --  14  
CREA 0.82 0.80  --  0.95 CA 9.0 8.6  --  8.5 MG 2.0  --  1.8 1.7 PHOS 3.6  --  3.8 3.9 ALB 1.6* 1.6*  --  1.7* ALT 19 21  --  20 No components found for: [place] No results for input(s): PH, PCO2, PO2, HCO3, FIO2 in the last 72 hours. No results for input(s): INR, INREXT, INREXT in the last 72 hours. No results found for: SDES Lab Results Component Value Date/Time Culture result: NO GROWTH 4 DAYS 08/11/2020 09:11 PM  
 
        
___________________________________________________ Attending Physician: Aki Louis MD

## 2020-08-16 NOTE — PROGRESS NOTES
Bedside and Verbal shift change report given to [facility], RN (oncoming nurse) by Krystle Voss RN (offgoing nurse). Report included the following information SBAR and Kardex.

## 2020-08-16 NOTE — PROGRESS NOTES
Jc Velazquez [facility] [address] 
9[address] [facility], [address] [place] [address] 
[phone] Medical Progress Note NAME: Debbie Pringle :  1945 MRM:  [id number] Date/Time: 2020 Assessment / Plan:  
 
66 yo M w/ hx of AF on Eliquis, HTN, COPD, recent LUE cellulitis treated at [facility] then discharged to [facility] and later [facility] referred to ED from SNF due to concerns with worsening wound infection. Hospital course complicated by problems as listed below: 
 
Cellulitis of left upper extremity: complicated hx. ED on admission had consulted plastics surgery at [facility] who did not believe pt required transfer there. Reportedly fresh water infection (dog playing in river water jumped on arm with skin tear. Evaluated by general surgery, who did not think active infection. I agree. Discussed with Dr. David Snowden and wound care nurse Ms. Melany Bond. Reviewed records from [facility] and [facility]. Blood, urine, and wound cultures all negative. Discussed with staff at [facility] on 8/15. No wound cultures drawn there. Pt's skin is extraordinarily friable, on Eliquis. Discussed with wound care nurse on 8/15, recommending using [facility] transfer. We will plan to monitor off abx over the weekend and anticipate discharge on Monday with good wound care instructions, as this is essential, to mitigate infection risk. Plan for placement to SNF. DC planning underway Chronic atrial fibrillation: rate better after increasing metoprolol. Titrate up BB; monitor for hypotension. Continue Eliquis.  
  
Hypertension: BP controlled. Cont metoprolol as above 
  
COPD: no wheezing. Cont duonebs PRN 
  
Dyslipidemia: Continue pravastatin 
  
Depression: Continue Zoloft Anemia: normocytic. Consistent with ACD. FOBT. Atypical lymphocytes noted on diff. Recommend outpatient follow up 
  
 
Total time spent: 25 minutes Time spent in the care of this patient including reviewing records, discussing with nursing and/or other providers on the treatment team, obtaining history and examining the patient, and discussing treatment plans. Care Plan discussed with: Patient, Nursing Staff and >50% of time spent in counseling and coordination of care Discussed:  Care Plan and D/C Planning Prophylaxis:  Eliquis Disposition:  SNF/LTC Subjective: Chief Complaint:  Follow up left arm wound Chart/notes/labs/studies reviewed, patient examined. No CP, SOB, F/C. No drainage or bleeding from wound. Objective:  
 
 
Vitals:  
 
  
Last 24hrs VS reviewed since prior progress note. Most recent are: 
 
Visit Vitals /67 (BP 1 Location: Right arm, BP Patient Position: At rest) Pulse (!) 111 Temp 98.1 °F (36.7 °C) Resp 18 Ht 6' (1.829 m) Wt 103.1 kg (227 lb 3.2 oz) SpO2 97% BMI 30.81 kg/m² SpO2 Readings from Last 6 Encounters:  
08/16/20 97% 09/20/18 100% Intake/Output Summary (Last 24 hours) at 8/16/2020 1710 Last data filed at 8/16/2020 0930 Gross per 24 hour Intake 440 ml Output 1200 ml Net -760 ml Exam:  
 
Physical Exam: 
 
Gen:  Well-developed, well-nourished, in no acute distress HEENT:  Sclerae nonicteric, hearing intact to voice, mucous membranes moist 
Neck:  Supple, without masses. Resp:  No accessory muscle use, CTAB without wheezes, rales, or rhonchi 
Card: irregular, HR ~100, without m/r/g. Trace BLE edema. Abd:  +bowel sounds, soft, NTTP, nondistended. No HSM. Neuro: Face symmetric, tongue midline, speech fluent, follows commands appropriately SKIN: L arm dressing C/D/I (see media tab for exposed wound pictures) Psych:  Alert, oriented to self and hospital and situation. Fair insight Medications Reviewed: (see below) Lab Data Reviewed: (see below) 
 
______________________________________________________________________ Medications:  
 
Current Facility-Administered Medications Medication Dose Route Frequency  cetirizine (ZYRTEC) tablet 10 mg  10 mg Oral DAILY  fluticasone propionate (FLONASE) 50 mcg/actuation nasal spray 2 Spray  2 Spray Both Nostrils DAILY  metoprolol tartrate (LOPRESSOR) tablet 75 mg  75 mg Oral BID  
 apixaban (ELIQUIS) tablet 5 mg  5 mg Oral BID  
 bisacodyL (DULCOLAX) tablet 5 mg  5 mg Oral DAILY PRN  
 docusate sodium (COLACE) capsule 100 mg  100 mg Oral BID  folic acid (FOLVITE) tablet 1 mg  1 mg Oral DAILY  lactobac ac& pc-s.therm-b.anim (DAGOBERTO Q/RISAQUAD)  1 Cap Oral Q12H PRN  pravastatin (PRAVACHOL) tablet 20 mg  20 mg Oral QHS  sertraline (ZOLOFT) tablet 50 mg  50 mg Oral DAILY  traMADoL (ULTRAM) tablet 50 mg  50 mg Oral Q4H PRN  thiamine mononitrate (B-1) tablet 100 mg  100 mg Oral DAILY  sodium chloride (NS) flush 5-40 mL  5-40 mL IntraVENous Q8H  
 sodium chloride (NS) flush 5-40 mL  5-40 mL IntraVENous PRN  
 acetaminophen (TYLENOL) tablet 650 mg  650 mg Oral Q6H PRN Or  
 acetaminophen (TYLENOL) suppository 650 mg  650 mg Rectal Q6H PRN  polyethylene glycol (MIRALAX) packet 17 g  17 g Oral DAILY PRN  promethazine (PHENERGAN) tablet 12.5 mg  12.5 mg Oral Q6H PRN Or  
 ondansetron (ZOFRAN) injection 4 mg  4 mg IntraVENous Q6H PRN  
 albuterol-ipratropium (DUO-NEB) 2.5 MG-0.5 MG/3 ML  3 mL Nebulization Q6H PRN  
 alteplase (CATHFLO) 1 mg in sterile water (preservative free) 1 mL injection  1 mg InterCATHeter PRN Lab Review:  
 
Recent Labs 08/15/20 
8615 08/14/20 
2654 WBC 6.2 5.5 HGB 8.8* 8.6* HCT 28.3* 27.1*  
 267 Recent Labs 08/15/20 
6968 08/14/20 
0243 08/14/20 
9615  138  --   
K 3.6 3.3*  --   
* 112*  --   
CO2 20* 21  --   
GLU 74 89  --   
BUN 11 11  --   
CREA 0.82 0.80  --   
CA 9.0 8.6  --   
MG 2.0  --  1.8 PHOS 3.6  --  3.8 ALB 1.6* 1.6*  --   
ALT 19 21  -- No components found for: [place] No results for input(s): PH, PCO2, PO2, HCO3, FIO2 in the last 72 hours. No results for input(s): INR, INREXT, INREXT in the last 72 hours. No results found for: SDES Lab Results Component Value Date/Time Culture result: NO GROWTH 5 DAYS 08/11/2020 09:11 PM  
 
        
___________________________________________________ Attending Physician: Philomena Enciso MD

## 2020-08-16 NOTE — PROGRESS NOTES
Problem: Falls - Risk of 
Goal: *Absence of Falls Description: Document Donata Carrel Fall Risk and appropriate interventions in the flowsheet. Outcome: Progressing Towards Goal 
Note: Fall Risk Interventions: 
Mobility Interventions: Communicate number of staff needed for ambulation/transfer, Bed/chair exit alarm, OT consult for ADLs, Patient to call before getting OOB, PT Consult for mobility concerns, PT Consult for assist device competence, Strengthening exercises (ROM-active/passive), Utilize walker, cane, or other assistive device, Utilize gait belt for transfers/ambulation Medication Interventions: Bed/chair exit alarm, Evaluate medications/consider consulting pharmacy, Teach patient to arise slowly, Patient to call before getting OOB, Utilize gait belt for transfers/ambulation Elimination Interventions: Bed/chair exit alarm, Call light in reach, Elevated toilet seat, Patient to call for help with toileting needs, Toilet paper/wipes in reach, Stay With Me (per policy), Toileting schedule/hourly rounds Problem: Patient Education: Go to Patient Education Activity Goal: Patient/Family Education Outcome: Progressing Towards Goal

## 2020-08-16 NOTE — PROGRESS NOTES
Bedside and Verbal shift change report given to [place], [facility] (oncoming nurse) by Rick Verma RN (offgoing nurse). Report included the following information SBAR, Kardex and MAR.

## 2020-08-16 NOTE — PROGRESS NOTES
RRT RN Note: 
 
1958:  Patient's chart accessed for MEW of 3. Patient's heart rate elevated in the low 100s, BP 92/47, with MAP of 62. MD increased metoprolol, pt just given PO tablet at 1745. Will f/u with night shift RN.   
 
2021:  Spoke to patient's primary RN, [place]. Notified RN that patient's MAP 62, to auscultate lung sounds, see if patient had any hx of CHF, to see if patient could maybe get bolus, and to notify MD.    
 
2033:  Ramírez Luna RN notified MD, MD stated BP was ok, to hold all BP meds, and continue to monitor.

## 2020-08-17 NOTE — PROGRESS NOTES
Transition of Care Plan:  RUR-15%-low      
1. Return to skilled care: [facility] has accepted; [facility] pending; a new referral was sent in [address] [place] [address] link to [place]; they do not have a wound care nurse 2. Will need covid test for placement 3. W/c [facility] transport at d/c 
4. Wound care following 5. CM following for any needs prior to d/c JALEEL Ponce RN

## 2020-08-17 NOTE — PROGRESS NOTES
2032: RRT RN informed RN about the pt's MAP of 62. Pt's BP was 92/47. Pt was asymptomatic upon assessment. RN reached out to on-call MD, Dr. Monica Bynum. Per MD, RN is to monitor for now and hold all BP meds if SBP is less than 110. Will continue to monitor.

## 2020-08-17 NOTE — PROGRESS NOTES
Problem: Mobility Impaired (Adult and Pediatric) Goal: *Acute Goals and Plan of Care (Insert Text) Description: FUNCTIONAL STATUS PRIOR TO ADMISSION: The patient was functional at the wheelchair level and required moderate assistance for transfers to the chair. HOME SUPPORT PRIOR TO ADMISSION: The patient lived with wife prior to initial hospitalization but has been at [facility] prior to this admission. Physical Therapy Goals Initiated 8/14/2020 1. Patient will move from supine to sit and sit to supine  in bed with minimal assistance/contact guard assist within 7 day(s). 2.  Patient will transfer from bed to chair and chair to bed with minimal assistance/contact guard assist using the least restrictive device within 7 day(s). 3.  Patient will perform sit to stand with minimal assistance/contact guard assist within 7 day(s). 4.  Patient will ambulate with minimal assistance/contact guard assist for 150 feet with the least restrictive device within 7 day(s). 5.  Patient will ascend/descend 4 stairs with 2 handrail(s) with minimal assistance/contact guard assist within 7 day(s). Outcome: Progressing Towards Goal 
Note: PHYSICAL THERAPY TREATMENT Patient: Bi Jay (74 y.o. male) Date: 8/17/2020 Diagnosis: Cellulitis of left upper extremity [L03.114] Cellulitis of left upper extremity Precautions:   
Chart, physical therapy assessment, plan of care and goals were reviewed. ASSESSMENT Patient continues with skilled PT services and progressing towards goals. Min/Mod A x1 for bed mobility, functional transfers and gait training. Verbal cues for safe technique with use of RW. Fatigues quickly with limited activity. Encouraged OOB to chair 3 x/ day and use of BSC vs bedpan with nursing assistance. -117. O2 sat 93% on room air. Current Level of Function Impacting Discharge (mobility/balance): Min/ Mod A x1 Other factors to consider for discharge: PLAN : 
Patient continues to benefit from skilled intervention to address the above impairments. Continue treatment per established plan of care. to address goals. Recommendation for discharge: (in order for the patient to meet his/her long term goals) Therapy up to 5 days/week in SNF setting This discharge recommendation: 
Has been made in collaboration with the attending provider and/or case management IF patient discharges home will need the following DME: rolling walker SUBJECTIVE:  
Patient stated i guess I could get up.  OBJECTIVE DATA SUMMARY:  
Critical Behavior: 
Neurologic State: Alert Orientation Level: Appropriate for age Cognition: Appropriate decision making Functional Mobility Training: 
Bed Mobility: 
  
Supine to Sit: Moderate assistance Scooting: Contact guard assistance; Additional time;Assist x1 Transfers: 
Sit to Stand: Minimum assistance;Assist x1;Additional time Stand to Sit: Minimum assistance;Assist x1;Additional time Balance: 
Sitting: Intact; High guard Standing: Impaired; With support Standing - Static: Constant support; Fair 
Standing - Dynamic : Fair Ambulation/Gait Training: 
Distance (ft): (2 x 10) Assistive Device: Walker, rolling;Gait belt Ambulation - Level of Assistance: Minimal assistance;Assist x1; Moderate assistance; Additional time Gait Abnormalities: Path deviations; Step to gait Base of Support: Widened Speed/Connie: Slow Stairs: Therapeutic Exercises:  
 
Pain Rating: 
Denies pain Activity Tolerance:  
Fair and observed SOB with activity Please refer to the flowsheet for vital signs taken during this treatment. After treatment patient left in no apparent distress:  
Sitting in chair, Call bell within reach, and Bed / chair alarm activated COMMUNICATION/COLLABORATION:  
The patients plan of care was discussed with: Registered nurse. Dominga Wilder Time Calculation: 23 mins

## 2020-08-17 NOTE — PROGRESS NOTES
Jc Mixonelsen [facility] 79 
380 [facility], [address] [place] [address] 
[phone] Medical Progress Note NAME: Nate Reno :  1945 MRM:  [id number] Date/Time: 2020 Subjective: Chief Complaint:  \"I have this cough\" Chart/notes/labs/studies reviewed, patient examined. Pt does report cough. Unable to expectorate sputum. Also slightly SOB. LUE much less painful Objective:  
 
 
Vitals:  
 
  
Last 24hrs VS reviewed since prior progress note. Most recent are: 
 
Visit Vitals /82 (BP 1 Location: Right arm, BP Patient Position: At rest) Pulse (!) 105 Temp 97.6 °F (36.4 °C) Resp 18 Ht 6' (1.829 m) Wt 103.1 kg (227 lb 3.2 oz) SpO2 96% BMI 30.81 kg/m² SpO2 Readings from Last 6 Encounters:  
20 96% 18 100% Intake/Output Summary (Last 24 hours) at 2020 1853 Last data filed at 2020 2580 Gross per 24 hour Intake  Output 550 ml Net -550 ml Exam:  
 
Physical Exam: 
 
Gen:  Well-developed, well-nourished, in no acute distress HEENT:  Sclerae nonicteric, hearing intact to voice, mucous membranes moist 
Neck:  Supple, without masses. Resp: course breath sounds Card: irregularly irregular. HR 90's. Abd:  +bowel sounds, soft, NTTP, nondistended. No HSM. Neuro: Face symmetric, tongue midline, speech fluent, follows commands appropriately SKIN: L arm dressing C/D/I (see media tab for exposed wound) Psych:  Alert, oriented to self and hospital. Limited insight Medications Reviewed: (see below) Lab Data Reviewed: (see below) 
 
______________________________________________________________________ Medications:  
 
Current Facility-Administered Medications Medication Dose Route Frequency  guaiFENesin ER (MUCINEX) tablet 600 mg  600 mg Oral Q12H  
 ammonium lactate (LAC-HYDRIN) 12 % lotion   Topical DAILY  [START ON 2020] furosemide (LASIX) injection 20 mg  20 mg IntraVENous DAILY  cetirizine (ZYRTEC) tablet 10 mg  10 mg Oral DAILY  fluticasone propionate (FLONASE) 50 mcg/actuation nasal spray 2 Spray  2 Spray Both Nostrils DAILY  metoprolol tartrate (LOPRESSOR) tablet 100 mg  100 mg Oral BID  
 apixaban (ELIQUIS) tablet 5 mg  5 mg Oral BID  
 bisacodyL (DULCOLAX) tablet 5 mg  5 mg Oral DAILY PRN  
 docusate sodium (COLACE) capsule 100 mg  100 mg Oral BID  folic acid (FOLVITE) tablet 1 mg  1 mg Oral DAILY  lactobac ac& pc-s.therm-b.anim (DAGOBERTO Q/RISAQUAD)  1 Cap Oral Q12H PRN  pravastatin (PRAVACHOL) tablet 20 mg  20 mg Oral QHS  sertraline (ZOLOFT) tablet 50 mg  50 mg Oral DAILY  traMADoL (ULTRAM) tablet 50 mg  50 mg Oral Q4H PRN  thiamine mononitrate (B-1) tablet 100 mg  100 mg Oral DAILY  sodium chloride (NS) flush 5-40 mL  5-40 mL IntraVENous Q8H  
 sodium chloride (NS) flush 5-40 mL  5-40 mL IntraVENous PRN  
 acetaminophen (TYLENOL) tablet 650 mg  650 mg Oral Q6H PRN Or  
 acetaminophen (TYLENOL) suppository 650 mg  650 mg Rectal Q6H PRN  polyethylene glycol (MIRALAX) packet 17 g  17 g Oral DAILY PRN  promethazine (PHENERGAN) tablet 12.5 mg  12.5 mg Oral Q6H PRN Or  
 ondansetron (ZOFRAN) injection 4 mg  4 mg IntraVENous Q6H PRN  
 albuterol-ipratropium (DUO-NEB) 2.5 MG-0.5 MG/3 ML  3 mL Nebulization Q6H PRN  
 alteplase (CATHFLO) 1 mg in sterile water (preservative free) 1 mL injection  1 mg InterCATHeter PRN Lab Review:  
 
Recent Labs 08/17/20 1957 08/15/20 
7424 WBC 6.1 6.2 HGB 8.5* 8.8* HCT 27.3* 28.3*  
 258 Recent Labs 08/17/20 
2985 08/15/20 
7738  141  
K 3.2* 3.6  112* CO2 22 20* GLU 81 74 BUN 12 11 CREA 0.78 0.82 CA 9.5 9.0 MG 2.0 2.0 PHOS 3.6 3.6 ALB 1.6* 1.6* ALT  --  19 No components found for: [place] No results for input(s): PH, PCO2, PO2, HCO3, FIO2 in the last 72 hours.  
No results for input(s): INR, INREXT, INREXT in the last 72 hours. No results found for: SDES Lab Results Component Value Date/Time Culture result: NO GROWTH 5 DAYS 08/11/2020 09:11 PM  
 
  
  
Assessment / Plan:  
Cellulitis of left upper extremity: complicated hx. Records from [facility] => blood, urine, and wound cultures all negative. Discussed with staff at [facility] on 8/15. No wound cultures drawn there. Pt's skin is extraordinarily friable, on Eliquis 
-continue aggressive wound care Chronic atrial fibrillation:  
-continue metoprolol; may need secondary agent  
-continue eliquis  
  
Hypertension: BP WNL -on metoprolol doubt can further uptitrate COPD: no wheezing 
-duonebs PRN  
  
Dyslipidemia: Continue pravastatin 
  
Depression: Continue Zoloft Hypokalemia  
-replete Cough - elevated proBNP noted. Does appear to sound course. Likely volume overloaded 
-check x-ray in the AM  
-start diuresis  
-strict I's and O's  
 
Dispo  
-awaiting COVID testing for SNF placement Total time spent: 35 minutes Care Plan discussed with: Patient, Nursing Staff and >50% of time spent in counseling and coordination of care Discussed:  Care Plan and D/C Planning Prophylaxis:  Eliquis Disposition:  SNF/LTC 
_________________________________________ Attending Physician: James Page MD

## 2020-08-17 NOTE — PROGRESS NOTES
Bedside and Verbal shift change report given to ANDREWS Bhardwaj (oncoming nurse) by Anne Simon RN  (offgoing nurse). Report included the following information SBAR, Kardex, Intake/Output, MAR, Accordion and Recent Results.

## 2020-08-17 NOTE — WOUND CARE
Wound care - follow up  visit for wound assessment HAJAJAYNA present on admission. Alert no distress- assessed with Dobransky- dressing removed per MD prior to arrival. 
 
  
Assessment Skin is thin and fragile - multiple areas of purple bruises and scabs - arms and legs - skin is dry and peeling on arms and legs, Left arm Scattered areas of partial thickness and full thickness skin loss, resolving, less drainage- dry scabs lifting with skin car-  wound dry with thick adherent scabs from elbows to hand. 
 Left shin- foam in place. Skin dry and peeling on arms and lower legs. 
 Sacral area intact- incontinent of stool 
  
Treatment Wounds cleansed Skin moisturized 
mep transfer to left arm Plan of care reviewed with staff, MD and care manager 
  
Will follow [address]

## 2020-08-17 NOTE — PROGRESS NOTES
Bedside and Verbal shift change report given to Dillon Buitrago RN (oncoming nurse) by Carlos Flores RN (offgoing nurse). Report included the following information SBAR, Kardex and ED Summary.

## 2020-08-17 NOTE — PROGRESS NOTES
CMS Note 8/17/2020 Patient received and signed their 2nd IMM letter. Patient was given a copy for their record. Ela Tucker CMS

## 2020-08-18 NOTE — PROGRESS NOTES
Problem: Self Care Deficits Care Plan (Adult) Goal: *Acute Goals and Plan of Care (Insert Text) Description: FUNCTIONAL STATUS PRIOR TO ADMISSION: Patient was independent and active without use of DME. Prior to early July indep with ADL tasks, driving and pet care. Admitted to OSH>[facility] IPR>[facility]. He reports difficulty with endurance for IPR. While at SNF he reports ambulating with RW in room. HOME SUPPORT: The patient lived with wife but did not require assist. 
 
Occupational Therapy Goals Initiated 8/14/2020 1. Patient will perform grooming in standing with supervision/set-up within 7 day(s). 2.  Patient will perform lower body dressing with AD moderate assistance  within 7 day(s). 3.  Patient will perform toilet transfers with minimal assistance/contact guard assist within 7 day(s). 4.  Patient will perform all aspects of toileting with minimal assistance/contact guard assist within 7 day(s). 5.  Patient will participate in upper extremity therapeutic exercise/activities within comfortable/safe ROM with supervision/set-up for 8 minutes within 7 day(s). 6.  Patient will utilize energy conservation techniques during functional activities with verbal cues within 7 day(s). Outcome: Progressing Towards Goal 
 OCCUPATIONAL THERAPY TREATMENT Patient: Larry Patel (83 y.o. male) Date: 8/18/2020 Diagnosis: Cellulitis of left upper extremity [L03.114] Cellulitis of left upper extremity Precautions:   
Chart, occupational therapy assessment, plan of care, and goals were reviewed. ASSESSMENT Patient continues with skilled OT services and is progressing towards goals. Pt sits edge of bed, . He verbalized nauseous but willing to change gown, min assist. Pt lay back to bed suddenly, explained importance of not lying flat and raised head of bed up. Pt engaged with UE exercise once back to bed.   
 
Current Level of Function Impacting Discharge (ADLs): Min assist UB dress Other factors to consider for discharge: PLAN : 
Patient continues to benefit from skilled intervention to address the above impairments. Continue treatment per established plan of care. to address goals. Recommend with staff: Out of bed 3 x a day, Recommend next OT session: Cont towards goals Recommendation for discharge: (in order for the patient to meet his/her long term goals) Therapy up to 5 days/week in SNF setting This discharge recommendation: 
Has been made in collaboration with the attending provider and/or case management IF patient discharges home will need the following DME:  
 
 
SUBJECTIVE:  
Patient stated I fell nauseous.  OBJECTIVE DATA SUMMARY:  
Cognitive/Behavioral Status: 
Neurologic State: Alert Orientation Level: Appropriate for age Cognition: Follows commands Functional Mobility and Transfers for ADLs: 
Bed Mobility: 
Supine to Sit: Minimum assistance; Additional time;Assist x1 Scooting: Contact guard assistance; Additional time Transfers: 
Sit to Stand: Minimum assistance;Assist x1;Additional time Bed to Chair: Minimum assistance;Assist x1;Additional time Balance: 
Sitting: Intact Standing: Impaired; With support Standing - Static: Constant support; Fair 
Standing - Dynamic : Fair ADL Intervention: 
  
 
  
 
  
 
  
 
Upper Body Dressing Assistance Dressing Assistance: Minimum assistance Hospital Gown: Minimum  assistance Therapeutic Exercises:  
 
EXERCISE Sets Reps Active Active Assist  
Passive Comments Shoulder flex/ext 1 20 [x]           []           [] Chest press 1 20 [x]           []           []             
Hand flex/ext 1 30 [x]           []           []             
Wrist flex/ext 1 10 [x]           []           []             
   []           []           []             
   []           []           []             
   []           []           [] []           []           []             
   []           []           []             
   []           []           []             
   []           []           [] Activity Tolerance:  
Poor Please refer to the flowsheet for vital signs taken during this treatment. After treatment patient left in no apparent distress:  
Supine in bed COMMUNICATION/COLLABORATION:  
The patients plan of care was discussed with: Occupational therapist and Registered nurse. JUAN Wagner Time Calculation: 25 mins

## 2020-08-18 NOTE — PROGRESS NOTES
Transition of Care Plan:  RUR-16%-low      
1. Pt to go to [facility] at d/c 2. Covid negative 3. W/c [facility] transport at d/c 
4. Wound care following 5.  CM following for any needs prior to d/c 
6. D/c delayed by one day for respiratory/pulmonary issues JALEEL Yeboah RN

## 2020-08-18 NOTE — WOUND CARE
Wound care follow up Plans to dc to snf pending- left arm dressing intact- supplies obtained for transfer Wound care orders in place.  
[facility]

## 2020-08-18 NOTE — PROGRESS NOTES
CM Note: 
Covid test is negative. Report sent to [facility].  
ANDREWS Lema

## 2020-08-18 NOTE — PROGRESS NOTES
Comprehensive Nutrition Assessment Type and Reason for Visit: Jesica Velazquez Nutrition Recommendations/Plan: 1. Continue regular diet. 2. Encourage po intake. Wife bringing in snacks. Nutrition Assessment:     
8/18: F/u. Spoke with pt and wife in room. Pt on nebulizer at time of visit. Lunch tray untouched so far. Per wife, pt ate eggs for breakfast. Says pt has been c/o nausea today so he hasn't felt like eating much. Says he was eating well until a couple days ago when his intake started decreasing again. Pt says he ate dinner last night but wife says she wasn't present so she isn't sure. Says he used to be on an appetite stimulant when his appetite was poor previously. They declines ONS or snacks at this time. Wife says she is encouraging him to eat and is bringing in snacks from home. Continue to monitor and encourage po intake. Labs- K 3.4. Med-s folic acid, thiamin, Yeimi-Q. 8/14: 66 y/o male admitted with cellulitis of L upper extremity. PMH inculdes HTN, COPD. BMI 30.8 c/w class I obesity. Pt reports appetite has been good until today. Says he has been eating well for most meals but didn't feel like eating any breakfast yet. C/o a little nausea. Reports poor appetite x3 weeks at the beginning of July d/t being sick and on abx. Says he weighed 250# before that. Recorded weights show 6% weight loss x1.5 months which is considered significant for time frame. Says his weight has been stable since his appetite returned. Normally eats 3 meals/d at home. Discussed sources of protein and its importance for healing. Pt receptive, says he usually eats plenty of protein. No nutrition-related questions at this time. Labs- K 3.3. Meds- colace, folic acid, zosyn, vanco. 
 
Intakes: 
Patient Vitals for the past 168 hrs: 
 % Diet Eaten 08/17/20 2031 80 % 08/16/20 1730 80 % 08/16/20 0930 80 % 08/15/20 1454 75 % 08/15/20 0900 100 % 08/12/20 1943 60 % Weight hx:  
Wt Readings from Last 10 Encounters: 08/18/20 103 kg (227 lb 1.2 oz) 06/05/20 110 kg (242 lb 9.6 oz)  
09/20/18 105.2 kg (232 lb) Malnutrition Assessment: 
Malnutrition Status: none Estimated Daily Nutrient Needs: 
Energy (kcal):  8229(7639 x 1.3 AF) Protein (g):  103(1-1.2 g/kg) Fluid (ml/day):  2336(1mL/kcal) Nutrition Related Findings:  LBM 8/17 Wounds:   
Skin tears, arm cellulitis Current Nutrition Therapies: DIET REGULAR 
DIET ONE TIME MESSAGE 
DIET ONE TIME MESSAGE Anthropometric Measures: 
· Height:  6' (182.9 cm) · Current Body Wt:  103 kg (227 lb 1.2 oz) · Admission Body Wt:      
· Usual Body Wt:       
· Ideal Body Wt:  178 lbs:  127.5 % · Adjusted Body Weight:   ; Weight Adjustment for: · Adjusted BMI:      
· BMI Category:  Obese class 1 (BMI 30.0-34. 9) Nutrition Diagnosis:  
· Unintended weight loss related to inadequate protein-energy intake as evidenced by (6% wt loss x 1.5 months) 8/18: Nutrition dx improving. Weight stable since admission. Nutrition Interventions:  
Food and/or Nutrient Delivery: Continue current diet Nutrition Education and Counseling: No recommendations at this time Coordination of Nutrition Care: Continued inpatient monitoring Goals: 
PO intake >50% meals next 5-7 days Nutrition Monitoring and Evaluation:  
Behavioral-Environmental Outcomes:   
Food/Nutrient Intake Outcomes: Food and nutrient intake Physical Signs/Symptoms Outcomes: Weight, Skin, Nausea/vomiting Discharge Planning:   
Continue current diet Electronically signed by Saleem Strickland RDN on 8/18/2020 Contact: [phone]

## 2020-08-18 NOTE — PROGRESS NOTES
Bedside and Verbal shift change report given to Jag Hickman RN (oncoming nurse) by Drake Cox. Star King RN  (offgoing nurse). Report included the following information SBAR, Kardex, Intake/Output, MAR, Accordion and Recent Results.

## 2020-08-18 NOTE — PROGRESS NOTES
Spiritual Care Assessment/Progress Note [address] 
 
 
NAME: Sandhya Hernandez      MRN: [id number] AGE: 67 y.o. SEX: male Judaism Affiliation: No preference Language: [place] 8/18/2020     Total Time (in minutes): 7 Spiritual Assessment begun in [facility] 5M1 MED SURG 1 through conversation with: 
  
    [x]Patient        [x] Family    [] Friend(s) Reason for Consult: Initial/Spiritual assessment, patient floor Spiritual beliefs: (Please include comment if needed) 
   [] Identifies with a lenny tradition:     
   [] Supported by a lenny community:        
   [] Claims no spiritual orientation:       
   [] Seeking spiritual identity:            
   [] Adheres to an individual form of spirituality:       
   [x] Not able to assess:                   
 
    
Identified resources for coping:  
   [] Prayer                           
   [] Music                  [] Guided Imagery [x] Family/friends                 [] Pet visits [] Devotional reading                         [] Unknown 
   [] Other:                                          
 
 
Interventions offered during this visit: (See comments for more details) Patient Interventions: Affirmation of emotions/emotional suffering, Normalization of emotional/spiritual concerns Family/Friend(s): Affirmation of emotions/emotional suffering, Normalization of emotional/spiritual concerns Plan of Care: 
 
 [x] Support spiritual and/or cultural needs  
 [] Support AMD and/or advance care planning process    
 [] Support grieving process 
 [] Coordinate Rites and/or Rituals  
 [] Coordination with community clergy  [] No spiritual needs identified at this time 
 [] Detailed Plan of Care below (See Comments)  [] Make referral to Music Therapy 
[] Make referral to Pet Therapy    
[] Make referral to Addiction services 
[] Make referral to [facility] 
[] Make referral to Spiritual Care Partner 
[] No future visits requested       
[x] Follow up visits as needed Comments:  for initial visit. Pt was in bed and pt's wife at bedside. Family was appreciative of  support though did not have any needs at this time. Let them know of  support and availability.  provided pastoral listening and support. Please contact [phone] [facility] for further support.  follow up as needed. [address] Pj Santiago, MACE 
 [phone])

## 2020-08-18 NOTE — PROGRESS NOTES
Bedside and Verbal shift change report given to ANDREWS Bhardwaj (oncoming nurse) by Darlene Lopez RN (offgoing nurse). Report included the following information SBAR, Kardex, Intake/Output, MAR, Accordion and Recent Results.

## 2020-08-18 NOTE — PROGRESS NOTES
Problem: Mobility Impaired (Adult and Pediatric) Goal: *Acute Goals and Plan of Care (Insert Text) Description: FUNCTIONAL STATUS PRIOR TO ADMISSION: The patient was functional at the wheelchair level and required moderate assistance for transfers to the chair. HOME SUPPORT PRIOR TO ADMISSION: The patient lived with wife prior to initial hospitalization but has been at [facility] prior to this admission. Physical Therapy Goals Initiated 8/14/2020 1. Patient will move from supine to sit and sit to supine  in bed with minimal assistance/contact guard assist within 7 day(s). 2.  Patient will transfer from bed to chair and chair to bed with minimal assistance/contact guard assist using the least restrictive device within 7 day(s). 3.  Patient will perform sit to stand with minimal assistance/contact guard assist within 7 day(s). 4.  Patient will ambulate with minimal assistance/contact guard assist for 150 feet with the least restrictive device within 7 day(s). 5.  Patient will ascend/descend 4 stairs with 2 handrail(s) with minimal assistance/contact guard assist within 7 day(s). Outcome: Progressing Towards Goal 
Note: PHYSICAL THERAPY TREATMENT Patient: Delmer Lerma (12 y.o. male) Date: 8/18/2020 Diagnosis: Cellulitis of left upper extremity [L03.114] Cellulitis of left upper extremity Precautions:   
Chart, physical therapy assessment, plan of care and goals were reviewed. ASSESSMENT Patient continues with skilled PT services and progressing towards goals. Min A x 1 with increased time for mobility tasks. Two bouts Sit<>stand using RW. Quickly fatigues with increased SOB and HR into 130's. Pt declined attempt for gait training and transferred to chair with Min A. Reviewed incentive spirometer and encouraged OOB 3x/day Current Level of Function Impacting Discharge (mobility/balance): Min A Other factors to consider for discharge: PLAN : 
Patient continues to benefit from skilled intervention to address the above impairments. Continue treatment per established plan of care. to address goals. Recommendation for discharge: (in order for the patient to meet his/her long term goals) Therapy up to 5 days/week in SNF setting This discharge recommendation: 
Has been made in collaboration with the attending provider and/or case management IF patient discharges home will need the following DME: to be determined (TBD) SUBJECTIVE:  
Patient stated Tee Mock will help me back to bed.  OBJECTIVE DATA SUMMARY:  
Critical Behavior: 
Neurologic State: Alert Orientation Level: Appropriate for age Cognition: Appropriate decision making Functional Mobility Training: 
Bed Mobility: 
  
Supine to Sit: Minimum assistance; Additional time;Assist x1 Scooting: Contact guard assistance; Additional time Transfers: 
Sit to Stand: Minimum assistance;Assist x1;Additional time Stand to Sit: Minimum assistance;Assist x1;Additional time Bed to Chair: Minimum assistance;Assist x1;Additional time Balance: 
Sitting: Intact Standing: Impaired; With support Standing - Static: Constant support; Fair 
Standing - Dynamic : Fair Ambulation/Gait Training: 
  
Assistive Device: Walker, rolling;Gait belt Base of Support: Widened Speed/Connie: Slow Stairs: Therapeutic Exercises:  
 
Pain Rating: 
Denies pain Activity Tolerance:  
Fair, requires frequent rest breaks, and observed SOB with activity Please refer to the flowsheet for vital signs taken during this treatment. After treatment patient left in no apparent distress:  
Sitting in chair, Call bell within reach, and Bed / chair alarm activated COMMUNICATION/COLLABORATION:  
The patients plan of care was discussed with: Registered nurseChico Rodriguez Time Calculation: 24 mins

## 2020-08-19 NOTE — PROGRESS NOTES
Bedside and Verbal shift change report given to [id number] Christian Oro RN (oncoming nurse) by Pollo Michael. David Cheng RN (offgoing nurse). Report included the following information SBAR, Kardex, Intake/Output, MAR, Accordion and Recent Results.

## 2020-08-19 NOTE — PROGRESS NOTES
Problem: Falls - Risk of 
Goal: *Absence of Falls Description: Document Froilan Host Fall Risk and appropriate interventions in the flowsheet. Outcome: Progressing Towards Goal 
Note: Fall Risk Interventions: 
Mobility Interventions: Bed/chair exit alarm, Communicate number of staff needed for ambulation/transfer, Patient to call before getting OOB Medication Interventions: Bed/chair exit alarm, Teach patient to arise slowly, Patient to call before getting OOB Elimination Interventions: Urinal in reach, Stay With Me (per policy), Call light in reach, Bed/chair exit alarm

## 2020-08-19 NOTE — PROGRESS NOTES
Problem: Mobility Impaired (Adult and Pediatric) Goal: *Acute Goals and Plan of Care (Insert Text) Description: FUNCTIONAL STATUS PRIOR TO ADMISSION: The patient was functional at the wheelchair level and required moderate assistance for transfers to the chair. HOME SUPPORT PRIOR TO ADMISSION: The patient lived with wife prior to initial hospitalization but has been at [facility] prior to this admission. Physical Therapy Goals Initiated 8/14/2020 1. Patient will move from supine to sit and sit to supine  in bed with minimal assistance/contact guard assist within 7 day(s). 2.  Patient will transfer from bed to chair and chair to bed with minimal assistance/contact guard assist using the least restrictive device within 7 day(s). 3.  Patient will perform sit to stand with minimal assistance/contact guard assist within 7 day(s). 4.  Patient will ambulate with minimal assistance/contact guard assist for 150 feet with the least restrictive device within 7 day(s). 5.  Patient will ascend/descend 4 stairs with 2 handrail(s) with minimal assistance/contact guard assist within 7 day(s). Outcome: Progressing Towards Goal 
Note: PHYSICAL THERAPY TREATMENT Patient: Ambika Pearl (65 y.o. male) Date: 8/19/2020 Diagnosis: Cellulitis of left upper extremity [L03.114] Cellulitis of left upper extremity Precautions:   
Chart, physical therapy assessment, plan of care and goals were reviewed. ASSESSMENT Patient continues with skilled PT services and progressing towards goals. Max encouragement to participate with therapy. Pt reports back/neck pain at rest. Educated on gentle ROM for LB and shld shrugs, scapular retractions. Min A to come to sitting EOB, denies dizziness with positional changes. Stool in the bed, pt reports he knows when he need to use BR, encouraged transferring to [facility] vs use of bedpan. Unsteady gait with use of RW with transfer to chair with Min A x1.   
 
Current Level of Function Impacting Discharge (mobility/balance): Min/Mod A Other factors to consider for discharge: PLAN : 
Patient continues to benefit from skilled intervention to address the above impairments. Continue treatment per established plan of care. to address goals. Recommendation for discharge: (in order for the patient to meet his/her long term goals) Therapy up to 5 days/week in SNF setting This discharge recommendation: 
Has been made in collaboration with the attending provider and/or case management IF patient discharges home will need the following DME: to be determined (TBD) SUBJECTIVE:  
Patient stated i have no energy.  OBJECTIVE DATA SUMMARY:  
Critical Behavior: 
Neurologic State: Alert, Eyes open spontaneously Orientation Level: Oriented X4 Cognition: Follows commands Functional Mobility Training: 
Bed Mobility: 
  
  
  
  
  
  
Transfers: 
  
  
     
  
     
  
  
  
  
Balance: 
  
Ambulation/Gait Training: 
  
  
  
  
  
  
  
  
  
  
  
  
  
  
  
  
  
  
Stairs: Therapeutic Exercises:  
 
Pain Rating: 
Global pain \"aching\" Activity Tolerance:  
Fair Please refer to the flowsheet for vital signs taken during this treatment. After treatment patient left in no apparent distress:  
Sitting in chair, Call bell within reach, and Bed / chair alarm activated COMMUNICATION/COLLABORATION:  
The patients plan of care was discussed with: Registered nurseChico Alcantar Time Calculation: 23 mins

## 2020-08-19 NOTE — PROGRESS NOTES
[facility] 
[address] 
[phone] Medical Progress Note NAME: Freddie Camejo :  1945 MRM:  [id number] Date/Time: 2020 Subjective: Chief Complaint:  \"I'm okay\" Chart/notes/labs/studies reviewed, patient examined. States he feels less SOB today. Objective:  
 
 
Vitals:  
 
  
Last 24hrs VS reviewed since prior progress note. Most recent are: 
 
Visit Vitals /73 (BP 1 Location: Left arm, BP Patient Position: At rest) Pulse 84 Temp 97.5 °F (36.4 °C) Resp 19 Ht 6' (1.829 m) Wt 103 kg (227 lb 1.2 oz) SpO2 95% BMI 30.80 kg/m² SpO2 Readings from Last 6 Encounters:  
20 95% 18 100% Intake/Output Summary (Last 24 hours) at 2020 1544 Last data filed at 2020 1345 Gross per 24 hour Intake 400 ml Output 1000 ml Net -600 ml Exam:  
 
Physical Exam: 
 
Gen:  Well-developed, well-nourished, in no acute distress HEENT:  Sclerae nonicteric, hearing intact to voice, mucous membranes moist 
Neck:  Supple, without masses. Resp: diffusely coarse breath sounds. No audible wheezing appreciated Card: irregularly irregular. 's this AM  
Abd:  +bowel sounds, soft, NTTP, nondistended. No HSM. Neuro: Face symmetric, tongue midline, speech fluent, follows commands appropriately SKIN: L arm dressing C/D/I Psych:  Alert, oriented to self and hospital. Limited insight Medications Reviewed: (see below) Lab Data Reviewed: (see below) 
 
______________________________________________________________________ Medications:  
 
Current Facility-Administered Medications Medication Dose Route Frequency  metoprolol (LOPRESSOR) injection 5 mg  5 mg IntraVENous Q6H PRN  
 ipratropium (ATROVENT) 0.02 % nebulizer solution 0.5 mg  0.5 mg Nebulization Q6HWA RT  
 levalbuterol (XOPENEX) nebulizer soln 1.25 mg/3 mL  1.25 mg Nebulization Q6HWA RT  
 digoxin (LANOXIN) injection 125 mcg  125 mcg IntraVENous Q6H  
 [START ON 8/20/2020] digoxin (LANOXIN) tablet 0.125 mg  0.125 mg Oral DAILY  dilTIAZem IR (CARDIZEM) tablet 60 mg  60 mg Oral AC&HS  
 metoprolol tartrate (LOPRESSOR) tablet 50 mg  50 mg Oral BID  furosemide (LASIX) tablet 20 mg  20 mg Oral DAILY  budesonide (PULMICORT) 500 mcg/2 ml nebulizer suspension  500 mcg Nebulization BID RT  
 arformoteroL (BROVANA) neb solution 15 mcg  15 mcg Nebulization BID RT  
 guaiFENesin ER (MUCINEX) tablet 600 mg  600 mg Oral Q12H  
 ammonium lactate (LAC-HYDRIN) 12 % lotion   Topical DAILY  cetirizine (ZYRTEC) tablet 10 mg  10 mg Oral DAILY  fluticasone propionate (FLONASE) 50 mcg/actuation nasal spray 2 Spray  2 Spray Both Nostrils DAILY  apixaban (ELIQUIS) tablet 5 mg  5 mg Oral BID  
 bisacodyL (DULCOLAX) tablet 5 mg  5 mg Oral DAILY PRN  
 docusate sodium (COLACE) capsule 100 mg  100 mg Oral BID  folic acid (FOLVITE) tablet 1 mg  1 mg Oral DAILY  lactobac ac& pc-s.therm-b.anim (DAGOBRETO Q/RISAQUAD)  1 Cap Oral Q12H PRN  pravastatin (PRAVACHOL) tablet 20 mg  20 mg Oral QHS  sertraline (ZOLOFT) tablet 50 mg  50 mg Oral DAILY  traMADoL (ULTRAM) tablet 50 mg  50 mg Oral Q4H PRN  thiamine mononitrate (B-1) tablet 100 mg  100 mg Oral DAILY  sodium chloride (NS) flush 5-40 mL  5-40 mL IntraVENous Q8H  
 sodium chloride (NS) flush 5-40 mL  5-40 mL IntraVENous PRN  
 acetaminophen (TYLENOL) tablet 650 mg  650 mg Oral Q6H PRN Or  
 acetaminophen (TYLENOL) suppository 650 mg  650 mg Rectal Q6H PRN  polyethylene glycol (MIRALAX) packet 17 g  17 g Oral DAILY PRN  promethazine (PHENERGAN) tablet 12.5 mg  12.5 mg Oral Q6H PRN Or  
 ondansetron (ZOFRAN) injection 4 mg  4 mg IntraVENous Q6H PRN  
 alteplase (CATHFLO) 1 mg in sterile water (preservative free) 1 mL injection  1 mg InterCATHeter PRN Lab Review:  
 
Recent Labs   08/19/20 
0150 08/18/20 0425 08/17/20 
2041 WBC 6.5 6.4 6.1 HGB 8.1* 8.7* 8.5* HCT 25.9* 27.8* 27.3*  
 214 240 Recent Labs 08/19/20 
0424 08/18/20 
0425 08/17/20 
9875 * 136 138  
K 3.6 3.4* 3.2*  
 105 108 CO2 22 22 22 GLU 87 78 81 BUN 14 12 12 CREA 0.94 0.85 0.78 CA 9.7 9.8 9.5 MG 2.0 2.1 2.0 PHOS  --   --  3.6 ALB  --   --  1.6* No components found for: [place] No results for input(s): PH, PCO2, PO2, HCO3, FIO2 in the last 72 hours. No results for input(s): INR, INREXT, INREXT in the last 72 hours. No results found for: SDES Lab Results Component Value Date/Time Culture result: NO GROWTH 5 DAYS 08/11/2020 09:11 PM  
 
  
  
Assessment / Plan:  
Cellulitis of left upper extremity: complicated hx. [facility] from [facility] => blood, urine, and wound cultures all negative. Dr Zainab Zaidi discussed with staff at [facility] on 8/15. No wound cultures drawn there. Pt's skin is extraordinarily friable, on Eliquis 
-continue aggressive wound care; dressing changed this AM  
 
Chronic atrial fibrillation:  
-continue metoprolol  
-on dilt q6; change to long acting in the AM if tolerating  
-dig load considering poor rate control  
-continue eliquis  
-cardiology consulted  
-repeat TTE  
-increase lasix to BID  
  
Hypertension: BP WNL -on metoprolol, dilt COPD: no wheezing 
-duonebs; started scheduled + Pulmicort/Brovana  
  
Dyslipidemia: Continue pravastatin 
  
Depression: Continue Zoloft Hypokalemia  - repleted Cough - elevated proBNP noted. Does appear to sound course. Likely volume overloaded 
-lasix added; increased to BID Dispo  
-COVID negative for SNF placement Total time spent: 35 minutes Care Plan discussed with: Patient, Nursing Staff and >50% of time spent in counseling and coordination of care Discussed:  Care Plan and D/C Planning Prophylaxis:  Eliquis Disposition:  SNF/LTC _________________________________________ Attending Physician: Halina Bee MD

## 2020-08-19 NOTE — CONSULTS
Celina Bailey MD [facility] [address] Office [phone] Date of  Admission: 8/11/2020  8:00 PM 
  
 
Assessment/Plan: · Chronic a fib with RVR in the setting of cellulitis:  Rate mgt has been limited by low BP. BB has been escalated and low dose short acting dilt has been added. Dig load in progress. Pt is asymptomatic. On eliquis. CHADS 2 Vasc: 3 . OP will have him see EP for watchman . He was last seen in our office in June and did not make his July follow up appt. Will ck ECHO today. Obtain old records. · HTN: meds adjusted. · HLD: on statin · COPD: prn nebs · Smoker: advised cessation · Cellulitis L arm: · Volume overload: elevated pbnp: lasix has been added. Ck ECHo, obtain old records from Dr Brigitte Jacobo · Iron deficiency Anemia: hgb 8.6 Thank you for allowing us to participate in Cheyenne Waters care. We will be happy to follow along. Please call for any questions. Consult requested by Michael Bermeo MD for *Cellulitis of left upper extremity [L03.114] Subjective: 
Cheyenne Waters is a 67 y.o.  male  with PMH of chronic a fib previously followed by Dr. Lamar Hardy, most recently followed by Dr. Matt Carter, HTN. COPD, dyslipidemia, depression who was admitted on 8/11/2020 for LUE cellulitis. Pt was started on IV abx. Cardiology has been asked to see for a fib RVR: HR's 100-120 since 8/16. The patient denies chest pain, dependent edema, diaphoresis, RODRIGUEZ, orthopnea, palpitations, PND, shortness of breath, claudication or syncope. No bleeding. Cardiac risk factors   
HTN  
HLD Male gender  
smoker Cardiographics:   Telemetry: a fib 100-130 EKG Results Procedure 720 Value Units Date/Time EKG, 12 LEAD, INITIAL [[id number]] Collected:  08/14/20 1340 Order Status:  Completed Updated:  08/14/20 6489   Ventricular Rate 105 BPM   
  Atrial Rate 125 BPM   
  QRS Duration 114 ms   
  Q-T Interval 358 ms QTC Calculation (Bezet) 473 ms Calculated R Axis 73 degrees Calculated T Axis 43 degrees Diagnosis -- Atrial fibrillation with rapid ventricular response Incomplete right bundle branch block ST & T wave abnormality, consider anterior ischemia Abnormal ECG When compared with ECG of 07-DEC-1998 09:32, 
ST now depressed in Anterior leads T wave inversion now evident in Anterior leads QT has lengthened Confirmed by Maria Isabel Monson MD. ([id number]) on 8/14/2020 10:48:34 PM 
  
  
 
 
 
Cardiac Testing/ Procedures: A. Cardiac Cath/PCI:  
B.ECHO/JARETT:  
C.StressNuclear/Stress ECHO/Stress test: D.Vascular:  
E. EP:  
Melonie Zuleta Patient Active Problem List  
 Diagnosis Date Noted  Chronic atrial fibrillation ([facility] 08/12/2020  
 HTN (hypertension), benign 08/12/2020  COPD (chronic obstructive pulmonary disease) ([facility] 08/12/2020  Dyslipidemia 08/12/2020  Depression 08/12/2020  Cellulitis of left upper extremity 08/11/2020 Past Medical History:  
Diagnosis Date  Arthritis  Atrial fibrillation ([facility]  COPD (chronic obstructive pulmonary disease) ([facility] 8/12/2020  High cholesterol  History of vascular access device 08/12/2020 Rt Basilic 5fr dual PICC at 42cm arm cir 34.5cm  Hypertension Past Surgical History:  
Procedure Laterality Date  COLONOSCOPY N/A 9/20/2018 COLONOSCOPY performed by Ho Adamson MD at [address] [facility] HX COLONOSCOPY    
 HX HIP REPLACEMENT Bilateral   
 HX TONSILLECTOMY Allergies Allergen Reactions  Codeine Rash Current Facility-Administered Medications Medication Dose Route Frequency  metoprolol (LOPRESSOR) injection 5 mg  5 mg IntraVENous Q6H PRN  
 ipratropium (ATROVENT) 0.02 % nebulizer solution 0.5 mg  0.5 mg Nebulization Q6HWA RT  
 levalbuterol (XOPENEX) nebulizer soln 1.25 mg/3 mL  1.25 mg Nebulization Q6HWA RT  
 digoxin (LANOXIN) injection 125 mcg  125 mcg IntraVENous Q6H  
 [START ON 8/20/2020] digoxin (LANOXIN) tablet 0.125 mg  0.125 mg Oral DAILY  dilTIAZem IR (CARDIZEM) tablet 60 mg  60 mg Oral AC&HS  
 metoprolol tartrate (LOPRESSOR) tablet 50 mg  50 mg Oral BID  furosemide (LASIX) tablet 20 mg  20 mg Oral DAILY  budesonide (PULMICORT) 500 mcg/2 ml nebulizer suspension  500 mcg Nebulization BID RT  
 arformoteroL (BROVANA) neb solution 15 mcg  15 mcg Nebulization BID RT  
 guaiFENesin ER (MUCINEX) tablet 600 mg  600 mg Oral Q12H  
 ammonium lactate (LAC-HYDRIN) 12 % lotion   Topical DAILY  cetirizine (ZYRTEC) tablet 10 mg  10 mg Oral DAILY  fluticasone propionate (FLONASE) 50 mcg/actuation nasal spray 2 Spray  2 Spray Both Nostrils DAILY  apixaban (ELIQUIS) tablet 5 mg  5 mg Oral BID  
 bisacodyL (DULCOLAX) tablet 5 mg  5 mg Oral DAILY PRN  
 docusate sodium (COLACE) capsule 100 mg  100 mg Oral BID  folic acid (FOLVITE) tablet 1 mg  1 mg Oral DAILY  lactobac ac& pc-s.therm-b.anim (DAGOBERTO Q/RISAQUAD)  1 Cap Oral Q12H PRN  pravastatin (PRAVACHOL) tablet 20 mg  20 mg Oral QHS  sertraline (ZOLOFT) tablet 50 mg  50 mg Oral DAILY  traMADoL (ULTRAM) tablet 50 mg  50 mg Oral Q4H PRN  thiamine mononitrate (B-1) tablet 100 mg  100 mg Oral DAILY  sodium chloride (NS) flush 5-40 mL  5-40 mL IntraVENous Q8H  
 sodium chloride (NS) flush 5-40 mL  5-40 mL IntraVENous PRN  
 acetaminophen (TYLENOL) tablet 650 mg  650 mg Oral Q6H PRN Or  
 acetaminophen (TYLENOL) suppository 650 mg  650 mg Rectal Q6H PRN  polyethylene glycol (MIRALAX) packet 17 g  17 g Oral DAILY PRN  promethazine (PHENERGAN) tablet 12.5 mg  12.5 mg Oral Q6H PRN Or  
 ondansetron (ZOFRAN) injection 4 mg  4 mg IntraVENous Q6H PRN  
 alteplase (CATHFLO) 1 mg in sterile water (preservative free) 1 mL injection  1 mg InterCATHeter PRN Review of Symptoms: 
Constitutional: negative for fatigue and malaise ENT: negative Respiratory: positive for cough or sputum Gastrointestinal: negative for nausea and vomiting Genitourinary: no dysuria, hematuria, frequency Musculoskeletal:negative for back pain Neurological: negative for headaches and memory problems Other systems reviewed and negative except as above. Social History Socioeconomic History  Marital status:  Spouse name: Not on file  Number of children: Not on file  Years of education: Not on file  Highest education level: Not on file Tobacco Use  Smoking status: Smoker, Current Status Unknown Packs/day: 0.25 Years: 60.00 Pack years: 15.00  Smokeless tobacco: Never Used Substance and Sexual Activity  Alcohol use: Yes Alcohol/week: 8.0 standard drinks Types: 8 Shots of liquor per week  Drug use: No  
Other Topics Concern Family History Problem Relation Age of Onset  Stroke Neg Hx Physical Exam 
 
Visit Vitals /63 (BP 1 Location: Left arm, BP Patient Position: At rest) Pulse (!) 105 Temp 97.5 °F (36.4 °C) Resp 20 Ht 6' (1.829 m) Wt 103 kg (227 lb 1.2 oz) SpO2 98% BMI 30.80 kg/m² General: awake, alert, and oriented. MAEx4. No acute distress HEENT Exam:   
 Normocephalic, atraumatic. Sclera anicteric . Neck: supple, no lymphadenopathy Lung Exam: Not  dyspneic. Respirations unlabored. O2 @ 98 % room air Sat: . Lungs: scattered rhonchi. Heart Exam: Unable to palpate PMI due to body habitus Rate: Rhythm: irregular, 2/6 systolic  Murmur. No JVD, No HJR Abdomen Exam:   
  obese, soft, nontender. + Bowel sounds. No palpable masses. : voiding  [place] Extremities Exam: LUE gauze wrapped. Trace LE edema R, + 1 LLE. Vascular Exam:   
  radial, brachial, dorsalis pedis, posterior tibial, are equal and strong bilaterally Neuro exam:  No focal deficits Psy Exam: Normal affect, does not appear anxious or agitated Recent Results (from the past 12 hour(s)) MAGNESIUM Collection Time: 08/19/20  4:24 AM  
Result Value Ref Range Magnesium 2.0 1.6 - 2.4 mg/dL METABOLIC PANEL, BASIC Collection Time: 08/19/20  4:24 AM  
Result Value Ref Range Sodium 135 (L) 136 - 145 mmol/L Potassium 3.6 3.5 - 5.1 mmol/L Chloride 104 97 - 108 mmol/L  
 CO2 22 21 - 32 mmol/L Anion gap 9 5 - 15 mmol/L Glucose 87 65 - 100 mg/dL BUN 14 6 - 20 MG/DL Creatinine 0.94 0.70 - 1.30 MG/DL  
 BUN/Creatinine ratio 15 12 - 20 GFR est AA >60 >60 ml/min/1.73m2 GFR est non-AA >60 >60 ml/min/1.73m2 Calcium 9.7 8.5 - 10.1 MG/DL  
CBC WITH AUTOMATED DIFF Collection Time: 08/19/20  4:24 AM  
Result Value Ref Range WBC 6.5 4.1 - 11.1 K/uL  
 RBC 2.97 (L) 4.10 - 5.70 M/uL HGB 8.1 (L) 12.1 - 17.0 g/dL HCT 25.9 (L) 36.6 - 50.3 % MCV 87.2 80.0 - 99.0 FL  
 MCH 27.3 26.0 - 34.0 PG  
 MCHC 31.3 30.0 - 36.5 g/dL  
 RDW 15.9 (H) 11.5 - 14.5 % PLATELET 662 897 - 993 K/uL MPV 10.6 8.9 - 12.9 FL  
 NRBC 0.0 0  WBC ABSOLUTE NRBC 0.00 0.00 - 0.01 K/uL NEUTROPHILS 37 32 - 75 % LYMPHOCYTES 42 12 - 49 % MONOCYTES 14 (H) 5 - 13 % EOSINOPHILS 3 0 - 7 % BASOPHILS 4 (H) 0 - 1 % IMMATURE GRANULOCYTES 0 %  
 ABS. NEUTROPHILS 2.4 1.8 - 8.0 K/UL  
 ABS. LYMPHOCYTES 2.7 0.8 - 3.5 K/UL  
 ABS. MONOCYTES 0.9 0.0 - 1.0 K/UL  
 ABS. EOSINOPHILS 0.2 0.0 - 0.4 K/UL  
 ABS. BASOPHILS 0.3 (H) 0.0 - 0.1 K/UL  
 ABS. IMM. GRANS. 0.0 K/UL  
 DF MANUAL    
 RBC COMMENTS ANISOCYTOSIS 
1+ NT-PRO BNP Collection Time: 08/19/20  4:24 AM  
Result Value Ref Range NT pro-BNP 1,781 (H) <450 PG/ML Payam Bailey MS [facility]

## 2020-08-19 NOTE — PROGRESS NOTES
0000 RRT nurse reviewed patients history and meds, due to patient heart rate being 100-130's elevated . Spoke with primary nurse Naveen Lee, she will contact hospitalist concerning patient medicine changes, and to see if we can a additional dose of metoprolol. Patient pain is controled at this time. Patient has history of a fib 
0026 prn dose IV dose of Metoprolol.   
Alexander Swenson RN, CCRN

## 2020-08-19 NOTE — PROGRESS NOTES
Jc Mixonelsen [facility] 
[address] [facility], [address] [place] [address] 
[phone] Medical Progress Note NAME: Nate Reno :  1945 MRM:  [id number] Date/Time: 2020 Subjective: Chief Complaint:  \"I don't know\" Chart/notes/labs/studies reviewed, patient examined. Pt's HR elevated this AM. Objective:  
 
 
Vitals:  
 
  
Last 24hrs VS reviewed since prior progress note. Most recent are: 
 
Visit Vitals /73 (BP 1 Location: Right arm, BP Patient Position: At rest) Pulse (!) 104 Temp 97.4 °F (36.3 °C) Resp 19 Ht 6' (1.829 m) Wt 103 kg (227 lb 1.2 oz) SpO2 100% BMI 30.80 kg/m² SpO2 Readings from Last 6 Encounters:  
20 100% 18 100% Intake/Output Summary (Last 24 hours) at 2020 2140 Last data filed at 2020 Gross per 24 hour Intake 100 ml Output 1890 ml Net -1790 ml Exam:  
 
Physical Exam: 
 
Gen:  Well-developed, well-nourished, in no acute distress HEENT:  Sclerae nonicteric, hearing intact to voice, mucous membranes moist 
Neck:  Supple, without masses. Resp: course breath sounds Card: irregularly irregular. 's Abd:  +bowel sounds, soft, NTTP, nondistended. No HSM. Neuro: Face symmetric, tongue midline, speech fluent, follows commands appropriately SKIN: L arm dressing C/D/I (see media tab for exposed wound) Psych:  Alert, oriented to self and hospital. Limited insight Medications Reviewed: (see below) Lab Data Reviewed: (see below) 
 
______________________________________________________________________ Medications:  
 
Current Facility-Administered Medications Medication Dose Route Frequency  metoprolol tartrate (LOPRESSOR) tablet 50 mg  50 mg Oral BID  dilTIAZem IR (CARDIZEM) tablet 30 mg  30 mg Oral TIDAC  furosemide (LASIX) tablet 20 mg  20 mg Oral DAILY  albuterol-ipratropium (DUO-NEB) 2.5 MG-0.5 MG/3 ML  3 mL Nebulization Q6HWA RT  
 guaiFENesin ER (MUCINEX) tablet 600 mg  600 mg Oral Q12H  
 ammonium lactate (LAC-HYDRIN) 12 % lotion   Topical DAILY  cetirizine (ZYRTEC) tablet 10 mg  10 mg Oral DAILY  fluticasone propionate (FLONASE) 50 mcg/actuation nasal spray 2 Spray  2 Spray Both Nostrils DAILY  apixaban (ELIQUIS) tablet 5 mg  5 mg Oral BID  
 bisacodyL (DULCOLAX) tablet 5 mg  5 mg Oral DAILY PRN  
 docusate sodium (COLACE) capsule 100 mg  100 mg Oral BID  folic acid (FOLVITE) tablet 1 mg  1 mg Oral DAILY  lactobac ac& pc-s.therm-b.anim (DAGOBERTO Q/RISAQUAD)  1 Cap Oral Q12H PRN  pravastatin (PRAVACHOL) tablet 20 mg  20 mg Oral QHS  sertraline (ZOLOFT) tablet 50 mg  50 mg Oral DAILY  traMADoL (ULTRAM) tablet 50 mg  50 mg Oral Q4H PRN  thiamine mononitrate (B-1) tablet 100 mg  100 mg Oral DAILY  sodium chloride (NS) flush 5-40 mL  5-40 mL IntraVENous Q8H  
 sodium chloride (NS) flush 5-40 mL  5-40 mL IntraVENous PRN  
 acetaminophen (TYLENOL) tablet 650 mg  650 mg Oral Q6H PRN Or  
 acetaminophen (TYLENOL) suppository 650 mg  650 mg Rectal Q6H PRN  polyethylene glycol (MIRALAX) packet 17 g  17 g Oral DAILY PRN  promethazine (PHENERGAN) tablet 12.5 mg  12.5 mg Oral Q6H PRN Or  
 ondansetron (ZOFRAN) injection 4 mg  4 mg IntraVENous Q6H PRN  
 alteplase (CATHFLO) 1 mg in sterile water (preservative free) 1 mL injection  1 mg InterCATHeter PRN Lab Review:  
 
Recent Labs 08/18/20 
0425 08/17/20 
4162 WBC 6.4 6.1 HGB 8.7* 8.5* HCT 27.8* 27.3*  
 240 Recent Labs 08/18/20 
0425 08/17/20 
4660  138  
K 3.4* 3.2*  
 108 CO2 22 22 GLU 78 81 BUN 12 12 CREA 0.85 0.78 CA 9.8 9.5 MG 2.1 2.0 PHOS  --  3.6 ALB  --  1.6* No components found for: [place] No results for input(s): PH, PCO2, PO2, HCO3, FIO2 in the last 72 hours. No results for input(s): INR, INREXT, INREXT in the last 72 hours.  
No results found for: SDES Lab Results Component Value Date/Time Culture result: NO GROWTH 5 DAYS 08/11/2020 09:11 PM  
 
  
  
Assessment / Plan:  
Cellulitis of left upper extremity: complicated hx. [facility] from [facility] [place] => blood, urine, and wound cultures all negative. Dr Mart Jiménez discussed with staff at [facility] on 8/15. No wound cultures drawn there. Pt's skin is extraordinarily friable, on Eliquis 
-continue aggressive wound care Chronic atrial fibrillation:  
-continue metoprolol  
-add dilt  
-continue eliquis  
  
Hypertension: BP WNL -on metoprolol  
-dilt added; BP's have been limiting rate control COPD: no wheezing 
-duonebs PRN  
-Mucinex added 
  
Dyslipidemia: Continue pravastatin 
  
Depression: Continue Zoloft Hypokalemia  - repleted Cough - elevated proBNP noted. Does appear to sound course. Likely volume overloaded 
-lasix added Dispo  
-COVID negative for SNF placement Total time spent: 35 minutes Care Plan discussed with: Patient, Nursing Staff and >50% of time spent in counseling and coordination of care Discussed:  Care Plan and D/C Planning Prophylaxis:  Eliquis Disposition:  SNF/LTC 
_________________________________________ Attending Physician: Terri Sanchez MD

## 2020-08-19 NOTE — PROGRESS NOTES
Bedside shift change report given to Cherelle (oncoming nurse) by Tania Chun (offgoing nurse). Report included the following information SBAR, Kardex, MAR and Recent Results.

## 2020-08-19 NOTE — PROGRESS NOTES
4:56 PM 
Pt accepted at [facility] with plan to dc on 8/18/20. DC was cancelled for medical issues. Pt not stable for dc today, called and informed the [facility]. They will have a bed for pt when he is ready. Mert Deluna

## 2020-08-20 NOTE — WOUND CARE
Wound care - follow up  visit for wound assessment RICHMOND present on admission. Alert no distress- assessed with Roverto. 
  
 
Assessment Skin is thin and fragile - multiple areas of purple bruises and scabs - arms and legs - skin is dry and peeling on arms and legs, Left arm - large areas of resurfaced skin Scattered areas of partial thickness and full thickness skin loss, resolving, less drainage- dry scabs lifting with skin care  wound dry with thick adherent scabs from elbows to hand - lifting and removed with care given  
 Left shin- foam in place. Skin dry and peeling on arms and lower legs. Treatment Wounds cleansed Skin moisturized 
mep transfer to left arm Plan of care reviewed with staff, MD and care manager. Pending dc to snf.  
 
[address]

## 2020-08-20 NOTE — PROGRESS NOTES
Cardiology Progress Note NAME:  Delmer Lerma :   1945 MRN:   [id number] Assessment/Plan: 1. A fib RVR: rate improved on dig. Cont BB, change cardizem to long acting. Cont eliquis. Will have him see EP as OP for watchman work up. 2. Hx CAD : EF preserved. 3. Deconditioned: plans for SNF. [id number] Darshana Kevin for discharge cardiac wise. Follow-up Information Follow up With Specialties Details Why Contact Info [place] [address] 
[phone] Cesar Montesinos MD Cardiology On 10/5/2020 9:20 am  [address] [address] [place] [id number] 
[phone] Subjective: Follow up a fib RVR Cardiac ROS: Patient denies any exertional chest pain, dyspnea, palpitations, syncope, orthopnea, edema or paroxysmal nocturnal dyspnea. Previous Cardiac Eval 
1. LE Duplex 20- Left lower extremity venous duplex negative for deep venous thrombosis or thrombophlebitis in the veins visualized. Right common femoral vein is thrombus free. 
  
INCIDENTAL FINDING: Avascular structure noted in the popliteal fossa extending into the calf measuring 13.99 x 1.36 cm consistent with a probable ruptured Baker's Cyst. Clinical correlation is recommended 2. Calcium Score 11-Left main: 99 Left anterior descendin Left circumflex: 91 
Right coronary: 192 Posterior descendin Total calcium score: 734 
 
3. Lipids 17- , HDL 40, LDL 71,  Cath 2011: 50 % stenosis in prox LAD, non critical stenosis 30% in RCA. 
20 ECHO ADULT COMPLETE 2020 Narrative · LV: Estimated LVEF is 60 - 65%. Normal cavity size, wall thickness and  
systolic function (ejection fraction normal). Wall motion: normal. 
· MV: Moderate mitral annular calcification.  
· AV: Moderate aortic valve sclerosis with reduced excursion but no stenosis. Mean gradient 6mmHg. · TV: Mild tricuspid valve regurgitation is present. Signed by: Grace Li MD  
 
 
 
Review of Systems: No nausea, indigestion, vomiting, pain, cough, sputum. No bleeding. Taking po. Appetite ok. Objective:  
 
Visit Vitals /68 (BP 1 Location: Right arm, BP Patient Position: At rest) Pulse (!) 108 Comment: RN Nan aware Temp 97.8 °F (36.6 °C) Resp 18 Ht 6' (1.829 m) Wt 103.1 kg (227 lb 4.7 oz) SpO2 99% BMI 30.83 kg/m² O2 Device: Room air Temp (24hrs), Av.6 °F (36.4 °C), Min:96.9 °F (36.1 °C), Max:98.1 °F (36.7 °C) 
 
 
701 - 1900 In: 120 [P.O.:120] Out: 150 [Urine:150] 1901 -  0700 In: 400 [P.O.:400] Out: 6406 [QBZYN:1844] TELE: a fib General: AAOx3 cooperative, no acute distress. HEENT: Atraumatic. Pink and moist.  Anicteric sclerae. Neck : Supple Lungs: CTA bilaterally. Heart: Regular rhythm, no murmur, no rubs, no gallops. No JVD. No carotid bruits. Abdomen: Soft, non-distended, non-tender. + Bowel sounds. Extremities: LUE with gauze wrap. Trace LE edema. Neurologic: Grossly intact. Alert and oriented X 3. No acute neurological distress. Psych: Fair insight. Not anxious or agitated. Care Plan discussed with: 
  Comments Patient x Family RN x Care Manager Consultant:  x attending Data Review: No lab exists for component: ITNL No results for input(s): CPK, CKMB, TROIQ in the last 72 hours. Recent Labs  
  20 
0308 20 
0424 20 
0425 * 135* 136  
K 3.8 3.6 3.4*  
 104 105 CO2 24 22 22 BUN 14 14 12 CREA 1.03 0.94 0.85 GLU 88 87 78 MG 2.1 2.0 2.1 WBC 6.4 6.5 6.4 HGB 8.7* 8.1* 8.7* HCT 27.4* 25.9* 27.8*  
 205 214 No results for input(s): INR, PTP, APTT, INREXT in the last 72 hours. Medications reviewed Current Facility-Administered Medications Medication Dose Route Frequency  dilTIAZem ER (CARDIZEM CD) capsule 240 mg  240 mg Oral DAILY  metoprolol (LOPRESSOR) injection 5 mg  5 mg IntraVENous Q6H PRN  
 ipratropium (ATROVENT) 0.02 % nebulizer solution 0.5 mg  0.5 mg Nebulization Q6HWA RT  
 levalbuterol (XOPENEX) nebulizer soln 1.25 mg/3 mL  1.25 mg Nebulization Q6HWA RT  
 digoxin (LANOXIN) tablet 0.125 mg  0.125 mg Oral DAILY  furosemide (LASIX) tablet 20 mg  20 mg Oral ACB&D  
 metoprolol tartrate (LOPRESSOR) tablet 50 mg  50 mg Oral BID  budesonide (PULMICORT) 500 mcg/2 ml nebulizer suspension  500 mcg Nebulization BID RT  
 arformoteroL (BROVANA) neb solution 15 mcg  15 mcg Nebulization BID RT  
 guaiFENesin ER (MUCINEX) tablet 600 mg  600 mg Oral Q12H  
 ammonium lactate (LAC-HYDRIN) 12 % lotion   Topical DAILY  cetirizine (ZYRTEC) tablet 10 mg  10 mg Oral DAILY  fluticasone propionate (FLONASE) 50 mcg/actuation nasal spray 2 Spray  2 Spray Both Nostrils DAILY  apixaban (ELIQUIS) tablet 5 mg  5 mg Oral BID  
 bisacodyL (DULCOLAX) tablet 5 mg  5 mg Oral DAILY PRN  
 docusate sodium (COLACE) capsule 100 mg  100 mg Oral BID  folic acid (FOLVITE) tablet 1 mg  1 mg Oral DAILY  lactobac ac& pc-s.therm-b.anim (DAGOBERTO Q/RISAQUAD)  1 Cap Oral Q12H PRN  pravastatin (PRAVACHOL) tablet 20 mg  20 mg Oral QHS  sertraline (ZOLOFT) tablet 50 mg  50 mg Oral DAILY  traMADoL (ULTRAM) tablet 50 mg  50 mg Oral Q4H PRN  thiamine mononitrate (B-1) tablet 100 mg  100 mg Oral DAILY  sodium chloride (NS) flush 5-40 mL  5-40 mL IntraVENous Q8H  
 sodium chloride (NS) flush 5-40 mL  5-40 mL IntraVENous PRN  
 acetaminophen (TYLENOL) tablet 650 mg  650 mg Oral Q6H PRN Or  
 acetaminophen (TYLENOL) suppository 650 mg  650 mg Rectal Q6H PRN  polyethylene glycol (MIRALAX) packet 17 g  17 g Oral DAILY PRN  promethazine (PHENERGAN) tablet 12.5 mg  12.5 mg Oral Q6H PRN  Or  
 ondansetron (ZOFRAN) injection 4 mg  4 mg IntraVENous Q6H PRN  alteplase (CATHFLO) 1 mg in sterile water (preservative free) 1 mL injection  1 mg InterCATHeter PRN Colleen Low NP

## 2020-08-20 NOTE — PROGRESS NOTES
1515: 
[facility] to transport pt at 4:15-4:30. Charge is $60.  Pt's wife to call with credit card payment. Transition of Care Plan to SNF/Rehab Communication to Patient/Family: Met with patient and family and they are agreeable to the transition plan. The Plan for Transition of Care is related to the following treatment goals: The Patient and/or patient representative was provided with a choice of provider and agrees  with the discharge plan. Yes [x] No [] A Freedom of choice list was provided with basic dialogue that supports the patient's individualized plan of care/goals and shares the quality data associated with the providers. Yes [x] No [] SNF/Rehab Transition: 
Patient has been accepted to [address] [place], [place] [address] SNF/Rehab and meets criteria for admission. Patient will transported by [facility] w/c Ruperto Baig and expected to leave at 4:15 . Communication to SNF/Rehab: 
Bedside RN, Tomasz Bray, has been notified to update the transition plan to the facility and call report ([phone] and ask for the [place] wing). Discharge information has been updated on the AVS. And communicated to facility via 5to1/Ideacentric, or CC link. Discharge instructions to be fax'd to facility at ([phone]). Reviewed and confirmed with facility, [facility], can manage the patient care needs for the following:  
 
Lisa Vargas with (X) only those applicable: 
Medication: 
[x]Medications are available at the facility []IV Antibiotics [x]Controlled Substance  hard copies available sent. []Weekly Labs Tramadol 50 mg, 1 tab po q4h prn for pain Equipment: 
[]CPAP/BiPAP []Wound Vacuum []Mcmillan or Urinary Device []PICC/Central Line []Nebulizer []Ventilator Treatment: 
[]Isolation (for MRSA, VRE, etc.) []Surgical Drain Management []Tracheostomy Care [x]Dressing Changes []Dialysis with transportation []PEG Care []Oxygen []Daily Weights for Heart Failure Dietary: 
[x]Any diet limitations []Tube Feedings []Total Parenteral Management (TPN) Low sodium Financial Resources: 
[]Medicaid Application Completed []UAI Completed and copy given to pt/family 
and copy given to pt/family 
[]A screening has previously been completed. []Level II Completed 
 
[] Private pay individual who will not become  
financially eligible for Medicaid within 6 months from admission to a [address] [place] [address] facility. [] Individual refused to have screening conducted. []Medicaid Application Completed []The screening denied because it was determined individual did not need/did not qualify for nursing facility level of care. [] Out of state residents seeking direct admission to a [address] facility. [] Individuals who are inpatients of an out of state hospital, or in state or out of state veterans/ hospital and seek direct admission to a [address] facility [] Individuals who are pateints or residents of a state owned/operated facility that is licensed by Department of [employer] ([employer]) and seek direct admission to [address] facility [] A screening not required for enrollment in [facility] services as set out in 12 VAC 30- [] [facility] [place]) staff shall perform screenings of the [facility] clients. Advanced Care Plan: 
[]Surrogate Decision Maker of Care 
[]POA []Communicated Code Status and copy sent. Other: JALEEL Jacobs

## 2020-08-20 NOTE — PROGRESS NOTES
Discharge reviewed with patient and spouse, verbalize understanding. PICC Line removed at 1430 with no issues. Rx sent with patient. Verbal report given to Nurse Margette Bernheim at [facility] [address] [facility]. Patient and spouse appreciative of care provided by all staff.

## 2020-08-20 NOTE — PROGRESS NOTES
Hospital to SNF SBAR Handoff - Caity Bradly 
                                                                      67 y.o.   male [address]   Room: 508/01    [facility] 5M1 MED SURG 1  Unit Phone# :  [phone] [facility] [address] 
SF 5M1 MED SURG 1 
[address] [facility] Delia Motta [id number] Dept: [phone] Loc: [phone] SITUATION Admitted:  8/11/2020         Attending Provider:  No att. providers found Consultations:  IP CONSULT TO GENERAL SURGERY 
IP CONSULT TO CARDIOLOGY 
 
PCP:  Andreina Shelton Treatment Team: Utilization Review: Shen Mathew Nurse: Jatin Reyes RN; Care Manager: Hemanth Araiza Admitting Dx:  Cellulitis of left upper extremity [J12.028] Principal Problem: Cellulitis of left upper extremity * No surgery found * of  
  
BY: * Surgery not found *             ON: * No surgery found * Code Status: Full Code Advance Directives:  
Advance Care Planning 8/12/2020 Confirm Advance Directive Yes, not on file (Send w/patient) Yes Not W Pt  
 
 
Isolation:  There are currently no Active Isolations       MDRO: No current active infections Pain Medications given:  yes    Last dose: 8/19/2020 Special Equipment needed: no  Type of equipment: 
  
  BACKGROUND Allergies: Allergies Allergen Reactions  Codeine Rash Past Medical History:  
Diagnosis Date  Arthritis  Atrial fibrillation ([facility] [address]  COPD (chronic obstructive pulmonary disease) ([facility] [address] 8/12/2020  High cholesterol  History of vascular access device 08/12/2020 Rt Basilic 5fr dual PICC at 42cm arm cir 34.5cm  Hypertension Past Surgical History:  
Procedure Laterality Date  COLONOSCOPY N/A 9/20/2018 COLONOSCOPY performed by Felice Adler MD at [address] [facility] HX COLONOSCOPY    
 HX HIP REPLACEMENT Bilateral   
 HX TONSILLECTOMY No medications prior to admission. Hard scripts included in transfer packet yes Vaccinations: There is no immunization history on file for this patient. Readmission Risks:    Known Risks: The Charlson CoMorbitiy Index tool is an evidenced based tool that has more automatic generated information. The tool looks at many different items such as the age of the patient, how many times they were admitted in the last calendar year, current length of stay in the hospital and their diagnosis. All of these items are pulled automatically from information documented in the chart from various places and will generate a score that predicts whether a patient is at low (less than 13), medium (13-20) or high (21 or greater) risk of being readmitted. ASSESSMENT Temp: 98.2 °F (36.8 °C) (08/20/20 1129) Pulse (Heart Rate): 91 (08/20/20 1129) Resp Rate: 18 (08/20/20 1129)           BP: 114/65 (08/20/20 1129) O2 Sat (%): 96 % (08/20/20 1329) Weight: 103.1 kg (227 lb 4.7 oz)    Height: 6' (182.9 cm) (08/19/20 1136) If above not within 1 hour of discharge: 
 
BP:_____  P:____  R:____ T:_____ O2 Sat: ___%  O2: ______ Active Orders Diet DIET REGULAR 2 GM NA (House Low NA) Orientation: oriented to time, place, person and situation Active Behaviors: None Active Lines/Drains:  (Peg Tube / Mcmillan / CL or S/L?): no 
 
Urinary Status: Voiding     Last BM: Last Bowel Movement Date: 08/19/20 Skin Integrity: Wound (add Wound LDA), Tear Mobility: Slightly limited Weight Bearing Status: WBAT (Weight Bearing as Tolerated) Gait Training Assistive Device: Walker, rolling, Gait belt Ambulation - Level of Assistance: Minimal assistance, Assist x1, Moderate assistance, Additional time Distance (ft): (2 x 10) Lab Results Component Value Date/Time  Glucose 88 08/20/2020 03:08 AM  
 HGB 8.7 (L) 08/20/2020 03:08 AM  
 HGB 8.1 (L) 08/19/2020 04:24 AM  
  
  RECOMMENDATION See After Visit Summary (AVS) for: · Discharge instructions · After [facility] · Special equipment needed (entered pre-discharge by Care Management) · Medication Reconciliation · Follow up Appointment(s) Report given/sent by:  Gomez Reid Verbal report given to: Nurse Emily Torres  FAXED to:   
    
Estimated discharge time:  8/20/2020 at 1600

## 2020-08-20 NOTE — DISCHARGE INSTRUCTIONS
HOSPITALIST DISCHARGE INSTRUCTIONS  NAME: Mejia Ahmadi   :     MRN:  [id number]     Date/Time:  2020 10:42 AM    ADMIT DATE: 2020     DISCHARGE DATE: 2020     ADMITTING DIAGNOSIS:  Left upper extremity cellulitis, wound  Atrial fibrillation with rapid ventricular response   COPD     DISCHARGE DIAGNOSIS:  As above     MEDICATIONS:     · It is important that you take the medication exactly as they are prescribed. · Keep your medication in the bottles provided by the pharmacist and keep a list of the medication names, dosages, and times to be taken in your wallet. · Do not take other medications without consulting your doctor. Pain Management: per above medications    What to do at Home    Recommended diet:  Cardiac Diet    Recommended activity: Activity as tolerated    If you experience any of the following symptoms then please call your primary care physician or return to the emergency room if you cannot get hold of your doctor:  Fever, chills, nausea, vomiting, diarrhea, change in mentation, falling, bleeding, shortness of breath, chest pain     Follow Up:  Dr. Mickey Richardson, DO  you are to call and set up an appointment to see them in 2 weeks. Follow-up Information     Follow up With Specialties Details Why Jovany Ngo [address]. [address]    [place], MD Cardiology On 10/5/2020 9:20 am  [address]      Artemio Palma [address]  [phone]          Information obtained by :  I understand that if any problems occur once I am at home I am to contact my physician. I understand and acknowledge receipt of the instructions indicated above. Physician's or R.N.'s Signature                                                                  Date/Time                                                                                                                                              Patient or Representative Signature                                                          Date/Time     Atrial Fibrillation: Care Instructions  Your Care Instructions     Atrial fibrillation is an irregular and often fast heartbeat. Treating this condition is important for several reasons. It can cause blood clots, which can travel from your heart to your brain and cause a stroke. If you have a fast heartbeat, you may feel lightheaded, dizzy, and weak. An irregular heartbeat can also increase your risk for heart failure. Atrial fibrillation is often the result of another heart condition, such as high blood pressure or coronary artery disease. Making changes to improve your heart condition will help you stay healthy and active. Follow-up care is a key part of your treatment and safety. Be sure to make and go to all appointments, and call your doctor if you are having problems. It's also a good idea to know your test results and keep a list of the medicines you take. How can you care for yourself at home? Medicines  · Take your medicines exactly as prescribed. Call your doctor if you think you are having a problem with your medicine. You will get more details on the specific medicines your doctor prescribes. · If your doctor has given you a blood thinner to prevent a stroke, be sure you get instructions about how to take your medicine safely. Blood thinners can cause serious bleeding problems. · Do not take any vitamins, over-the-counter drugs, or herbal products without talking to your doctor first.  Lifestyle changes  · Do not smoke. Smoking can increase your chance of a stroke and heart attack.  If you need help quitting, talk to your doctor about stop-smoking programs and medicines. These can increase your chances of quitting for good. · Eat a heart-healthy diet. · Stay at a healthy weight. Lose weight if you need to. · Limit alcohol to 2 drinks a day for men and 1 drink a day for women. Too much alcohol can cause health problems. · Avoid colds and flu. Get a pneumococcal vaccine shot. If you have had one before, ask your doctor whether you need another dose. Get a flu shot every year. If you must be around people with colds or flu, wash your hands often. Activity  · If your doctor recommends it, get more exercise. Walking is a good choice. Bit by bit, increase the amount you walk every day. Try for at least 30 minutes on most days of the week. You also may want to swim, bike, or do other activities. Your doctor may suggest that you join a cardiac rehabilitation program so that you can have help increasing your physical activity safely. · Start light exercise if your doctor says it is okay. Even a small amount will help you get stronger, have more energy, and manage stress. Walking is an easy way to get exercise. Start out by walking a little more than you did in the hospital. Gradually increase the amount you walk. · When you exercise, watch for signs that your heart is working too hard. You are pushing too hard if you cannot talk while you are exercising. If you become short of breath or dizzy or have chest pain, sit down and rest immediately. · Check your pulse regularly. Place two fingers on the artery at the palm side of your wrist, in line with your thumb. If your heartbeat seems uneven or fast, talk to your doctor. When should you call for help? [id number] anytime you think you may need emergency care. For example, call if:  · You have symptoms of a heart attack. These may include:  ? Chest pain or pressure, or a strange feeling in the chest.  ? Sweating. ? Shortness of breath. ? Nausea or vomiting.   ? Pain, pressure, or a strange feeling in the back, neck, jaw, or upper belly or in one or both shoulders or arms. ? Lightheadedness or sudden weakness. ? A fast or irregular heartbeat. After you call 911, the  may tell you to chew 1 adult-strength or 2 to 4 low-dose aspirin. Wait for an ambulance. Do not try to drive yourself. · You have symptoms of a stroke. These may include:  ? Sudden numbness, tingling, weakness, or loss of movement in your face, arm, or leg, especially on only one side of your body. ? Sudden vision changes. ? Sudden trouble speaking. ? Sudden confusion or trouble understanding simple statements. ? Sudden problems with walking or balance. ? A sudden, severe headache that is different from past headaches. · You passed out (lost consciousness). Call your doctor now or seek immediate medical care if:  · You have new or increased shortness of breath. · You feel dizzy or lightheaded, or you feel like you may faint. · Your heart rate becomes irregular. · You can feel your heart flutter in your chest or skip heartbeats. Tell your doctor if these symptoms are new or worse. Watch closely for changes in your health, and be sure to contact your doctor if you have any problems. Where can you learn more? Go to http://www.gray.com/  Enter U020 in the search box to learn more about \"Atrial Fibrillation: Care Instructions. \"  Current as of: December 16, 2019               Content Version: 12.5  © 0383-9051 [employer]. Care instructions adapted under license by [employer] (which disclaims liability or warranty for this information). If you have questions about a medical condition or this instruction, always ask your healthcare professional. [place] [address] any warranty or liability for your use of this information.

## 2020-08-22 NOTE — PROGRESS NOTES
Community Care Team medical review documentation for patient in [facility]     Patient was discharged from [facility] on 8/20/20 to [facility] ([facility] Information included in this progress note has been provided to SNF. Discharge Diagnosis: Cellulitis of LUE, A-fib, HTN, COPD, Depression, Dyslipidemia   PCP : Ceasar Escobar DO  ACP:  Full Code ; ACP not on file   Medication Changes at Discharge:   Start: Duo-Neb, Ammonium lactate, digoxin, cardizem CD, Doxycycline, Lasix  Change: Albuterol, metoprolol tartrate, tramadol  Stop: Cipro, doxycycline, lisinopril, sodium chloride  Diet: Regular  Follow up Appointment Recommendations:   Future Appointments   Date Time Provider Katharina Simpson   10/5/2020  9:20 AM MD DELORES Lara AMB   PCP - SHANON post SNF d/c   PTA Functional Status: The patient was functional at the wheelchair level and required moderate assistance for transfers to the chair. The patient lived with wife prior to initial hospitalization but has been at [facility] prior to this admission. Community Care Team will follow up weekly with [facility] until discharge. Medications were not reconciled and general patient assessment was not completed during this [facility] outreach.       Kyrie Wheat RN

## 2020-09-01 NOTE — PROGRESS NOTES
Late chart  Community Care Team Documentation for Patient in [facility]  Subsequent Follow up     Patient remains at [facility] ([facility]). See previous [facility] Team notes. Spoke with SNF team.  PT/OT update bed mobility with Mod assist  x 1: transfers with CGA; unable to ambulate as of now; UB bathing and dressing with SBA; LB bathing and dressing with Max assist x 1; toileting with Max assist x 1 and dependent  for hygiene : speech is working with pt  for cognition. Medications were not reconciled and general patient assessment was not completed during this skilled nursing facility outreach.      Franny Nunez

## 2020-09-11 NOTE — PROGRESS NOTES
Late chart  Community Care Team Documentation for Patient in [facility]  Subsequent Follow up     Patient remains at [facility] ([facility]). See previous [facility] Team notes. Spoke with SNF team.  PT/OT/SP Progress and Goals & Any other important updates: Currently bed mobility with Min/Mod assist x 1 ; Transfers with min assist x 1 Gait; ambulating 30 ft with CGA,  UB and LB bathing min assist, dressing and toileting max assist x1. LCD for therapy 9/16. Disposition: Plan to return home with wife and [facility] on 9/17    Medications were not reconciled and general patient assessment was not completed during this skilled nursing facility outreach.      Yvon Rodgers RN

## 2020-09-12 NOTE — DISCHARGE SUMMARY
Physician Discharge Summary Patient ID: Landry Cornejo [id number] 
86 y.o. 
1945 Admit date: 8/11/2020 Discharge date and time: 8/20/2020 Admission Diagnoses: Cellulitis of left upper extremity [L03.114] Discharge Diagnoses/Hospital Course Cellulitis of left upper extremity: complicated hx. Records from [facility] => blood, urine, and wound cultures all negative. Dr Ben Jaffe discussed with staff at [facility] on 8/15. No wound cultures drawn there. Pt's skin is extraordinarily friable, on Eliquis. Improved with aggressive wound care/Mepilex transfer. Will need excellent wound care to prevent infection. May need to consider stopping Eliquis. Advised d/w cardiology.  
  
Chronic atrial fibrillation: on metoprolol, dilt and dig. Eliquis for now. Family will d/w cardiology re: potentially stopping due to #1. TTE with EF 55-60%   
  
Hypertension: on metoprolol and dilt  
  
COPD: home regimen continued  
  
Dyslipidemia: on statin  
  
Depression: on Zoloft Cough: noted. Coarse breath sounds likely 2/2 volume overload in the setting of a-fib. proBNP was elevated. Lasix increased with improvement in addition to nebs. CXR without acute infectious process. PCP: Nyasia Bustillo DO Consults: Cardiology, general surgery Discharge Exam: 
Visit Vitals /65 (BP 1 Location: Right arm, BP Patient Position: At rest) Pulse 91 Temp 98.2 °F (36.8 °C) Resp 18 Ht 6' (1.829 m) Wt 103.1 kg (227 lb 4.7 oz) SpO2 96% BMI 30.83 kg/m² Gen:  Well-developed, well-nourished, in no acute distress HEENT:  Sclerae nonicteric, hearing intact to voice, mucous membranes moist 
Neck:  Supple, without masses. Resp: improved but coarse breath sounds. No audible wheezing appreciated Card: irregularly irregular. HR 90's Abd:  +bowel sounds, soft, NTTP, nondistended. No HSM. Neuro:  Face symmetric, tongue midline, speech fluent, follows commands appropriately SKIN: L arm dressing C/D/I Psych:  Alert, oriented to self and hospital. Limited insight Disposition: SNF Patient Instructions:  
Discharge Medication List as of 8/20/2020  2:41 PM  
  
START taking these medications Details  
ammonium lactate (LAC-HYDRIN) 12 % lotion rub in to affected area well, No Print, Disp-1 Bottle,R-0PLEASE SEE WOUND CARE INSTRUCTIONS  
  
digoxin (LANOXIN) 0.125 mg tablet Take 1 Tab by mouth daily. , No Print, Disp-30 Tab,R-0  
  
dilTIAZem ER (CARDIZEM CD) 240 mg capsule Take 1 Cap by mouth daily. , Print, Disp-30 Cap,R-0  
  
doxycycline (VIBRA-TABS) 100 mg tablet Take 1 Tab by mouth every twelve (12) hours for 7 days. , No Print, Disp-14 Tab,R-0  
  
furosemide (LASIX) 20 mg tablet Take 1 Tab by mouth daily. , Normal, Disp-30 Tab,R-0  
  
albuterol-ipratropium (DUO-NEB) 2.5 mg-0.5 mg/3 ml nebu 3 mL by Nebulization route every six (6) hours for 7 days. , No Print, Disp-30 Nebule,R-0  
  
albuterol (ACCUNEB) 1.25 mg/3 mL nebu 3 mL by Nebulization route every two (2) hours as needed for Wheezing., No Print, Disp-30 Nebule,R-0  
  
  
CONTINUE these medications which have CHANGED Details  
traMADoL (ULTRAM) 50 mg tablet Take 1 Tab by mouth every four (4) hours as needed for Pain for up to 30 days. Max Daily Amount: 300 mg. Indications: pain, Print, Disp-30 Tab,R-0  
  
metoprolol tartrate (LOPRESSOR) 25 mg tablet Take 2 Tabs by mouth two (2) times a day., No Print, Disp-60 Tab,R-0 CONTINUE these medications which have NOT CHANGED Details  
acetaminophen (TYLENOL) 500 mg tablet Take 500 mg by mouth every four (4) hours as needed for Pain., Historical Med  
  
albuterol (PROVENTIL HFA, VENTOLIN HFA, PROAIR HFA) 90 mcg/actuation inhaler Take 2 Puffs by inhalation every four (4) hours as needed for Wheezing., Historical Med  
  
bisacodyL (DULCOLAX) 5 mg EC tablet Take 5 mg by mouth every four (4) hours as needed for Constipation. , Historical Med  
  
lactobacillus rhamnosus gg 10 billion cell (CULTURELLE) 10 billion cell capsule Take 1 Cap by mouth every twelve (12) hours as needed., Historical Med  
  
docusate sodium (COLACE) 100 mg capsule Take 100 mg by mouth two (2) times a day., Historical Med  
  
folic acid (FOLVITE) 1 mg tablet Take 1 mg by mouth daily. , Historical Med  
  
guaiFENesin ER (MUCINEX) 600 mg ER tablet Take 600 mg by mouth two (2) times a day., Historical Med  
  
loperamide (Imodium A-D) 2 mg tablet Take 2 mg by mouth every four (4) hours as needed for Diarrhea., Historical Med  
  
megestroL (MEGACE) 400 mg/10 mL (40 mg/mL) suspension Take 400 mg by mouth daily. , Historical Med  
  
nystatin (MYCOSTATIN) 100,000 unit/mL suspension Take 1 tsp by mouth four (4) times daily. swish and spit, Historical Med  
  
thiamine HCL (B-1) 100 mg tablet Take 100 mg by mouth daily. , Historical Med  
  
sertraline (ZOLOFT) 50 mg tablet Take 50 mg by mouth daily. , Historical Med  
  
apixaban (ELIQUIS) 5 mg tablet Take 1 Tab by mouth two (2) times a day., Normal, Disp-60 Tab,R-4  
  
pravastatin (PRAVACHOL) 20 mg tablet Take 20 mg by mouth nightly., Historical Med  
  
  
STOP taking these medications  
  
 ciprofloxacin HCl (CIPRO) 500 mg tablet Comments:  
Reason for Stopping:   
   
 doxycycline (ADOXA) 100 mg tablet Comments:  
Reason for Stopping:   
   
 lisinopriL (PRINIVIL, ZESTRIL) 5 mg tablet Comments:  
Reason for Stopping:   
   
 sodium chloride 1 gram tablet Comments:  
Reason for Stopping:   
   
  
 
Activity: Activity as tolerated Diet: Resume previous diet Wound Care: As directed Follow-up with Rashid Iniguez, DO Follow-up Information Follow up With Specialties Details Why Contact Info [facility] [place] [address] 
[phone] Renetta Jones MD Cardiology On 10/5/2020 9:20 am  [id number] [place], [place] [id number] 
[phone]  Kian Crooks, Sae Mayorga,  Family Medicine   [facility] [address] [place] [address] 
[phone] Approximate time spent in patient care on day of discharge: 35 minutes Signed: 
Terri Sanchez MD 
9/11/2020 
8:06 PM

## 2020-09-30 NOTE — PROGRESS NOTES
Late chart  Community Care Team Documentation for Patient in [facility]  Subsequent Follow up     Patient remains at [facility] ([facility]). See previous [facility] Team notes. Spoke with SNF team.  PT/OT/SP Progress and Goals & Any other important updates: bed mobility with S. Transfers with SBA/S. ambulating 20' x 2 with RW and CGA. UB bathing and dressing with Min A x1. Toileting with Min/Mod A x1. ADL's; mod/max    Medications were not reconciled and general patient assessment was not completed during this skilled nursing facility outreach.      Edward Meredith Rn

## 2020-09-30 NOTE — PROGRESS NOTES
Community Care Team Documentation for Patient in [facility]  Subsequent Follow up     Patient remains at [address] ([facility]). See previous [facility] Team notes. Spoke with SNF team.  PT/OT update: Currently bed mobility with SBA Transfers with CGA to Min A x 1 Standing tolerance ~2 minutes UB bathing and dressing with Min A x1 wears gowns Toileting transfers vary from CGA to Max A x1. Disposition:  ELOS 10/7, plan to discharge home with his wife and HH. Medications were not reconciled and general patient assessment was not completed during this skilled nursing facility outreach.      Yvon Rodgers

## 2020-10-05 NOTE — PROGRESS NOTES
ATTENTION:  
This medical record was transcribed using an electronic medical records/speech recognition system. Although proofread, it may and can contain electronic, spelling and other errors. Corrections may be executed at a later time. Please feel free to contact us for any clarifications as needed. ICD-10-CM ICD-9-CM 1. Persistent atrial fibrillation (HCC)  I48.19 427.31  
2. Dyslipidemia  E78.5 272.4 3. Essential hypertension  I10 401.9 Félix Batres is a 67 y.o. male with  referred for atrial fibrillation dyslipidemia hypertension. .  The patient denies chest pain/ shortness of breath, orthopnea, PND, LE edema, palpitations, syncope, presyncope or fatigue. Cardiac risk factors: smoking/ tobacco exposure, dyslipidemia, obesity, male gender, hypertension I have personally obtained the history from the patient. Ed Canchola is a pleasant gentleman who I am seeing for Dr. Ben Marie for atrial fibrillation. He was followed by Dr. Ghassan Pepper in the past.  He has a chronic history of atrial fibrillation dating back at least 15 to 20 years. He states that he cannot recall ever having attempted cardioversion. He is done well over the years and has been following Dr. Po Lai and has had no problems he states. He has not had any recent cardiac testing he cannot recall when he had a echo or a stress test. 
 
He returns after being admitted to the hospital in August with a pretty significant infection of the skin on the left forearm. Currently wrapped. He is in a nursing home I believe getting rehab. He seems rather slow and his ammonia level is elevated in the 60s at this time. He is little confused. He denies any chest discomfort but notes that over last 2 weeks he is having worsening shortness of breath. He has no PND orthopnea and only uses one pillow to raise his head at night and sometimes none.   He was smoking up until his admission to the hospital he has not smoked for some time now. He did have an echocardiogram that demonstrates normal EF at 60-65%. He is currently taking Eliquis In the past he was rather active and he was using his stationary bike and golfing regularly ACTIVE PROBLEM LIST Patient Active Problem List  
 Diagnosis Date Noted  Chronic atrial fibrillation ([facility] 08/12/2020  
 HTN (hypertension), benign 08/12/2020  COPD (chronic obstructive pulmonary disease) ([facility] 08/12/2020  Dyslipidemia 08/12/2020  Depression 08/12/2020  Cellulitis of left upper extremity 08/11/2020 PAST MEDICAL HISTORY Past Medical History:  
Diagnosis Date  Arthritis  Atrial fibrillation ([facility]  [facility] 8/12/2020  High cholesterol  History of vascular access device 08/12/2020 Rt Basilic 5fr dual PICC at 42cm arm cir 34.5cm  Hypertension PAST SURGICAL HISTORY Past Surgical History:  
Procedure Laterality Date  COLONOSCOPY N/A 9/20/2018 COLONOSCOPY performed by Geovanni Spear MD at [facility] HX COLONOSCOPY    
 HX HIP REPLACEMENT Bilateral   
 HX TONSILLECTOMY ALLERGIES Allergies Allergen Reactions  Codeine Rash FAMILY HISTORY Family History Problem Relation Age of Onset  Stroke Neg Hx   
 negative for cardiac disease SOCIAL HISTORY Social History Socioeconomic History  Marital status:  Spouse name: Not on file  Number of children: Not on file  Years of education: Not on file  Highest education level: Not on file Tobacco Use  Smoking status: Smoker, Current Status Unknown Packs/day: 0.25 Years: 60.00 Pack years: 15.00  Smokeless tobacco: Never Used Substance and Sexual Activity  Alcohol use: Yes Alcohol/week: 8.0 standard drinks Types: 8 Shots of liquor per week  Drug use: No  
Other Topics Concern MEDICATIONS Current Outpatient Medications Medication Sig  
 metoprolol tartrate (LOPRESSOR) 25 mg tablet Take 2 Tabs by mouth two (2) times a day.  ammonium lactate (LAC-HYDRIN) 12 % lotion rub in to affected area well  digoxin (LANOXIN) 0.125 mg tablet Take 1 Tab by mouth daily.  dilTIAZem ER (CARDIZEM CD) 240 mg capsule Take 1 Cap by mouth daily.  furosemide (LASIX) 20 mg tablet Take 1 Tab by mouth daily.  acetaminophen (TYLENOL) 500 mg tablet Take 500 mg by mouth every four (4) hours as needed for Pain.  bisacodyL (DULCOLAX) 5 mg EC tablet Take 5 mg by mouth every four (4) hours as needed for Constipation.  lactobacillus rhamnosus gg 10 billion cell (CULTURELLE) 10 billion cell capsule Take 1 Cap by mouth every twelve (12) hours as needed.  docusate sodium (COLACE) 100 mg capsule Take 100 mg by mouth two (2) times a day.  folic acid (FOLVITE) 1 mg tablet Take 1 mg by mouth daily.  guaiFENesin ER (MUCINEX) 600 mg ER tablet Take 600 mg by mouth two (2) times a day.  loperamide (Imodium A-D) 2 mg tablet Take 2 mg by mouth every four (4) hours as needed for Diarrhea.  megestroL (MEGACE) 400 mg/10 mL (40 mg/mL) suspension Take 400 mg by mouth daily.  nystatin (MYCOSTATIN) 100,000 unit/mL suspension Take 1 tsp by mouth four (4) times daily. swish and spit  thiamine HCL (B-1) 100 mg tablet Take 100 mg by mouth daily.  sertraline (ZOLOFT) 50 mg tablet Take 50 mg by mouth daily.  apixaban (ELIQUIS) 5 mg tablet Take 1 Tab by mouth two (2) times a day.  pravastatin (PRAVACHOL) 20 mg tablet Take 20 mg by mouth nightly.  albuterol (ACCUNEB) 1.25 mg/3 mL nebu 3 mL by Nebulization route every two (2) hours as needed for Wheezing.  albuterol (PROVENTIL HFA, VENTOLIN HFA, PROAIR HFA) 90 mcg/actuation inhaler Take 2 Puffs by inhalation every four (4) hours as needed for Wheezing. No current facility-administered medications for this visit.    
 
 
I have reviewed the nurses notes, vitals, problem list, allergy list, medical history, family, social history and medications. REVIEW OF SYMPTOMS General: Pt denies excessive weight gain or loss. Pt is able to conduct ADL's HEENT: Denies blurred vision, headaches, hearing loss, epistaxis and difficulty swallowing. Respiratory: Denies cough, congestion, shortness of breath, RODRIGUEZ, wheezing or stridor. Cardiovascular: Denies precordial pain, palpitations, edema or PND Gastrointestinal: Denies poor appetite, indigestion, abdominal pain or blood in stool Genitourinary: Denies hematuria, dysuria, increased urinary frequency Musculoskeletal: Denies joint pain or swelling from muscles or joints Neurologic: Denies tremor, paresthesias, headache, or sensory motor disturbance Psychiatric: Denies confusion, insomnia, depression Integumentray: Denies rash, itching or ulcers. Hematologic: Denies easy bruising, bleeding PHYSICAL EXAMINATION Vitals:  
 10/05/20 1001 BP: 110/78 Pulse: 78 Temp: 96.9 °F (36.1 °C) SpO2: 98% Weight: 207 lb (93.9 kg) Height: 6' (1.829 m) General: Well developed, in no acute distress. Deconditioned confused although he can give me some history HEENT: No jaundice, oral mucosa moist, no oral ulcers Neck: Supple, no stiffness, no lymphadenopathy, supple Heart: Irregular Respiratory: Rhonchi at both bases Abdomen:   Soft, non-tender, bowel sounds are active.  Protuberant Extremities:  + edema, multiple ecchymotic areas over upper and lower extremities Musculoskeletal: No clubbing, no deformities Neuro: A&Ox3, speech clear, gait stable, cooperative, no focal neurologic deficits Skin: Moderate multiple ecchymotic areas over extremities left upper extremity is wrapped and dry EKG: Atrial fibrillation with RVR   109 DIAGNOSTIC DATA 1. LE Duplex 5/1/20- Left lower extremity venous duplex negative for deep venous thrombosis or thrombophlebitis in the veins visualized. Right common femoral vein is thrombus free. 
  
INCIDENTAL FINDING: Avascular structure noted in the popliteal fossa extending into the calf measuring 13.99 x 1.36 cm consistent with a probable ruptured Baker's Cyst. Clinical correlation is recommended 2. Calcium Score 11-Left main: 99 Left anterior descendin Left circumflex: 91 
Right coronary: 192 Posterior descendin Total calcium score: 734 
 
3. Lipids 17- , HDL 40, LDL 71,  
 
4. Cath  
2011: 50 % stenosis in prox LAD, non critical stenosis 30% in RCA. 5. Echo 
20 -EF 60 - 65%, mod mitral annular calcification, mod AV sclerosis with reduced excursion but no stenosis, Mean gradient 6mmHg. , mild TR None LABORATORY DATA Lab Results Component Value Date/Time WBC 6.4 2020 03:08 AM  
 HGB 8.7 (L) 2020 03:08 AM  
 HCT 27.4 (L) 2020 03:08 AM  
 PLATELET 114  03:08 AM  
 MCV 87.3 2020 03:08 AM  
  
Lab Results Component Value Date/Time Sodium 135 (L) 2020 03:08 AM  
 Potassium 3.8 2020 03:08 AM  
 Chloride 103 2020 03:08 AM  
 CO2 24 2020 03:08 AM  
 Anion gap 8 2020 03:08 AM  
 Glucose 88 2020 03:08 AM  
 BUN 14 2020 03:08 AM  
 Creatinine 1.03 2020 03:08 AM  
 BUN/Creatinine ratio 14 2020 03:08 AM  
 GFR est AA >60 2020 03:08 AM  
 GFR est non-AA >60 2020 03:08 AM  
 Calcium 10.2 (H) 2020 03:08 AM  
 Bilirubin, total 0.5 08/15/2020 03:49 AM  
 Alk.  phosphatase 125 (H) 08/15/2020 03:49 AM  
 Protein, total 6.4 08/15/2020 03:49 AM  
 Albumin 1.6 (L) 2020 04:37 AM  
 Globulin 4.8 (H) 08/15/2020 03:49 AM  
 A-G Ratio 0.3 (L) 08/15/2020 03:49 AM  
 ALT (SGPT) 19 08/15/2020 03:49 AM  
  
 
 
 ASSESSMENT/RECOMMENDATIONS:.  
  
1. Atrial fibrillation  
-No issues with rapid heart rates seems that his rate is under good control on his current medical regimen with Cardizem. -He is a chads vas score of 3 with 3.2 risk of stroke per year and I reviewed with him the importance of being anticoagulated to reduce his risk of stroke or possible embolization to extremity. He is agreeable to go on Eliquis at 5 mg twice daily.-We will have him see Dr. posada regarding possible watchman device 2. HTN 
-Blood pressure is good on current medical regimen no adjustments 3. Dyslipidemia 
-LDL was 71 back in 2017 I have no recent cholesterol profile 4. Shortness of breath 
-Unclear if it is related to him being deconditioned and the multiple medicines he is taking. We will check a proBNP but his last echo shows normal EF. He said he had some testing done pulmonary including a CAT scan of his chest and everything look good 5. Tobacco dependency 
-He is not smoking since August. 
 
Follow up in 3 months No orders of the defined types were placed in this encounter. Follow-up and Dispositions  · Return in about 6 months (around 4/5/2021). I have discussed the diagnosis with  Kirt Ruiz and the intended plan as seen in the above orders. Questions were answered concerning future plans. I have discussed medication side effects and warnings with the patient as well. Thank you,  Corinne Bora, DO for involving me in the care of  Kirt Ruiz. Please do not hesitate to contact me for further questions/concerns. Michael Driver MD, [facility] - [address], [address]     
[phone] / [phone] Fax

## 2020-10-05 NOTE — PROGRESS NOTES
Brian Flores is a 67 y.o. male Visit Vitals /78 (BP 1 Location: Left arm, BP Patient Position: Sitting) Pulse 78 Temp 96.9 °F (36.1 °C) Ht 6' (1.829 m) Wt 207 lb (93.9 kg) SpO2 98% BMI 28.07 kg/m² Chief Complaint Patient presents with  Hypertension  Irregular Heart Beat  
  afib Chest pain no SOB yes Dizziness no Swelling no Recent hospital visit no Refills no

## 2020-10-21 NOTE — PROGRESS NOTES
Late chart  Community Care Team Documentation for Patient in [facility]  Subsequent Follow up     Patient remains at [facility] ([facility]). See previous [facility] Team notes. Spoke with SNF team. PT/OT update: Currently Bed mobility CGA;transfers: CGA; ambulating 70 feet with rolling walker and CGA; UB bathig and dressing SBA; LB bathing and dressing CGA and toileting CGA        Medications were not reconciled and general patient assessment was not completed during this skilled nursing facility outreach.      Gavino Koehler RN

## 2020-10-21 NOTE — PROGRESS NOTES
Community Care Team Documentation for Patient in [facility]  Subsequent Follow up     [facility] [address] [facility] [address] ([facility] See previous [facility] Team notes. Spoke with SNF team. PT/OT update: Currently bed mobility SBA, transfers mod assist X1; UB bathing and dressing with standby to min assist, LB bathing and dressing max assist, toileting mod assist, and feeding SBA to CGA. LTD 10/21 as pt is not making progress, Pt has no motivation to participated Disposition:  Patient will stay at [facility] until alternate pts wife can make alternate arrangements. Medications were not reconciled and general patient assessment was not completed during this skilled nursing facility outreach.      Melonie Li RN

## 2020-11-15 PROBLEM — R41.82 AMS (ALTERED MENTAL STATUS): Status: ACTIVE | Noted: 2020-01-01

## 2020-11-15 PROBLEM — N17.9 AKI (ACUTE KIDNEY INJURY) (HCC): Status: ACTIVE | Noted: 2020-01-01

## 2020-11-15 PROBLEM — N39.0 UTI (URINARY TRACT INFECTION): Status: ACTIVE | Noted: 2020-01-01

## 2020-11-15 NOTE — ED PROVIDER NOTES
EMERGENCY DEPARTMENT HISTORY AND PHYSICAL EXAM 
 
 
Date: 11/15/2020 Patient Name: Fidel Grimes History of Presenting Illness No chief complaint on file. CC: Altered mentation History Provided By: Nursing notation. HPI: Fidel Grimes, 67 y.o. male with a past medical history significant for afib, diabetes, hypertension and hyperlipidemia presents to the ED with cc of altered mentation. Patient presents from [facility] for lethargy, decreased responsiveness. As per notation, last time patient was noted to be at baseline was Friday. On my evaluation patient asleep, responds to painful stimuli, otherwise minimal responsiveness. On reviewing prior notes, patient at baseline awake, alert, responsive. Currently no significant distress noted. There are no other complaints, changes, or physical findings at this time. PCP: Vitaliy Moffett DO 
 
Current Facility-Administered Medications Medication Dose Route Frequency Provider Last Rate Last Dose  zoledronic acid (RECLAST) IVPB 5 mg  5 mg IntraVENous ONCE Nevaeh Hollingsworth MD      
 piperacillin-tazobactam (ZOSYN) 3.375 g in 0.9% sodium chloride (MBP/ADV) 100 mL MBP  3.375 g IntraVENous NOW Ephraim York MD      
 
Current Outpatient Medications Medication Sig Dispense Refill  metoprolol tartrate (LOPRESSOR) 25 mg tablet Take 2 Tabs by mouth two (2) times a day. 60 Tab 0  
 ammonium lactate (LAC-HYDRIN) 12 % lotion rub in to affected area well 1 Bottle 0  
 digoxin (LANOXIN) 0.125 mg tablet Take 1 Tab by mouth daily. 30 Tab 0  
 dilTIAZem ER (CARDIZEM CD) 240 mg capsule Take 1 Cap by mouth daily. 30 Cap 0  
 furosemide (LASIX) 20 mg tablet Take 1 Tab by mouth daily. 30 Tab 0  
 albuterol (ACCUNEB) 1.25 mg/3 mL nebu 3 mL by Nebulization route every two (2) hours as needed for Wheezing. 30 Nebule 0  
 acetaminophen (TYLENOL) 500 mg tablet Take 500 mg by mouth every four (4) hours as needed for Pain.  albuterol (PROVENTIL HFA, VENTOLIN HFA, PROAIR HFA) 90 mcg/actuation inhaler Take 2 Puffs by inhalation every four (4) hours as needed for Wheezing.  bisacodyL (DULCOLAX) 5 mg EC tablet Take 5 mg by mouth every four (4) hours as needed for Constipation.  lactobacillus rhamnosus gg 10 billion cell (CULTURELLE) 10 billion cell capsule Take 1 Cap by mouth every twelve (12) hours as needed.  docusate sodium (COLACE) 100 mg capsule Take 100 mg by mouth two (2) times a day.  folic acid (FOLVITE) 1 mg tablet Take 1 mg by mouth daily.  guaiFENesin ER (MUCINEX) 600 mg ER tablet Take 600 mg by mouth two (2) times a day.  loperamide (Imodium A-D) 2 mg tablet Take 2 mg by mouth every four (4) hours as needed for Diarrhea.  megestroL (MEGACE) 400 mg/10 mL (40 mg/mL) suspension Take 400 mg by mouth daily.  nystatin (MYCOSTATIN) 100,000 unit/mL suspension Take 1 tsp by mouth four (4) times daily. swish and spit  thiamine HCL (B-1) 100 mg tablet Take 100 mg by mouth daily.  sertraline (ZOLOFT) 50 mg tablet Take 50 mg by mouth daily.  apixaban (ELIQUIS) 5 mg tablet Take 1 Tab by mouth two (2) times a day. 60 Tab 4  pravastatin (PRAVACHOL) 20 mg tablet Take 20 mg by mouth nightly. Past History Past Medical History: 
Past Medical History:  
Diagnosis Date  Arthritis  Atrial fibrillation ([facility] [address]  COPD (chronic obstructive pulmonary disease) ([facility] [address] 8/12/2020  High cholesterol  History of vascular access device 08/12/2020 Rt Basilic 5fr dual PICC at 42cm arm cir 34.5cm  Hypertension Past Surgical History: 
Past Surgical History:  
Procedure Laterality Date  COLONOSCOPY N/A 9/20/2018 COLONOSCOPY performed by Nima Powell MD at [facility] HX COLONOSCOPY    
 HX HIP REPLACEMENT Bilateral   
 HX TONSILLECTOMY Family History: 
Family History Problem Relation Age of Onset  Stroke Neg Hx Social History: 
Social History Tobacco Use  Smoking status: Smoker, Current Status Unknown Packs/day: 0.25 Years: 60.00 Pack years: 15.00  Smokeless tobacco: Never Used Substance Use Topics  Alcohol use: Yes Alcohol/week: 8.0 standard drinks Types: 8 Shots of liquor per week  Drug use: No  
 
 
Allergies: Allergies Allergen Reactions  Codeine Rash Review of Systems Review of Systems Unable to perform ROS: Mental status change Physical Exam  
 
Physical Exam 
Constitutional:   
   General: He is not in acute distress. Appearance: He is ill-appearing and toxic-appearing. He is not diaphoretic. HENT:  
   Head: Normocephalic and atraumatic. Nose: Nose normal.  
Eyes:  
   Conjunctiva/sclera: Conjunctivae normal.  
   Pupils: Pupils are equal, round, and reactive to light. Neck: Musculoskeletal: Neck supple. Cardiovascular:  
   Rate and Rhythm: Normal rate and regular rhythm. Pulmonary:  
   Effort: Pulmonary effort is normal.  
   Breath sounds: Normal breath sounds. No wheezing. Abdominal:  
   General: Abdomen is flat. Bowel sounds are normal. There is no distension. Palpations: Abdomen is soft. Tenderness: There is no abdominal tenderness. Musculoskeletal: Normal range of motion. General: No swelling, tenderness or deformity. Lymphadenopathy:  
   Cervical: No cervical adenopathy. Skin: 
   General: Skin is warm and dry. Capillary Refill: Capillary refill takes 2 to 3 seconds. Coloration: Skin is pale. Neurological:  
   Mental Status: He is lethargic. Comments: Patient lethargic, localizes pain, does not follow commands, nonverbal.  No extremity flaccidity, equal tone, no facial asymmetry. Diagnostic Study Results Labs - Recent Results (from the past 12 hour(s)) EKG, 12 LEAD, INITIAL Collection Time: 11/15/20 12:49 PM  
Result Value Ref Range  Ventricular Rate 128 BPM  
 Atrial Rate 141 BPM  
 QRS Duration 108 ms Q-T Interval 410 ms QTC Calculation (Bezet) 598 ms Calculated R Axis 81 degrees Calculated T Axis -71 degrees Diagnosis Atrial fibrillation with rapid ventricular response with premature  
ventricular or aberrantly conducted complexes Incomplete right bundle branch block Marked ST abnormality, possible inferior subendocardial injury Abnormal ECG No previous ECGs available Confirmed by Fadi Love MD, Jose Nguyen ([phone]) on 11/15/2020 1:13:51 PM 
  
CBC WITH AUTOMATED DIFF Collection Time: 11/15/20  1:45 PM  
Result Value Ref Range WBC 20.1 (H) 4.1 - 11.1 K/uL  
 RBC 3.62 (L) 4.10 - 5.70 M/uL HGB 9.0 (L) 12.1 - 17.0 g/dL HCT 28.9 (L) 36.6 - 50.3 % MCV 79.8 (L) 80.0 - 99.0 FL  
 MCH 24.9 (L) 26.0 - 34.0 PG  
 MCHC 31.1 30.0 - 36.5 g/dL  
 RDW 19.5 (H) 11.5 - 14.5 % PLATELET 090 824 - 567 K/uL MPV 10.6 8.9 - 12.9 FL  
 NEUTROPHILS 72 32 - 75 % LYMPHOCYTES 13 12 - 49 % MONOCYTES 15 (H) 5 - 13 % EOSINOPHILS 0 0 - 7 % BASOPHILS 0 0 - 1 % IMMATURE GRANULOCYTES 0 0.0 - 0.5 % DF AUTOMATED METABOLIC PANEL, COMPREHENSIVE Collection Time: 11/15/20  1:45 PM  
Result Value Ref Range Sodium 137 136 - 145 mmol/L Potassium 3.7 3.5 - 5.1 mmol/L Chloride 104 97 - 108 mmol/L  
 CO2 22 21 - 32 mmol/L Anion gap 11 5 - 15 mmol/L Glucose 74 65 - 100 mg/dL BUN 35 (H) 6 - 20 mg/dL Creatinine 2.18 (H) 0.70 - 1.30 mg/dL BUN/Creatinine ratio 16 12 - 20 GFR est AA 36 (L) >60 ml/min/1.73m2 GFR est non-AA 30 (L) >60 ml/min/1.73m2 Calcium 13.7 (HH) 8.5 - 10.1 mg/dL Bilirubin, total 0.7 0.2 - 1.0 mg/dL AST (SGOT) 25 15 - 37 U/L  
 ALT (SGPT) 10 (L) 12 - 78 U/L Alk. phosphatase 113 45 - 117 U/L Protein, total 8.2 6.4 - 8.2 g/dL Albumin 2.2 (L) 3.5 - 5.0 g/dL Globulin 6.0 (H) 2.0 - 4.0 g/dL A-G Ratio 0.4 (L) 1.1 - 2.2    
TROPONIN I Collection Time: 11/15/20  1:45 PM  
Result Value Ref Range  Troponin-I, Qt. <0.05 <0.05 ng/mL URINALYSIS W/MICROSCOPIC Collection Time: 11/15/20  1:45 PM  
Result Value Ref Range Color Yellow/Straw Appearance Turbid (A) Clear Specific gravity 1.015 1.003 - 1.030    
 pH (UA) 5.0 5.0 - 8.0 Protein 100 (A) Negative mg/dL Glucose Negative Negative mg/dL Ketone Negative Negative mg/dL Bilirubin Negative Negative Blood Moderate (A) Negative Urobilinogen 0.1 0.1 - 1.0 EU/dL Nitrites Negative Negative Leukocyte Esterase Moderate (A) Negative WBC >100 (H) 0 - 4 /hpf  
 RBC >100 (H) 0 - 5 /hpf Bacteria 4+ (A) Negative /hpf Mucus 4+ /lpf Other Urine Micro 7 LACTIC ACID Collection Time: 11/15/20  1:45 PM  
Result Value Ref Range Lactic acid 2.6 (HH) 0.4 - 2.0 mmol/L Radiologic Studies -  
[unfilled] CT Results  (Last 48 hours)  
          
 11/15/20 1307  CT HEAD WO CONT Final result Impression:  IMPRESSION: Chronic findings as above. Narrative:  CT head without contrast:  
   
No comparison. Dose reduction optimization techniques were used for the exam.  
   
Noncontrast axial images were performed with coronal and sagittal  
reconstructions. There is cerebral cortical atrophy with chronic small vessel ischemia in the  
periventricular white matter. There are bilateral basal ganglion calcifications. There is no acute hemorrhage or other mass effect. There is no acute skull fracture. The mastoid air cells and included paranasal sinuses are clear. CXR Results  (Last 48 hours) None Medical Decision Making and ED Course I am the first provider for this patient. I reviewed the vital signs, available nursing notes, past medical history, past surgical history, family history and social history. Vital Signs-Reviewed the patient's vital signs.  
Patient Vitals for the past 12 hrs: 
 Temp Pulse Resp BP SpO2  
11/15/20 1440  (!) 122 26 115/82 100 % 11/15/20 1246 97.5 °F (36.4 °C) (!) 103 (!) 32 117/79 97 % EKG interpretation: (Preliminary) Atrial fibrillation 128 T wave inversion in lateral leads. No ST elevations Records Reviewed: Old Medical Records The patient presents with lethargy altered mentation with a differential diagnosis of sepsis, UTI, pneumonia, CVA, intracranial hemorrhage. Provider Notes (Medical Decision Making): MDM  
25-year-old male, resident [facility], presents to the emergency department for lethargy, altered mentation. Patient awake, nonverbal, localizes pain otherwise no meaningful interaction. Initial vitals at triage show a febrile patient, tachycardic to 32, saturating 97% on 2 L nasal cannula, normotensive Patient started on IV fluids, possible dehydration, will give 1 L normal saline 
basic lab work drawn including CBC, BMP, PT PTT, lactate, cultures, troponin CXR, CT head ED Course:  
Initial assessment performed. The patients presenting problems have been discussed, and they are in agreement with the care plan formulated and outlined with them. I have encouraged them to ask questions as they arise throughout their visit. ED Course as of Nov 15 1454 Sun Nov 15, 2020  
1437 Patient CT head resulted, no signs of acute intracranial hemorrhage or CVA. Basic lab work reviewed, CBC shows leukocytosis at 20,000 with monocytosis Metabolic panel significant for renal insufficiency, and hypercalcemia at 13.7, corrected for low albumin 15.1 UA shows more than 100 white blood cells moderate leuk esterase Patient currently receiving IV NS, will order zoledronic acid to treat hypercalcemia,  and zosyn to cover UTI. [PZ] 1439 Albumin(!): 2.2 [PZ] ED Course User Index [PZ] Sayra Samuel MD  
 
Discussed case with Dr. Fabby Aguilar, endorsed admission. Procedures Rosanna Nguyen MD 
Procedures Rosanna Nguyen MD 
 
 
 
Disposition Admit to hospital 
 
 
 
Diagnosis Clinical Impression: 1. Sepsis, due to unspecified organism, unspecified whether acute organ dysfunction present ([facility] [address] 2. Urinary tract infection associated with indwelling urethral catheter, initial encounter ([facility] [address] 3. Renal insufficiency 4. Hypercalcemia Attestations: 
 
Mirna Newton MD 
 
Please note that this dictation was completed with [employer], the computer voice recognition software. Quite often unanticipated grammatical, syntax, homophones, and other interpretive errors are inadvertently transcribed by the computer software. Please disregard these errors. Please excuse any errors that have escaped final proofreading. Thank you.

## 2020-11-15 NOTE — Clinical Note
Drained 375 ml of Serous pleural fluid drained and bandaid placed at access site. Speciman's sent to the lab.

## 2020-11-15 NOTE — H&P
History and Physical 
 
Patient: Suzy Barlow MRN: [id number]  SSN: [id number] YOB: 1945  Age: 67 y.o. Sex: male Subjective:  
  
Suzy Barlow is a 67 y.o. male who was sent to hospital from [facility] with reports from staff of altered mental status, lethargy, not eating and dehydration. Pt has history of atrial fibrillation,  
CAD, COPD, diastolic heart failure,last documented in 6/20 55-60%, depression and anxiety. Pt presents with elevation in lactic acid, leukocytosis, and BRE. Pt will be admitted for further evaluation and treatment. Past Medical History:  
Diagnosis Date  Arthritis  Atrial fibrillation ([facility] [address]  COPD (chronic obstructive pulmonary disease) ([facility] [address] 8/12/2020  High cholesterol  History of vascular access device 08/12/2020 Rt Basilic 5fr dual PICC at 42cm arm cir 34.5cm  Hypertension Past Surgical History:  
Procedure Laterality Date  COLONOSCOPY N/A 9/20/2018 COLONOSCOPY performed by Carmen Vanegas MD at [address] HX COLONOSCOPY    
 HX HIP REPLACEMENT Bilateral   
 HX TONSILLECTOMY Family History Problem Relation Age of Onset  No Known Problems Mother  No Known Problems Father  No Known Problems Sister  No Known Problems Brother  No Known Problems Maternal Grandmother  No Known Problems Maternal Grandfather  No Known Problems Paternal Grandmother  No Known Problems Paternal Grandfather  No Known Problems Other  Stroke Neg Hx Social History Tobacco Use  Smoking status: Unknown If Ever Smoked  Smokeless tobacco: Never Used Substance Use Topics  Alcohol use: Never Alcohol/week: 8.0 standard drinks Types: 8 Shots of liquor per week Frequency: Never Prior to Admission medications Medication Sig Start Date End Date Taking? Authorizing Provider  
metoprolol tartrate (LOPRESSOR) 25 mg tablet Take 2 Tabs by mouth two (2) times a day. 8/20/20   Rae Camargo MD  
ammonium lactate (LAC-HYDRIN) 12 % lotion rub in to affected area well 8/21/20   Rae Camargo MD  
digoxin (LANOXIN) 0.125 mg tablet Take 1 Tab by mouth daily. 8/21/20   Rae Camargo MD  
dilTIAZem ER (CARDIZEM CD) 240 mg capsule Take 1 Cap by mouth daily. 8/21/20   Rae Camargo MD  
furosemide (LASIX) 20 mg tablet Take 1 Tab by mouth daily. 8/20/20   Rae Camargo MD  
albuterol (ACCUNEB) 1.25 mg/3 mL nebu 3 mL by Nebulization route every two (2) hours as needed for Wheezing. 8/20/20   Rae Camargo MD  
acetaminophen (TYLENOL) 500 mg tablet Take 500 mg by mouth every four (4) hours as needed for Pain. Provider, Historical  
albuterol (PROVENTIL HFA, VENTOLIN HFA, PROAIR HFA) 90 mcg/actuation inhaler Take 2 Puffs by inhalation every four (4) hours as needed for Wheezing. Provider, Historical  
bisacodyL (DULCOLAX) 5 mg EC tablet Take 5 mg by mouth every four (4) hours as needed for Constipation. Provider, Historical  
lactobacillus rhamnosus gg 10 billion cell (CULTURELLE) 10 billion cell capsule Take 1 Cap by mouth every twelve (12) hours as needed. Provider, Historical  
docusate sodium (COLACE) 100 mg capsule Take 100 mg by mouth two (2) times a day. Provider, Historical  
folic acid (FOLVITE) 1 mg tablet Take 1 mg by mouth daily. Provider, Historical  
guaiFENesin ER (MUCINEX) 600 mg ER tablet Take 600 mg by mouth two (2) times a day. Provider, Historical  
loperamide (Imodium A-D) 2 mg tablet Take 2 mg by mouth every four (4) hours as needed for Diarrhea. Provider, Historical  
megestroL (MEGACE) 400 mg/10 mL (40 mg/mL) suspension Take 400 mg by mouth daily. Provider, Historical  
nystatin (MYCOSTATIN) 100,000 unit/mL suspension Take 1 tsp by mouth four (4) times daily. swish and spit    Provider, Historical  
thiamine HCL (B-1) 100 mg tablet Take 100 mg by mouth daily.     Provider, Historical sertraline (ZOLOFT) 50 mg tablet Take 50 mg by mouth daily. Provider, Historical  
apixaban (ELIQUIS) 5 mg tablet Take 1 Tab by mouth two (2) times a day. 6/5/20   Justina Stevens MD  
pravastatin (PRAVACHOL) 20 mg tablet Take 20 mg by mouth nightly. Provider, Historical  
  
 
Allergies Allergen Reactions  Codeine Rash Review of Systems: 
ROS Unable to obtain due to confusion/lethargy Objective:  
 
Vitals:  
 11/15/20 1246 11/15/20 1440 BP: 117/79 115/82 Pulse: (!) 103 (!) 122 Resp: (!) 32 26 Temp: 97.5 °F (36.4 °C) SpO2: 97% 100% Weight: 113.4 kg (250 lb) Height: 6' 2\" (1.88 m) Physical Exam: 
Physical Exam  
Constitutional: He appears well-developed. He appears lethargic. No distress. HENT:  
Head: Normocephalic and atraumatic. Eyes: Pupils are equal, round, and reactive to light. Right eye exhibits discharge. Left eye exhibits discharge. Neck: No thyromegaly present. Cardiovascular: Intact distal pulses. An irregular rhythm present. Tachycardia present. Murmur heard. Pulmonary/Chest: Effort normal and breath sounds normal. No respiratory distress. He has no wheezes. He has no rales. Abdominal: Soft. Bowel sounds are normal.  
Musculoskeletal:  
   Comments: Generalized weakness Neurological: He appears lethargic. Answers simple questions, responds to painful stimuli, weakly moves extremities Skin: No pallor. Right lower shin with ulcer, yellow drainage Sacrum reddened, no breakdown Left arm with skin tear Multiple ecchymotic, skin is friable/thin Recent Results (from the past 24 hour(s)) EKG, 12 LEAD, INITIAL Collection Time: 11/15/20 12:49 PM  
Result Value Ref Range Ventricular Rate 128 BPM  
 Atrial Rate 141 BPM  
 QRS Duration 108 ms Q-T Interval 410 ms QTC Calculation (Bezet) 598 ms Calculated R Axis 81 degrees Calculated T Axis -71 degrees Diagnosis   Atrial fibrillation with rapid ventricular response with premature  
ventricular or aberrantly conducted complexes Incomplete right bundle branch block Marked ST abnormality, possible inferior subendocardial injury Abnormal ECG No previous ECGs available Confirmed by Marty Montenegro MD, [facility] ([phone]) on 11/15/2020 1:13:51 PM 
  
CBC WITH AUTOMATED DIFF Collection Time: 11/15/20  1:45 PM  
Result Value Ref Range WBC 20.1 (H) 4.1 - 11.1 K/uL  
 RBC 3.62 (L) 4.10 - 5.70 M/uL HGB 9.0 (L) 12.1 - 17.0 g/dL HCT 28.9 (L) 36.6 - 50.3 % MCV 79.8 (L) 80.0 - 99.0 FL  
 MCH 24.9 (L) 26.0 - 34.0 PG  
 MCHC 31.1 30.0 - 36.5 g/dL  
 RDW 19.5 (H) 11.5 - 14.5 % PLATELET 518 186 - 831 K/uL MPV 10.6 8.9 - 12.9 FL  
 NEUTROPHILS 72 32 - 75 % LYMPHOCYTES 13 12 - 49 % MONOCYTES 15 (H) 5 - 13 % EOSINOPHILS 0 0 - 7 % BASOPHILS 0 0 - 1 % IMMATURE GRANULOCYTES 0 0.0 - 0.5 % DF AUTOMATED METABOLIC PANEL, COMPREHENSIVE Collection Time: 11/15/20  1:45 PM  
Result Value Ref Range Sodium 137 136 - 145 mmol/L Potassium 3.7 3.5 - 5.1 mmol/L Chloride 104 97 - 108 mmol/L  
 CO2 22 21 - 32 mmol/L Anion gap 11 5 - 15 mmol/L Glucose 74 65 - 100 mg/dL BUN 35 (H) 6 - 20 mg/dL Creatinine 2.18 (H) 0.70 - 1.30 mg/dL BUN/Creatinine ratio 16 12 - 20 GFR est AA 36 (L) >60 ml/min/1.73m2 GFR est non-AA 30 (L) >60 ml/min/1.73m2 Calcium 13.7 (HH) 8.5 - 10.1 mg/dL Bilirubin, total 0.7 0.2 - 1.0 mg/dL AST (SGOT) 25 15 - 37 U/L  
 ALT (SGPT) 10 (L) 12 - 78 U/L Alk. phosphatase 113 45 - 117 U/L Protein, total 8.2 6.4 - 8.2 g/dL Albumin 2.2 (L) 3.5 - 5.0 g/dL Globulin 6.0 (H) 2.0 - 4.0 g/dL A-G Ratio 0.4 (L) 1.1 - 2.2    
TROPONIN I Collection Time: 11/15/20  1:45 PM  
Result Value Ref Range Troponin-I, Qt. <0.05 <0.05 ng/mL BNP Collection Time: 11/15/20  1:45 PM  
Result Value Ref Range NT pro-BNP 10,632 (H) <450 pg/mL URINALYSIS W/MICROSCOPIC Collection Time: 11/15/20  1:45 PM  
Result Value Ref Range Color Yellow/Straw Appearance Turbid (A) Clear Specific gravity 1.015 1.003 - 1.030    
 pH (UA) 5.0 5.0 - 8.0 Protein 100 (A) Negative mg/dL Glucose Negative Negative mg/dL Ketone Negative Negative mg/dL Bilirubin Negative Negative Blood Moderate (A) Negative Urobilinogen 0.1 0.1 - 1.0 EU/dL Nitrites Negative Negative Leukocyte Esterase Moderate (A) Negative WBC >100 (H) 0 - 4 /hpf  
 RBC >100 (H) 0 - 5 /hpf Bacteria 4+ (A) Negative /hpf Mucus 4+ /lpf Other Urine Micro 7 LACTIC ACID Collection Time: 11/15/20  1:45 PM  
Result Value Ref Range Lactic acid 2.6 (HH) 0.4 - 2.0 mmol/L  
 
 
XR Results (maximum last 3): Results from Hospital Encounter encounter on 11/15/20 XR CHEST PORT Narrative XR CHEST PORT Comparison:  8/18/2020. Single view: The lungs are clear. No pneumothorax or pleural effusion apparent. The cardiomediastinum is unremarkable. There is no evidence of acute cardiac 
decompensation. Impression IMPRESSION:  No evidence of an acute cardiopulmonary process. Results from [facility] Encounter encounter on 08/11/20 XR CHEST PORT Narrative INDICATION:   dyspnea Portable AP upright view of the chest. 
 
Direct comparison is made to prior chest x-ray dated August 11, 2020. Cardiomediastinal silhouette is stable. Right-sided PICC line extends to mid SVC. Lungs are clear bilaterally. Pleural spaces are normal. Osseous structures 
are intact. Impression IMPRESSION: No acute cardiopulmonary disease. XR CHEST PORT Narrative EXAM: XR CHEST PORT INDICATION: dyspnea COMPARISON: 8/12/2020 FINDINGS: A portable AP radiograph of the chest was obtained at 1106 hours. The 
patient is on a cardiac monitor. Heart size mildly enlarged. Right-sided PICC 
line is in stable position. . The cardiac and mediastinal contours and pulmonary 
vascularity are normal.  The bones and soft tissues are grossly within normal 
limits. Impression IMPRESSION: No acute cardiopulmonary process CT Results (maximum last 3): Results from Hospital Encounter encounter on 11/15/20 CT HEAD WO CONT Narrative CT head without contrast: 
 
No comparison. Dose reduction optimization techniques were used for the exam. 
 
Noncontrast axial images were performed with coronal and sagittal 
reconstructions. There is cerebral cortical atrophy with chronic small vessel ischemia in the 
periventricular white matter. There are bilateral basal ganglion calcifications. There is no acute hemorrhage or other mass effect. There is no acute skull fracture. The mastoid air cells and included paranasal sinuses are clear. Impression IMPRESSION: Chronic findings as above. Results from Hospital Encounter encounter on 03/05/19 CT CHEST WO CONT Narrative INDICATION:   Solitary pulmonary nodules EXAM:  CT CHEST without CONTRAST 
 
COMPARISON:  8/27/2018 TECHNIQUE:  Thin collimation axial images were obtained through the chest 
without IV contrast administration. Coronal and sagittal reconstructions were 
obtained. CT dose reduction was achieved through use of a standardized protocol 
tailored for this examination and automatic exposure control for dose 
modulation. FINDINGS: 
  
LUNGS: Several small bilateral lung nodules measuring up to 6 mm are unchanged 
(series 3, images 39, 45, 48, 50). No new focal lung abnormality. The central 
airways are patent. LYMPH NODES: No thoracic lymphadenopathy. PLEURAL FLUID: No pleural effusion. PERICARDIAL FLUID: No pericardial effusion. THYROID: No thyroid nodule. OTHER: 2.3 cm right adrenal myelolipoma. Stable appearance of the bones Impression IMPRESSION: 
 
Small bilateral lung nodules measuring up to 6 mm are unchanged. Follow-up 
guidelines below.  
 
 RECOMMENDATIONS FOR FOLLOW-UP AND MANAGEMENT OF NODULES SMALLER THAN 8 MM 
DETECTED INCIDENTALLY AT NONSCREENING CT: LOW-RISK PATIENTS: 
 
LESS THAN OR EQUAL TO 4 MM:  No follow-up needed. GREATER THAN 4-6 MM:  Follow-up CT at 12 mo; if unchanged, no further follow-up. GREATER THAN 6-8 MM:  Initial follow-up CT at 6-12 months then at 18-24 months 
if no change. GREATER THAN 8 MM:  Follow-up CT at around 3, 9, and 24 months, dynamic 
contrast-enhanced CT, PET, and/or biopsy. HIGH-RISK PATIENTS: 
 
LESS THAN OR EQUAL TO 4 MM:  Follow-up CT at 12 months; if unchanged, no further 
follow-up. GREATER THAN 4-6 MM:  Initial follow-up CT at 6-12 months then at 18-24 months 
if no change. GREATER THAN 6-8 MM:  Initial follow-up CT at 3-6 months then at 9-12 and 24 
months if no change. GREATER THAN 8 MM:  Same as for low-risk patient. 23X Results from [facility] Encounter encounter on 08/27/18 CT LOW DOSE LUNG CANCER SCREENING Addendum Addendum: The addendum should read as follows: The impression from the original report recommended follow-up low dose CT  scan in 6 months under the first impression. The correct recommendation should  be: Follow-up should be performed in 3 months. This case was discussed with  Dr. Tara Harmon, Shayy Palomo MD 8/29/2018  3:02 PM        
 Narrative EXAM:  CT CHEST WITHOUT CONTRAST INDICATION: Screening. COMPARISON: CT cardiac scoring February 4, 2011, this scan from February 4, 2011 
does not include the entirety of the lungs. Esthela Long CONTRAST: None. TECHNIQUE: Low dose unenhanced multislice helical CT was performed from the 
thoracic inlet to the adrenal glands without intravenous contrast 
administration. Contiguous 1.25 mm axial images were reconstructed and lung and 
soft tissue windows were generated. Axial MIPS and coronal and sagital 
reformations were generated. CT dose reduction was achieved through use of a 
standardized protocol tailored for this examination and automatic exposure 
control for dose modulation. DOSE: CTDIvol  4. 3mGy FINDINGS: The lungs reveal a new nodular density medially in the right lower lobe seen on 
image 193. This measures 6 x 6 mm and has irregular margins and linear densities 
extending from this. This is solid in appearance. This was not present on the 
previous exam. 
 
There is a 3 mm nodule in the plane of the right major fissure inferiorly and 
laterally. This area was not imaged on the previous exam. Posterior left lower 
lobe is a 3 mm nodule. There is a 4 mm nodule posteriorly in left lower lobe 
seen on image 202 there is a 2 mm nodule laterally in the right middle lobe. There is a 3 mm nodule left upper lobe that is stable when compared to previous 
examination. Medially in the right middle lobe there is a nodular area of 
scarring that is probably unchanged given differences in technique when compared 
to previous exam. 
 
There are no endobronchial lesions. Normal-sized lymph nodes in the mediastinum 
and hilum are unchanged. There is severe coronary artery calcification. There is also calcification of 
the aortic valve slightly greater than expected for patient this age. This could 
be due to aortic valvular stenosis. Review of the bone windows demonstrate no destructive lesions. Ephraim Roy Impression IMPRESSION:  
1. There is a new 6 x 6 mm solid nodule seen medially in the right lower lobe as 
described above. This was not present previously. Follow-up low dose CT in 6 
months is suggested. The other small nodules can also be evaluated. 2. Severe coronary artery calcification. Additionally there is calcification 
region of the aortic valve could be related to aortic valvular stenosis. Ephraim Roy Lung-RADS Category: 4A: Suspicious: Further evaluation is suggested. Management recommendation: Recommend three-month follow-up low dose CT scan. . . 23X MRI Results (maximum last 3): Results from [facility] Encounter encounter on 05/24/18 MRI LUMB SPINE WO CONT Narrative EXAM:  MRI LUMB SPINE WO CONT HISTORY: Lumbar radiculopathy INDICATION:  Lumbar spondylosis. COMPARISON: 2/27/2012 TECHNIQUE: MR imaging of the lumbar spine was performed using the following 
sequences: sagittal T1, T2, STIR;  axial T1, T2.  
 
CONTRAST:  None. FINDINGS: 
 
Is congenital narrowing of the lumbar canal. Epidural lipomatosis. Minimal 
retrolisthesis of L5 on S1. Fully formed intervertebral disc at S1-S2. Hypertrophy of the spinous processes. Vertebral body heights are maintained. No 
focal marrow lesion. Abdominal aorta normal in caliber. Proximal common iliac 
vessels normal in caliber. Left renal cyst. 
The conus medullaris terminates at L1. Signal and caliber of the distal spinal 
cord are within normal limits. The paraspinal soft tissues are within normal limits. Lower thoracic spine: No herniation or stenosis. L1-L2:  No herniation or stenosis. L2-L3:  Disc desiccation. Disc bulge/osteophyte. Mild canal stenosis. Foramina 
are patent L3-L4:  Mild facet arthropathy. Disc bulge. Minimal canal stenosis. Foramina are 
patent L4-L5:  Facet arthropathy with ligamentum flavum hypertrophy. Disc bulge. Moderate canal stenosis. Foramina are patent L5-S1:  Minimal retrolisthesis. Mild broad-based protrusion/osteophyte. The 
canal is patent. Mild to moderate right foraminal stenosis. Impression IMPRESSION: 
Multilevel disc and facet degenerative change. Moderate canal stenosis at L4-5. Minimal/mild canal stenoses L2-3 L3-4. Mild to moderate right foraminal stenosis L5-S1. Results from [facility] Encounter encounter on 02/27/12 MRI SPINE LUMBAR WITHOUT CONTRAST Narrative **Final Report** 
  
 
ICD Codes / Adm. Diagnosis: 719.45   / PAIN IN JOINT, PELVIC REGION A Examination:  MR Charmayne Favia  - [id number] - Feb 27 2012  3:04PM 
Accession No:  [id number] Reason:  pain REPORT: 
INDICATION:  pain EXAMINATION:  MRI LUMBAR SPINE 
 
COMPARISON: None TECHNIQUE: MR imaging of the lumbar spine was performed with sagittal T1,  
T2, STIR;  axial T1, T2. Contrast was not administered. FINDINGS: 
 
There is 2 mm retrolisthesis at L5-S1. Vertebral body heights are  
maintained. There are reactive endplate changes at X3-M2 as well as at the  
left aspect of T12-L1. The conus medullaris terminates at L1. There are T2  
hyperintense structures and both kidneys, likely cysts. There is mild  
ectasia of the infrarenal abdominal aorta. There are ventral osteophytes  
arising from essentially all lumbar vertebral bodies. L1/2:  There is mild disc bulge and a small central disc protrusion without  
significant spinal canal or foraminal stenosis. L2/3:  There is diffuse disc bulge and facet hypertrophy resulting in mild  
spinal canal stenosis. There is no foraminal stenosis. L3/4:  There is diffuse disc bulge and facet hypertrophy causing mild spinal  
canal stenosis. The foramina are patent. L4/5:  Diffuse disc bulge facet hypertrophy results in mild spinal canal and  
mild bilateral foraminal stenosis. L5/S1:  Diffuse disc bulge and facet hypertrophy results in mild spinal  
canal and moderate right foraminal stenosis. There is minimal if any left  
foraminal stenosis. IMPRESSION: 
Multilevel degenerative disc disease and grade 1 retrolisthesis at L5-S1, as  
described above. Signing/Reading Doctor: Claudia Driver ([id number]) Approved: Claudia Driver ([id number])  02/27/2012 Nuclear Medicine Results (maximum last 3): No results found for this or any previous visit. US Results (maximum last 3): No results found for this or any previous visit. Active Problems: 
  UTI (urinary tract infection) (11/15/2020) BRE (acute kidney injury) ([facility] [place] [address] (11/15/2020) AMS (altered mental status) (11/15/2020) Assessment/Plan: 1. Sepsis- with leukocytosis, elevated lactic acid, IV bolus NS, continue IV fluids, Zosyn IV, BC, [facility] sent 2. Atrial fibrillation- on digoxin, cardizem, metoprolol, eliquis 3. 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 4. Generalized weakness- OT/PT/speech evaluations. DVT Prophylaxis  heparin Code Status Full POA/ Sheila Flores wife  cell  Total Time 45 minutes Signed By: Cuca Huynh NP November 15, 2020

## 2020-11-15 NOTE — ED TRIAGE NOTES
Pt to ed via ems for ams from [facility] AT [facility]. Staff unable to state last known well. Reports difficulty swallowing that was noted yesterday by n. Home staff.

## 2020-11-15 NOTE — ROUTINE PROCESS
TRANSFER - OUT REPORT: 
 
Verbal report given to Elana(name) on Katelynn Fontaine  being transferred to [facility](unit) for routine progression of care Report consisted of patients Situation, Background, Assessment and  
Recommendations(SBAR). Information from the following report(s) SBAR, MAR and Recent Results was reviewed with the receiving nurse. Lines:  
Peripheral IV 11/15/20 Right Antecubital (Active) Phlebitis Assessment 0 11/15/20 1401 Infiltration Assessment 0 11/15/20 1401 Dressing Status Clean, dry, & intact 11/15/20 1401 Dressing Type Transparent 11/15/20 1401 Opportunity for questions and clarification was provided. Patient transported with: 
 Monitor Tech

## 2020-11-16 PROBLEM — A41.9 SEPSIS (HCC): Status: ACTIVE | Noted: 2020-01-01

## 2020-11-16 PROBLEM — G93.40 ENCEPHALOPATHY ACUTE: Status: ACTIVE | Noted: 2020-01-01

## 2020-11-16 PROBLEM — E16.2 HYPOGLYCEMIA: Status: ACTIVE | Noted: 2020-01-01

## 2020-11-16 NOTE — CONSULTS
Consult NAME: Kimberly Albarran :  1945 MRN:  [id number] Date/Time:  2020 1:05 PM 
 
Patient PCP: Gomez Earl, DO 
________________________________________________________________________ This is an inpatient cardiology consultation requested by Dr. Josh Moore for atrial fibrillation with rapid ventricular response. Assessment: 1. Atrial fibrillation with rapid ventricular response. Patient has a history of chronic atrial fibrillation and patient's current rapid ventricular response appears to be due to patient's hyperadrenergic state from sepsis. Patient is borderline hypotensive and while in hospital has ruled out for myocardial infarction. Plan: 1. Continue IV amiodarone along with digoxin for rate control of atrial fibrillation. 2.  Aggressive management of sepsis. Consider IV Jose-Synephrine for blood pressure support if needed. 3.  2D echocardiogram for LV function and valvular function. 4.  Further recommendations as dictated by patient clinical course and echocardiographic findings. []        High complexity decision making was performed Subjective: CHIEF COMPLAINT: Atrial fibrillation with rapid ventricular response HISTORY OF PRESENT ILLNESS:    
This is a 50-year-old  man with a history of atrial fibrillation, hypertension, diabetes mellitus and hyperlipidemia who was brought to the emergency room from [facility] with decreased mental status and lethargy. Patient in hospital was noted to be febrile with a temperature of 102 °F and assessment thus far has been suggestive of sepsis. Patient's head CT has been unremarkable for any acute intracranial event. Cardiac enzymes have been negative for myocardial injury. Cardiology consultation was requested for patient's atrial fibrillation with rapid ventricular response.   Patient heart rate has been in the range of 130 to 140 bpm.  Patient earlier for atrial fibrillation with rapid rate response was on IV Cardizem drip which was switched to IV amiodarone due to patient's low blood pressure. Patient is nonverbal and barely responsive to painful stimuli and therefore first-line clinical history is not obtainable. Patient on nasal cannula oxygen. In no acute distress. Past Medical History:  
Diagnosis Date  Arthritis  Atrial fibrillation ([facility] [address]  COPD (chronic obstructive pulmonary disease) ([facility] [address] 8/12/2020  High cholesterol  History of vascular access device 08/12/2020 Rt Basilic 5fr dual PICC at 42cm arm cir 34.5cm  Hypertension Past Surgical History:  
Procedure Laterality Date  COLONOSCOPY N/A 9/20/2018 COLONOSCOPY performed by Alex Meadows MD at [address] HX COLONOSCOPY    
 HX HIP REPLACEMENT Bilateral   
 HX TONSILLECTOMY Allergies Allergen Reactions  Codeine Rash Meds:  See below Social History Tobacco Use  Smoking status: Unknown If Ever Smoked  Smokeless tobacco: Never Used Substance Use Topics  Alcohol use: Never Alcohol/week: 8.0 standard drinks Types: 8 Shots of liquor per week Frequency: Never Family History Problem Relation Age of Onset  No Known Problems Mother  No Known Problems Father  No Known Problems Sister  No Known Problems Brother  No Known Problems Maternal Grandmother  No Known Problems Maternal Grandfather  No Known Problems Paternal Grandmother  No Known Problems Paternal Grandfather  No Known Problems Other  Stroke Neg Hx REVIEW OF SYSTEMS:   
 [x]         Unable to obtain  ROS due to altered mental status [x]         Total of 12 systems reviewed as follows: 
 
Constitutional: negative fever, negative chills, negative weight loss Eyes:   negative visual changes ENT:   negative sore throat, tongue or lip swelling Respiratory:  negative cough, negative dyspnea Cards:  negative for chest pain, palpitations, lower extremity edema GI:   negative for nausea, vomiting, diarrhea, and abdominal pain Genitourinary: negative for frequency, dysuria Integument:  negative for rash Hematologic:  negative for easy bruising and gum/nose bleeding Musculoskel: negative for myalgias,  back pain Neurological:  negative for headaches, dizziness, vertigo, weakness Behavl/Psych: negative for feelings of anxiety, depression Pertinent Positives include : 
 
Objective:  
  
Physical Exam: 
 
Last 24hrs VS reviewed since prior progress note. Most recent are: 
 
Visit Vitals BP (!) 87/59 Pulse (!) 113 Temp (!) 102 °F (38.9 °C) Resp (!) 35 Ht 6' 2\" (1.88 m) Wt 92.1 kg (203 lb) SpO2 98% BMI 26.06 kg/m² Intake/Output Summary (Last 24 hours) at 11/16/2020 1305 Last data filed at 11/16/2020 0600 Gross per 24 hour Intake 3222.25 ml Output 1300 ml Net 1922.25 ml General Appearance: Well developed, well nourished, patient nonverbal and nonresponsive to verbal stimuli. Ears/Nose/Mouth/Throat: Pupils equal and round Neck: Supple. JVP within normal limits. Carotids good upstrokes, with no bruit. Chest: Lungs scattered rhonchi bilaterally. Cardiovascular: Tachycardic irregularly irregular, normal S1-S2, jugular venous distention absent, carotid bruits absent. Othelia Kirill Abdomen: Soft, non-tender, bowel sounds are active. No organomegaly. Extremities: No edema bilaterally. Skin: Warm and dry. []         Post-cath site without hematoma, bruit, tenderness, or thrill. Distal pulses intact. Data:  
  
Telemetry: 
 
EKG: 
[]  No new EKG for review. XR CHEST PORT Final Result IMPRESSION:  No evidence of an acute cardiopulmonary process. CT HEAD WO CONT Final Result IMPRESSION: Chronic findings as above. Prior to Admission medications Medication Sig Start Date End Date Taking?  Authorizing Provider  
metoprolol tartrate (LOPRESSOR) 25 mg tablet Take 2 Tabs by mouth two (2) times a day. 8/20/20   Mac Manzo MD  
ammonium lactate (LAC-HYDRIN) 12 % lotion rub in to affected area well 8/21/20   Mac Manzo MD  
dilTIAZem ER (CARDIZEM CD) 240 mg capsule Take 1 Cap by mouth daily. 8/21/20   Mac Manzo MD  
furosemide (LASIX) 20 mg tablet Take 1 Tab by mouth daily. 8/20/20   Mac Manzo MD  
albuterol (ACCUNEB) 1.25 mg/3 mL nebu 3 mL by Nebulization route every two (2) hours as needed for Wheezing. 8/20/20   Mac Manzo MD  
acetaminophen (TYLENOL) 500 mg tablet Take 500 mg by mouth every four (4) hours as needed for Pain. Provider, Historical  
albuterol (PROVENTIL HFA, VENTOLIN HFA, PROAIR HFA) 90 mcg/actuation inhaler Take 2 Puffs by inhalation every four (4) hours as needed for Wheezing. Provider, Historical  
bisacodyL (DULCOLAX) 5 mg EC tablet Take 5 mg by mouth every four (4) hours as needed for Constipation. Provider, Historical  
lactobacillus rhamnosus gg 10 billion cell (CULTURELLE) 10 billion cell capsule Take 1 Cap by mouth every twelve (12) hours as needed. Provider, Historical  
docusate sodium (COLACE) 100 mg capsule Take 100 mg by mouth two (2) times a day. Provider, Historical  
folic acid (FOLVITE) 1 mg tablet Take 1 mg by mouth daily. Provider, Historical  
guaiFENesin ER (MUCINEX) 600 mg ER tablet Take 600 mg by mouth two (2) times a day. Provider, Historical  
loperamide (Imodium A-D) 2 mg tablet Take 2 mg by mouth every four (4) hours as needed for Diarrhea. Provider, Historical  
megestroL (MEGACE) 400 mg/10 mL (40 mg/mL) suspension Take 400 mg by mouth daily. Provider, Historical  
nystatin (MYCOSTATIN) 100,000 unit/mL suspension Take 1 tsp by mouth four (4) times daily. swish and spit    Provider, Historical  
thiamine HCL (B-1) 100 mg tablet Take 100 mg by mouth daily. Provider, Historical  
sertraline (ZOLOFT) 50 mg tablet Take 50 mg by mouth daily. Provider, Historical  
apixaban (ELIQUIS) 5 mg tablet Take 1 Tab by mouth two (2) times a day. 6/5/20   Justina Stevens MD  
pravastatin (PRAVACHOL) 20 mg tablet Take 20 mg by mouth nightly. Provider, Historical  
 
 
Recent Results (from the past 24 hour(s)) CBC WITH AUTOMATED DIFF Collection Time: 11/15/20  1:45 PM  
Result Value Ref Range WBC 20.1 (H) 4.1 - 11.1 K/uL  
 RBC 3.62 (L) 4.10 - 5.70 M/uL HGB 9.0 (L) 12.1 - 17.0 g/dL HCT 28.9 (L) 36.6 - 50.3 % MCV 79.8 (L) 80.0 - 99.0 FL  
 MCH 24.9 (L) 26.0 - 34.0 PG  
 MCHC 31.1 30.0 - 36.5 g/dL  
 RDW 19.5 (H) 11.5 - 14.5 % PLATELET 257 019 - 606 K/uL MPV 10.6 8.9 - 12.9 FL  
 NEUTROPHILS 72 32 - 75 % LYMPHOCYTES 13 12 - 49 % MONOCYTES 15 (H) 5 - 13 % EOSINOPHILS 0 0 - 7 % BASOPHILS 0 0 - 1 % IMMATURE GRANULOCYTES 0 0.0 - 0.5 % DF AUTOMATED METABOLIC PANEL, COMPREHENSIVE Collection Time: 11/15/20  1:45 PM  
Result Value Ref Range Sodium 137 136 - 145 mmol/L Potassium 3.7 3.5 - 5.1 mmol/L Chloride 104 97 - 108 mmol/L  
 CO2 22 21 - 32 mmol/L Anion gap 11 5 - 15 mmol/L Glucose 74 65 - 100 mg/dL BUN 35 (H) 6 - 20 mg/dL Creatinine 2.18 (H) 0.70 - 1.30 mg/dL BUN/Creatinine ratio 16 12 - 20 GFR est AA 36 (L) >60 ml/min/1.73m2 GFR est non-AA 30 (L) >60 ml/min/1.73m2 Calcium 13.7 (HH) 8.5 - 10.1 mg/dL Bilirubin, total 0.7 0.2 - 1.0 mg/dL AST (SGOT) 25 15 - 37 U/L  
 ALT (SGPT) 10 (L) 12 - 78 U/L Alk. phosphatase 113 45 - 117 U/L Protein, total 8.2 6.4 - 8.2 g/dL Albumin 2.2 (L) 3.5 - 5.0 g/dL Globulin 6.0 (H) 2.0 - 4.0 g/dL A-G Ratio 0.4 (L) 1.1 - 2.2    
TROPONIN I Collection Time: 11/15/20  1:45 PM  
Result Value Ref Range Troponin-I, Qt. <0.05 <0.05 ng/mL BNP Collection Time: 11/15/20  1:45 PM  
Result Value Ref Range NT pro-BNP 10,632 (H) <450 pg/mL URINALYSIS W/MICROSCOPIC Collection Time: 11/15/20  1:45 PM  
Result Value Ref Range  Color Yellow/Straw Appearance Turbid (A) Clear Specific gravity 1.015 1.003 - 1.030    
 pH (UA) 5.0 5.0 - 8.0 Protein 100 (A) Negative mg/dL Glucose Negative Negative mg/dL Ketone Negative Negative mg/dL Bilirubin Negative Negative Blood Moderate (A) Negative Urobilinogen 0.1 0.1 - 1.0 EU/dL Nitrites Negative Negative Leukocyte Esterase Moderate (A) Negative WBC >100 (H) 0 - 4 /hpf  
 RBC >100 (H) 0 - 5 /hpf Bacteria 4+ (A) Negative /hpf Mucus 4+ /lpf Other Urine Micro 7 LACTIC ACID Collection Time: 11/15/20  1:45 PM  
Result Value Ref Range Lactic acid 2.6 (HH) 0.4 - 2.0 mmol/L  
CULTURE, BLOOD, PAIRED Collection Time: 11/15/20  1:45 PM  
 Specimen: Blood Result Value Ref Range Special Requests: No Special Requests Culture result: No growth after 16 hours TSH 3RD GENERATION Collection Time: 11/15/20  1:45 PM  
Result Value Ref Range TSH 4.39 (H) 0.36 - 3.74 uIU/mL SARS-COV-2 Collection Time: 11/15/20  5:15 PM  
Result Value Ref Range SARS-CoV-2 Please find results under separate order EKG, 12 LEAD, INITIAL Collection Time: 11/16/20 12:03 AM  
Result Value Ref Range Ventricular Rate 139 BPM  
 Atrial Rate 136 BPM  
 QRS Duration 104 ms Q-T Interval 338 ms QTC Calculation (Bezet) 514 ms Calculated R Axis 86 degrees Calculated T Axis 49 degrees Diagnosis Atrial fibrillation with rapid ventricular response Incomplete right bundle branch block ST & T wave abnormality, consider inferior ischemia ST & T wave abnormality, consider anterolateral ischemia Abnormal ECG No previous ECGs available Confirmed by Lakeisha Lee MD, Regina Fitch ([phone]) on 11/16/2020 9:23:37 AM 
  
CBC W/O DIFF Collection Time: 11/16/20  2:30 AM  
Result Value Ref Range WBC 14.9 (H) 4.1 - 11.1 K/uL  
 RBC 3.45 (L) 4.10 - 5.70 M/uL HGB 8.4 (L) 12.1 - 17.0 g/dL HCT 27.7 (L) 36.6 - 50.3 %  MCV 80.3 80.0 - 99.0 FL  
 MCH 24.3 (L) 26.0 - 34.0 PG  
 MCHC 30.3 30.0 - 36.5 g/dL  
 RDW 19.4 (H) 11.5 - 14.5 % PLATELET 597 166 - 445 K/uL MPV 10.5 8.9 - 92.1 FL  
METABOLIC PANEL, COMPREHENSIVE Collection Time: 11/16/20  2:30 AM  
Result Value Ref Range Sodium 140 136 - 145 mmol/L Potassium 3.7 3.5 - 5.1 mmol/L Chloride 110 (H) 97 - 108 mmol/L  
 CO2 20 (L) 21 - 32 mmol/L Anion gap 10 5 - 15 mmol/L Glucose 40 (LL) 65 - 100 mg/dL BUN 38 (H) 6 - 20 mg/dL Creatinine 2.10 (H) 0.70 - 1.30 mg/dL BUN/Creatinine ratio 18 12 - 20 GFR est AA 37 (L) >60 ml/min/1.73m2 GFR est non-AA 31 (L) >60 ml/min/1.73m2 Calcium 13.0 (H) 8.5 - 10.1 mg/dL Bilirubin, total 0.8 0.2 - 1.0 mg/dL AST (SGOT) 28 15 - 37 U/L  
 ALT (SGPT) 11 (L) 12 - 78 U/L Alk. phosphatase 115 45 - 117 U/L Protein, total 7.8 6.4 - 8.2 g/dL Albumin 2.1 (L) 3.5 - 5.0 g/dL Globulin 5.7 (H) 2.0 - 4.0 g/dL A-G Ratio 0.4 (L) 1.1 - 2.2 MAGNESIUM Collection Time: 11/16/20  2:30 AM  
Result Value Ref Range Magnesium 1.7 1.6 - 2.4 mg/dL PHOSPHORUS Collection Time: 11/16/20  2:30 AM  
Result Value Ref Range Phosphorus 3.7 2.6 - 4.7 mg/dL DIGOXIN Collection Time: 11/16/20  2:30 AM  
Result Value Ref Range Digoxin level 0.4 (L) 0.90 - 2.0 ng/mL Reported dose date Not provided Reported dose: Not provided Units CK Collection Time: 11/16/20  2:30 AM  
Result Value Ref Range CK 15 (L) 39 - 308 U/L  
TROPONIN I Collection Time: 11/16/20  2:30 AM  
Result Value Ref Range Troponin-I, Qt. <0.05 <0.05 ng/mL URIC ACID Collection Time: 11/16/20  2:30 AM  
Result Value Ref Range Uric acid 9.7 (H) 3.5 - 7.2 mg/dL GLUCOSE, POC Collection Time: 11/16/20  7:06 AM  
Result Value Ref Range Glucose (POC) 102 (H) 65 - 100 mg/dL Performed by [facility] Echo Results  (Last 48 hours) None Patient's  EKG and laboratory data were individually reviewed by me.  
 
Please send a copy of this dictation to above referring physician. Jasbir Castillo MD 
 
This dictation was done by Dragon computer voice recognition software. Occasionally grammatical, syntax and other interpretive errors escape final proof reading. Please feel free to call me for any clarification.

## 2020-11-16 NOTE — PROGRESS NOTES
Hospitalist Progress Note Daily Progress Note: 11/16/2020 
 
76-year-old male sent here from [facility] with reports from staff of altered mental status, lethargy, not eating and dehydration. He has a history of atrial fibrillation in rvr on amio drip. Ua suggestive uti, zosyn initiated 11/15. Beck, cr 2.18, us rp pending. Subjective: The patient is seen for follow  up. Obtunded, 
Moans if touched mostly anywhere Hypoglycemia to 40 this am, ~ 100 after d10 @ 30 started Hypotensive, giving fluid bolus, if not efficacious then will start delaney for pressor suppport. amio drip started Fever to 103.1. UCS & BLCS pending Cont on zosyn, added vanco. 
ICU day 2 Problem List: 
Problem List as of 11/16/2020 Date Reviewed: 11/15/2020 Codes Class Noted - Resolved UTI (urinary tract infection) ICD-10-CM: N39.0 ICD-9-CM: 599.0  11/15/2020 - Present BECK (acute kidney injury) ([facility] [address] ICD-10-CM: N17.9 ICD-9-CM: 584.9  11/15/2020 - Present AMS (altered mental status) ICD-10-CM: U18.45 
ICD-9-CM: 780.97  11/15/2020 - Present Chronic atrial fibrillation (HCC) ICD-10-CM: I48.20 ICD-9-CM: 427.31  8/12/2020 - Present HTN (hypertension), benign ICD-10-CM: I10 
ICD-9-CM: 401.1  8/12/2020 - Present COPD (chronic obstructive pulmonary disease) (HCC) ICD-10-CM: J44.9 ICD-9-CM: 513  8/12/2020 - Present Dyslipidemia ICD-10-CM: E78.5 ICD-9-CM: 272.4  8/12/2020 - Present Depression ICD-10-CM: F32.9 ICD-9-CM: 421  8/12/2020 - Present Cellulitis of left upper extremity ICD-10-CM: L03.114 
ICD-9-CM: 682.3  8/11/2020 - Present Medications reviewed Current Facility-Administered Medications Medication Dose Route Frequency  lactated Ringers infusion  125 mL/hr IntraVENous CONTINUOUS  
 acetaminophen (TYLENOL) suppository 650 mg  650 mg Rectal Q4H PRN  
 dextrose 10% infusion  30 mL/hr IntraVENous CONTINUOUS  
 amiodarone (CORDARONE) 900 mg/250 ml D5W infusion  0.5-1 mg/min IntraVENous TITRATE  sodium chloride 0.9 % bolus infusion 250 mL  250 mL IntraVENous ONCE  
 PHENYLephrine (FRANK-SYNEPHRINE) 30 mg in 0.9% sodium chloride 250 mL infusion   mcg/min IntraVENous TITRATE  acetaminophen (TYLENOL) tablet 650 mg  650 mg Oral Q4H PRN  
 albuterol (PROVENTIL HFA, VENTOLIN HFA, PROAIR HFA) inhaler 2 Puff  2 Puff Inhalation Q4H PRN  
 docusate sodium (COLACE) capsule 100 mg  100 mg Oral BID  folic acid (FOLVITE) tablet 1 mg  1 mg Oral DAILY  pravastatin (PRAVACHOL) tablet 20 mg  20 mg Oral QHS  sertraline (ZOLOFT) tablet 50 mg  50 mg Oral DAILY  thiamine mononitrate (B-1) tablet 100 mg  100 mg Oral DAILY  piperacillin-tazobactam (ZOSYN) 2.25 g in 0.9% sodium chloride (MBP/ADV) 50 mL MBP  2.25 g IntraVENous Q8H  
 furosemide (LASIX) injection 40 mg  40 mg IntraVENous DAILY  metoprolol (LOPRESSOR) injection 5 mg  5 mg IntraVENous Q6H  
 digoxin (LANOXIN) injection 125 mcg  125 mcg IntraVENous DAILY  ondansetron (ZOFRAN) injection 4 mg  4 mg IntraVENous Q6H PRN  
 hydrOXYzine pamoate (VISTARIL) capsule 25 mg  25 mg Oral TID PRN  
 aspirin chewable tablet 81 mg  81 mg Oral DAILY  risperiDONE (RisperDAL) tablet 2 mg  2 mg Oral QHS  chlorhexidine (PERIDEX) 0.12 % mouthwash 15 mL  15 mL Oral Q12H Review of Systems:  
Unobtainable 2/2 encephalopathic/acute illness Objective:  
Physical Exam:  
 
Visit Vitals BP (!) 87/59 Pulse (!) 113 Temp (!) 102 °F (38.9 °C) Resp (!) 35 Ht 6' 2\" (1.88 m) Wt 92.1 kg (203 lb) SpO2 98% BMI 26.06 kg/m² O2 Flow Rate (L/min): 2 l/min O2 Device: Nasal cannula Temp (24hrs), Av.4 °F (38.6 °C), Min:97.3 °F (36.3 °C), Max:103.1 °F (39.5 °C) No intake/output data recorded. 1901 -  0700 In: 3222.3 [I.V.:3222.3] Out: 1300 [Urine:1300] Constitutional: He appears well-developed. Obtunded Head: Normocephalic and atraumatic.   
Eyes: Pupils are equal, round, and reactive to light. Mouth: oral mucosa dry Neck: No thyromegaly present. Cardiovascular:Irreg/irreg Lungs:Effort normal with fair ae. No wheeze, not labored Abdominal: Soft. can't tell if tender, moans if touch him anywhere including extremities Musculoskeletal:  
   Comments: Generalized weakness Neurological: obtunded, moves all ext, 
Skin: Right lower shin with ulcer, yellow drainage Sacrum reddened, no breakdown Left arm with skin tear Multiple ecchymotic, skin is friable/thin Data Review:  
   
Recent Days: 
Recent Labs 11/16/20 
0230 11/15/20 
1345 WBC 14.9* 20.1* HGB 8.4* 9.0*  
HCT 27.7* 28.9*  
 169 Recent Labs 11/16/20 
0230 11/15/20 
1345  137  
K 3.7 3.7 * 104 CO2 20* 22 GLU 40* 74 BUN 38* 35* CREA 2.10* 2.18* CA 13.0* 13.7* MG 1.7  --   
PHOS 3.7  --   
ALB 2.1* 2.2* TBILI 0.8 0.7 ALT 11* 10* No results for input(s): PH, PCO2, PO2, HCO3, FIO2 in the last 72 hours. 24 Hour Results: 
Recent Results (from the past 24 hour(s)) CBC WITH AUTOMATED DIFF Collection Time: 11/15/20  1:45 PM  
Result Value Ref Range WBC 20.1 (H) 4.1 - 11.1 K/uL  
 RBC 3.62 (L) 4.10 - 5.70 M/uL HGB 9.0 (L) 12.1 - 17.0 g/dL HCT 28.9 (L) 36.6 - 50.3 % MCV 79.8 (L) 80.0 - 99.0 FL  
 MCH 24.9 (L) 26.0 - 34.0 PG  
 MCHC 31.1 30.0 - 36.5 g/dL  
 RDW 19.5 (H) 11.5 - 14.5 % PLATELET 551 529 - 479 K/uL MPV 10.6 8.9 - 12.9 FL  
 NEUTROPHILS 72 32 - 75 % LYMPHOCYTES 13 12 - 49 % MONOCYTES 15 (H) 5 - 13 % EOSINOPHILS 0 0 - 7 % BASOPHILS 0 0 - 1 % IMMATURE GRANULOCYTES 0 0.0 - 0.5 % DF AUTOMATED METABOLIC PANEL, COMPREHENSIVE Collection Time: 11/15/20  1:45 PM  
Result Value Ref Range Sodium 137 136 - 145 mmol/L Potassium 3.7 3.5 - 5.1 mmol/L Chloride 104 97 - 108 mmol/L  
 CO2 22 21 - 32 mmol/L Anion gap 11 5 - 15 mmol/L Glucose 74 65 - 100 mg/dL BUN 35 (H) 6 - 20 mg/dL  Creatinine 2.18 (H) 0.70 - 1.30 mg/dL BUN/Creatinine ratio 16 12 - 20 GFR est AA 36 (L) >60 ml/min/1.73m2 GFR est non-AA 30 (L) >60 ml/min/1.73m2 Calcium 13.7 (HH) 8.5 - 10.1 mg/dL Bilirubin, total 0.7 0.2 - 1.0 mg/dL AST (SGOT) 25 15 - 37 U/L  
 ALT (SGPT) 10 (L) 12 - 78 U/L Alk. phosphatase 113 45 - 117 U/L Protein, total 8.2 6.4 - 8.2 g/dL Albumin 2.2 (L) 3.5 - 5.0 g/dL Globulin 6.0 (H) 2.0 - 4.0 g/dL A-G Ratio 0.4 (L) 1.1 - 2.2    
TROPONIN I Collection Time: 11/15/20  1:45 PM  
Result Value Ref Range Troponin-I, Qt. <0.05 <0.05 ng/mL BNP Collection Time: 11/15/20  1:45 PM  
Result Value Ref Range NT pro-BNP 10,632 (H) <450 pg/mL URINALYSIS W/MICROSCOPIC Collection Time: 11/15/20  1:45 PM  
Result Value Ref Range Color Yellow/Straw Appearance Turbid (A) Clear Specific gravity 1.015 1.003 - 1.030    
 pH (UA) 5.0 5.0 - 8.0 Protein 100 (A) Negative mg/dL Glucose Negative Negative mg/dL Ketone Negative Negative mg/dL Bilirubin Negative Negative Blood Moderate (A) Negative Urobilinogen 0.1 0.1 - 1.0 EU/dL Nitrites Negative Negative Leukocyte Esterase Moderate (A) Negative WBC >100 (H) 0 - 4 /hpf  
 RBC >100 (H) 0 - 5 /hpf Bacteria 4+ (A) Negative /hpf Mucus 4+ /lpf Other Urine Micro 7 LACTIC ACID Collection Time: 11/15/20  1:45 PM  
Result Value Ref Range Lactic acid 2.6 (HH) 0.4 - 2.0 mmol/L  
CULTURE, BLOOD, PAIRED Collection Time: 11/15/20  1:45 PM  
 Specimen: Blood Result Value Ref Range Special Requests: No Special Requests Culture result: No growth after 16 hours TSH 3RD GENERATION Collection Time: 11/15/20  1:45 PM  
Result Value Ref Range TSH 4.39 (H) 0.36 - 3.74 uIU/mL SARS-COV-2 Collection Time: 11/15/20  5:15 PM  
Result Value Ref Range SARS-CoV-2 Please find results under separate order EKG, 12 LEAD, INITIAL  Collection Time: 11/16/20 12:03 AM  
Result Value Ref Range Ventricular Rate 139 BPM  
 Atrial Rate 136 BPM  
 QRS Duration 104 ms Q-T Interval 338 ms QTC Calculation (Bezet) 514 ms Calculated R Axis 86 degrees Calculated T Axis 49 degrees Diagnosis Atrial fibrillation with rapid ventricular response Incomplete right bundle branch block ST & T wave abnormality, consider inferior ischemia ST & T wave abnormality, consider anterolateral ischemia Abnormal ECG No previous ECGs available Confirmed by Merle Garcia MD, Cosme Overcast ([phone]) on 11/16/2020 9:23:37 AM 
  
CBC W/O DIFF Collection Time: 11/16/20  2:30 AM  
Result Value Ref Range WBC 14.9 (H) 4.1 - 11.1 K/uL  
 RBC 3.45 (L) 4.10 - 5.70 M/uL HGB 8.4 (L) 12.1 - 17.0 g/dL HCT 27.7 (L) 36.6 - 50.3 % MCV 80.3 80.0 - 99.0 FL  
 MCH 24.3 (L) 26.0 - 34.0 PG  
 MCHC 30.3 30.0 - 36.5 g/dL  
 RDW 19.4 (H) 11.5 - 14.5 % PLATELET 806 724 - 958 K/uL MPV 10.5 8.9 - 16.2 FL  
METABOLIC PANEL, COMPREHENSIVE Collection Time: 11/16/20  2:30 AM  
Result Value Ref Range Sodium 140 136 - 145 mmol/L Potassium 3.7 3.5 - 5.1 mmol/L Chloride 110 (H) 97 - 108 mmol/L  
 CO2 20 (L) 21 - 32 mmol/L Anion gap 10 5 - 15 mmol/L Glucose 40 (LL) 65 - 100 mg/dL BUN 38 (H) 6 - 20 mg/dL Creatinine 2.10 (H) 0.70 - 1.30 mg/dL BUN/Creatinine ratio 18 12 - 20 GFR est AA 37 (L) >60 ml/min/1.73m2 GFR est non-AA 31 (L) >60 ml/min/1.73m2 Calcium 13.0 (H) 8.5 - 10.1 mg/dL Bilirubin, total 0.8 0.2 - 1.0 mg/dL AST (SGOT) 28 15 - 37 U/L  
 ALT (SGPT) 11 (L) 12 - 78 U/L Alk. phosphatase 115 45 - 117 U/L Protein, total 7.8 6.4 - 8.2 g/dL Albumin 2.1 (L) 3.5 - 5.0 g/dL Globulin 5.7 (H) 2.0 - 4.0 g/dL A-G Ratio 0.4 (L) 1.1 - 2.2 MAGNESIUM Collection Time: 11/16/20  2:30 AM  
Result Value Ref Range Magnesium 1.7 1.6 - 2.4 mg/dL PHOSPHORUS Collection Time: 11/16/20  2:30 AM  
Result Value Ref Range Phosphorus 3.7 2.6 - 4.7 mg/dL DIGOXIN  
 Collection Time: 11/16/20  2:30 AM  
Result Value Ref Range Digoxin level 0.4 (L) 0.90 - 2.0 ng/mL Reported dose date Not provided Reported dose: Not provided Units CK Collection Time: 11/16/20  2:30 AM  
Result Value Ref Range CK 15 (L) 39 - 308 U/L  
TROPONIN I Collection Time: 11/16/20  2:30 AM  
Result Value Ref Range Troponin-I, Qt. <0.05 <0.05 ng/mL URIC ACID Collection Time: 11/16/20  2:30 AM  
Result Value Ref Range Uric acid 9.7 (H) 3.5 - 7.2 mg/dL GLUCOSE, POC Collection Time: 11/16/20  7:06 AM  
Result Value Ref Range Glucose (POC) 102 (H) 65 - 100 mg/dL Performed by [facility] Assessment/  
 
Patient Active Problem List  
Diagnosis Code  Cellulitis of left upper extremity L03.114  
 Chronic atrial fibrillation (HCC) I48.20  
 HTN (hypertension), benign I10  
 COPD (chronic obstructive pulmonary disease) ([facility]) J44.9  Dyslipidemia E78.5  Depression F32.9  
 UTI (urinary tract infection) N39.0  BECK (acute kidney injury) ([facility] [address] N17.9  AMS (altered mental status) R41.82  
   
--Severe Sepsis: fever, la 2.6, hypotension, hypoglycemia- uti? Blcs/ucs pending, fever to 103.1 this am 
--Hypoglycemia: likely sepsis related- now on d10 @ 30 
--Afib rvr- amio drip onitiated 
--Beck/Dehydration 
--Encephalopathy, sepsis --Suspect uti 
--Hx essential hptn 
--Hx copd, no apparent exacerbation Plan: 
Cont icu Continue d10 for now at 30/hr, accuchecks q4 and prn, 
Ns @ 125/hr IV delaney if needed for pressor support, if not responding to bolus Hold lasix,monitor volume-hx chf 
Amio drip per cardio + dig Retroperitoneal us Cont zosyn Add vanco, pharmacy to dose Follow ucs/blcs VTEP: Heparin sq Full code Dispo pending Course, came from [facility] Care Plan discussed with: Patient/Family and Nurse Total time spent with patient: 30 minutes.  
 
Zoila Busch MD

## 2020-11-16 NOTE — PROGRESS NOTES
Pt. Arrived confused and unable to talk to me. Made comfortable in bed. Pt. Yelling out at times. Monitor tech called to say heart rate 90 to 150. Called  and received orders. Then called Dr. Angelique Ortega, for consult. orders given. Pt. B/P to low to support a Cardizem drip. Will monitor, orders put in. Cardiac nurse here to evaluate pt. Situation.

## 2020-11-16 NOTE — PROGRESS NOTES
Physician Progress Note PATIENTNirmal Burrows 
[facility] #:                  [id number] :                       1945 ADMIT DATE:       11/15/2020 12:44 PM 
[address] [place] DATE: 
RESPONDING 
PROVIDER #:        BUSTER GARCIA NP 
 
 
 
 
QUERY TEXT: 
 
Dear Ms. Jose Mcfarlane: 
 
Pt admitted with UTI. Pt noted to have vargas catheter POA per ED documentation. If possible, please document in the progress notes and discharge summary if you are evaluating and/or treating any of the following: The medical record reflects the following: 
Risk Factors: 67 M from NH with dementia, afib, sepsis UTI Clinical Indicators: UA + UTI, ED documentation notes vargas catheter POA Treatment: IV Zosyn and IV Vancomycin Thank you, ELDA Hancock, RN Clinical  
[phone] Options provided: 
-- UTI due to chronic indwelling urinary catheter 
-- UTI not due to indwelling urinary catheter 
-- Other - I will add my own diagnosis -- Disagree - Not applicable / Not valid -- Disagree - Clinically unable to determine / Unknown 
-- Refer to Clinical Documentation Reviewer PROVIDER RESPONSE TEXT: 
 
UTI is not due to the indwelling urinary catheter.  
 
Query created by: Stevie Romero on 2020 4:12 PM 
 
 
Electronically signed by:  Cedric Garcia NP 2020 4:15 PM

## 2020-11-16 NOTE — ROUTINE PROCESS
Arrived to unit to start ordered cardizem drip, noticed pt had systolic blood pressures under 100. Called Dr. Emanuel Mabry back to notify, ordered to cancel drip and give IV metoprolol if blood pressure can tolerate. Then tele notified HR continues to go up into 140s-160s and sustain, rate at 0015 was 101-180 when measured. Called Dr. Emanuel Mabry back, received orders to transfer pt to CVICU and start cardizem drip as planned and for ICU nurse to contact him about further orders. Notified Nursing supervisor and primary RN.

## 2020-11-16 NOTE — ROUTINE PROCESS
PT eval order received and acknowledged, however, patient transferred to ICU from [facility] due to medical decline prior to completion of evaluation. PT eval orders will be discontinued and will need new PT eval order once pt medically stable for evaluation. Thank you.

## 2020-11-16 NOTE — PROGRESS NOTES
Spoke with nsg, reports pt not arousable to participate in bedside sw eval at this time. Nsg reports will contact clinician if pt becomes more arousable. Will cont to follow. Addition to above: per discussion with Dr. Devin Reddy, will cont current SLP consult after transfer to ICU. Will plan to f/u 11- if pt appropriate to participate.

## 2020-11-16 NOTE — PROGRESS NOTES
Consult for Vancomycin Dosing by Pharmacy by Dr. Ioana Zeng Consult provided for this 67y.o. year old male , for indication of Sepsis Day of Therapy: 01 
Goal of Level(s): 15-20mcg/dL Other Current Antibiotics: Zosyn Significant Cultures:  
    Date                             Culture                                       Organism 11/5                              Blood                                          Pending Results Procedure Component Value Units Date/Time MRSA SCREEN - PCR (NASAL) [[id number]] Order Status:  Sent CULTURE, URINE [[id number]] Collected:  11/15/20 1600 Order Status:  Completed Specimen:  Urine Updated:  11/16/20 1040 CULTURE, BLOOD, PAIRED [[id number]] Collected:  11/15/20 1345 Order Status:  Completed Specimen:  Blood Updated:  11/16/20 0818 Special Requests: No Special Requests Culture result: No growth after 16 hours Serum Creatinine Creatinine Date Value Ref Range Status 11/16/2020 2.10 (H) 0.70 - 1.30 mg/dL Final  
11/15/2020 2.18 (H) 0.70 - 1.30 mg/dL Final  
08/20/2020 1.03 0.70 - 1.30 MG/DL Final  
  
Creatinine Clearance Estimated Creatinine Clearance: 34.8 mL/min (A) (based on SCr of 2.1 mg/dL (H)). BUN Lab Results Component Value Date/Time BUN 38 (H) 11/16/2020 02:30 AM  
  
WBC Lab Results Component Value Date/Time WBC 14.9 (H) 11/16/2020 02:30 AM  
  
Temp (!) 102 °F (38.9 °C) New Regimen: Pt will receive Vancomycin 1250mg IV today. Trandom Trough level has been ordered for tomorrow AM 
Pharmacy to follow daily and will make changes to dose and/or frequency based on clinical status. _________________________________ Pharmacist MARIVEL GalanD

## 2020-11-16 NOTE — ED NOTES
Assumed care of patient. Patient resting on bed with eyes closed; awakens with verbal.  No acute distress; no respiratory distress.

## 2020-11-16 NOTE — PROGRESS NOTES
Four eyes skin assessment completed with Billie Crespo RN. Patient has multiple scattered abrasions, bruises, and scabbings on all extremities, skin tears on left upper arm and right shin.

## 2020-11-17 NOTE — PROGRESS NOTES
Consult for Vancomycin Dosing by Pharmacy by Dr. Neri Figueroa Consult provided for this 67y.o. year old male , for indication of Sepsis Day of Therapy:2 Goal of Level(s): 15-20mcg/dL Other Current Antibiotics: ceftriaxone,unasyn, and acyclovir for meningeal encephalitis Significant Cultures:  
     
Results Procedure Component Value Units Date/Time MRSA SCREEN - PCR (NASAL) [[id number]] Order Status:  Sent CULTURE, URINE [[id number]] Collected:  11/15/20 1600 Order Status:  Completed Specimen:  Urine Updated:  11/16/20 1040 CULTURE, BLOOD, PAIRED [[id number]] Collected:  11/15/20 1345 Order Status:  Completed Specimen:  Blood Updated:  11/16/20 0818 Special Requests: No Special Requests Culture result: No growth after 16 hours Serum Creatinine Creatinine Date Value Ref Range Status 11/17/2020 2.04 (H) 0.70 - 1.30 mg/dL Final  
11/16/2020 2.10 (H) 0.70 - 1.30 mg/dL Final  
11/15/2020 2.18 (H) 0.70 - 1.30 mg/dL Final  
  
Creatinine Clearance Estimated Creatinine Clearance: 35.8 mL/min (A) (based on SCr of 2.04 mg/dL (H)). BUN Lab Results Component Value Date/Time BUN 36 (H) 11/17/2020 05:00 AM  
  
WBC Lab Results Component Value Date/Time WBC 20.7 (H) 11/17/2020 05:00 AM  
  
Temp 98 °F (36.7 °C) Last Level:  
Vancomycin,trough Date Value Ref Range Status 08/13/2020 14.6 (H) 5.0 - 10.0 ug/mL Final  
  Comment:  
      
Trough levels of 15-20 ug/mL should be targeted for patients with coagulase negative Staphylococcus and MRSA pneumonia, endocarditis, osteomyelitis, meningitis, and bacteremia, as well as patients not responding to lower levels. Trough levels of 10-15 ug/mL for infections from other sources (e.g. urinary tract, cellultis) are appropriate. All patients receiving concomitant nephrotoxic therapies should have their funtion closely monitored regardless of peak or trough levels. Vancomycin, random Date Value Ref Range Status 11/17/2020 13.4 ug/mL Final  
  Comment: No reference range has been established. Ideal body weight: 82.2 kg (181 lb 3.5 oz) Adjusted ideal body weight: 86.2 kg (189 lb 14.9 oz) Pharmacy to dose Vancomycin 1250mg today at 10am . Random level scheduled for 11/18/2020 at 0600 Pharmacy to follow daily and will make changes to dose and/or frequency based on clinical status. _________________________________ Pharmacist LINSEY Hallman [facility]

## 2020-11-17 NOTE — PROGRESS NOTES
Physician Progress Note Paradise Carr 
[facility] #:                  [id number] :                       1945 ADMIT DATE:       11/15/2020 12:44 PM 
[address] [place] DATE: 
RESPONDING 
PROVIDER #:        Pablo Fisher MD 
 
 
 
 
QUERY TEXT: 
 
Dear Dr. Sugey Isidro: 
 
Pt admitted with sepsis, UTI. Pt noted to have drop in Hemoglobin, Hypotension, require pressor support and blood transfusions. If possible, please document in the progress notes and discharge summary if you are evaluating and/or treating any of the following: The medical record reflects the following: 
Risk Factors: 67 M admitted with UTI and sepsis Clinical Indicators: Hgb 9.0 to 6.8; BPs 86/49, 88/59 Treatment: Levohed gtt, PRBC transfusion, insertion of CVC, ICU care and monitoring Thank you, Isabelle Urena, BSN, RN Clinical  
[phone] Options provided: 
-- Cardiogenic Shock -- Hemorrhagic Shock 
-- Septic Shock -- Hypovolemic Shock -- Hypovolemia without Shock -- Hypotension without Shock 
-- Other - I will add my own diagnosis -- Disagree - Not applicable / Not valid -- Disagree - Clinically unable to determine / Unknown 
-- Refer to Clinical Documentation Reviewer PROVIDER RESPONSE TEXT: 
 
This patient has Septic Shock.  
 
Query created by: Medardo Began on 2020 12:31 PM 
 
 
Electronically signed by:  Pablo Fisher MD 2020 12:45 PM

## 2020-11-17 NOTE — PROGRESS NOTES
Updated wife, Vicente Raymond [phone] Wife states nl functioning prior to admit to [facility] 7/4/20. Prolonged admit secondary to cellulitis, uti. Mentation waxing and waning since then and not recovered to prior level of functioning since. She states mri brain=atrophy, neuro commented perhaps on dementia progressed with acute illness. PSA added.

## 2020-11-17 NOTE — PROGRESS NOTES
Chart reviewed, spoke with nsg and Dr. Lacie Stewart. Pt remains inappropriate for bedside sw eval at this time. Per discussion with Dr. Lacie Stewart, will d/c SLP consult at this time. Please reconsult when pt's status improves.

## 2020-11-17 NOTE — PROGRESS NOTES
Reason for Admission:   UTI; AMS; and BRE 
               
RUR Score:  23% MODERATE    
          
PCP: First and Last name:  Leroy Aguirre DO Name of Practice: [facility] Are you a current patient: Yes/No: Yes Approximate date of last visit: June 3rd, 2020 Can you participate in a virtual visit if needed: No 
 
Do you (patient/family) have any concerns for transition/discharge? Spouse informed SW patient has a diagnosis of Dementia. Plan for utilizing home health:   No prior HH in the past.  Not currently open w/any [facility] agency at this time. Current Advanced Directive/Advance Care Plan:  FULL Code. Spouse is the primary decision maker on the patient's behalf. Mrs. Bj Hinkle can be reached @ [phone] (cell) or [phone] (home). Transition of Care Plan:   
 
Recently inpatient hospital admission [facility] 10/26/2020 and discharged on 11/9/2020. Discharge plan sent patient to SNF ([address] [place] [address]). Patient was at SNF x 5 days before admission to inpatient. Lives w/spouse in a two story home w/three steps to enter the front door. Last seen in PCP office June 3rd, 2020. No upcoming scheduled appointment's w/PCP office. Patient drives. Requires assistance w/ALD's & IADL's from staff at nursing home. Prior SNF (Aug and Sep 2020) patient was at [facility] x two mos. No prior IRF in the past.  Spouse desires for her  to return to [address] [place] [address], at time of discharge. Gave verbal consent for writer to send referral on her 's behalf. Referral to be sent via [facility]. Spouse is the primary decision maker for the patient. Mrs. Bj Hinkle, can be reached @ [phone] (cell) or [phone] (home). SW to continue to monitor re: discharge needs and disposition. Care Management Interventions PCP Verified by CM:  Yes Last Visit to PCP: 06/03/20 Mode of Transport at Discharge: Other (see comment)(Transport) Transition of Care Consult (CM Consult): Discharge Planning Discharge Durable Medical Equipment: (DME (cane)) Current Support Network: Lives with Spouse, Own Home(Two story home w/three steps to enter the front door. ) The Patient and/or Patient Representative was Provided with a Choice of Provider and Agrees with the Discharge Plan?: Yes Discharge Location Discharge Placement: Skilled nursing facility [facility]

## 2020-11-17 NOTE — PROCEDURES
Interventional Radiology Brief Procedure Note Patient: Annia Garcia MRN: [id number]  SSN: [id number] YOB: 1945  Age: 67 y.o. Sex: male Date of Procedure: 11/17/2020 Pre-Procedure Diagnosis: hypotension Post-Procedure Diagnosis: SAME Procedure(s): Temporary Central Venous Catheter Brief Description of Procedure: Bedside US guided central venous catheter placed Performed By: Kamila Mcclain DO Assistants: None Anesthesia: Lidocaine Estimated Blood Loss: Less than 10ml Specimens: None Implants: Temporary Venous Catheter Findings: Patent RIJV Complications: None

## 2020-11-17 NOTE — CONSULTS
Consult Date: 11/17/2020 Consults:  Sepsis Subjective This is a 67year old WM, brought by EMS from [facility] for mental status changes and difficulty swallowing. He was afebrile but tachycardic and tachypneic. WBC was 20,100 with abnormal U/A showing marked pyuria and bacteria. CRP and procalcitonin also markedly elevated. CXR showed essentially clear lungs. CT Head showed no acute intracranial findings. Blood and urine cultures sent and patient started on IV Vancomycin and Unasyn for sepsis. ID has been consulted for this reason. GI consulted for possible GI bleed and Cardiology for A-fib. Patient seen in the ICU. He is somnolent at this time and unable to provide any history. Past Medical History:  
Diagnosis Date  Arthritis  Atrial fibrillation ([facility] [address]  COPD (chronic obstructive pulmonary disease) ([facility] [address] 8/12/2020  High cholesterol  History of vascular access device 08/12/2020 Rt Basilic 5fr dual PICC at 42cm arm cir 34.5cm  Hypertension Past Surgical History:  
Procedure Laterality Date  COLONOSCOPY N/A 9/20/2018 COLONOSCOPY performed by Marko Gomez MD at [address] [place] HX COLONOSCOPY    
 HX HIP REPLACEMENT Bilateral   
 HX TONSILLECTOMY Family History Problem Relation Age of Onset  No Known Problems Mother  No Known Problems Father  No Known Problems Sister  No Known Problems Brother  No Known Problems Maternal Grandmother  No Known Problems Maternal Grandfather  No Known Problems Paternal Grandmother  No Known Problems Paternal Grandfather  No Known Problems Other  Stroke Neg Hx Social History Tobacco Use  Smoking status: Unknown If Ever Smoked  Smokeless tobacco: Never Used Substance Use Topics  Alcohol use: Never Alcohol/week: 8.0 standard drinks Types: 8 Shots of liquor per week Frequency: Never Current Facility-Administered Medications Medication Dose Route Frequency Provider Last Rate Last Dose  
 0.9% sodium chloride infusion 250 mL  250 mL IntraVENous PRN Alex Moise MD      
 pantoprazole (PROTONIX) 40 mg in 0.9% sodium chloride 10 mL injection  40 mg IntraVENous Q12H Alex Moise MD      
 cefTRIAXone (ROCEPHIN) 2 g in 0.9% sodium chloride (MBP/ADV) 50 mL MBP  2 g IntraVENous Q12H Alex Moise MD      
 ampicillin-sulbactam (UNASYN) 3 g in 0.9% sodium chloride (MBP/ADV) 100 mL MBP  3 g IntraVENous Naz Looney  mL/hr at 11/17/20 0841 3 g at 11/17/20 0841  
 NOREPINephrine (LEVOPHED) 8 mg in 5% dextrose 250mL (32 mcg/mL) infusion  0.5-16 mcg/min IntraVENous TITRATE Alex Moise MD 18.8 mL/hr at 11/17/20 0906 10 mcg/min at 11/17/20 7580  potassium chloride 10 mEq in 100 ml IVPB  10 mEq IntraVENous Laura Martin  mL/hr at 11/17/20 0844 10 mEq at 11/17/20 1810  acyclovir (ZOVIRAX) 800 mg in 0.9% sodium chloride 250 mL IVPB  10 mg/kg (Ideal) IntraVENous Q12H Naz Leyva MD      
 vancomycin (VANCOCIN) 1,250 mg in 0.9% sodium chloride 250 mL (VIAL-MATE)  1,250 mg IntraVENous Carlos Knott MD      
 [START ON 11/18/2020] vancomycin random level scheduled for 0600 on 11/18/2020   Other ONCE Cl PONCE MD      
 acetaminophen (TYLENOL) suppository 650 mg  650 mg Rectal Q4H PRN Cl PONCE MD   650 mg at 11/16/20 1225  dextrose 10% infusion  30 mL/hr IntraVENous CONTINUOUS Alex Moise MD 30 mL/hr at 11/16/20 0741 30 mL/hr at 11/16/20 0741  
 amiodarone (CORDARONE) 900 mg/250 ml D5W infusion  0.5-1 mg/min IntraVENous TITRATE Lamonte Pina MD 16.7 mL/hr at 11/16/20 1736 1 mg/min at 11/16/20 1736  
 0.9% sodium chloride infusion  125 mL/hr IntraVENous CONTINUOUS Cl PONCE  mL/hr at 11/17/20 0750 125 mL/hr at 11/17/20 0750  VANCOMYCIN INFORMATION NOTE 1 Each  1 Each Other Rx Dosing/Monitoring Kayley Vanegas MD      
 acetaminophen (TYLENOL) tablet 650 mg  650 mg Oral Q4H PRN Rom Herring NP      
 albuterol (PROVENTIL HFA, VENTOLIN HFA, PROAIR HFA) inhaler 2 Puff  2 Puff Inhalation Q4H PRN Rom Herring NP      
 docusate sodium (COLACE) capsule 100 mg  100 mg Oral BID Rom Herring NP   Stopped at 11/36/86 1941  
 folic acid (FOLVITE) tablet 1 mg  1 mg Oral DAILY Rom Herring NP   Stopped at 11/16/20 0900  pravastatin (PRAVACHOL) tablet 20 mg  20 mg Oral QHS Rom Herring NP   20 mg at 11/16/20 2232  sertraline (ZOLOFT) tablet 50 mg  50 mg Oral DAILY Rom Herring NP      
 thiamine mononitrate (B-1) tablet 100 mg  100 mg Oral DAILY Rom Herring NP   Stopped at 11/16/20 0900  digoxin (LANOXIN) injection 125 mcg  125 mcg IntraVENous DAILY Rom Herring NP   125 mcg at 11/17/20 7759  ondansetron (ZOFRAN) injection 4 mg  4 mg IntraVENous Q6H PRN Rom Herring NP      
 hydrOXYzine pamoate (VISTARIL) capsule 25 mg  25 mg Oral TID PRN Rom Herring NP   25 mg at 11/15/20 2217  risperiDONE (RisperDAL) tablet 2 mg  2 mg Oral QHS Rom Herring NP   2 mg at 11/16/20 2232  chlorhexidine (PERIDEX) 0.12 % mouthwash 15 mL  15 mL Oral Q12H Rom Herring NP   Stopped at 11/16/20 0900 Review of Systems Unable to perform ROS: Mental status change Objective Vital signs for last 24 hours: 
Visit Vitals BP (!) 110/92 Pulse 84 Temp 98 °F (36.7 °C) Resp 27 Ht 6' 2\" (1.88 m) Wt 203 lb (92.1 kg) SpO2 100% BMI 26.06 kg/m² Intake/Output this shift: 
Current Shift: No intake/output data recorded. Last 3 Shifts: 11/15 1901 - 11/17 0700 In: 2122.3 [I.V.:2122.3] Out: 2650 [Urine:2650] Data Review:  
Recent Results (from the past 24 hour(s)) GLUCOSE, POC Collection Time: 11/16/20  2:47 PM  
Result Value Ref Range Glucose (POC) 83 65 - 100 mg/dL  Performed by MARY LOU PRATT PANEL, BASIC Collection Time: 11/17/20  5:00 AM  
Result Value Ref Range Sodium 146 (H) 136 - 145 mmol/L Potassium 2.6 (LL) 3.5 - 5.1 mmol/L Chloride 119 (H) 97 - 108 mmol/L  
 CO2 16 (L) 21 - 32 mmol/L Anion gap 11 5 - 15 mmol/L Glucose 61 (L) 65 - 100 mg/dL BUN 36 (H) 6 - 20 mg/dL Creatinine 2.04 (H) 0.70 - 1.30 mg/dL BUN/Creatinine ratio 18 12 - 20 GFR est AA 39 (L) >60 ml/min/1.73m2 GFR est non-AA 32 (L) >60 ml/min/1.73m2 Calcium 11.8 (H) 8.5 - 10.1 mg/dL CBC WITH AUTOMATED DIFF Collection Time: 11/17/20  5:00 AM  
Result Value Ref Range WBC 20.7 (H) 4.1 - 11.1 K/uL  
 RBC 2.86 (L) 4.10 - 5.70 M/uL HGB 6.8 (L) 12.1 - 17.0 g/dL HCT 23.2 (L) 36.6 - 50.3 % MCV 81.1 80.0 - 99.0 FL  
 MCH 23.8 (L) 26.0 - 34.0 PG  
 MCHC 29.3 (L) 30.0 - 36.5 g/dL  
 RDW 19.9 (H) 11.5 - 14.5 % PLATELET 399 (L) 536 - 400 K/uL MPV 10.4 8.9 - 12.9 FL  
 NRBC 0.0 0  WBC ABSOLUTE NRBC 0.00 0.00 - 0.01 K/uL  
 DF AUTOMATED NEUTROPHILS 75 32 - 75 % LYMPHOCYTES 9 (L) 12 - 49 % MONOCYTES 16 (H) 5 - 13 % EOSINOPHILS 0 0 - 7 % BASOPHILS 0 0 - 1 % IMMATURE GRANULOCYTES 0 %  
 ABS. NEUTROPHILS 15.5 (H) 1.8 - 8.0 K/UL  
 ABS. LYMPHOCYTES 1.9 0.8 - 3.5 K/UL  
 ABS. MONOCYTES 3.3 (H) 0.0 - 1.0 K/UL  
 ABS. EOSINOPHILS 0.0 0.0 - 0.4 K/UL  
 ABS. BASOPHILS 0.0 0.0 - 0.1 K/UL  
 ABS. IMM. GRANS. 0.0 K/UL  
 RBC COMMENTS Normocytic, Normochromic C REACTIVE PROTEIN, QT Collection Time: 11/17/20  5:00 AM  
Result Value Ref Range C-Reactive protein 35.90 (H) 0.00 - 0.60 mg/dL PROCALCITONIN Collection Time: 11/17/20  5:00 AM  
Result Value Ref Range Procalcitonin 12.30 (H) 0 ng/mL BNP Collection Time: 11/17/20  5:00 AM  
Result Value Ref Range NT pro-BNP 22,128 (H) <450 pg/mL LACTIC ACID Collection Time: 11/17/20  5:00 AM  
Result Value Ref Range Lactic acid 4.2 (HH) 0.4 - 2.0 mmol/L  
VANCOMYCIN, RANDOM  Collection Time: 11/17/20 5:00 AM  
Result Value Ref Range Vancomycin, random 13.4 ug/mL Reported dose date 0 Reported dose: 0 Units GLUCOSE, POC Collection Time: 11/17/20  7:36 AM  
Result Value Ref Range Glucose (POC) 78 65 - 100 mg/dL Performed by Jarrett Wood Collection Time: 11/17/20  8:00 AM  
Result Value Ref Range Crossmatch Expiration 11/20/2020,2359 ABO/Rh(D) Leva Pean Negative Antibody screen Negative Unit number [id number] Blood component type RC LR Unit division 00 Status of unit Allocated TRANSFUSION STATUS Ok to transfuse Crossmatch result Compatible Physical Exam 
Constitutional:   
   General: He is in acute distress. Appearance: He is ill-appearing. He is not diaphoretic. HENT:  
   Head: Normocephalic and atraumatic. Nose:  
   Comments: Nasal O2 Mouth/Throat:  
   Mouth: Mucous membranes are dry. Pharynx: Oropharynx is clear. Comments: pood oral hygiene Eyes:  
   Pupils: Pupils are equal, round, and reactive to light. Neck: Musculoskeletal: Neck supple. Cardiovascular:  
   Rate and Rhythm: Tachycardia present. Rhythm irregular. Heart sounds: No murmur. Pulmonary:  
   Breath sounds: No wheezing, rhonchi or rales. Abdominal:  
   Palpations: Abdomen is soft. Tenderness: There is no abdominal tenderness. Genitourinary: 
   Comments: Mcmillan catheter Musculoskeletal:  
   Right lower leg: No edema. Left lower leg: No edema. Comments: 2x3 cm open wound right medial calf with dry base Skin: 
   Findings: No erythema or rash. Neurological:  
   Comments: Unable to assess Psychiatric:  
   Comments: Unable to assess ASSESSMENT/PLAN 1. Severe sepsis with tachycardia, tachypnea, leukocytosis, elevated procalcitonin and CRP 2. Probable UTI with marked pyuria and bacteriuria 3. Altered mental status, possiblyy secondary to above 4. Rule out GI bleed 5. Atrial fibrillation 1. Discontinue Unasyn and Ceftriaxone 2. Start IV Zosyn for broader Gram negative coverage 3. Continue IV Vancomycin for now 4. In am, repeat CBC, procalcitonin and CRP 5. Follow-up blood and urine cultures Alden Lino MD

## 2020-11-17 NOTE — PROGRESS NOTES
11: 55AM Outbound call to patient's spouse Mrs. Olegario Glass. No answer and voicemail left w/direct contact information. Will try to reach spouse again. Felipa Swartz, DEVIN

## 2020-11-17 NOTE — CONSULTS
Consult Date: 11/17/2020 Consults Mr. Meghna Macrus is a 67year old man from [place] who was sent in with AMS and progressive decline in functioning. He has been febrile to 103.1, has a UTI for which he is being treated, leukocytosis and BRE , anemia on labs. Despite treatment for medical conditions he is not clinically improving. This morning her was noted to have neck stiffness and meningitis is on the differential. Ct head was unrevealing. He has bene started on vanc, ceftraxone, ampicillin and acyclovir. He also has A fin but not on [address] [place] [address] "[facility]. Subjective Past Medical History:  
Diagnosis Date  Arthritis  Atrial fibrillation ([facility] [address]  COPD (chronic obstructive pulmonary disease) ([facility] [address] 8/12/2020  High cholesterol  History of vascular access device 08/12/2020 Rt Basilic 5fr dual PICC at 42cm arm cir 34.5cm  Hypertension Past Surgical History:  
Procedure Laterality Date  COLONOSCOPY N/A 9/20/2018 COLONOSCOPY performed by Allison Fajardo MD at [address] HX COLONOSCOPY    
 HX HIP REPLACEMENT Bilateral   
 HX TONSILLECTOMY Family History Problem Relation Age of Onset  No Known Problems Mother  No Known Problems Father  No Known Problems Sister  No Known Problems Brother  No Known Problems Maternal Grandmother  No Known Problems Maternal Grandfather  No Known Problems Paternal Grandmother  No Known Problems Paternal Grandfather  No Known Problems Other  Stroke Neg Hx Social History Tobacco Use  Smoking status: Unknown If Ever Smoked  Smokeless tobacco: Never Used Substance Use Topics  Alcohol use: Never Alcohol/week: 8.0 standard drinks Types: 8 Shots of liquor per week Frequency: Never Current Facility-Administered Medications Medication Dose Route Frequency Provider Last Rate Last Dose  
 0.9% sodium chloride infusion 250 mL  250 mL IntraVENous PRN Bulmaro Mariee MD      
[address] [place] [address] pantoprazole (PROTONIX) 40 mg in 0.9% sodium chloride 10 mL injection  40 mg IntraVENous Q12H Ana Lilia Leyva MD      
 cefTRIAXone (ROCEPHIN) 2 g in 0.9% sodium chloride (MBP/ADV) 50 mL MBP  2 g IntraVENous Q12H Naz Leyva MD      
 ampicillin-sulbactam (UNASYN) 3 g in 0.9% sodium chloride (MBP/ADV) 100 mL MBP  3 g IntraVENous Q6H Naz Leyva  mL/hr at 11/17/20 0841 3 g at 11/17/20 0841  
 NOREPINephrine (LEVOPHED) 8 mg in 5% dextrose 250mL (32 mcg/mL) infusion  0.5-16 mcg/min IntraVENous TITRATE Jorge Lane MD 18.8 mL/hr at 11/17/20 0906 10 mcg/min at 11/17/20 5785  potassium chloride 10 mEq in 100 ml IVPB  10 mEq IntraVENous Hunter Cantrell  mL/hr at 11/17/20 0844 10 mEq at 11/17/20 1611  acyclovir (ZOVIRAX) 800 mg in 0.9% sodium chloride 250 mL IVPB  10 mg/kg (Ideal) IntraVENous Q12H Naz Leyva MD      
 vancomycin (VANCOCIN) 1,250 mg in 0.9% sodium chloride 250 mL (VIAL-MATE)  1,250 mg IntraVENous Carlos Boone MD      
 [START ON 11/18/2020] vancomycin random level scheduled for 0600 on 11/18/2020   Other ONCE Aminah PONCE MD      
 acetaminophen (TYLENOL) suppository 650 mg  650 mg Rectal Q4H PRN Aminah PONCE MD   650 mg at 11/16/20 1225  dextrose 10% infusion  30 mL/hr IntraVENous CONTINUOUS Jorge Lane MD 30 mL/hr at 11/16/20 0741 30 mL/hr at 11/16/20 0741  
 amiodarone (CORDARONE) 900 mg/250 ml D5W infusion  0.5-1 mg/min IntraVENous TITRATE Zulygab Soliman MD 16.7 mL/hr at 11/16/20 1736 1 mg/min at 11/16/20 1736  
 0.9% sodium chloride infusion  125 mL/hr IntraVENous CONTINUOUS Aminah PONCE  mL/hr at 11/17/20 0750 125 mL/hr at 11/17/20 0750  VANCOMYCIN INFORMATION NOTE 1 Each  1 Each Other Rx Dosing/Monitoring Carlos Jay MD      
 acetaminophen (TYLENOL) tablet 650 mg  650 mg Oral Q4H PRN Aranza Herring, ALANNA Mixon albuterol (PROVENTIL HFA, VENTOLIN HFA, PROAIR HFA) inhaler 2 Puff  2 Puff Inhalation Q4H PRN Rom Herring NP      
 docusate sodium (COLACE) capsule 100 mg  100 mg Oral BID Rom Herring NP   Stopped at 21/04/92 9350  
 folic acid (FOLVITE) tablet 1 mg  1 mg Oral DAILY Rom Herring NP   Stopped at 11/16/20 0900  pravastatin (PRAVACHOL) tablet 20 mg  20 mg Oral QHS Rom Herring NP   20 mg at 11/16/20 2232  sertraline (ZOLOFT) tablet 50 mg  50 mg Oral DAILY Rom Herring NP      
 thiamine mononitrate (B-1) tablet 100 mg  100 mg Oral DAILY Rom Herring NP   Stopped at 11/16/20 0900  digoxin (LANOXIN) injection 125 mcg  125 mcg IntraVENous DAILY Rom Herring NP   125 mcg at 11/17/20 1451  ondansetron (ZOFRAN) injection 4 mg  4 mg IntraVENous Q6H PRN Rom Herring NP      
 hydrOXYzine pamoate (VISTARIL) capsule 25 mg  25 mg Oral TID PRN Rom Herring NP   25 mg at 11/15/20 2217  risperiDONE (RisperDAL) tablet 2 mg  2 mg Oral QHS Rom Herring NP   2 mg at 11/16/20 2232  chlorhexidine (PERIDEX) 0.12 % mouthwash 15 mL  15 mL Oral Q12H oRm Herring NP   Stopped at 11/16/20 0900 Review of Systems Unable to perform ROS: Mental status change Objective Vital signs for last 24 hours: 
Visit Vitals BP (!) 110/92 Pulse 84 Temp 98 °F (36.7 °C) Resp 27 Ht 6' 2\" (1.88 m) Wt 92.1 kg (203 lb) SpO2 100% BMI 26.06 kg/m² Intake/Output this shift: 
Current Shift: No intake/output data recorded. Last 3 Shifts: 11/15 1901 - 11/17 0700 In: 2122.3 [I.V.:2122.3] Out: 2650 [Urine:2650] Data Review:  
Recent Results (from the past 24 hour(s)) GLUCOSE, POC Collection Time: 11/16/20  2:47 PM  
Result Value Ref Range Glucose (POC) 83 65 - 100 mg/dL Performed by MARY LOU ROYAL   
METABOLIC PANEL, BASIC Collection Time: 11/17/20  5:00 AM  
Result Value Ref Range Sodium 146 (H) 136 - 145 mmol/L  Potassium 2.6 (LL) 3.5 - 5.1 mmol/L Chloride 119 (H) 97 - 108 mmol/L  
 CO2 16 (L) 21 - 32 mmol/L Anion gap 11 5 - 15 mmol/L Glucose 61 (L) 65 - 100 mg/dL BUN 36 (H) 6 - 20 mg/dL Creatinine 2.04 (H) 0.70 - 1.30 mg/dL BUN/Creatinine ratio 18 12 - 20 GFR est AA 39 (L) >60 ml/min/1.73m2 GFR est non-AA 32 (L) >60 ml/min/1.73m2 Calcium 11.8 (H) 8.5 - 10.1 mg/dL CBC WITH AUTOMATED DIFF Collection Time: 11/17/20  5:00 AM  
Result Value Ref Range WBC 20.7 (H) 4.1 - 11.1 K/uL  
 RBC 2.86 (L) 4.10 - 5.70 M/uL HGB 6.8 (L) 12.1 - 17.0 g/dL HCT 23.2 (L) 36.6 - 50.3 % MCV 81.1 80.0 - 99.0 FL  
 MCH 23.8 (L) 26.0 - 34.0 PG  
 MCHC 29.3 (L) 30.0 - 36.5 g/dL  
 RDW 19.9 (H) 11.5 - 14.5 % PLATELET 527 (L) 009 - 400 K/uL MPV 10.4 8.9 - 12.9 FL  
 NRBC 0.0 0  WBC ABSOLUTE NRBC 0.00 0.00 - 0.01 K/uL  
 DF AUTOMATED NEUTROPHILS 75 32 - 75 % LYMPHOCYTES 9 (L) 12 - 49 % MONOCYTES 16 (H) 5 - 13 % EOSINOPHILS 0 0 - 7 % BASOPHILS 0 0 - 1 % IMMATURE GRANULOCYTES 0 %  
 ABS. NEUTROPHILS 15.5 (H) 1.8 - 8.0 K/UL  
 ABS. LYMPHOCYTES 1.9 0.8 - 3.5 K/UL  
 ABS. MONOCYTES 3.3 (H) 0.0 - 1.0 K/UL  
 ABS. EOSINOPHILS 0.0 0.0 - 0.4 K/UL  
 ABS. BASOPHILS 0.0 0.0 - 0.1 K/UL  
 ABS. IMM. GRANS. 0.0 K/UL  
 RBC COMMENTS Normocytic, Normochromic C REACTIVE PROTEIN, QT Collection Time: 11/17/20  5:00 AM  
Result Value Ref Range C-Reactive protein 35.90 (H) 0.00 - 0.60 mg/dL PROCALCITONIN Collection Time: 11/17/20  5:00 AM  
Result Value Ref Range Procalcitonin 12.30 (H) 0 ng/mL BNP Collection Time: 11/17/20  5:00 AM  
Result Value Ref Range NT pro-BNP 22,128 (H) <450 pg/mL LACTIC ACID Collection Time: 11/17/20  5:00 AM  
Result Value Ref Range Lactic acid 4.2 (HH) 0.4 - 2.0 mmol/L  
VANCOMYCIN, RANDOM Collection Time: 11/17/20  5:00 AM  
Result Value Ref Range Vancomycin, random 13.4 ug/mL Reported dose date 0 Reported dose: 0 Units GLUCOSE, POC  Collection Time: 11/17/20  7:36 AM  
Result Value Ref Range Glucose (POC) 78 65 - 100 mg/dL Performed by Jarrett Wood Collection Time: 11/17/20  8:00 AM  
Result Value Ref Range Crossmatch Expiration 11/20/2020,0290 ABO/Rh(D) Leva Pean Negative Antibody screen Negative Unit number [id number] Blood component type RC LR Unit division 00 Status of unit Allocated TRANSFUSION STATUS Ok to transfuse Crossmatch result Compatible Physical Exam 
 
Neuro Physical Exam 
 
 
Patient not examined due to a procedure being performed in his room Assessment and Plan: Mr. Anali Olsen is a 67year old man with multiple medical issues and fevers as well as leukocytosis indicating infection. Clinical condition is not improving and medical team wants to rule out meningitis. Will come back tomorrow to examine and perform LP since not he is having another procedure in his room today. Cont all antibiotics and acyclovir until then.

## 2020-11-17 NOTE — PROGRESS NOTES
Hospitalist Progress Note Daily Progress Note: 11/17/2020 
 
69-year-old male sent here from [facility] with reports from staff of altered mental status, lethargy, not eating and dehydration. He has a history of atrial fibrillation in rvr on amio drip. Ua suggestive uti, zosyn initiated 11/15. Beck, cr 2.18, us rp pending. Subjective:  
Patient seen and evaluated at bedside, overnight events noted, of note patient is significantly lethargic, however does respond to painful and verbal stimuli, of note patient has significant neck stiffness on examination, patient was found to have a significantly reduced hemoglobin as well as potassium this a.m., discussed with RN at bedside. Problem List: 
Problem List as of 11/17/2020 Date Reviewed: 11/15/2020 Codes Class Noted - Resolved * (Principal) Sepsis ([facility] ICD-10-CM: A41.9 ICD-9-CM: 038.9, 995.91  11/16/2020 - Present Hypoglycemia ICD-10-CM: E16.2 ICD-9-CM: 251.2  11/16/2020 - Present Encephalopathy acute ICD-10-CM: G93.40 ICD-9-CM: 348.30  11/16/2020 - Present UTI (urinary tract infection) ICD-10-CM: N39.0 ICD-9-CM: 599.0  11/15/2020 - Present BECK (acute kidney injury) ([facility] ICD-10-CM: N17.9 ICD-9-CM: 584.9  11/15/2020 - Present AMS (altered mental status) ICD-10-CM: Q87.59 
ICD-9-CM: 780.97  11/15/2020 - Present Chronic atrial fibrillation (HCC) ICD-10-CM: I48.20 ICD-9-CM: 427.31  8/12/2020 - Present HTN (hypertension), benign ICD-10-CM: I10 
ICD-9-CM: 401.1  8/12/2020 - Present COPD (chronic obstructive pulmonary disease) (HCC) ICD-10-CM: J44.9 ICD-9-CM: 330  8/12/2020 - Present Dyslipidemia ICD-10-CM: E78.5 ICD-9-CM: 272.4  8/12/2020 - Present Depression ICD-10-CM: F32.9 ICD-9-CM: 566  8/12/2020 - Present Cellulitis of left upper extremity ICD-10-CM: L03.114 
ICD-9-CM: 682.3  8/11/2020 - Present Medications reviewed Current Facility-Administered Medications Medication Dose Route Frequency  0.9% sodium chloride infusion 250 mL  250 mL IntraVENous PRN  pantoprazole (PROTONIX) 40 mg in 0.9% sodium chloride 10 mL injection  40 mg IntraVENous Q12H  cefTRIAXone (ROCEPHIN) 2 g in 0.9% sodium chloride (MBP/ADV) 50 mL MBP  2 g IntraVENous Q12H  
 ampicillin-sulbactam (UNASYN) 3 g in 0.9% sodium chloride (MBP/ADV) 100 mL MBP  3 g IntraVENous Q6H  
 NOREPINephrine (LEVOPHED) 8 mg in 5% dextrose 250mL (32 mcg/mL) infusion  0.5-16 mcg/min IntraVENous TITRATE  potassium chloride 10 mEq in 100 ml IVPB  10 mEq IntraVENous Q1H  
 acyclovir (ZOVIRAX) 800 mg in 0.9% sodium chloride 250 mL IVPB  10 mg/kg (Ideal) IntraVENous Q8H  
 acetaminophen (TYLENOL) suppository 650 mg  650 mg Rectal Q4H PRN  
 dextrose 10% infusion  30 mL/hr IntraVENous CONTINUOUS  
 amiodarone (CORDARONE) 900 mg/250 ml D5W infusion  0.5-1 mg/min IntraVENous TITRATE  
 0.9% sodium chloride infusion  125 mL/hr IntraVENous CONTINUOUS  
 VANCOMYCIN INFORMATION NOTE 1 Each  1 Each Other Rx Dosing/Monitoring  acetaminophen (TYLENOL) tablet 650 mg  650 mg Oral Q4H PRN  
 albuterol (PROVENTIL HFA, VENTOLIN HFA, PROAIR HFA) inhaler 2 Puff  2 Puff Inhalation Q4H PRN  
 docusate sodium (COLACE) capsule 100 mg  100 mg Oral BID  folic acid (FOLVITE) tablet 1 mg  1 mg Oral DAILY  pravastatin (PRAVACHOL) tablet 20 mg  20 mg Oral QHS  sertraline (ZOLOFT) tablet 50 mg  50 mg Oral DAILY  thiamine mononitrate (B-1) tablet 100 mg  100 mg Oral DAILY  digoxin (LANOXIN) injection 125 mcg  125 mcg IntraVENous DAILY  ondansetron (ZOFRAN) injection 4 mg  4 mg IntraVENous Q6H PRN  
 hydrOXYzine pamoate (VISTARIL) capsule 25 mg  25 mg Oral TID PRN  
 risperiDONE (RisperDAL) tablet 2 mg  2 mg Oral QHS  chlorhexidine (PERIDEX) 0.12 % mouthwash 15 mL  15 mL Oral Q12H Review of Systems:  
Unobtainable 2/2 encephalopathic/acute illness Objective:  
Physical Exam:  
 
Visit Vitals BP (!) 105/51 (BP 1 Location: Left leg, BP Patient Position: At rest) Pulse 73 Temp 98 °F (36.7 °C) Resp 27 Ht 6' 2\" (1.88 m) Wt 92.1 kg (203 lb) SpO2 99% BMI 26.06 kg/m² O2 Flow Rate (L/min): 2 l/min O2 Device: Nasal cannula Temp (24hrs), Av.2 °F (37.3 °C), Min:97.7 °F (36.5 °C), Max:102 °F (38.9 °C) No intake/output data recorded. 11/15 190 -  0700 In: .3 [I.V.:.3] Out: 0 [EOHZA:0667] Constitutional: He appears well-developed. Obtunded Head: Normocephalic and atraumatic. Eyes: Pupils are equal, round, and reactive to light. Mouth: oral mucosa dry Neck: No thyromegaly present, significant neck stiffness on examination Cardiovascular:Irreg/irreg Lungs:Effort normal with fair ae. No wheeze, not labored Abdominal: Soft. can't tell if tender, moans if touch him anywhere including extremities Musculoskeletal:  
   Comments: Generalized weakness Neurological: obtunded, moves all ext, 
Skin: Right lower shin with ulcer, yellow drainage Sacrum reddened, no breakdown Left arm with skin tear Multiple ecchymotic, skin is friable/thin Data Review:  
   
Recent Days: 
Recent Labs 20 
0500 20 
0230 11/15/20 
1345 WBC 20.7* 14.9* 20.1* HGB 6.8* 8.4* 9.0*  
HCT 23.2* 27.7* 28.9*  
* 163 169 Recent Labs 20 
0500 20 
0230 11/15/20 
1345 * 140 137  
K 2.6* 3.7 3.7 * 110* 104 CO2 16* 20* 22 GLU 61* 40* 74 BUN 36* 38* 35* CREA 2.04* 2.10* 2.18* CA 11.8* 12.2*  13.0* 13.7* MG  --  1.7  --   
PHOS  --  3.7  --   
ALB  --  2.1* 2.2* TBILI  --  0.8 0.7 ALT  --  11* 10* No results for input(s): PH, PCO2, PO2, HCO3, FIO2 in the last 72 hours. 24 Hour Results: 
Recent Results (from the past 24 hour(s)) GLUCOSE, POC Collection Time: 20  2:47 PM  
Result Value Ref Range Glucose (POC) 83 65 - 100 mg/dL  Performed by MARY LOU ROYAL   
METABOLIC PANEL, BASIC Collection Time: 11/17/20  5:00 AM  
Result Value Ref Range Sodium 146 (H) 136 - 145 mmol/L Potassium 2.6 (LL) 3.5 - 5.1 mmol/L Chloride 119 (H) 97 - 108 mmol/L  
 CO2 16 (L) 21 - 32 mmol/L Anion gap 11 5 - 15 mmol/L Glucose 61 (L) 65 - 100 mg/dL BUN 36 (H) 6 - 20 mg/dL Creatinine 2.04 (H) 0.70 - 1.30 mg/dL BUN/Creatinine ratio 18 12 - 20 GFR est AA 39 (L) >60 ml/min/1.73m2 GFR est non-AA 32 (L) >60 ml/min/1.73m2 Calcium 11.8 (H) 8.5 - 10.1 mg/dL CBC WITH AUTOMATED DIFF Collection Time: 11/17/20  5:00 AM  
Result Value Ref Range WBC 20.7 (H) 4.1 - 11.1 K/uL  
 RBC 2.86 (L) 4.10 - 5.70 M/uL HGB 6.8 (L) 12.1 - 17.0 g/dL HCT 23.2 (L) 36.6 - 50.3 % MCV 81.1 80.0 - 99.0 FL  
 MCH 23.8 (L) 26.0 - 34.0 PG  
 MCHC 29.3 (L) 30.0 - 36.5 g/dL  
 RDW 19.9 (H) 11.5 - 14.5 % PLATELET 578 (L) 997 - 400 K/uL MPV 10.4 8.9 - 12.9 FL  
 NRBC 0.0 0  WBC ABSOLUTE NRBC 0.00 0.00 - 0.01 K/uL  
 DF AUTOMATED NEUTROPHILS 75 32 - 75 % LYMPHOCYTES 9 (L) 12 - 49 % MONOCYTES 16 (H) 5 - 13 % EOSINOPHILS 0 0 - 7 % BASOPHILS 0 0 - 1 % IMMATURE GRANULOCYTES 0 %  
 ABS. NEUTROPHILS 15.5 (H) 1.8 - 8.0 K/UL  
 ABS. LYMPHOCYTES 1.9 0.8 - 3.5 K/UL  
 ABS. MONOCYTES 3.3 (H) 0.0 - 1.0 K/UL  
 ABS. EOSINOPHILS 0.0 0.0 - 0.4 K/UL  
 ABS. BASOPHILS 0.0 0.0 - 0.1 K/UL  
 ABS. IMM. GRANS. 0.0 K/UL  
 RBC COMMENTS Normocytic, Normochromic C REACTIVE PROTEIN, QT Collection Time: 11/17/20  5:00 AM  
Result Value Ref Range C-Reactive protein 35.90 (H) 0.00 - 0.60 mg/dL PROCALCITONIN Collection Time: 11/17/20  5:00 AM  
Result Value Ref Range Procalcitonin 12.30 (H) 0 ng/mL BNP Collection Time: 11/17/20  5:00 AM  
Result Value Ref Range NT pro-BNP 22,128 (H) <450 pg/mL LACTIC ACID Collection Time: 11/17/20  5:00 AM  
Result Value Ref Range Lactic acid 4.2 (HH) 0.4 - 2.0 mmol/L  
VANCOMYCIN, RANDOM  Collection Time: 11/17/20  5:00 AM  
Result Value Ref Range Vancomycin, random 13.4 ug/mL Reported dose date 0 Reported dose: 0 Units GLUCOSE, POC Collection Time: 11/17/20  7:36 AM  
Result Value Ref Range Glucose (POC) 78 65 - 100 mg/dL Performed by Omar Whaley Assessment/  
 
Patient Active Problem List  
Diagnosis Code  Cellulitis of left upper extremity L03.114  
 Chronic atrial fibrillation (HCC) I48.20  
 HTN (hypertension), benign I10  
 COPD (chronic obstructive pulmonary disease) (HCC) J44.9  Dyslipidemia E78.5  Depression F32.9  
 UTI (urinary tract infection) N39.0  BRE (acute kidney injury) ([facility] N17.9  AMS (altered mental status) R41.82  Sepsis ([facility] A41.9  Hypoglycemia E16.2  
 Encephalopathy acute G93.40 Plan: 
 
Severe sepsispatient presented with above-mentioned symptomatology was found to meet severe sepsis criteria due to unclear etiology, of note patient still requires pressor support, based on patient's clinical examination is significant concern for meningitis given neck stiffness present on exam 
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 Obtain neurology consult further evaluation Levophed for pressor support Obtain infectious disease consult further evaluation Continue to monitor Atrial fibrillation with RVRpatient presented with atrial fibrillation with RVR likely secondary to ongoing severe sepsis, will recommend discontinuation of beta-blockers Initiation of amiodarone GTT Transthoracic echo shows preserved ejection fraction Cardiology consult appreciated, will continue to follow Anemiapatient was found to have a significantly decreased hemoglobin due to unclear etiology, of note patient is on antiplatelet therapy at this time Discontinue aspirin Maintain active type and screen Transfuse 2 units packed RBCs Protonix 40 mg IV twice daily N.p.o. 
Stool for occult blood Gastroenterology consult Hypokalemiaaggressively replete potassium and recheck potassium Obtain repeat serum magnesium, as well as repeat serum potassium Hyperlipidemiacontinue statin ProphylaxisSCDs FENcontinue n.p.o., maintenance IV fluids, replete potassium and magnesium Full code by default, will clarify about surrogate decision-maker Critical care time spent 60 minutes involving direct patient care reviewing patient's labs and coordination of care with nursing staff Care Plan discussed with: Patient/Family and Nurse Praneeth Guillen MD  
 
Of note pt needs an emergent central line for HD instability otherwise could result in loss of life or limb

## 2020-11-17 NOTE — PROGRESS NOTES
Progress Note 11/17/2020 12:30 PM 
NAME: Vandana Mederos MRN:  [id number] Admit Diagnosis: UTI (urinary tract infection) [N39.0] AMS (altered mental status) [R41.82] BRE (acute kidney injury) ([facility] [N17.9] Assessment/Plan:  
Atrial fibrillation with rapid ventricular response, also on IV amiodarone, now controlled ventricular response, off amiodarone, not anticoagulated with anemia and drop in hemoglobin. On digoxin, will check digoxin level Hypotension, on IV Levophed Altered mental status,? Baseline, reportedly with decreased intake, lethargy Anemia, dropping hemoglobin, Severe hypokalemia, being supplemented, magnesium was 1.7, will recheck in a.m. []       High complexity decision making was performed in this patient at high risk for decompensation with multiple organ involvement. Subjective:  
 
Vandana Mederos denies chest pain, dyspnea. Discussed with RN events overnight. Review of Systems: 
 
 
Could NOT obtain due to: Altered mental status Objective:  
  
Physical Exam: 
 
Last 24hrs VS reviewed since prior progress note. Most recent are: 
 
Visit Vitals BP (!) 110/92 Pulse 97 Temp 98.6 °F (37 °C) Resp 27 Ht 6' 2\" (1.88 m) Wt 92.1 kg (203 lb) SpO2 100% BMI 26.06 kg/m² Intake/Output Summary (Last 24 hours) at 11/17/2020 1230 Last data filed at 11/17/2020 0300 Gross per 24 hour Intake  Output 1350 ml Net -1350 ml General Appearance: Well developed,  
Ears/Nose/Mouth/Throat: Breathing though mouth Neck: Supple. Chest: Lungs coarse breath sounds Cardiovascular: iregular rate and rhythm, S1S2 normal, no murmur. Abdomen: Soft, non-tender, bowel sounds are active. Extremities: No edema bilaterally. Skin: Warm and dry. []         Post-cath site without hematoma, bruit, tenderness, or thrill. Distal pulses intact. PMH/SH reviewed - no change compared to H&P Data Review Telemetry:  
 
EKG:  
[]  No new EKG for review Lab Data Personally Reviewed: 
 
Recent Labs 11/17/20 
0500 11/16/20 
0230 WBC 20.7* 14.9* HGB 6.8* 8.4* HCT 23.2* 27.7*  
* 163 No results for input(s): INR, PTP, APTT, INREXT in the last 72 hours. Recent Labs 11/17/20 
0500 11/16/20 
0230 11/15/20 
1345 * 140 137  
K 2.6* 3.7 3.7 * 110* 104 CO2 16* 20* 22 BUN 36* 38* 35* CREA 2.04* 2.10* 2.18* GLU 61* 40* 74 CA 11.8* 12.2*  13.0* 13.7* MG  --  1.7  --   
 
Recent Labs 11/16/20 
0230 11/15/20 
1345 CPK 15*  --   
TROIQ <0.05 <0.05 No results found for: CHOL, CHOLX, CHLST, CHOLV, HDL, HDLP, LDL, LDLC, DLDLP, TGLX, TRIGL, TRIGP, CHHD, CHHDX Recent Labs 11/16/20 
0230 11/15/20 
1345  113  
TP 7.8 8.2 ALB 2.1* 2.2*  
GLOB 5.7* 6.0* No results for input(s): PH, PCO2, PO2 in the last 72 hours. Medications Personally Reviewed: 
 
Current Facility-Administered Medications Medication Dose Route Frequency  0.9% sodium chloride infusion 250 mL  250 mL IntraVENous PRN  pantoprazole (PROTONIX) 40 mg in 0.9% sodium chloride 10 mL injection  40 mg IntraVENous Q12H  
 NOREPINephrine (LEVOPHED) 8 mg in 5% dextrose 250mL (32 mcg/mL) infusion  0.5-16 mcg/min IntraVENous TITRATE  potassium chloride 10 mEq in 100 ml IVPB  10 mEq IntraVENous Q1H  
 acyclovir (ZOVIRAX) 800 mg in 0.9% sodium chloride 250 mL IVPB  10 mg/kg (Ideal) IntraVENous Q12H  
 vancomycin (VANCOCIN) 1,250 mg in 0.9% sodium chloride 250 mL (VIAL-MATE)  1,250 mg IntraVENous ONCE  
 [START ON 11/18/2020] vancomycin random level scheduled for 0600 on 11/18/2020   Other ONCE  piperacillin-tazobactam (ZOSYN) 3.375 g in 0.9% sodium chloride (MBP/ADV) 100 mL MBP  3.375 g IntraVENous Q8H  
 acetaminophen (TYLENOL) suppository 650 mg  650 mg Rectal Q4H PRN  
 dextrose 10% infusion  30 mL/hr IntraVENous CONTINUOUS  
 amiodarone (CORDARONE) 900 mg/250 ml D5W infusion  0.5-1 mg/min IntraVENous TITRATE  0.9% sodium chloride infusion  125 mL/hr IntraVENous CONTINUOUS  
 VANCOMYCIN INFORMATION NOTE 1 Each  1 Each Other Rx Dosing/Monitoring  acetaminophen (TYLENOL) tablet 650 mg  650 mg Oral Q4H PRN  
 albuterol (PROVENTIL HFA, VENTOLIN HFA, PROAIR HFA) inhaler 2 Puff  2 Puff Inhalation Q4H PRN  
 docusate sodium (COLACE) capsule 100 mg  100 mg Oral BID  folic acid (FOLVITE) tablet 1 mg  1 mg Oral DAILY  pravastatin (PRAVACHOL) tablet 20 mg  20 mg Oral QHS  sertraline (ZOLOFT) tablet 50 mg  50 mg Oral DAILY  thiamine mononitrate (B-1) tablet 100 mg  100 mg Oral DAILY  digoxin (LANOXIN) injection 125 mcg  125 mcg IntraVENous DAILY  ondansetron (ZOFRAN) injection 4 mg  4 mg IntraVENous Q6H PRN  
 hydrOXYzine pamoate (VISTARIL) capsule 25 mg  25 mg Oral TID PRN  
 risperiDONE (RisperDAL) tablet 2 mg  2 mg Oral QHS  chlorhexidine (PERIDEX) 0.12 % mouthwash 15 mL  15 mL Oral Q12H Nabil Baron MD

## 2020-11-18 NOTE — CONSULTS
Hematology/Oncology Consult Patient: Mikel Hernandez MRN: [id number] YOB: 1945  Age: 67 y.o. Sex: male HPI  
  
CC : Altered mental status. Mikel Hernandez is a 67 y.o. male who is being seen for Anemia and thrombocytopenia. HE is admitted to the [address] [place] [address] on 11/15 for altered mental status, sepsis and hypotension from the nursing home. He had head ct that did not shwo any acute findings. He on testign was diagnosed with UTI and sepsis from it. Admitted to ICU as he needs pressors. He had several electrolyte abnormalities inclduing renal failure and hypercalcemia, hypokalemia. He is noted to have devleoped worsening anemia, bh of 6.8gm/dl yesterday requiring transfusions and thormbocytopenia with platelets of 76F and so hematology consult is requested. He is not provide any history and is quite somnolent at the time of my exam.  . Past Medical History:  
Diagnosis Date  Arthritis  Atrial fibrillation ([facility] [facility]  COPD (chronic obstructive pulmonary disease) ([facility] [address] [facility] 8/12/2020  High cholesterol  History of vascular access device 08/12/2020 Rt Basilic 5fr dual PICC at 42cm arm cir 34.5cm  Hypertension Past Surgical History:  
Procedure Laterality Date  COLONOSCOPY N/A 9/20/2018 COLONOSCOPY performed by Tru Pritchett MD at [address] [facility] HX COLONOSCOPY    
 HX HIP REPLACEMENT Bilateral   
 HX TONSILLECTOMY  IR INSERT NON TUNL CVC OVER 5 YRS  11/17/2020 Family History Problem Relation Age of Onset  No Known Problems Mother  No Known Problems Father  No Known Problems Sister  No Known Problems Brother  No Known Problems Maternal Grandmother  No Known Problems Maternal Grandfather  No Known Problems Paternal Grandmother  No Known Problems Paternal Grandfather  No Known Problems Other  Stroke Neg Hx Social History Tobacco Use  Smoking status: Unknown If Ever Smoked  Smokeless tobacco: Never Used Substance Use Topics  Alcohol use: Never Alcohol/week: 8.0 standard drinks Types: 8 Shots of liquor per week Frequency: Never Current Facility-Administered Medications Medication Dose Route Frequency Provider Last Rate Last Dose  potassium chloride 10 mEq in 100 ml IVPB  10 mEq IntraVENous Q1H Mimi Leyva  mL/hr at 11/18/20 1453 10 mEq at 11/18/20 1453  morphine injection 2 mg  2 mg IntraVENous Q4H PRN Mayte Gonzalez MD   2 mg at 11/18/20 1226  
 pantoprazole (PROTONIX) 40 mg in 0.9% sodium chloride 10 mL injection  40 mg IntraVENous BID Mayte Gonzalez MD   40 mg at 11/18/20 1114  [START ON 11/19/2020] Vancomycin random level to be drawn on 11/19/2020 at 0600   Other ONCE Margaret Espino MD      
 midazolam (PF) (VERSED) 1 mg/mL injection  0.9% sodium chloride infusion 250 mL  250 mL IntraVENous PRN Mayte Gonzalez MD      
 NOREPINephrine (LEVOPHED) 8 mg in 5% dextrose 250mL (32 mcg/mL) infusion  0.5-16 mcg/min IntraVENous TITRATE Mayte Gonzalez MD 18.8 mL/hr at 11/18/20 1136 10 mcg/min at 11/18/20 1136  acyclovir (ZOVIRAX) 800 mg in 0.9% sodium chloride 250 mL IVPB  10 mg/kg (Ideal) IntraVENous Q12H Naz Leyva MD   800 mg at 11/18/20 7673  piperacillin-tazobactam (ZOSYN) 3.375 g in 0.9% sodium chloride (MBP/ADV) 100 mL MBP  3.375 g IntraVENous Q8H Margaret Espino MD 25 mL/hr at 11/18/20 1111 3.375 g at 11/18/20 1111  
 0.9% sodium chloride infusion 250 mL  250 mL IntraVENous PRN Mayte Gonzalez MD      
 dextrose 5% infusion  125 mL/hr IntraVENous CONTINUOUS Constantine Escalera  mL/hr at 11/18/20 0819 125 mL/hr at 11/18/20 4385  acetaminophen (TYLENOL) suppository 650 mg  650 mg Rectal Q4H PRN Lucio PONCE MD   650 mg at 11/16/20 1225  dextrose 10% infusion  30 mL/hr IntraVENous CONTINUOUS Mimi Leyva MD 30 mL/hr at 11/16/20 0741 30 mL/hr at 11/16/20 0741  
 amiodarone (CORDARONE) 900 mg/250 ml D5W infusion  0.5-1 mg/min IntraVENous TITRATE Lamonte Pina MD 16.7 mL/hr at 11/16/20 1736 1 mg/min at 11/16/20 1736  VANCOMYCIN INFORMATION NOTE 1 Each  1 Each Other Rx Dosing/Monitoring Carlos Wright MD      
 acetaminophen (TYLENOL) tablet 650 mg  650 mg Oral Q4H PRN Rom Herring NP      
 albuterol (PROVENTIL HFA, VENTOLIN HFA, PROAIR HFA) inhaler 2 Puff  2 Puff Inhalation Q4H PRN Rom Herring NP      
 docusate sodium (COLACE) capsule 100 mg  100 mg Oral BID Rom Herring NP   Stopped at 39/75/72 6872  
 folic acid (FOLVITE) tablet 1 mg  1 mg Oral DAILY Rom Herring NP   Stopped at 11/16/20 0900  pravastatin (PRAVACHOL) tablet 20 mg  20 mg Oral QHS Rom Herring NP   20 mg at 11/16/20 2232  sertraline (ZOLOFT) tablet 50 mg  50 mg Oral DAILY Rom Herring NP   Stopped at 11/17/20 0900  thiamine mononitrate (B-1) tablet 100 mg  100 mg Oral DAILY Rom Herring NP   Stopped at 11/16/20 0900  digoxin (LANOXIN) injection 125 mcg  125 mcg IntraVENous DAILY Rom Herring NP   125 mcg at 11/18/20 6994  ondansetron (ZOFRAN) injection 4 mg  4 mg IntraVENous Q6H PRN Rom Herring NP      
 hydrOXYzine pamoate (VISTARIL) capsule 25 mg  25 mg Oral TID PRN Rom Herring NP   25 mg at 11/15/20 2217  risperiDONE (RisperDAL) tablet 2 mg  2 mg Oral QHS Rom Herring NP   2 mg at 11/16/20 2232  chlorhexidine (PERIDEX) 0.12 % mouthwash 15 mL  15 mL Oral Q12H Rom Herring NP   15 mL at 11/18/20 3590 Facility-Administered Medications Ordered in Other Encounters Medication Dose Route Frequency Provider Last Rate Last Dose  propofoL (DIPRIVAN) 10 mg/mL injection   IntraVENous PRN Raegan England CRNA   2 mg at 11/18/20 5242 Allergies Allergen Reactions  Codeine Rash Review of Systems: ros not obtainable. Objective:  
 
Vitals:  
 11/18/20 1230 11/18/20 1300 11/18/20 1315 11/18/20 1400 BP: (!) 74/64 (!) 65/37 122/67 112/66 Pulse:  (!) 109  (!) 103 Resp:  28  (!) 33 Temp:      
SpO2:  99%  99% Weight:      
Height:      
  
 
Physical Exam: 
Constitutional Elderly white male , with altered mental status, Well nourished. Well developed. Head Normocephalic; no scars Eyes Conjunctivae and sclerae are clear and without icterus. Pupils are reactive and equal.  
ENMT Sinuses are nontender. No oral exudates, ulcers, masses, thrush or mucositis. Oropharynx clear. Tongue normal.  
Neck Supple without masses or thyromegaly. No jugular venous distension. Hematologic/Lymphatic No petechiae or purpura. No tender or palpable lymph nodes in the cervical, supraclavicular, axillary or inguinal area. Respiratory Lungs are clear to auscultation without rhonchi or wheezing. Cardiovascular Regular rate and rhythm of heart without murmurs, gallops or rubs. Chest / Line Site Chest is symmetric with no chest wall deformities. Abdomen Non-tender, non-distended, no masses, ascites or hepatosplenomegaly. Good bowel sounds. No guarding or rebound tenderness. No pulsatile masses. Musculoskeletal No tenderness or swelling, normal range of motion without obvious weakness. Extremities No visible deformities, no cyanosis, clubbing or edema. Skin No rashes, scars, or lesions suggestive of malignancy. No petechiae, purpura, or ecchymoses. No excoriations. Neurologic No sensory or motor deficits, normal cerebellar function, normal gait, cranial nerves intact. Psychiatric Alert and oriented times three. Coherent speech. Verbalizes understanding of our discussions today. Lab/Data Review: 
Recent Labs 11/18/20 
1345 11/18/20 
0530 11/17/20 
2130 11/17/20 
0500 WBC  --  15.8* 14.9* 20.7* HGB 8.7* 9.3* 9.1* 6.8* HCT 28.2* 30.1* 29.5* 23.2*  
PLT  --  90* 92* 122* Recent Labs 11/18/20 
0530 11/17/20 
2130 11/17/20 
0500 11/16/20 
0230 * 154* 146* 140  
K 3.1* 3.3* 2.6* 3.7 * 126* 119* 110* CO2 17* 16* 16* 20* * 75 61* 40* BUN 32* 36* 36* 38* CREA 2.04* 1.97* 2.04* 2.10* CA 10.4* 10.7* 11.8* 12.2*  13.0*  
MG  --  1.9  --  1.7 PHOS  --   --   --  3.7 ALB 1.6*  --   --  2.1* TBILI 0.6  --   --  0.8 ALT 13  --   --  11* INR  --  1.9*  --   -- No results for input(s): PH, PCO2, PO2, HCO3, FIO2 in the last 72 hours. Recent Results (from the past 24 hour(s)) GLUCOSE, POC Collection Time: 11/17/20  6:16 PM  
Result Value Ref Range Glucose (POC) 65 65 - 100 mg/dL Performed by Raul Velázquez, POC Collection Time: 11/17/20  9:15 PM  
Result Value Ref Range Glucose (POC) 87 65 - 100 mg/dL Performed by Anali Fletcher   
CBC W/O DIFF Collection Time: 11/17/20  9:30 PM  
Result Value Ref Range WBC 14.9 (H) 4.1 - 11.1 K/uL  
 RBC 3.56 (L) 4.10 - 5.70 M/uL HGB 9.1 (L) 12.1 - 17.0 g/dL HCT 29.5 (L) 36.6 - 50.3 % MCV 82.9 80.0 - 99.0 FL  
 MCH 25.6 (L) 26.0 - 34.0 PG  
 MCHC 30.8 30.0 - 36.5 g/dL  
 RDW 19.0 (H) 11.5 - 14.5 % PLATELET 92 (L) 961 - 400 K/uL MPV 9.8 8.9 - 12.9 FL  
DIGOXIN Collection Time: 11/17/20  9:30 PM  
Result Value Ref Range Digoxin level 0.4 (L) 0.90 - 2.0 ng/mL MAGNESIUM Collection Time: 11/17/20  9:30 PM  
Result Value Ref Range Magnesium 1.9 1.6 - 2.4 mg/dL METABOLIC PANEL, BASIC Collection Time: 11/17/20  9:30 PM  
Result Value Ref Range Sodium 154 (H) 136 - 145 mmol/L Potassium 3.3 (L) 3.5 - 5.1 mmol/L Chloride 126 (H) 97 - 108 mmol/L  
 CO2 16 (L) 21 - 32 mmol/L Anion gap 12 5 - 15 mmol/L Glucose 75 65 - 100 mg/dL BUN 36 (H) 6 - 20 mg/dL Creatinine 1.97 (H) 0.70 - 1.30 mg/dL BUN/Creatinine ratio 18 12 - 20 GFR est AA 40 (L) >60 ml/min/1.73m2 GFR est non-AA 33 (L) >60 ml/min/1.73m2  Calcium 10.7 (H) 8.5 - 10.1 mg/dL PROTHROMBIN TIME + INR Collection Time: 11/17/20  9:30 PM  
Result Value Ref Range Prothrombin time 21.9 (H) 11.9 - 14.7 sec INR 1.9 (H) 0.9 - 1.1 GLUCOSE, POC Collection Time: 11/18/20  1:37 AM  
Result Value Ref Range Glucose (POC) 125 (H) 65 - 100 mg/dL Performed by Harshil Ontiveros Collection Time: 11/18/20  5:10 AM  
Result Value Ref Range Vancomycin, random 19.6 ug/mL Reported dose date Not provided Reported dose: Not provided Units CBC WITH AUTOMATED DIFF Collection Time: 11/18/20  5:30 AM  
Result Value Ref Range WBC 15.8 (H) 4.1 - 11.1 K/uL  
 RBC 3.65 (L) 4.10 - 5.70 M/uL HGB 9.3 (L) 12.1 - 17.0 g/dL HCT 30.1 (L) 36.6 - 50.3 % MCV 82.5 80.0 - 99.0 FL  
 MCH 25.5 (L) 26.0 - 34.0 PG  
 MCHC 30.9 30.0 - 36.5 g/dL  
 RDW 18.8 (H) 11.5 - 14.5 % PLATELET 90 (L) 195 - 400 K/uL MPV 10.7 8.9 - 12.9 FL  
 NRBC 0.2 (H) 0  WBC ABSOLUTE NRBC 0.03 (H) 0.00 - 0.01 K/uL NEUTROPHILS 63 32 - 75 % LYMPHOCYTES 20 12 - 49 % MONOCYTES 5 5 - 13 % EOSINOPHILS 0 0 - 7 % BASOPHILS 2 (H) 0 - 1 % IMMATURE GRANULOCYTES 0 %  
 ABS. NEUTROPHILS 11.4 (H) 1.8 - 8.0 K/UL  
 ABS. LYMPHOCYTES 3.2 0.8 - 3.5 K/UL  
 ABS. MONOCYTES 0.8 0.0 - 1.0 K/UL  
 ABS. EOSINOPHILS 0.0 0.0 - 0.4 K/UL  
 ABS. BASOPHILS 0.3 (H) 0.0 - 0.1 K/UL  
 ABS. IMM. GRANS. 0.0 K/UL  
 DF Manual    
 BAND NEUTROPHILS 9 (H) 0 - 6 % METAMYELOCYTES 1 (H) 0 % RBC COMMENTS Microcytosis 1+ RBC COMMENTS Mara cells 1+ C REACTIVE PROTEIN, QT Collection Time: 11/18/20  5:30 AM  
Result Value Ref Range C-Reactive protein 31.40 (H) 0.00 - 0.60 mg/dL PROCALCITONIN Collection Time: 11/18/20  5:30 AM  
Result Value Ref Range Procalcitonin 8.18 (H) 0 ng/mL METABOLIC PANEL, COMPREHENSIVE Collection Time: 11/18/20  5:30 AM  
Result Value Ref Range Sodium 153 (H) 136 - 145 mmol/L Potassium 3.1 (L) 3.5 - 5.1 mmol/L  Chloride 125 (H) 97 - 108 mmol/L  
 CO2 17 (L) 21 - 32 mmol/L Anion gap 11 5 - 15 mmol/L Glucose 114 (H) 65 - 100 mg/dL BUN 32 (H) 6 - 20 mg/dL Creatinine 2.04 (H) 0.70 - 1.30 mg/dL BUN/Creatinine ratio 16 12 - 20 GFR est AA 39 (L) >60 ml/min/1.73m2 GFR est non-AA 32 (L) >60 ml/min/1.73m2 Calcium 10.4 (H) 8.5 - 10.1 mg/dL Bilirubin, total 0.6 0.2 - 1.0 mg/dL AST (SGOT) 43 (H) 15 - 37 U/L  
 ALT (SGPT) 13 12 - 78 U/L Alk. phosphatase 122 (H) 45 - 117 U/L Protein, total 6.2 (L) 6.4 - 8.2 g/dL Albumin 1.6 (L) 3.5 - 5.0 g/dL Globulin 4.6 (H) 2.0 - 4.0 g/dL A-G Ratio 0.3 (L) 1.1 - 2.2 GLUCOSE, POC Collection Time: 11/18/20  8:39 AM  
Result Value Ref Range Glucose (POC) 142 (H) 65 - 100 mg/dL Performed by Brandon Koehler GLUCOSE, POC Collection Time: 11/18/20 12:27 PM  
Result Value Ref Range Glucose (POC) 135 (H) 65 - 100 mg/dL Performed by Brandon Koehler HGB & HCT Collection Time: 11/18/20  1:45 PM  
Result Value Ref Range HGB 8.7 (L) 12.1 - 17.0 g/dL HCT 28.2 (L) 36.6 - 50.3 % GLUCOSE, POC Collection Time: 11/18/20  2:19 PM  
Result Value Ref Range Glucose (POC) 114 (H) 65 - 100 mg/dL Performed by [facility] Ct Head Wo Cont Result Date: 11/15/2020 IMPRESSION: Chronic findings as above. Us Retroperitoneum Comp Result Date: 11/17/2020 Impression: Normal exam  
 
Ir Us Guided Vascular Access Result Date: 11/17/2020 Impression: Successful placement of a triple-lumen central venous catheter. The catheter is ready for use. Xr Chest [facility] Result Date: 11/17/2020 Impression: Successful placement of a triple-lumen central venous catheter. The catheter is ready for use. Xr Chest [facility] Result Date: 11/17/2020 Impression: The cardiomediastinal silhouette is appropriate for age, technique, and lung expansion. Pulmonary vasculature is not congested. The lungs are essentially clear. No effusion or pneumothorax is seen.   
 
Xr Chest Port 
 
Result Date: 11/15/2020 IMPRESSION:  No evidence of an acute cardiopulmonary process. Ir Insert Non Tunl Cvc Over 5 Yrs Result Date: 11/17/2020 Impression: Successful placement of a triple-lumen central venous catheter. The catheter is ready for use. Assessment and Plan:  
 
Hospital Problems  Date Reviewed: 11/15/2020 Codes Class Noted POA * (Principal) Sepsis ([facility] [address] ICD-10-CM: A41.9 ICD-9-CM: 038.9, 995.91  11/16/2020 Yes Hypoglycemia ICD-10-CM: E16.2 ICD-9-CM: 251.2  11/16/2020 Yes Encephalopathy acute ICD-10-CM: G93.40 ICD-9-CM: 348.30  11/16/2020 Yes  
   
 UTI (urinary tract infection) ICD-10-CM: N39.0 ICD-9-CM: 599.0  11/15/2020 Unknown BRE (acute kidney injury) ([facility] [address] ICD-10-CM: N17.9 ICD-9-CM: 584.9  11/15/2020 Unknown AMS (altered mental status) ICD-10-CM: H37.37 
ICD-9-CM: 780.97  11/15/2020 Unknown 1) 67 yr old male admitted with AMS. Change in mental status likely due to UTI, sepsis, hypercalcemia , hypernatremia and renal failure.  
-ct head did not show any acute pathology.  
-Being eval by neurology. 2) Severe anemia along with thrombocytopenia: This is normocytic anemia. Both have decreased since admission. Likely related to sepsis. -In the setting hypercalcemia and renal fialure need to r/o Multiple myeloma.  
-check labs for myeloma.  
-check IRON studies, b12 and folate levels. LDH ,r gonzales and haptoglobing. _Seen by GI and is scheduled for endo today. Received 2 utnis of transfusion with good response.  
-transfuse for hb <7.5gm/dl. 3) Hypercalcemia: etiology not very clear. High on admission close to 14 corrected hemoglboin. Improved with hudration nicely.  
-check for myeloma.  
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  
-check ct scan of the chest abdomen and pelvis without contrat to eval for any solid tumor leison /bone mets. -psa is normal . 
-check cea and ca 19-9/ Thank you for the consult. Signed By: Renato Salguero MD   
 November 18, 2020

## 2020-11-18 NOTE — ANESTHESIA PREPROCEDURE EVALUATION
Relevant Problems No relevant active problems Anesthetic History Comments: ? Review of Systems / Medical History Patient summary reviewed, nursing notes reviewed and pertinent labs reviewed Pulmonary COPD Neuro/Psych Comments: ALTERED MENTAL STATUS. DEPRESSION Cardiovascular Hypertension Dysrhythmias : atrial fibrillation Hyperlipidemia Comments: EKG: A-FIB WITH RVR. RBBB. GI/Hepatic/Renal 
  
 
 
 
 
 
 Endo/Other Arthritis Other Findings Comments: Hb/Hct 8.7/28.2 PT 21.9 INR: 1.9 Pt admitted with sepsis, UTI, BRE and has encephalopathy.    
Levophed 12.5 mcg/min Eliquis: stopped > 24 hrs. SPOKE [address] VIA PHONE AND HE MENTIONED THAT HE AGGRESSIVELY MANAGED ELECTROLYTE ABNORMALITIES AND ITS OKAY TO PROCEED. Physical Exam 
 
Airway Comments: ? PATIENT DO NOT FOLLOW COMMANDS. Cardiovascular Rate: normal 
 
 
 
 Dental 
 
Dentition: Poor dentition Pulmonary Comments: B/L SCATTERED CRACKLES. Abdominal 
 
 
 
 Other Findings Anesthetic Plan ASA: 4, emergent Anesthesia type: total IV anesthesia and general - backup Induction: Intravenous Anesthetic plan and risks discussed with: Patient

## 2020-11-18 NOTE — ANESTHESIA POSTPROCEDURE EVALUATION
Procedure(s): ESOPHAGOGASTRODUODENOSCOPY (EGD). total IV anesthesia, general - backup Anesthesia Post Evaluation Multimodal analgesia: multimodal analgesia not used between 6 hours prior to anesthesia start to PACU discharge Patient location during evaluation: ICU Patient participation: complete - patient cannot participate Level of consciousness: responsive to light touch Pain management: adequate Airway patency: patent Anesthetic complications: no 
Cardiovascular status: blood pressure returned to baseline Respiratory status: face mask Hydration status: stable Post anesthesia nausea and vomiting:  none Final Post Anesthesia Temperature Assessment:  Normothermia (36.0-37.5 degrees C) INITIAL Post-op Vital signs:  
Vitals Value Taken Time /45 11/18/2020  4:30 PM  
Temp 37.5 °C (99.5 °F) 11/18/2020  4:30 PM  
Pulse 113 11/18/2020  4:30 PM  
Resp 28 11/18/2020  4:30 PM  
SpO2 100 % 11/18/2020  4:30 PM

## 2020-11-18 NOTE — PROGRESS NOTES
Progress Note Patient: Katelynn Fontaine MRN: [id number]  SSN: [id number] YOB: 1945  Age: 67 y.o. Sex: male Admit Date: 11/15/2020 LOS: 3 days Subjective:  
Patient followed for severe sepsis with altered mental status and suspected UTI. Blood cultures negative so far and urine culture is pending. There is concern for meningitis, however, exam is difficult and he is thrombocytopenic. Followed by Neurology with possible plans for LP. He is currently on IV Vancomycin and Zosyn. Patient remains somnolent. Objective:  
 
Vitals:  
 11/18/20 0500 11/18/20 0600 11/18/20 0700 11/18/20 0741 BP: 91/62 (!) 146/102 93/65 Pulse: (!) 101 95 (!) 113 Resp: (!) 34 30 (!) 36 Temp:   98.6 °F (37 °C) SpO2: 99% 98% 99% 99% Weight:      
Height:      
  
 
Intake and Output: 
Current Shift: 11/18 0701 - 11/18 1900 In: -  
Out: 400 [Urine:400] Last three shifts: 11/16 1901 - 11/18 0700 In: 780 Out: 3700 [place] [id number] Physical Exam:  
 Constitutional:   
   General: He is in acute distress. Appearance: He is ill-appearing. He is not diaphoretic. HENT:  
   Head: Normocephalic and atraumatic. Nose:  
   Comments: Nasal O2 Mouth/Throat:  
   Mouth: Mucous membranes are dry. Pharynx: Oropharynx is clear. Comments: pood oral hygiene Eyes:  
   Pupils: Pupils are equal, round, and reactive to light. Neck: ?stiffness Cardiovascular:  
   Rate and Rhythm: Tachycardia present. Rhythm irregular. Heart sounds: No murmur. Pulmonary:  
   Breath sounds: No wheezing, rhonchi or rales. Abdominal:  
   Palpations: Abdomen is soft. Tenderness: There is no abdominal tenderness. Genitourinary: 
   Comments: Mcmillan catheter Musculoskeletal:  
   Right lower leg: No edema. Left lower leg: No edema. Comments: 2x3 cm open wound right medial calf with dry base Skin: 
   Findings: No erythema or rash.   
Neurological:  
   Comments: Unable to assess Psychiatric:  
   Comments: Unable to assess Lab/Data Review: WBC 15,800 Lactic acid 4.2 Procalcitonin 8.18 <12.30 CRP 31.40 <35.90 Covid-19 Pending Blood cultures (11/15) No growth at 3 days Urine culture (11/15) Pending Assessment:  
 
Principal Problem: 
  Sepsis ([facility] [address] (11/16/2020) Active Problems: 
  UTI (urinary tract infection) (11/15/2020) BRE (acute kidney injury) ([facility] [address] (11/15/2020) AMS (altered mental status) (11/15/2020) Hypoglycemia (11/16/2020) Encephalopathy acute (11/16/2020) 1. Severe sepsis with tachycardia, tachypnea, leukocytosis, elevated procalcitonin and CRP 2. Probable UTI with marked pyuria and bacteriuria 3. Altered mental status, possibly secondary to above, ?meningitis 4. Rule out GI bleed 5. Atrial fibrillation Comment:  Agree meningitis is possible in this patient, but the most common pathogen would be pneumococcus which would be covered by Vancomycin. Hemophilus and meningococci would be less likely. Plan: 1. Continue IV Zosyn and Vancomycin 2. Follow-up blood and urine culture 3. In am, repeat CBC, procalcitonin and CRP 4. Possible CSF examination if condition does not improve 5. Noted, patient also on IV Acyclovir; CSF HSV PCR would be required to rule out HSV. 6. Follow-up Covid-19 results Signed By: Kael Gonzalez MD   
 November 18, 2020

## 2020-11-18 NOTE — CONSULTS
Consult Patient: Isaac Wilhelm MRN: [id number]  SSN: [id number] YOB: 1945  Age: 67 y.o. Sex: male Subjective:  
  
Isaac Wilhelm is a 67 y.o. male who is being seen for anemia, GI bleeding, patient was not able to give a history,patient was admitted hospital for sepsis, mental cell change, has been in ICU for hypotensive episode, He has a history of atrial fibrillation in rvr on amio drip. Heart rate A. Control, however patient had dramatic hb dropping, no documented nausea vomiting hematemesis no red blood per rectum, stool test has been negative, patient was given 2 units of blood today, Past Medical History:  
Diagnosis Date  Arthritis  Atrial fibrillation ([facility]  COPD (chronic obstructive pulmonary disease) ([facility] 8/12/2020  High cholesterol  History of vascular access device 08/12/2020 Rt Basilic 5fr dual PICC at 42cm arm cir 34.5cm  Hypertension Social History Tobacco Use  Smoking status: Unknown If Ever Smoked  Smokeless tobacco: Never Used Substance Use Topics  Alcohol use: Never Alcohol/week: 8.0 standard drinks Types: 8 Shots of liquor per week Frequency: Never Current Facility-Administered Medications Medication Dose Route Frequency Provider Last Rate Last Dose  
 0.9% sodium chloride infusion 250 mL  250 mL IntraVENous PRN Ovi Hua MD      
 pantoprazole (PROTONIX) 40 mg in 0.9% sodium chloride 10 mL injection  40 mg IntraVENous Q12H Ovi Hua MD   40 mg at 11/17/20 2010  
 NOREPINephrine (LEVOPHED) 8 mg in 5% dextrose 250mL (32 mcg/mL) infusion  0.5-16 mcg/min IntraVENous TITRATE Ovi Hua MD 23.4 mL/hr at 11/17/20 2100 12.5 mcg/min at 11/17/20 2100  
 acyclovir (ZOVIRAX) 800 mg in 0.9% sodium chloride 250 mL IVPB  10 mg/kg (Ideal) IntraVENous Q12H Ovi Hua MD   800 mg at 11/17/20 2117  [START ON 11/18/2020] vancomycin random level scheduled for 0600 on 11/18/2020   Other ONCE Brittany Mendez MD      
 piperacillin-tazobactam (ZOSYN) 3.375 g in 0.9% sodium chloride (MBP/ADV) 100 mL MBP  3.375 g IntraVENous Q8H Megan Mayorga MD 25 mL/hr at 11/17/20 2010 3.375 g at 11/17/20 2010  
 0.9% sodium chloride infusion 250 mL  250 mL IntraVENous PRN Lin Walsh MD      
 acetaminophen (TYLENOL) suppository 650 mg  650 mg Rectal Q4H PRN Brittany Mendez MD   650 mg at 11/16/20 1225  dextrose 10% infusion  30 mL/hr IntraVENous CONTINUOUS Lin Walsh MD 30 mL/hr at 11/16/20 0741 30 mL/hr at 11/16/20 0741  
 amiodarone (CORDARONE) 900 mg/250 ml D5W infusion  0.5-1 mg/min IntraVENous TITRATE Gregory Jones MD 16.7 mL/hr at 11/16/20 1736 1 mg/min at 11/16/20 1736  
 0.9% sodium chloride infusion  125 mL/hr IntraVENous CONTINUOUS Felix PONCE  mL/hr at 11/17/20 0750 125 mL/hr at 11/17/20 0750  VANCOMYCIN INFORMATION NOTE 1 Each  1 Each Other Rx Dosing/Monitoring Carlos Paige MD      
 acetaminophen (TYLENOL) tablet 650 mg  650 mg Oral Q4H PRN Rom Herring NP      
 albuterol (PROVENTIL HFA, VENTOLIN HFA, PROAIR HFA) inhaler 2 Puff  2 Puff Inhalation Q4H PRN Rom Herring NP      
 docusate sodium (COLACE) capsule 100 mg  100 mg Oral BID Rom Herring NP   Stopped at 45/23/70 1686  
 folic acid (FOLVITE) tablet 1 mg  1 mg Oral DAILY Rom Herring NP   Stopped at 11/16/20 0900  pravastatin (PRAVACHOL) tablet 20 mg  20 mg Oral QHS Rom Herring NP   20 mg at 11/16/20 2232  sertraline (ZOLOFT) tablet 50 mg  50 mg Oral DAILY Rom Herring NP   Stopped at 11/17/20 0900  thiamine mononitrate (B-1) tablet 100 mg  100 mg Oral DAILY Rom Herring NP   Stopped at 11/16/20 0900  digoxin (LANOXIN) injection 125 mcg  125 mcg IntraVENous DAILY Rom Herring NP   125 mcg at 11/17/20 0115  ondansetron (ZOFRAN) injection 4 mg  4 mg IntraVENous Q6H PRN Rom Herring, ALANNA      
 hydrOXYzine pamoate (VISTARIL) capsule 25 mg  25 mg Oral TID PRN Rmo Herring NP   25 mg at 11/15/20 2217  risperiDONE (RisperDAL) tablet 2 mg  2 mg Oral QHS oRm Herring, ALANNA   2 mg at 11/16/20 2232  chlorhexidine (PERIDEX) 0.12 % mouthwash 15 mL  15 mL Oral Q12H Rom Herring NP   15 mL at 11/17/20 2010 Allergies Allergen Reactions  Codeine Rash Review of Systems: 
Review of Systems Unable to perform ROS: Mental acuity Objective:  
 
Vitals:  
 11/17/20 2000 11/17/20 2026 11/17/20 2100 11/17/20 2200 BP: 92/60  (!) 87/56 (!) 93/52 Pulse: 96  84 (!) 101 Resp: (!) 35  27 30 Temp: 97.8 °F (36.6 °C)  97.8 °F (36.6 °C) SpO2: 100% 100% 100% 100% Weight:   92.5 kg (203 lb 14.8 oz) Height:      
  
 
Physical Exam: 
Physical Exam  
Constitutional: He appears listless. He is uncooperative. HENT:  
Head: Atraumatic. Neck: Normal carotid pulses and no JVD present. Carotid bruit is not present. Cardiovascular: An irregular rhythm present. Pulmonary/Chest: Breath sounds normal. He is in respiratory distress. Abdominal: Soft. He exhibits no shifting dullness, no distension, no pulsatile liver, no fluid wave and no mass. Bowel sounds are increased. Lymphadenopathy:  
     Head (left side): No submental adenopathy present. Neurological: He appears listless. Skin: No bruising, no ecchymosis and no laceration noted. There is pallor. Recent Results (from the past 24 hour(s)) METABOLIC PANEL, BASIC Collection Time: 11/17/20  5:00 AM  
Result Value Ref Range Sodium 146 (H) 136 - 145 mmol/L Potassium 2.6 (LL) 3.5 - 5.1 mmol/L Chloride 119 (H) 97 - 108 mmol/L  
 CO2 16 (L) 21 - 32 mmol/L Anion gap 11 5 - 15 mmol/L Glucose 61 (L) 65 - 100 mg/dL BUN 36 (H) 6 - 20 mg/dL Creatinine 2.04 (H) 0.70 - 1.30 mg/dL BUN/Creatinine ratio 18 12 - 20 GFR est AA 39 (L) >60 ml/min/1.73m2  GFR est non-AA 32 (L) >60 ml/min/1.73m2 Calcium 11.8 (H) 8.5 - 10.1 mg/dL CBC WITH AUTOMATED DIFF Collection Time: 11/17/20  5:00 AM  
Result Value Ref Range WBC 20.7 (H) 4.1 - 11.1 K/uL  
 RBC 2.86 (L) 4.10 - 5.70 M/uL HGB 6.8 (L) 12.1 - 17.0 g/dL HCT 23.2 (L) 36.6 - 50.3 % MCV 81.1 80.0 - 99.0 FL  
 MCH 23.8 (L) 26.0 - 34.0 PG  
 MCHC 29.3 (L) 30.0 - 36.5 g/dL  
 RDW 19.9 (H) 11.5 - 14.5 % PLATELET 500 (L) 295 - 400 K/uL MPV 10.4 8.9 - 12.9 FL  
 NRBC 0.0 0  WBC ABSOLUTE NRBC 0.00 0.00 - 0.01 K/uL  
 DF AUTOMATED NEUTROPHILS 75 32 - 75 % LYMPHOCYTES 9 (L) 12 - 49 % MONOCYTES 16 (H) 5 - 13 % EOSINOPHILS 0 0 - 7 % BASOPHILS 0 0 - 1 % IMMATURE GRANULOCYTES 0 %  
 ABS. NEUTROPHILS 15.5 (H) 1.8 - 8.0 K/UL  
 ABS. LYMPHOCYTES 1.9 0.8 - 3.5 K/UL  
 ABS. MONOCYTES 3.3 (H) 0.0 - 1.0 K/UL  
 ABS. EOSINOPHILS 0.0 0.0 - 0.4 K/UL  
 ABS. BASOPHILS 0.0 0.0 - 0.1 K/UL  
 ABS. IMM. GRANS. 0.0 K/UL  
 RBC COMMENTS Normocytic, Normochromic C REACTIVE PROTEIN, QT Collection Time: 11/17/20  5:00 AM  
Result Value Ref Range C-Reactive protein 35.90 (H) 0.00 - 0.60 mg/dL PROCALCITONIN Collection Time: 11/17/20  5:00 AM  
Result Value Ref Range Procalcitonin 12.30 (H) 0 ng/mL BNP Collection Time: 11/17/20  5:00 AM  
Result Value Ref Range NT pro-BNP 22,128 (H) <450 pg/mL LACTIC ACID Collection Time: 11/17/20  5:00 AM  
Result Value Ref Range Lactic acid 4.2 (HH) 0.4 - 2.0 mmol/L  
VANCOMYCIN, RANDOM Collection Time: 11/17/20  5:00 AM  
Result Value Ref Range Vancomycin, random 13.4 ug/mL Reported dose date 0 Reported dose: 0 Units PSA SCREENING (SCREENING) Collection Time: 11/17/20  5:00 AM  
Result Value Ref Range Prostate Specific Ag 0.1 0.01 - 4.0 ng/mL GLUCOSE, POC Collection Time: 11/17/20  7:36 AM  
Result Value Ref Range Glucose (POC) 78 65 - 100 mg/dL Performed by Murtaza Reyes Time: 11/17/20  8:00 AM  
Result Value Ref Range Crossmatch Expiration 11/20/2020,2359 ABO/Rh(D) Laymon Bin Negative Antibody screen Negative Unit number [id number] Blood component type RC LR Unit division 00 Status of unit Issued TRANSFUSION STATUS Ok to transfuse Crossmatch result Compatible Unit number [id number] Blood component type RC LR,1 Unit division 00 Status of unit Issued TRANSFUSION STATUS Ok to transfuse Crossmatch result Compatible GLUCOSE, POC Collection Time: 11/17/20 12:12 PM  
Result Value Ref Range Glucose (POC) 90 65 - 100 mg/dL Performed by Patrice Bores, POC Collection Time: 11/17/20  6:16 PM  
Result Value Ref Range Glucose (POC) 65 65 - 100 mg/dL Performed by Patrice Bores, POC Collection Time: 11/17/20  9:15 PM  
Result Value Ref Range Glucose (POC) 87 65 - 100 mg/dL Performed by Mellody Krabbe   
CBC W/O DIFF Collection Time: 11/17/20  9:30 PM  
Result Value Ref Range WBC 14.9 (H) 4.1 - 11.1 K/uL  
 RBC 3.56 (L) 4.10 - 5.70 M/uL HGB 9.1 (L) 12.1 - 17.0 g/dL HCT 29.5 (L) 36.6 - 50.3 % MCV 82.9 80.0 - 99.0 FL  
 MCH 25.6 (L) 26.0 - 34.0 PG  
 MCHC 30.8 30.0 - 36.5 g/dL  
 RDW 19.0 (H) 11.5 - 14.5 % PLATELET 92 (L) 093 - 400 K/uL MPV 9.8 8.9 - 12.9 FL  
  
 
XR CHEST PORT Final Result Impression:   
Successful placement of a triple-lumen central venous catheter. The catheter is  
ready for use. IR INSERT NON TUNL CVC OVER 5 YRS Final Result Impression:   
Successful placement of a triple-lumen central venous catheter. The catheter is  
ready for use. IR [facility] Final Result Impression:   
Successful placement of a triple-lumen central venous catheter. The catheter is  
ready for use. XR CHEST PORT Final Result Impression: The cardiomediastinal silhouette is appropriate for age, technique,  
and lung expansion.  Pulmonary vasculature is not congested. The lungs are  
essentially clear. No effusion or pneumothorax is seen. US RETROPERITONEUM COMP Final Result Impression: Normal exam  
  
  
XR CHEST PORT Final Result IMPRESSION:  No evidence of an acute cardiopulmonary process. CT HEAD WO CONT Final Result IMPRESSION: Chronic findings as above. Assessment:  
 
Hospital Problems  Date Reviewed: 11/15/2020 Codes Class Noted POA * (Principal) Sepsis ([facility] [address] ICD-10-CM: A41.9 ICD-9-CM: 038.9, 995.91  11/16/2020 Yes Hypoglycemia ICD-10-CM: E16.2 ICD-9-CM: 251.2  11/16/2020 Yes Encephalopathy acute ICD-10-CM: G93.40 ICD-9-CM: 348.30  11/16/2020 Yes  
   
 UTI (urinary tract infection) ICD-10-CM: N39.0 ICD-9-CM: 599.0  11/15/2020 Unknown BRE (acute kidney injury) ([facility] ICD-10-CM: N17.9 ICD-9-CM: 584.9  11/15/2020 Unknown AMS (altered mental status) ICD-10-CM: R41.82 
ICD-9-CM: 780.97  11/15/2020 Unknown  
   
  
progressive anemia, possible GI blood loss, No massive GI bleeding, High risk for stress-induced ulcers Plan:  
Continue blood transfusion, 
Monitoring hemoglobin closely PPI as ordered If his hemoglobin dropped further after blood transfusion anEGD may be scheduled for the evaluation to rule out the stress-induced peptic ulcer disease Signed By: Macario Oscar MD   
 November 17, 2020 Thank you for allowing me to participate in this patients care Cc Referring Physician Cleo Phillips DO

## 2020-11-18 NOTE — PROGRESS NOTES
Hospitalist Progress Note Daily Progress Note: 11/18/2020 
 
50-year-old male sent here from [facility] with reports from staff of altered mental status, lethargy, not eating and dehydration. He has a history of atrial fibrillation in rvr on amio drip. Ua suggestive uti, zosyn initiated 11/15. Beck, cr 2.18, us rp pending. Subjective:  
Patient seen and evaluated at bedside, overnight events noted, of note patient is significantly lethargic, however does respond to painful and verbal stimuli, of note patient has significant neck stiffness on examination, patient was found to have a significantly reduced hemoglobin as well as potassium this a.m., discussed with RN at bedside. Problem List: 
Problem List as of 11/18/2020 Date Reviewed: 11/15/2020 Codes Class Noted - Resolved * (Principal) Sepsis ([facility] ICD-10-CM: A41.9 ICD-9-CM: 038.9, 995.91  11/16/2020 - Present Hypoglycemia ICD-10-CM: E16.2 ICD-9-CM: 251.2  11/16/2020 - Present Encephalopathy acute ICD-10-CM: G93.40 ICD-9-CM: 348.30  11/16/2020 - Present UTI (urinary tract infection) ICD-10-CM: N39.0 ICD-9-CM: 599.0  11/15/2020 - Present BECK (acute kidney injury) ([facility] ICD-10-CM: N17.9 ICD-9-CM: 584.9  11/15/2020 - Present AMS (altered mental status) ICD-10-CM: E13.59 
ICD-9-CM: 780.97  11/15/2020 - Present Chronic atrial fibrillation (HCC) ICD-10-CM: I48.20 ICD-9-CM: 427.31  8/12/2020 - Present HTN (hypertension), benign ICD-10-CM: I10 
ICD-9-CM: 401.1  8/12/2020 - Present COPD (chronic obstructive pulmonary disease) (HCC) ICD-10-CM: J44.9 ICD-9-CM: 339  8/12/2020 - Present Dyslipidemia ICD-10-CM: E78.5 ICD-9-CM: 272.4  8/12/2020 - Present Depression ICD-10-CM: F32.9 ICD-9-CM: 385  8/12/2020 - Present Cellulitis of left upper extremity ICD-10-CM: L03.114 
ICD-9-CM: 682.3  8/11/2020 - Present Medications reviewed Current Facility-Administered Medications Medication Dose Route Frequency  potassium chloride 10 mEq in 100 ml IVPB  10 mEq IntraVENous Q1H  
 0.9% sodium chloride infusion 250 mL  250 mL IntraVENous PRN  pantoprazole (PROTONIX) 40 mg in 0.9% sodium chloride 10 mL injection  40 mg IntraVENous Q12H  
 NOREPINephrine (LEVOPHED) 8 mg in 5% dextrose 250mL (32 mcg/mL) infusion  0.5-16 mcg/min IntraVENous TITRATE  acyclovir (ZOVIRAX) 800 mg in 0.9% sodium chloride 250 mL IVPB  10 mg/kg (Ideal) IntraVENous Q12H  piperacillin-tazobactam (ZOSYN) 3.375 g in 0.9% sodium chloride (MBP/ADV) 100 mL MBP  3.375 g IntraVENous Q8H  
 0.9% sodium chloride infusion 250 mL  250 mL IntraVENous PRN  
 dextrose 5% infusion  125 mL/hr IntraVENous CONTINUOUS  
 acetaminophen (TYLENOL) suppository 650 mg  650 mg Rectal Q4H PRN  
 dextrose 10% infusion  30 mL/hr IntraVENous CONTINUOUS  
 amiodarone (CORDARONE) 900 mg/250 ml D5W infusion  0.5-1 mg/min IntraVENous TITRATE  VANCOMYCIN INFORMATION NOTE 1 Each  1 Each Other Rx Dosing/Monitoring  acetaminophen (TYLENOL) tablet 650 mg  650 mg Oral Q4H PRN  
 albuterol (PROVENTIL HFA, VENTOLIN HFA, PROAIR HFA) inhaler 2 Puff  2 Puff Inhalation Q4H PRN  
 docusate sodium (COLACE) capsule 100 mg  100 mg Oral BID  folic acid (FOLVITE) tablet 1 mg  1 mg Oral DAILY  pravastatin (PRAVACHOL) tablet 20 mg  20 mg Oral QHS  sertraline (ZOLOFT) tablet 50 mg  50 mg Oral DAILY  thiamine mononitrate (B-1) tablet 100 mg  100 mg Oral DAILY  digoxin (LANOXIN) injection 125 mcg  125 mcg IntraVENous DAILY  ondansetron (ZOFRAN) injection 4 mg  4 mg IntraVENous Q6H PRN  
 hydrOXYzine pamoate (VISTARIL) capsule 25 mg  25 mg Oral TID PRN  
 risperiDONE (RisperDAL) tablet 2 mg  2 mg Oral QHS  chlorhexidine (PERIDEX) 0.12 % mouthwash 15 mL  15 mL Oral Q12H Review of Systems:  
Unobtainable 2/2 encephalopathic/acute illness Objective:  
Physical Exam:  
 
Visit Vitals BP 93/65 Pulse (!) 113 Temp 98.6 °F (37 °C) Resp (!) 36 Ht 6' 2\" (1.88 m) Wt 92.5 kg (203 lb 14.8 oz) SpO2 99% BMI 26.18 kg/m² O2 Flow Rate (L/min): 1 l/min O2 Device: Nasal cannula Temp (24hrs), Av.1 °F (36.7 °C), Min:97.8 °F (36.6 °C), Max:98.7 °F (37.1 °C) No intake/output data recorded.  1901 -  0700 In: 780 Out: 3700 [[id number]] Constitutional: He appears well-developed. Obtunded Head: Normocephalic and atraumatic. Eyes: Pupils are equal, round, and reactive to light. Mouth: oral mucosa dry Neck: No thyromegaly present, significant neck stiffness on examination Cardiovascular:Irreg/irreg Lungs:Effort normal with fair ae. No wheeze, not labored Abdominal: Soft. can't tell if tender, moans if touch him anywhere including extremities Musculoskeletal:  
   Comments: Generalized weakness Neurological: obtunded, moves all ext, 
Skin: Right lower shin with ulcer, yellow drainage Sacrum reddened, no breakdown Left arm with skin tear Multiple ecchymotic, skin is friable/thin Data Review:  
   
Recent Days: 
Recent Labs 20 
0530 20 
2130 20 
0500 WBC 15.8* 14.9* 20.7* HGB 9.3* 9.1* 6.8* HCT 30.1* 29.5* 23.2*  
PLT 90* 92* 122* Recent Labs 20 
0530 20 
2130 20 
0500 20 
0230 11/15/20 
1345 * 154* 146* 140 137  
K 3.1* 3.3* 2.6* 3.7 3.7 * 126* 119* 110* 104 CO2 17* 16* 16* 20* 22 * 75 61* 40* 74 BUN 32* 36* 36* 38* 35* CREA 2.04* 1.97* 2.04* 2.10* 2.18* CA 10.4* 10.7* 11.8* 12.2*  13.0* 13.7* MG  --  1.9  --  1.7  --   
PHOS  --   --   --  3.7  --   
ALB 1.6*  --   --  2.1* 2.2* TBILI 0.6  --   --  0.8 0.7 ALT 13  --   --  11* 10* INR  --  1.9*  --   --   -- No results for input(s): PH, PCO2, PO2, HCO3, FIO2 in the last 72 hours. 24 Hour Results: 
Recent Results (from the past 24 hour(s)) TYPE & SCREEN  Collection Time: 20  8:00 AM  
Result Value Ref Range Crossmatch Expiration 11/20/2020,2359 ABO/Rh(D) [place] Negative Antibody screen Negative Unit number [id number] Blood component type RC LR Unit division 00 Status of unit Issued,final   
 TRANSFUSION STATUS Ok to transfuse Crossmatch result Compatible Unit number [id number] Blood component type RC LR,1 Unit division 00 Status of unit Issued,final   
 TRANSFUSION STATUS Ok to transfuse Crossmatch result Compatible GLUCOSE, POC Collection Time: 11/17/20 12:12 PM  
Result Value Ref Range Glucose (POC) 90 65 - 100 mg/dL Performed by Stuart Aguilar, POC Collection Time: 11/17/20  6:16 PM  
Result Value Ref Range Glucose (POC) 65 65 - 100 mg/dL Performed by Stuart Aguilar, POC Collection Time: 11/17/20  9:15 PM  
Result Value Ref Range Glucose (POC) 87 65 - 100 mg/dL Performed by Mady Lynn   
CBC W/O DIFF Collection Time: 11/17/20  9:30 PM  
Result Value Ref Range WBC 14.9 (H) 4.1 - 11.1 K/uL  
 RBC 3.56 (L) 4.10 - 5.70 M/uL HGB 9.1 (L) 12.1 - 17.0 g/dL HCT 29.5 (L) 36.6 - 50.3 % MCV 82.9 80.0 - 99.0 FL  
 MCH 25.6 (L) 26.0 - 34.0 PG  
 MCHC 30.8 30.0 - 36.5 g/dL  
 RDW 19.0 (H) 11.5 - 14.5 % PLATELET 92 (L) 826 - 400 K/uL MPV 9.8 8.9 - 12.9 FL  
DIGOXIN Collection Time: 11/17/20  9:30 PM  
Result Value Ref Range Digoxin level 0.4 (L) 0.90 - 2.0 ng/mL MAGNESIUM Collection Time: 11/17/20  9:30 PM  
Result Value Ref Range Magnesium 1.9 1.6 - 2.4 mg/dL METABOLIC PANEL, BASIC Collection Time: 11/17/20  9:30 PM  
Result Value Ref Range Sodium 154 (H) 136 - 145 mmol/L Potassium 3.3 (L) 3.5 - 5.1 mmol/L Chloride 126 (H) 97 - 108 mmol/L  
 CO2 16 (L) 21 - 32 mmol/L Anion gap 12 5 - 15 mmol/L Glucose 75 65 - 100 mg/dL BUN 36 (H) 6 - 20 mg/dL Creatinine 1.97 (H) 0.70 - 1.30 mg/dL BUN/Creatinine ratio 18 12 - 20  GFR est AA 40 (L) >60 ml/min/1.73m2 GFR est non-AA 33 (L) >60 ml/min/1.73m2 Calcium 10.7 (H) 8.5 - 10.1 mg/dL PROTHROMBIN TIME + INR Collection Time: 11/17/20  9:30 PM  
Result Value Ref Range Prothrombin time 21.9 (H) 11.9 - 14.7 sec INR 1.9 (H) 0.9 - 1.1 GLUCOSE, POC Collection Time: 11/18/20  1:37 AM  
Result Value Ref Range Glucose (POC) 125 (H) 65 - 100 mg/dL Performed by Brittny James Collection Time: 11/18/20  5:10 AM  
Result Value Ref Range Vancomycin, random 19.6 ug/mL Reported dose date Not provided Reported dose: Not provided Units CBC WITH AUTOMATED DIFF Collection Time: 11/18/20  5:30 AM  
Result Value Ref Range WBC 15.8 (H) 4.1 - 11.1 K/uL  
 RBC 3.65 (L) 4.10 - 5.70 M/uL HGB 9.3 (L) 12.1 - 17.0 g/dL HCT 30.1 (L) 36.6 - 50.3 % MCV 82.5 80.0 - 99.0 FL  
 MCH 25.5 (L) 26.0 - 34.0 PG  
 MCHC 30.9 30.0 - 36.5 g/dL  
 RDW 18.8 (H) 11.5 - 14.5 % PLATELET 90 (L) 472 - 400 K/uL MPV 10.7 8.9 - 12.9 FL  
 NRBC 0.2 (H) 0  WBC ABSOLUTE NRBC 0.03 (H) 0.00 - 0.01 K/uL NEUTROPHILS PENDING % LYMPHOCYTES PENDING % MONOCYTES PENDING % EOSINOPHILS PENDING % BASOPHILS 0 0 - 1 % IMMATURE GRANULOCYTES 6 (H) 0.0 - 0.5 % ABS. NEUTROPHILS PENDING K/UL  
 ABS. LYMPHOCYTES PENDING K/UL  
 ABS. MONOCYTES PENDING K/UL  
 ABS. EOSINOPHILS PENDING K/UL  
 ABS. BASOPHILS 0.0 0.0 - 0.1 K/UL  
 ABS. IMM. GRANS. 0.9 (H) 0.00 - 0.04 K/UL  
 DF AUTOMATED    
C REACTIVE PROTEIN, QT Collection Time: 11/18/20  5:30 AM  
Result Value Ref Range C-Reactive protein 31.40 (H) 0.00 - 0.60 mg/dL PROCALCITONIN Collection Time: 11/18/20  5:30 AM  
Result Value Ref Range Procalcitonin 8.18 (H) 0 ng/mL METABOLIC PANEL, COMPREHENSIVE Collection Time: 11/18/20  5:30 AM  
Result Value Ref Range Sodium 153 (H) 136 - 145 mmol/L Potassium 3.1 (L) 3.5 - 5.1 mmol/L Chloride 125 (H) 97 - 108 mmol/L  
 CO2 17 (L) 21 - 32 mmol/L  Anion gap 11 5 - 15 mmol/L Glucose 114 (H) 65 - 100 mg/dL BUN 32 (H) 6 - 20 mg/dL Creatinine 2.04 (H) 0.70 - 1.30 mg/dL BUN/Creatinine ratio 16 12 - 20 GFR est AA 39 (L) >60 ml/min/1.73m2 GFR est non-AA 32 (L) >60 ml/min/1.73m2 Calcium 10.4 (H) 8.5 - 10.1 mg/dL Bilirubin, total 0.6 0.2 - 1.0 mg/dL AST (SGOT) 43 (H) 15 - 37 U/L  
 ALT (SGPT) 13 12 - 78 U/L Alk. phosphatase 122 (H) 45 - 117 U/L Protein, total 6.2 (L) 6.4 - 8.2 g/dL Albumin 1.6 (L) 3.5 - 5.0 g/dL Globulin 4.6 (H) 2.0 - 4.0 g/dL A-G Ratio 0.3 (L) 1.1 - 2.2 Assessment/  
 
Patient Active Problem List  
Diagnosis Code  Cellulitis of left upper extremity L03.114  
 Chronic atrial fibrillation (HCC) I48.20  
 HTN (hypertension), benign I10  
 COPD (chronic obstructive pulmonary disease) (HCC) J44.9  Dyslipidemia E78.5  Depression F32.9  
 UTI (urinary tract infection) N39.0  BRE (acute kidney injury) ([facility] N17.9  AMS (altered mental status) R41.82  Sepsis ([facility] A41.9  Hypoglycemia E16.2  
 Encephalopathy acute G93.40 Plan: 
 
Severe sepsispatient presented with above-mentioned symptomatology was found to meet severe sepsis criteria due to unclear etiology, of note patient still requires pressor support, based on patient's clinical examination is significant concern for meningitis given neck stiffness present on exam 
Follow-up blood cultures Follow up COVID 19 testing Follow-up urine cultures Follow-up inflammatory markers Continue vancomycin/zosyn/acyclovir for broad spectrum coverage Follow up Neurology recommendations Levophed for pressor support Follow up infectious disease consult further evaluation Continue to monitor Atrial fibrillation with RVRpatient presented with atrial fibrillation with RVR likely secondary to ongoing severe sepsis, will recommend discontinuation of beta-blockers Initiation of amiodarone GTT Transthoracic echo shows preserved ejection fraction Cardiology consult appreciated, will continue to follow Anemialikely multifactorial, stool occult blood was negative, s/p 2 uprbc with appropriate response Continue to trend H/H Maintain active type and screen Obtain iron studies/b12/folate Hypokalemiaaggressively replete potassium and recheck potassium Obtain repeat serum magnesium, as well as repeat serum potassium Hyperlipidemiacontinue statin ProphylaxisSCDs FENcontinue n.p.o. will place NG tube start tube feedings, maintenance IV fluids, replete potassium and magnesium Full code by default, will clarify about surrogate decision-maker Critical care time spent 60 minutes involving direct patient care reviewing patient's labs and coordination of care with nursing staff Care Plan discussed with: Patient/Family and Nurse Gautam Land MD  
 
Of note pt needs an emergent central line for HD instability otherwise could result in loss of life or limb

## 2020-11-18 NOTE — PROGRESS NOTES
Physician Progress Note [facility] #:                  [id number] :                       1945 ADMIT DATE:       11/15/2020 12:44 PM 
[address] [place] DATE: 
RESPONDING 
PROVIDER #:        Emre Fields MD 
 
 
 
 
QUERY TEXT: 
 
Dear Dr. Galvan Mask: 
 
Pt admitted with sepsis, BRE and has anemia documented. If possible, please document in progress notes and discharge summary further specificity regarding the acuity and type of anemia: 
 
 
The medical record reflects the following: 
Risk Factors: 67 M admitted with sepsis, BRE, UTI Clinical Indicators: Hgb 9.0, 8.4, 6.8, 9.1, 9.3 Treatment: GI consult, labs monitoring, blood transfusion  Thank you, ELDA Acharya, RN Clinical  
[phone] Options provided: 
-- Anemia due to acute blood loss 
-- Anemia due to chronic blood loss 
-- Anemia due to acute on chronic blood loss 
-- Anemia due to iron deficiency -- Anemia due to chronic disease 
-- Other - I will add my own diagnosis -- Disagree - Not applicable / Not valid -- Disagree - Clinically unable to determine / Unknown 
-- Refer to Clinical Documentation Reviewer PROVIDER RESPONSE TEXT: 
 
This patient has anemia due to chronic disease. Query created by: Chip Quinones on 2020 11:05 AM 
 
 
QUERY TEXT: 
 
Dear Dr. Galvan Mask: 
 
Pt admitted with sepsis, UTI, BRE and has encephalopathy documented. If possible, please document in progress notes and discharge summary further specificity regarding the type of encephalopathy: 
 
The medical record reflects the following: 
Risk Factors: 67 M admitted with sepsis, UTI, BRE Clinical Indicators: encephalopathy documented in Neurology PN, patient obtunded, hypokalemia Treatment: IV Zosyn and Vancomycin, ID/Neuro/Cardiology consults, labs Thank you, ELDA Acharya, RN Clinical  
[phone] Options provided: 
-- Hypertensive encephalopathy -- Metabolic encephalopathy 
-- Septic encephalopathy 
-- Toxic encephalopathy 
-- Toxic metabolic encephalopathy 
-- Other - I will add my own diagnosis -- Disagree - Not applicable / Not valid -- Disagree - Clinically unable to determine / Unknown 
-- Refer to Clinical Documentation Reviewer PROVIDER RESPONSE TEXT: 
 
This patient has septic encephalopathy.  
 
Query created by: Hiren Vargas on 11/18/2020 11:12 AM 
 
 
Electronically signed by:  Victorino Wheat MD 11/18/2020 11:31 AM

## 2020-11-18 NOTE — PROGRESS NOTES
Consult for Vancomycin Dosing by Pharmacy by Dr. Salazar Sit Consult provided for this 67y.o. year old male , for indication of Sepsis. Day of Therapy: 3 Goal of Level(s): 15-20mcg/dL Other Current Antibiotics:  Acyclovir, Zosyn Serum Creatinine Creatinine Date Value Ref Range Status 11/18/2020 2.04 (H) 0.70 - 1.30 mg/dL Final  
11/17/2020 1.97 (H) 0.70 - 1.30 mg/dL Final  
11/17/2020 2.04 (H) 0.70 - 1.30 mg/dL Final  
  
Creatinine Clearance Estimated Creatinine Clearance: 35.8 mL/min (A) (based on SCr of 2.04 mg/dL (H)). BUN Lab Results Component Value Date/Time BUN 32 (H) 11/18/2020 05:30 AM  
  
WBC Lab Results Component Value Date/Time WBC 15.8 (H) 11/18/2020 05:30 AM  
  
Temp 98.9 °F (37.2 °C) Vancomycin,trough Date Value Ref Range Status 08/13/2020 14.6 (H) 5.0 - 10.0 ug/mL Final  
  Comment:  
      
Trough levels of 15-20 ug/mL should be targeted for patients with coagulase negative Staphylococcus and MRSA pneumonia, endocarditis, osteomyelitis, meningitis, and bacteremia, as well as patients not responding to lower levels. Trough levels of 10-15 ug/mL for infections from other sources (e.g. urinary tract, cellultis) are appropriate. All patients receiving concomitant nephrotoxic therapies should have their funtion closely monitored regardless of peak or trough levels. Vancomycin, random Date Value Ref Range Status 11/18/2020 19.6 ug/mL Final  
  Comment: No reference range has been established. New Regimen: Give Vancomycin 1000 mg this am. Please do random Vancomycin level  on 11/19/2020 at 0600 am. 
 
 
Pharmacy to follow daily and will make changes to dose and/or frequency based on clinical status. _________________________________ Pharmacist Juan J Cruz, MARIVELD

## 2020-11-18 NOTE — CONSULTS
General Daily Progress Note Admit Date: 11/15/2020 Subjective: yelling with eyes closed Current Facility-Administered Medications Medication Dose Route Frequency  potassium chloride 10 mEq in 100 ml IVPB  10 mEq IntraVENous Q1H  
 aspirin chewable tablet 81 mg  81 mg Oral DAILY  morphine injection 2 mg  2 mg IntraVENous Q4H PRN  
 0.9% sodium chloride infusion 250 mL  250 mL IntraVENous PRN  
 NOREPINephrine (LEVOPHED) 8 mg in 5% dextrose 250mL (32 mcg/mL) infusion  0.5-16 mcg/min IntraVENous TITRATE  acyclovir (ZOVIRAX) 800 mg in 0.9% sodium chloride 250 mL IVPB  10 mg/kg (Ideal) IntraVENous Q12H  piperacillin-tazobactam (ZOSYN) 3.375 g in 0.9% sodium chloride (MBP/ADV) 100 mL MBP  3.375 g IntraVENous Q8H  
 0.9% sodium chloride infusion 250 mL  250 mL IntraVENous PRN  
 dextrose 5% infusion  125 mL/hr IntraVENous CONTINUOUS  
 acetaminophen (TYLENOL) suppository 650 mg  650 mg Rectal Q4H PRN  
 dextrose 10% infusion  30 mL/hr IntraVENous CONTINUOUS  
 amiodarone (CORDARONE) 900 mg/250 ml D5W infusion  0.5-1 mg/min IntraVENous TITRATE  VANCOMYCIN INFORMATION NOTE 1 Each  1 Each Other Rx Dosing/Monitoring  acetaminophen (TYLENOL) tablet 650 mg  650 mg Oral Q4H PRN  
 albuterol (PROVENTIL HFA, VENTOLIN HFA, PROAIR HFA) inhaler 2 Puff  2 Puff Inhalation Q4H PRN  
 docusate sodium (COLACE) capsule 100 mg  100 mg Oral BID  folic acid (FOLVITE) tablet 1 mg  1 mg Oral DAILY  pravastatin (PRAVACHOL) tablet 20 mg  20 mg Oral QHS  sertraline (ZOLOFT) tablet 50 mg  50 mg Oral DAILY  thiamine mononitrate (B-1) tablet 100 mg  100 mg Oral DAILY  digoxin (LANOXIN) injection 125 mcg  125 mcg IntraVENous DAILY  ondansetron (ZOFRAN) injection 4 mg  4 mg IntraVENous Q6H PRN  
 hydrOXYzine pamoate (VISTARIL) capsule 25 mg  25 mg Oral TID PRN  
 risperiDONE (RisperDAL) tablet 2 mg  2 mg Oral QHS  chlorhexidine (PERIDEX) 0.12 % mouthwash 15 mL  15 mL Oral Q12H Review of Systems Cannot obtain due to AMS Objective:  
 
Patient Vitals for the past 8 hrs: 
 BP Temp Pulse Resp SpO2  
11/18/20 0741     99 % 11/18/20 0700 93/65 98.6 °F (37 °C) (!) 113 (!) 36 99 % 11/18/20 0600 (!) 146/102  95 30 98 % 11/18/20 0500 91/62  (!) 101 (!) 34 99 % 11/18/20 0400 (!) 75/61  98 (!) 33 99 % 11/18/20 0300 90/72 98.2 °F (36.8 °C) (!) 105 27 100 % 11/18/20 0200 105/68  100 27 97 % 11/18/20 0100 95/71 98 °F (36.7 °C) (!) 107 22 99 % No intake/output data recorded. 11/16 1901 - 11/18 0700 In: 780 Out: 3700 [[id number]] Neuro Physical Exa Neurological Exam: 
Mental Status: Lethargic, not opening eyes, yelling and moaning Resists eye opening Difficult to check for meningismus because he fights neck movement Patient not following commands, moves left arm spontaneously only Reflexes: 1+ in b/l ptellars, 0 in b/l brachiorad Babinski mute b/l Data Review Recent Results (from the past 24 hour(s)) GLUCOSE, POC Collection Time: 11/17/20 12:12 PM  
Result Value Ref Range Glucose (POC) 90 65 - 100 mg/dL Performed by Rolando Fernandez, POC Collection Time: 11/17/20  6:16 PM  
Result Value Ref Range Glucose (POC) 65 65 - 100 mg/dL Performed by Rolando Fernandez, POC Collection Time: 11/17/20  9:15 PM  
Result Value Ref Range Glucose (POC) 87 65 - 100 mg/dL Performed by Charity Flowers   
CBC W/O DIFF Collection Time: 11/17/20  9:30 PM  
Result Value Ref Range WBC 14.9 (H) 4.1 - 11.1 K/uL  
 RBC 3.56 (L) 4.10 - 5.70 M/uL HGB 9.1 (L) 12.1 - 17.0 g/dL HCT 29.5 (L) 36.6 - 50.3 % MCV 82.9 80.0 - 99.0 FL  
 MCH 25.6 (L) 26.0 - 34.0 PG  
 MCHC 30.8 30.0 - 36.5 g/dL  
 RDW 19.0 (H) 11.5 - 14.5 % PLATELET 92 (L) 295 - 400 K/uL MPV 9.8 8.9 - 12.9 FL  
DIGOXIN Collection Time: 11/17/20  9:30 PM  
Result Value Ref Range  Digoxin level 0.4 (L) 0.90 - 2.0 ng/mL MAGNESIUM Collection Time: 11/17/20  9:30 PM  
Result Value Ref Range Magnesium 1.9 1.6 - 2.4 mg/dL METABOLIC PANEL, BASIC Collection Time: 11/17/20  9:30 PM  
Result Value Ref Range Sodium 154 (H) 136 - 145 mmol/L Potassium 3.3 (L) 3.5 - 5.1 mmol/L Chloride 126 (H) 97 - 108 mmol/L  
 CO2 16 (L) 21 - 32 mmol/L Anion gap 12 5 - 15 mmol/L Glucose 75 65 - 100 mg/dL BUN 36 (H) 6 - 20 mg/dL Creatinine 1.97 (H) 0.70 - 1.30 mg/dL BUN/Creatinine ratio 18 12 - 20 GFR est AA 40 (L) >60 ml/min/1.73m2 GFR est non-AA 33 (L) >60 ml/min/1.73m2 Calcium 10.7 (H) 8.5 - 10.1 mg/dL PROTHROMBIN TIME + INR Collection Time: 11/17/20  9:30 PM  
Result Value Ref Range Prothrombin time 21.9 (H) 11.9 - 14.7 sec INR 1.9 (H) 0.9 - 1.1 GLUCOSE, POC Collection Time: 11/18/20  1:37 AM  
Result Value Ref Range Glucose (POC) 125 (H) 65 - 100 mg/dL Performed by Nadia Red Collection Time: 11/18/20  5:10 AM  
Result Value Ref Range Vancomycin, random 19.6 ug/mL Reported dose date Not provided Reported dose: Not provided Units CBC WITH AUTOMATED DIFF Collection Time: 11/18/20  5:30 AM  
Result Value Ref Range WBC 15.8 (H) 4.1 - 11.1 K/uL  
 RBC 3.65 (L) 4.10 - 5.70 M/uL HGB 9.3 (L) 12.1 - 17.0 g/dL HCT 30.1 (L) 36.6 - 50.3 % MCV 82.5 80.0 - 99.0 FL  
 MCH 25.5 (L) 26.0 - 34.0 PG  
 MCHC 30.9 30.0 - 36.5 g/dL  
 RDW 18.8 (H) 11.5 - 14.5 % PLATELET 90 (L) 630 - 400 K/uL MPV 10.7 8.9 - 12.9 FL  
 NRBC 0.2 (H) 0  WBC ABSOLUTE NRBC 0.03 (H) 0.00 - 0.01 K/uL NEUTROPHILS PENDING % LYMPHOCYTES PENDING % MONOCYTES PENDING % EOSINOPHILS PENDING % BASOPHILS 0 0 - 1 % IMMATURE GRANULOCYTES 6 (H) 0.0 - 0.5 % ABS. NEUTROPHILS PENDING K/UL  
 ABS. LYMPHOCYTES PENDING K/UL  
 ABS. MONOCYTES PENDING K/UL  
 ABS. EOSINOPHILS PENDING K/UL  
 ABS. BASOPHILS 0.0 0.0 - 0.1 K/UL  
 ABS. IMM.  GRANS. 0.9 (H) 0.00 - 0.04 K/UL  
 DF AUTOMATED    
C REACTIVE PROTEIN, QT Collection Time: 11/18/20  5:30 AM  
Result Value Ref Range C-Reactive protein 31.40 (H) 0.00 - 0.60 mg/dL PROCALCITONIN Collection Time: 11/18/20  5:30 AM  
Result Value Ref Range Procalcitonin 8.18 (H) 0 ng/mL METABOLIC PANEL, COMPREHENSIVE Collection Time: 11/18/20  5:30 AM  
Result Value Ref Range Sodium 153 (H) 136 - 145 mmol/L Potassium 3.1 (L) 3.5 - 5.1 mmol/L Chloride 125 (H) 97 - 108 mmol/L  
 CO2 17 (L) 21 - 32 mmol/L Anion gap 11 5 - 15 mmol/L Glucose 114 (H) 65 - 100 mg/dL BUN 32 (H) 6 - 20 mg/dL Creatinine 2.04 (H) 0.70 - 1.30 mg/dL BUN/Creatinine ratio 16 12 - 20 GFR est AA 39 (L) >60 ml/min/1.73m2 GFR est non-AA 32 (L) >60 ml/min/1.73m2 Calcium 10.4 (H) 8.5 - 10.1 mg/dL Bilirubin, total 0.6 0.2 - 1.0 mg/dL AST (SGOT) 43 (H) 15 - 37 U/L  
 ALT (SGPT) 13 12 - 78 U/L Alk. phosphatase 122 (H) 45 - 117 U/L Protein, total 6.2 (L) 6.4 - 8.2 g/dL Albumin 1.6 (L) 3.5 - 5.0 g/dL Globulin 4.6 (H) 2.0 - 4.0 g/dL A-G Ratio 0.3 (L) 1.1 - 2.2 Assessment:  
 
Principal Problem: 
  Sepsis ([facility] [place] [address] (11/16/2020) Active Problems: 
  UTI (urinary tract infection) (11/15/2020) BRE (acute kidney injury) ([facility] [place] [address] (11/15/2020) AMS (altered mental status) (11/15/2020) Hypoglycemia (11/16/2020) Encephalopathy acute (11/16/2020) Plan:  
 
Mr. Leeanna Manzo is a 67year old man who presented with progressive lethargy, decreased PO intake who was found to have a uti in setting of fevers and leukocytosis. He has other metabolic derangements like BRE, thrombocytopenia and also on amiodarone for A fib. No fevers for 24 hrs. Neck stiffness described is difficult to reproduce because he resists neck movement. Meningitis or encephalitis suspicion is low given his AMS can be explained by other things, improved fevers.  He also cannot have an LP at this time given plt count of 90 and INR 1.9. Abiotics management as per ID. If meningitis or encephalitis suspicion increases please call me back. Thank you.

## 2020-11-18 NOTE — PROGRESS NOTES
Comprehensive Nutrition Assessment Type and Reason for Visit: Initial(NST) Nutrition Recommendations/Plan:  
Rec'd NG placement for initiation of TF As NG placed and available for use, initiate TF of Osmolite 1.2 at 30mL/hr Increase by 20mL q4hr to goal 80mL/hr, continuous Flush with 100mL free water q4hr Goal feeds provide 2304kcal, 107g protein, 2174mL fluid (meeting >/=100% est needs) Document TF rate, water flushes, and GRVs in EMR 
 
SLP carlosal as appropriate for diet advancement Nutrition Assessment:  Worsening AMS, weakness pta. Dx sepsis, UTI, BRE. Tx ICU (11/16) 2/2 cardizem drip for a fib; cardiology now following. Noted possible GIB; s/p 1 unit PRBC, GI following- no interventions at this time. COVID r/o pending. Pt remains lethargic. SLP eval'd, rec'd not appropriate for tx at this time. RD consulted for TF recs, no G-tube yet in place. Receiving protein-sparing kcal from D5. Labs: H/H 9.3/30.1, Na 153, BUN 32, Cr 2.04, , K 3.1. Meds: D5 at 125mL/hr, norepi IV, morphine, lanoxin, KCl. Malnutrition Assessment: 
Malnutrition Status: Moderate malnutrition Context:  Acute illness Findings of the 6 clinical characteristics of malnutrition:  
Energy Intake:  7 - 50% or less of est energy requirements for 5 or more days(No PO x2 days since admit, suspect poor PO at least 1x day pta) Weight Loss:  Unable to assess Body Fat Loss:  Unable to assess, Muscle Mass Loss:  Unable to assess, Fluid Accumulation:  1 - Mild, Generalized Estimated Daily Nutrient Needs: 
Energy (kcal): 2300kcal (AF 1.2, SF 1.1); Weight Used for Energy Requirements: Current Protein (g): 102g (1.1g/kg); Weight Used for Protein Requirements: Current Fluid (ml/day): 2300mL; Method Used for Fluid Requirements: 1 ml/kcal 
 
 
Nutrition Related Findings:  Unable to complete NFPE 2/2 precautions. Last BM documented 11/15; pt may benefit from addition of bowel regimen.  No documented hx dysphagia, n/v, or c/d. Generalized weeping edema noted. Noted recent admit to OSH (8/2020), documented 6%BW loss x 1.5 months Wounds:   
None Current Nutrition Therapies: DIET NPO Except Meds Current Tube Feeding (TF) Orders: · Feeding Route: Nasogastric Anthropometric Measures: 
· Height:  6' 2\" (188 cm)(est; noted admit 3x months ago, ht 6') · Current Body Wt:  92.5 kg (203 lb 14.8 oz) · Ideal Body Wt:  190 lbs:  107.3 % · BMI Category: Overweight (BMI 25.0-29. 9) · Nutrition Diagnosis:  
· Inadequate oral intake related to cognitive or neurological impairment as evidenced by NPO or clear liquid status due to medical condition(SLP rec'd too lethargic for tx/ PO) Nutrition Interventions:  
Food and/or Nutrient Delivery: Continue NPO, Start tube feeding Nutrition Education and Counseling: No recommendations at this time Coordination of Nutrition Care: Continue to monitor while inpatient Goals: 
Meet >75% EENs via TF vs PO in 7 days Maintain skin integrity Lytes wnl Nutrition Monitoring and Evaluation:  
Behavioral-Environmental Outcomes: None identified Food/Nutrient Intake Outcomes: Food and nutrient intake Physical Signs/Symptoms Outcomes: Weight, Skin, Biochemical data Discharge Planning: Too soon to determine Electronically signed by Barak Lazaro on 11/18/2020 at 11:39 AM 
 
Contact:

## 2020-11-18 NOTE — WOUND CARE
IP WOUND CONSULT Katelynn Fontaine MEDICAL RECORD NUMBER:  [id number] AGE: 67 y.o. GENDER: male  : 1945 TODAY'S DATE:  2020 GENERAL  
 
[] Follow-up [x] New Consult Katelynn Fontaine is a 67 y.o. male referred by:  
[x] Physician 
[] Nursing 
[] Other: PAST MEDICAL HISTORY Past Medical History:  
Diagnosis Date  Arthritis  Atrial fibrillation ([facility] [address]  COPD (chronic obstructive pulmonary disease) ([facility] [address] 2020  High cholesterol  History of vascular access device 2020 Rt Basilic 5fr dual PICC at 42cm arm cir 34.5cm  Hypertension PAST SURGICAL HISTORY Past Surgical History:  
Procedure Laterality Date  COLONOSCOPY N/A 2018 COLONOSCOPY performed by Nima Powell MD at [address] [facility] HX COLONOSCOPY    
 HX HIP REPLACEMENT Bilateral   
 HX TONSILLECTOMY  IR INSERT NON TUNL CVC OVER 5 YRS  2020 FAMILY HISTORY Family History Problem Relation Age of Onset  No Known Problems Mother  No Known Problems Father  No Known Problems Sister  No Known Problems Brother  No Known Problems Maternal Grandmother  No Known Problems Maternal Grandfather  No Known Problems Paternal Grandmother  No Known Problems Paternal Grandfather  No Known Problems Other  Stroke Neg Hx ALLERGIES Allergies Allergen Reactions  Codeine Rash MEDICATIONS No current facility-administered medications on file prior to encounter. Current Outpatient Medications on File Prior to Encounter Medication Sig Dispense Refill  metoprolol tartrate (LOPRESSOR) 25 mg tablet Take 2 Tabs by mouth two (2) times a day. 60 Tab 0  
 ammonium lactate (LAC-HYDRIN) 12 % lotion rub in to affected area well 1 Bottle 0  
 dilTIAZem ER (CARDIZEM CD) 240 mg capsule Take 1 Cap by mouth daily. 30 Cap 0  
 furosemide (LASIX) 20 mg tablet Take 1 Tab by mouth daily.  30 Tab 0  
 albuterol (ACCUNEB) 1.25 mg/3 mL nebu 3 mL by Nebulization route every two (2) hours as needed for Wheezing. 30 Nebule 0  
 acetaminophen (TYLENOL) 500 mg tablet Take 500 mg by mouth every four (4) hours as needed for Pain.  albuterol (PROVENTIL HFA, VENTOLIN HFA, PROAIR HFA) 90 mcg/actuation inhaler Take 2 Puffs by inhalation every four (4) hours as needed for Wheezing.  bisacodyL (DULCOLAX) 5 mg EC tablet Take 5 mg by mouth every four (4) hours as needed for Constipation.  lactobacillus rhamnosus gg 10 billion cell (CULTURELLE) 10 billion cell capsule Take 1 Cap by mouth every twelve (12) hours as needed.  docusate sodium (COLACE) 100 mg capsule Take 100 mg by mouth two (2) times a day.  folic acid (FOLVITE) 1 mg tablet Take 1 mg by mouth daily.  guaiFENesin ER (MUCINEX) 600 mg ER tablet Take 600 mg by mouth two (2) times a day.  loperamide (Imodium A-D) 2 mg tablet Take 2 mg by mouth every four (4) hours as needed for Diarrhea.  megestroL (MEGACE) 400 mg/10 mL (40 mg/mL) suspension Take 400 mg by mouth daily.  nystatin (MYCOSTATIN) 100,000 unit/mL suspension Take 1 tsp by mouth four (4) times daily. swish and spit  thiamine HCL (B-1) 100 mg tablet Take 100 mg by mouth daily.  sertraline (ZOLOFT) 50 mg tablet Take 50 mg by mouth daily.  apixaban (ELIQUIS) 5 mg tablet Take 1 Tab by mouth two (2) times a day. 60 Tab 4  pravastatin (PRAVACHOL) 20 mg tablet Take 20 mg by mouth nightly. [unfilled] Visit Vitals /67 Comment: 12.5 mcg/min Pulse (!) 109 Temp 98.9 °F (37.2 °C) Resp 28 Ht 6' 2\" (1.88 m) Comment: est; noted admit 3x months ago, ht 6' Wt 92.5 kg (203 lb 14.8 oz) SpO2 99% BMI 26.18 kg/m² ASSESSMENT Wound Identification: RLE anterior Wound Type: appears trauma related, unknown etiology Dressing change: Yes; medihoney, non-adherent pad, 4x4, and kerlix Verbal consent for picture: Patient unable to verbalize consent Contributing Factors: decreased mobility and shear force Wound Leg lower Left; Anterior (Active) Number of days: 98 Wound Arm Left; Lower anterior / lateral  (Active) Number of days: 98 Wound Pretibial Right; Anterior Full thickness tissue loss 11/18/20 (Active) Wound Etiology Other (Comment) 11/18/20 1417 Dressing Status Intact; New dressing applied 11/18/20 1417 Cleansed Cleansed with saline 11/18/20 1417 Dressing/Treatment Gauze dressing/dressing sponge; Honey gel/honey paste;Non-adherent; Roll gauze 11/18/20 1417 Dressing Change Due 11/19/20 11/18/20 1417 Wound Length (cm) 3 cm 11/18/20 1417 Wound Width (cm) 2 cm 11/18/20 1417 Wound Depth (cm) 0.2 cm 11/18/20 1417 Wound Surface Area (cm^2) 6 cm^2 11/18/20 1417 Wound Volume (cm^3) 1.2 cm^3 11/18/20 1417 Wound Assessment Dry;Eschar dry;Granulation tissue;Pink/red 11/18/20 1417 Drainage Amount None 11/18/20 1417 Wound Odor None 11/18/20 1417 Eunice-Wound/Incision Assessment Other (Comment) 11/18/20 1417 Edges Defined edges 11/18/20 1417 Wound Thickness Description Full thickness 11/18/20 1417 Number of days: 0 PLAN Skin Care & Pressure Relief Recommendations Minimize layers of linen Turn/reposition approximately every 2 hours Pillow wedges Offload heels pillows Physician/Provider notified:  
Recommendations: Medihoney every other day for RLE (see dressing order); float heels at all times while in bed. Teaching completed with:  
[] Patient          
[] Family member      
[] Caregiver         
[] Nursing 
[] Other Patient/Caregiver Teaching: 
Level of patient/caregiver understanding able to:  
[] Indicates understanding       [] Needs reinforcement 
[] Unsuccessful      [] Verbal Understanding 
[] Demonstrated understanding       [] No evidence of learning 
[] Refused teaching         [] N/A Electronically signed by Home Villarreal RN on 11/18/2020 at 2:25 PM
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
## 2020-11-19 NOTE — CONSULTS
Nephrology Consult Patient: Iris Arias MRN: [id number]  SSN: [id number] YOB: 1945  Age: 67 y.o. Sex: male Subjective: The patient is a 78-year-old man with underlying history of COPD, hypertension, atrial fibrillation was sent from the nursing home on 11/15 with altered mental status. CT head showed no acute process. He was diagnosed with severe sepsis possibly from UTI and on IV antibiotics. He is currently on single pressor. He is on nasal cannula 1 L. No lower extremity swelling. On admission he was noticed to have corrected calcium of 14.6 along with creatinine of 2.1 from an unknown baseline kidney functions. He was put on IV fluids and now noticed to have calcium corrected of 12.5 but sodium 153 and potassium 3.1 and still creatinine at 2.0. His platelet is 90 and he was already seen by hematologist.  No lower extremity swelling. His urine output is good. A 2D Echocardiogram showed preserved LVEF. Past Medical History:  
Diagnosis Date  Arthritis  Atrial fibrillation ([address]  COPD (chronic obstructive pulmonary disease) ([address] 8/12/2020  High cholesterol  History of vascular access device 08/12/2020 Rt Basilic 5fr dual PICC at 42cm arm cir 34.5cm  Hypertension Past Surgical History:  
Procedure Laterality Date  COLONOSCOPY N/A 9/20/2018 COLONOSCOPY performed by Oumar Kilpatrick MD at [address] [facility] HX COLONOSCOPY    
 HX HIP REPLACEMENT Bilateral   
 HX TONSILLECTOMY  IR INSERT NON TUNL CVC OVER 5 YRS  11/17/2020 Family History Problem Relation Age of Onset  No Known Problems Mother  No Known Problems Father  No Known Problems Sister  No Known Problems Brother  No Known Problems Maternal Grandmother  No Known Problems Maternal Grandfather  No Known Problems Paternal Grandmother  No Known Problems Paternal Grandfather  No Known Problems Other  Stroke Neg Hx Social History Tobacco Use  Smoking status: Unknown If Ever Smoked  Smokeless tobacco: Never Used Substance Use Topics  Alcohol use: Never Alcohol/week: 8.0 standard drinks Types: 8 Shots of liquor per week Frequency: Never Current Facility-Administered Medications Medication Dose Route Frequency Provider Last Rate Last Dose  morphine injection 2 mg  2 mg IntraVENous Q4H PRN Chun Larson MD   2 mg at 11/18/20 1226  
 pantoprazole (PROTONIX) 40 mg in 0.9% sodium chloride 10 mL injection  40 mg IntraVENous BID Chun Larson MD   40 mg at 11/18/20 1114  [START ON 11/19/2020] Vancomycin random level to be drawn on 11/19/2020 at 0600   Other ONCE Yohana Gonzalez MD      
 midazolam (PF) (VERSED) 1 mg/mL injection  calciTONIN (MIACALCIN) injection 370 Int'l Units  4 Int'l Units/kg SubCUTAneous Q12H Rivera Melendez MD      
 0.9% sodium chloride infusion 250 mL  250 mL IntraVENous PRN Chun Larson MD      
 NOREPINephrine (LEVOPHED) 8 mg in 5% dextrose 250mL (32 mcg/mL) infusion  0.5-16 mcg/min IntraVENous TITRATE Chun Larson MD 18.8 mL/hr at 11/18/20 1136 10 mcg/min at 11/18/20 1136  acyclovir (ZOVIRAX) 800 mg in 0.9% sodium chloride 250 mL IVPB  10 mg/kg (Ideal) IntraVENous Q12H Naz Leyva MD   800 mg at 11/18/20 0388  piperacillin-tazobactam (ZOSYN) 3.375 g in 0.9% sodium chloride (MBP/ADV) 100 mL MBP  3.375 g IntraVENous Q8H Yohana Gonzalez MD 25 mL/hr at 11/18/20 1111 3.375 g at 11/18/20 1111  
 0.9% sodium chloride infusion 250 mL  250 mL IntraVENous PRN Chun Larson MD      
 dextrose 5% infusion  150 mL/hr IntraVENous CONTINUOUS Rivera Melendez  mL/hr at 11/18/20 0819 125 mL/hr at 11/18/20 9867  acetaminophen (TYLENOL) suppository 650 mg  650 mg Rectal Q4H PRN Roselyn PONCE MD   650 mg at 11/16/20 1225  dextrose 10% infusion  30 mL/hr IntraVENous CONTINUOUS Cody Alberto MD 30 mL/hr at 11/16/20 0741 30 mL/hr at 11/16/20 0741  
 amiodarone (CORDARONE) 900 mg/250 ml D5W infusion  0.5-1 mg/min IntraVENous TITRATE Kasey Kramer MD 16.7 mL/hr at 11/16/20 1736 1 mg/min at 11/16/20 1736  VANCOMYCIN INFORMATION NOTE 1 Each  1 Each Other Rx Dosing/Monitoring Inell Carlos Zeng MD      
 acetaminophen (TYLENOL) tablet 650 mg  650 mg Oral Q4H PRN Rom Herring NP      
 albuterol (PROVENTIL HFA, VENTOLIN HFA, PROAIR HFA) inhaler 2 Puff  2 Puff Inhalation Q4H PRN Rom Herring NP      
 docusate sodium (COLACE) capsule 100 mg  100 mg Oral BID Rom Herring NP   Stopped at 48/94/75 7151  
 folic acid (FOLVITE) tablet 1 mg  1 mg Oral DAILY Rom Herring NP   Stopped at 11/16/20 0900  pravastatin (PRAVACHOL) tablet 20 mg  20 mg Oral QHS Rom Herring NP   20 mg at 11/16/20 2232  sertraline (ZOLOFT) tablet 50 mg  50 mg Oral DAILY Rom Herring NP   Stopped at 11/17/20 0900  thiamine mononitrate (B-1) tablet 100 mg  100 mg Oral DAILY Rom Herring NP   Stopped at 11/16/20 0900  digoxin (LANOXIN) injection 125 mcg  125 mcg IntraVENous DAILY Rom Herring NP   125 mcg at 11/18/20 8031  ondansetron (ZOFRAN) injection 4 mg  4 mg IntraVENous Q6H PRN Rom Herring NP      
 hydrOXYzine pamoate (VISTARIL) capsule 25 mg  25 mg Oral TID PRN Rom Herring NP   25 mg at 11/15/20 2217  risperiDONE (RisperDAL) tablet 2 mg  2 mg Oral QHS Rom Herring NP   2 mg at 11/16/20 2232  chlorhexidine (PERIDEX) 0.12 % mouthwash 15 mL  15 mL Oral Q12H Rom Herring NP   15 mL at 11/18/20 7892 Allergies Allergen Reactions  Codeine Rash Review of Systems: 
Unable to obtain Objective:  
 
Vitals:  
 11/18/20 1500 11/18/20 1600 11/18/20 1630 11/18/20 1700 BP: (!) 120/59 (!) 100/52 (!) 107/45 Pulse: (!) 106 (!) 111 (!) 113 (!) 110 Resp: 30 24 28 Temp: 98 °F (36.7 °C)  99.5 °F (37.5 °C) SpO2: 99% 98% 100%   
Weight:      
Height:      
  
 
Physical Exam: 
General: NAD, lethargic Eyes: sclera anicteric Oral Cavity: No thrush or ulcers Neck: no JVD Chest: Fair bilateral air entry Heart: normal sounds Abdomen: soft and non tender :  vargas Lower Extremities: no edema Skin: no rash Neuro: intact Psychiatric: non-depressed Assessment:  
 
Hospital Problems  Date Reviewed: 11/15/2020 Codes Class Noted POA * (Principal) Sepsis ([facility] ICD-10-CM: A41.9 ICD-9-CM: 038.9, 995.91  11/16/2020 Yes Hypoglycemia ICD-10-CM: E16.2 ICD-9-CM: 251.2  11/16/2020 Yes Encephalopathy acute ICD-10-CM: G93.40 ICD-9-CM: 348.30  11/16/2020 Yes  
   
 UTI (urinary tract infection) ICD-10-CM: N39.0 ICD-9-CM: 599.0  11/15/2020 Unknown BRE (acute kidney injury) ([facility] ICD-10-CM: N17.9 ICD-9-CM: 584.9  11/15/2020 Unknown AMS (altered mental status) ICD-10-CM: Q72.09 
ICD-9-CM: 780.97  11/15/2020 Unknown Plan: #1 severe hypercalcemia. -Etiology could be multifactorial including volume contraction/rule out primary hyperparathyroidism and multiple myeloma.   
-On admission corrected calcium was 14.6. -It has improved down to 12.4 with IV fluids.   
-He is currently not on any calcium and vitamin D supplements. - I will order calcitonin 4 units/kg twice a day for 4 doses. -I will increase IV fluids.   
-I will order intact PTH level and serum free light chain ratio. 2.  Acute kidney injury.   
-Likely prerenal from volume depletion and sepsis.   
-On admission creatinine was 2.0.   
-Unknown baseline creatinine.   
-Clinically volume down.   
-On single pressor. -UA is consistent with UTI. - I will keep him on IV fluids.   
-He is currently not on any potential nephrotoxins. 3.  Severe hypokalemia.   
-From low p.o. intake and IV fluids.   
-His potassium is only 3.1.   
-He was given IV KCl today.   
-His magnesium is okay.  
 
4. Hypernatremia.   
-From free water deficit.   
-Sodium is 153.   
-On hypotonic IV fluids.   
-I will increase the rate.   
-On no diuretics. 5.  Altered mental status.   
-Etiology could be sepsis and metabolic encephalopathy.   
-He will need correction of his electrolytes including sodium and calcium.   
-On IV antibiotics. On IV fluids. 6.  Severe sepsis.   
-Possibly from UTI. -ID is on board. On single pressor. On IV antibiotics. 7.  Thrombocytopenia. Platelet count is 90. Heam is on board. Thank you for the consultation. Signed By: Leila Lew MD   
 November 18, 2020

## 2020-11-19 NOTE — PROGRESS NOTES
Progress Note Patient: Xuan Walsh MRN: [id number]  SSN: [id number] YOB: 1945  Age: 67 y.o. Sex: male Admit Date: 11/15/2020 LOS: 4 days Subjective:  
Patient followed for severe sepsis with altered mental status and suspected UTI. Blood cultures negative so far and urine culture has grown ESBL E. Coli. He is currently on IV Vancomycin and Zosyn. Patient now intubated. CXR unremarkable. Family member at bedside. Objective:  
 
Vitals:  
 11/19/20 0600 11/19/20 0700 11/19/20 0805 11/19/20 0850 BP: (!) 98/58 Pulse: (!) 115  (!) 112 Resp: 26  24 Temp:  98.3 °F (36.8 °C) SpO2: 98%  98% 98% Weight:      
Height:      
  
 
Intake and Output: 
Current Shift: 11/19 0701 - 11/19 1900 In: -  
Out: 006 [MYUDB:505] Last three shifts: 11/17 1901 - 11/19 0700 In: 430 [I.V.:150] Out: 5250 [GAJYY:8691] Physical Exam:  
 Constitutional:   
   General: He is in acute distress. Appearance: He is ill-appearing. He is not diaphoretic. HENT:  
   Head: orotracheal tube Neck: ?stiffness Cardiovascular:  
   Rate and Rhythm: Tachycardia present. Rhythm irregular. Heart sounds: No murmur. Pulmonary:  
   Breath sounds: No wheezing, rhonchi or rales. Abdominal:  
   Palpations: Abdomen is soft. Tenderness: There is no abdominal tenderness. Genitourinary: 
   Comments: Mcmillan catheter Musculoskeletal:  
   Right lower leg: No edema. Left lower leg: No edema. Comments: 2x3 cm open wound right medial calf with dry base Skin: 
   Findings: No erythema or rash. Neurological:  
   Comments: Unable to assess Psychiatric:  
   Comments: Unable to assess Lab/Data Review: WBC 13,100 Lactic acid 4.2 Procalcitonin 5.52 <8.18 <12.30 CRP 28.30 <31.40 <35.90 Covid-19 Pending Blood cultures (11/15) No growth at 4 days Urine culture (11/15) >100,000 cfu/ml ESBL E. Coli 
 
CXR (11/19) Stable right neck central venous catheter. Now present, there is an ET tube 4-5 cm above the samuel. NG tube projects below the left hemidiaphragm. Improved 
aeration through the right lung; clearing patchy reticular markings. No 
interstitial or alveolar pulmonary edema. No pneumothorax or sizable pleural 
Effusion. Assessment:  
 
Principal Problem: 
  Sepsis ([facility] (11/16/2020) Active Problems: 
  UTI (urinary tract infection) (11/15/2020) BRE (acute kidney injury) ([facility] (11/15/2020) AMS (altered mental status) (11/15/2020) Hypoglycemia (11/16/2020) Encephalopathy acute (11/16/2020) 1. Severe sepsis with tachycardia, tachypnea, leukocytosis, elevated procalcitonin and CRP, resolving 2. UTI with marked pyuria and bacteriuria, secondary to ESBL E. coli 3. Altered mental status, possibly secondary to above 4. Rule out GI bleed 5. Atrial fibrillation 6. Covid-19 negative 7. Acute hypoxic respiratory failure, intubated Comment:  There is in vitro susceptibility of ESBL E. Coli to Zosyn, however, Meropenem is more reliable. Plan: 1. Discontinue  Zosyn 2. Start Meropenem IV 3. Consider stopping Vancomycin tomorrow if improvement continues. 4. Follow-up blood culture 5. In am, repeat CBC, procalcitonin and CRP Signed By: Susan Ortega MD   
 November 19, 2020

## 2020-11-19 NOTE — PROGRESS NOTES
Progress Note Patient: Elena Burgess MRN: [id number]  SSN: [id number] YOB: 1945  Age: 67 y.o. Sex: male Admit Date: 11/15/2020 LOS: 4 days Subjective:  
Patient examined, patient exam today, intubated overnight, 
NG tube has no coffee-ground, Not to start tube feeding yet, Past Medical History:  
Diagnosis Date  Arthritis  Atrial fibrillation ([facility]  COPD (chronic obstructive pulmonary disease) ([facility] 8/12/2020  High cholesterol  History of vascular access device 08/12/2020 Rt Basilic 5fr dual PICC at 42cm arm cir 34.5cm  Hypertension Current Facility-Administered Medications:  
  dexmedeTOMidine (PRECEDEX) 400 mcg in 0.9% sodium chloride 104 mL infusion, 0.1-1.5 mcg/kg/hr, IntraVENous, TITRATE, Naz Leyva MD, Last Rate: 19.2 mL/hr at 11/19/20 1450, 0.8 mcg/kg/hr at 11/19/20 1450   fentaNYL (PF) 1,500 mcg/30 mL (50 mcg/mL) infusion, 0-200 mcg/hr, IntraVENous, TITRATE, Curry Warner DO, Last Rate: 2 mL/hr at 11/19/20 1136, 100 mcg/hr at 11/19/20 1136 
  albumin human 25% (BUMINATE) solution 25 g, 25 g, IntraVENous, Q6H, Curry Warner DO, 25 g at 11/19/20 1129 
  dextrose 5% infusion, 100 mL/hr, IntraVENous, CONTINUOUS, Last Rate: 100 mL/hr at 11/19/20 1252, 100 mL/hr at 11/19/20 1252 **AND** [COMPLETED] sodium bicarbonate 8.4 % (1 mEq/mL) injection 100 mEq, 100 mEq, IntraVENous, ONCE, Kandice Easton MD, 100 mEq at 11/19/20 1102   hydrocortisone Sod Succ (PF) (SOLU-CORTEF) injection 50 mg, 50 mg, IntraVENous, Q6H, Curry Warner DO, 50 mg at 11/19/20 1152   vasopressin (VASOSTRICT) 40 Units in 0.9% sodium chloride 40 mL infusion, 0.04 Units/min, IntraVENous, CONTINUOUS, Curry Warner, DO, Last Rate: 2.4 mL/hr at 11/19/20 1136, 0.04 Units/min at 11/19/20 1136 
  [START ON 11/20/2020] VANCOMYCIN INFORMATION NOTE, , Other, ONCE, Naz Leyva MD 
  meropenem [facility]) 1 g in sterile water (preservative free) 20 mL IV syringe, 1 g, IntraVENous, Q12H, Darylene Patient, MD, 1 g at 11/19/20 1253 
  0.9% sodium chloride infusion 250 mL, 250 mL, IntraVENous, PRN, Marlin Bhatia MD 
  metoprolol (LOPRESSOR) injection 2.5 mg, 2.5 mg, IntraVENous, Q6H, Elnoria Brunner, MD, 2.5 mg at 11/19/20 1441   morphine injection 2 mg, 2 mg, IntraVENous, Q4H PRN, Britany Cavazos MD, 2 mg at 11/19/20 2134   pantoprazole (PROTONIX) 40 mg in 0.9% sodium chloride 10 mL injection, 40 mg, IntraVENous, BID, Naz Leyva MD, 40 mg at 11/19/20 0930   calciTONIN (MIACALCIN) injection 370 Int'l Units, 4 Int'l Units/kg, SubCUTAneous, Q12H, Kandice Easton MD, 370 Int'l Units at 11/18/20 2138 
  0.9% sodium chloride infusion 250 mL, 250 mL, IntraVENous, PRN, Naz Leyva MD 
  NOREPINephrine (LEVOPHED) 8 mg in 5% dextrose 250mL (32 mcg/mL) infusion, 0.5-16 mcg/min, IntraVENous, TITRATE, Naz Leyva MD, Last Rate: 30 mL/hr at 11/19/20 1441, 16 mcg/min at 11/19/20 1441   acyclovir (ZOVIRAX) 800 mg in 0.9% sodium chloride 250 mL IVPB, 10 mg/kg (Ideal), IntraVENous, Q12H, Naz Leyva MD, 800 mg at 11/19/20 0930 
  0.9% sodium chloride infusion 250 mL, 250 mL, IntraVENous, PRN, Naz Leyva MD 
  acetaminophen (TYLENOL) suppository 650 mg, 650 mg, Rectal, Q4H PRN, Hardeep PONCE MD, 650 mg at 11/16/20 1225 
  amiodarone (CORDARONE) 900 mg/250 ml D5W infusion, 0.5-1 mg/min, IntraVENous, TITRATE, Sweta Mercado MD, Last Rate: 16.7 mL/hr at 11/16/20 1736, 1 mg/min at 11/16/20 1736   VANCOMYCIN INFORMATION NOTE 1 Each, 1 Each, Other, Rx Dosing/Monitoring, Carlos Espinoza MD 
  acetaminophen (TYLENOL) tablet 650 mg, 650 mg, Oral, Q4H PRN, Rom Herring NP, 650 mg at 11/19/20 0027 
  albuterol (PROVENTIL HFA, VENTOLIN HFA, PROAIR HFA) inhaler 2 Puff, 2 Puff, Inhalation, Q4H PRN, Rom Herring NP 
  docusate sodium (COLACE) capsule 100 mg, 100 mg, Oral, BID, Rom Herring, NP, 100 mg at 88/89/01 6356 
  folic acid (FOLVITE) tablet 1 mg, 1 mg, Oral, DAILY, Rom Herring, NP, Stopped at 11/16/20 0900   pravastatin (PRAVACHOL) tablet 20 mg, 20 mg, Oral, QHS, Rom Herring, NP 
  sertraline (ZOLOFT) tablet 50 mg, 50 mg, Oral, DAILY, Rom Herring, NP, Stopped at 11/17/20 0900   thiamine mononitrate (B-1) tablet 100 mg, 100 mg, Oral, DAILY, Rom Herring, NP, Stopped at 11/16/20 0900   digoxin (LANOXIN) injection 125 mcg, 125 mcg, IntraVENous, DAILY, Rom Herring, NP, 125 mcg at 11/19/20 0812   ondansetron (ZOFRAN) injection 4 mg, 4 mg, IntraVENous, Q6H PRN, Rom Herring NP 
  hydrOXYzine pamoate (VISTARIL) capsule 25 mg, 25 mg, Oral, TID PRN, Rom Herring, NP, 25 mg at 11/15/20 2217   risperiDONE (RisperDAL) tablet 2 mg, 2 mg, Oral, QHS, Rom Herring, ALANNA, 2 mg at 11/18/20 2101   chlorhexidine (PERIDEX) 0.12 % mouthwash 15 mL, 15 mL, Oral, Q12H, Rom Herring, ALANNA, 15 mL at 11/19/20 0930 Objective:  
 
Vitals:  
 11/19/20 1300 11/19/20 1340 11/19/20 1358 11/19/20 1400 BP: (!) 67/19 (!) 114/52  (!) 107/45 Pulse: 94 (!) 103 (!) 102 96 Resp: 23 25 25 23 Temp:  99.6 °F (37.6 °C) SpO2: 98% 98% 98% 98% Weight:      
Height:      
  
 
Intake and Output: 
Current Shift: 11/19 0701 - 11/19 1900 In: 312 Out: 300 [Urine:300] Last three shifts: 11/17 1901 - 11/19 0700 In: 430 [I.V.:150] Out: 5250 [EDSUX:1249] Physical Exam:  
Physical Exam  
Constitutional: He is sedated and intubated. HENT:  
Head: Atraumatic. Neck: Normal carotid pulses, no hepatojugular reflux and no JVD present. Cardiovascular: An irregular rhythm present. Tachycardia present. Pulmonary/Chest: Breath sounds normal. No apnea and no bradypnea. He is intubated. Abdominal: Soft. He exhibits pulsatile liver. He exhibits no shifting dullness, no distension and no fluid wave. Skin: Skin is intact. No pallor.   
  
 
Lab/Data Review: Recent Results (from the past 24 hour(s)) GLUCOSE, POC Collection Time: 11/18/20  4:42 PM  
Result Value Ref Range Glucose (POC) 118 (H) 65 - 100 mg/dL Performed by Danyelle Sanchez GLUCOSE, POC Collection Time: 11/18/20  9:24 PM  
Result Value Ref Range Glucose (POC) 113 (H) 65 - 100 mg/dL Performed by Shane Alarcon   
HGB & HCT Collection Time: 11/18/20  9:30 PM  
Result Value Ref Range HGB 8.9 (L) 12.1 - 17.0 g/dL HCT 29.0 (L) 36.6 - 50.3 % METABOLIC PANEL, COMPREHENSIVE Collection Time: 11/18/20  9:30 PM  
Result Value Ref Range Sodium 149 (H) 136 - 145 mmol/L Potassium 3.6 3.5 - 5.1 mmol/L Chloride 127 (H) 97 - 108 mmol/L  
 CO2 16 (L) 21 - 32 mmol/L Anion gap 6 5 - 15 mmol/L Glucose 113 (H) 65 - 100 mg/dL BUN 26 (H) 6 - 20 mg/dL Creatinine 1.95 (H) 0.70 - 1.30 mg/dL BUN/Creatinine ratio 13 12 - 20 GFR est AA 41 (L) >60 ml/min/1.73m2 GFR est non-AA 34 (L) >60 ml/min/1.73m2 Calcium 9.9 8.5 - 10.1 mg/dL Bilirubin, total 0.6 0.2 - 1.0 mg/dL AST (SGOT) 42 (H) 15 - 37 U/L  
 ALT (SGPT) 12 12 - 78 U/L Alk. phosphatase 105 45 - 117 U/L Protein, total 6.0 (L) 6.4 - 8.2 g/dL Albumin 1.4 (L) 3.5 - 5.0 g/dL Globulin 4.6 (H) 2.0 - 4.0 g/dL A-G Ratio 0.3 (L) 1.1 - 2.2 GLUCOSE, POC Collection Time: 11/19/20  1:25 AM  
Result Value Ref Range Glucose (POC) 108 (H) 65 - 100 mg/dL Performed by Shane Alarcon RENAL FUNCTION PANEL Collection Time: 11/19/20  4:00 AM  
Result Value Ref Range Sodium 148 (H) 136 - 145 mmol/L Potassium 3.4 (L) 3.5 - 5.1 mmol/L Chloride 124 (H) 97 - 108 mmol/L  
 CO2 15 (LL) 21 - 32 mmol/L Anion gap 9 5 - 15 mmol/L Glucose 125 (H) 65 - 100 mg/dL BUN 24 (H) 6 - 20 mg/dL Creatinine 1.90 (H) 0.70 - 1.30 mg/dL BUN/Creatinine ratio 13 12 - 20 GFR est AA 42 (L) >60 ml/min/1.73m2 GFR est non-AA 35 (L) >60 ml/min/1.73m2 Calcium 9.8 8.5 - 10.1 mg/dL  Phosphorus 1.5 (L) 2.6 - 4.7 mg/dL Albumin 1.4 (L) 3.5 - 5.0 g/dL Usman Wasserman Collection Time: 11/19/20  6:00 AM  
Result Value Ref Range Vancomycin, random 19.6 ug/mL Reported dose date BLOOD Reported dose: BLOOD Units CBC WITH AUTOMATED DIFF Collection Time: 11/19/20  6:15 AM  
Result Value Ref Range WBC 13.1 (H) 4.1 - 11.1 K/uL  
 RBC 3.48 (L) 4.10 - 5.70 M/uL HGB 8.9 (L) 12.1 - 17.0 g/dL HCT 28.7 (L) 36.6 - 50.3 % MCV 82.5 80.0 - 99.0 FL  
 MCH 25.6 (L) 26.0 - 34.0 PG  
 MCHC 31.0 30.0 - 36.5 g/dL  
 RDW 19.5 (H) 11.5 - 14.5 % PLATELET 58 (L) 231 - 400 K/uL MPV 10.6 8.9 - 12.9 FL  
 NEUTROPHILS 66 32 - 75 % BAND NEUTROPHILS 4 0 - 6 % LYMPHOCYTES 23 12 - 49 % MONOCYTES 7 5 - 13 % EOSINOPHILS 0 0 - 7 % BASOPHILS 0 0 - 1 % IMMATURE GRANULOCYTES 0 %  
 ABS. NEUTROPHILS 9.2 (H) 1.8 - 8.0 K/UL  
 ABS. LYMPHOCYTES 3.0 0.8 - 3.5 K/UL  
 ABS. MONOCYTES 0.9 0.0 - 1.0 K/UL  
 ABS. EOSINOPHILS 0.0 0.0 - 0.4 K/UL  
 ABS. BASOPHILS 0.0 0.0 - 0.1 K/UL  
 ABS. IMM. GRANS. 0.0 K/UL  
 DF Manual    
 RBC COMMENTS [place] cells 2+ RBC COMMENTS Microcytosis 1+ C REACTIVE PROTEIN, QT Collection Time: 11/19/20  6:15 AM  
Result Value Ref Range C-Reactive protein 28.30 (H) 0.00 - 0.60 mg/dL PROCALCITONIN Collection Time: 11/19/20  6:15 AM  
Result Value Ref Range Procalcitonin 5.52 (H) 0 ng/mL RETICULOCYTE COUNT Collection Time: 11/19/20  6:15 AM  
Result Value Ref Range Reticulocyte count 1.2 0.7 - 2.1 % Absolute Retic Cnt. 0.0424 (L) 0.0620 - 0.0950 M/ul GLUCOSE, POC Collection Time: 11/19/20  9:34 AM  
Result Value Ref Range Glucose (POC) 127 (H) 65 - 100 mg/dL Performed by Samuel Roblero LACTIC ACID Collection Time: 11/19/20 10:45 AM  
Result Value Ref Range Lactic acid 3.8 (HH) 0.4 - 2.0 mmol/L PROTHROMBIN TIME + INR Collection Time: 11/19/20 10:45 AM  
Result Value Ref Range Prothrombin time 22.6 (H) 11.9 - 14.7 sec  INR 2.0 (H) 0.9 - 1.1 PTT Collection Time: 11/19/20 10:45 AM  
Result Value Ref Range aPTT 65.2 (H) 23.0 - 35.7 sec  
 aPTT, therapeutic range   68 - 109 sec FIBRINOGEN Collection Time: 11/19/20 10:45 AM  
Result Value Ref Range Fibrinogen 529 220 - 535 mg/dL AMMONIA Collection Time: 11/19/20 10:45 AM  
Result Value Ref Range Ammonia 11 <32 umol/L  
TSH 3RD GENERATION Collection Time: 11/19/20 10:45 AM  
Result Value Ref Range TSH 4.33 (H) 0.36 - 3.74 uIU/mL GLUCOSE, POC Collection Time: 11/19/20 11:43 AM  
Result Value Ref Range Glucose (POC) 139 (H) 65 - 100 mg/dL Performed by [employer] PLASMA, ALLOCATE Collection Time: 11/19/20 12:30 PM  
Result Value Ref Range Unit number [id number] Blood component type FP 24H,Thaw Unit division 00 Status of unit Issued TRANSFUSION STATUS Ok to transfuse XR CHEST PORT Final Result XR CHEST PORT Final Result Impression:   
Successful placement of a triple-lumen central venous catheter. The catheter is  
ready for use. IR INSERT NON TUNL CVC OVER 5 YRS Final Result Impression:   
Successful placement of a triple-lumen central venous catheter. The catheter is  
ready for use. [facility] Final Result Impression:   
Successful placement of a triple-lumen central venous catheter. The catheter is  
ready for use. XR CHEST PORT Final Result Impression: The cardiomediastinal silhouette is appropriate for age, technique,  
and lung expansion. Pulmonary vasculature is not congested. The lungs are  
essentially clear. No effusion or pneumothorax is seen. US RETROPERITONEUM COMP Final Result Impression: Normal exam  
  
  
XR CHEST PORT Final Result IMPRESSION:  No evidence of an acute cardiopulmonary process. CT HEAD WO CONT Final Result IMPRESSION: Chronic findings as above. CT CHEST WO CONT    (Results Pending) CT ABD PELV WO CONT (Results Pending) XR CHEST PORT    (Results Pending) Assessment:  
 
Principal Problem: 
  Sepsis ([facility] (11/16/2020) Active Problems: 
  UTI (urinary tract infection) (11/15/2020) BRE (acute kidney injury) ([facility] (11/15/2020) AMS (altered mental status) (11/15/2020) Hypoglycemia (11/16/2020) Encephalopathy acute (11/16/2020) 
 
progressive anemia, possible GI blood loss, 
Gastric erosion noted, esophagitis noted, No massive GI bleeding, No stress-induced ulcers COVID 19 negative Plan:  
Continue  On PPI Monitoring hemoglobin closely May start on tube feeding Neurologic evaluation for mental status change, 
 
 
Plan:  
 
 
Signed By: Charmaine Junior MD   
 November 19, 2020 Thank you for allowing me to participate in this patients care Cc Referring Physician Coleen Luque DO

## 2020-11-19 NOTE — PROCEDURES
[facility] Pulmonary, Critical Care, and Sleep Medicine Ultrasound Guided Radial Arterial Line Procedure Note Indication: Escalating vasopressors Risks, benefits, alternatives explained and consent obtained. Time out called. Patient's right wrist was positioned and secured. Collateral circulation confirmed with Gopal test.  
 
Arterial line Bundle: 
Full sterile barrier precautions used. 7-Step Sterility Protocol followed. (cap, mask sterile gown, sterile gloves, large sterile sheet, hand hygiene, 2% chlorhexidine for cutaneous antisepsis) Using doppler guidance, Right radial artery cannulated x 1 attempt(s) with no difficulty. Good blood return and wave form. Opening blood pressure was 157 mm Hg. Catheter secured & Biopatch applied. Sterile Tegaderm placed. No hematoma. Distal perfusion good. Ok to use line. Watch distal hand circulation.  
 
Ted Dubois DO  
11/19/2020 2:23 PM

## 2020-11-19 NOTE — PROGRESS NOTES
Hospitalist Progress Note Daily Progress Note: 11/19/2020 
 
42-year-old male sent here from [facility] with reports from staff of altered mental status, lethargy, not eating and dehydration. He has a history of atrial fibrillation in rvr on amio drip. Ua suggestive uti, zosyn initiated 11/15. Beck, cr 2.18, us rp pending. Subjective:  
Patient seen and evaluated at bedside, overnight events noted, of note patient is significantly lethargic, appears obtunded and in respiratory distress, discussed with RN at bedside. Problem List: 
Problem List as of 11/19/2020 Date Reviewed: 11/15/2020 Codes Class Noted - Resolved * (Principal) Sepsis ([facility] ICD-10-CM: A41.9 ICD-9-CM: 038.9, 995.91  11/16/2020 - Present Hypoglycemia ICD-10-CM: E16.2 ICD-9-CM: 251.2  11/16/2020 - Present Encephalopathy acute ICD-10-CM: G93.40 ICD-9-CM: 348.30  11/16/2020 - Present UTI (urinary tract infection) ICD-10-CM: N39.0 ICD-9-CM: 599.0  11/15/2020 - Present BECK (acute kidney injury) ([facility] ICD-10-CM: N17.9 ICD-9-CM: 584.9  11/15/2020 - Present AMS (altered mental status) ICD-10-CM: W39.79 
ICD-9-CM: 780.97  11/15/2020 - Present Chronic atrial fibrillation (HCC) ICD-10-CM: I48.20 ICD-9-CM: 427.31  8/12/2020 - Present HTN (hypertension), benign ICD-10-CM: I10 
ICD-9-CM: 401.1  8/12/2020 - Present COPD (chronic obstructive pulmonary disease) (HCC) ICD-10-CM: J44.9 ICD-9-CM: 854  8/12/2020 - Present Dyslipidemia ICD-10-CM: E78.5 ICD-9-CM: 272.4  8/12/2020 - Present Depression ICD-10-CM: F32.9 ICD-9-CM: 769  8/12/2020 - Present Cellulitis of left upper extremity ICD-10-CM: L03.114 
ICD-9-CM: 682.3  8/11/2020 - Present Medications reviewed Current Facility-Administered Medications Medication Dose Route Frequency  potassium chloride 10 mEq in 100 ml IVPB  10 mEq IntraVENous Q1H  
 albumin human 25% (BUMINATE) solution 25 g  25 g IntraVENous Q6H  
 morphine injection 2 mg  2 mg IntraVENous Q4H PRN  pantoprazole (PROTONIX) 40 mg in 0.9% sodium chloride 10 mL injection  40 mg IntraVENous BID  calciTONIN (MIACALCIN) injection 370 Int'l Units  4 Int'l Units/kg SubCUTAneous Q12H  
 0.9% sodium chloride infusion 250 mL  250 mL IntraVENous PRN  
 NOREPINephrine (LEVOPHED) 8 mg in 5% dextrose 250mL (32 mcg/mL) infusion  0.5-16 mcg/min IntraVENous TITRATE  acyclovir (ZOVIRAX) 800 mg in 0.9% sodium chloride 250 mL IVPB  10 mg/kg (Ideal) IntraVENous Q12H  piperacillin-tazobactam (ZOSYN) 3.375 g in 0.9% sodium chloride (MBP/ADV) 100 mL MBP  3.375 g IntraVENous Q8H  
 0.9% sodium chloride infusion 250 mL  250 mL IntraVENous PRN  
 dextrose 5% infusion  150 mL/hr IntraVENous CONTINUOUS  
 acetaminophen (TYLENOL) suppository 650 mg  650 mg Rectal Q4H PRN  
 dextrose 10% infusion  30 mL/hr IntraVENous CONTINUOUS  
 amiodarone (CORDARONE) 900 mg/250 ml D5W infusion  0.5-1 mg/min IntraVENous TITRATE  VANCOMYCIN INFORMATION NOTE 1 Each  1 Each Other Rx Dosing/Monitoring  acetaminophen (TYLENOL) tablet 650 mg  650 mg Oral Q4H PRN  
 albuterol (PROVENTIL HFA, VENTOLIN HFA, PROAIR HFA) inhaler 2 Puff  2 Puff Inhalation Q4H PRN  
 docusate sodium (COLACE) capsule 100 mg  100 mg Oral BID  folic acid (FOLVITE) tablet 1 mg  1 mg Oral DAILY  pravastatin (PRAVACHOL) tablet 20 mg  20 mg Oral QHS  sertraline (ZOLOFT) tablet 50 mg  50 mg Oral DAILY  thiamine mononitrate (B-1) tablet 100 mg  100 mg Oral DAILY  digoxin (LANOXIN) injection 125 mcg  125 mcg IntraVENous DAILY  ondansetron (ZOFRAN) injection 4 mg  4 mg IntraVENous Q6H PRN  
 hydrOXYzine pamoate (VISTARIL) capsule 25 mg  25 mg Oral TID PRN  
 risperiDONE (RisperDAL) tablet 2 mg  2 mg Oral QHS  chlorhexidine (PERIDEX) 0.12 % mouthwash 15 mL  15 mL Oral Q12H Review of Systems:  
Unobtainable 2/2 encephalopathic/acute illness Objective:  
Physical Exam:  
 
Visit Vitals BP (!) 98/58 (BP 1 Location: Right arm, BP Patient Position: At rest) Pulse (!) 115 Temp 98.8 °F (37.1 °C) Resp 26 Ht 6' 2\" (1.88 m) Comment: est; noted admit 3x months ago, ht 6' Wt 92.1 kg (203 lb 0.7 oz) SpO2 98% BMI 26.07 kg/m² O2 Flow Rate (L/min): 0.5 l/min O2 Device: Room air Temp (24hrs), Av.9 °F (37.2 °C), Min:98 °F (36.7 °C), Max:99.5 °F (37.5 °C) No intake/output data recorded.  1901 -  0700 In: 430 [I.V.:150] Out: 5250 [[id number]:[id number]] Constitutional: He appears well-developed. Obtunded Head: Normocephalic and atraumatic. Eyes: Pupils are equal, round, and reactive to light. Mouth: oral mucosa dry Neck: No thyromegaly present, significant neck stiffness on examination Cardiovascular:Irreg/irreg Lungs:Effort normal with fair ae. No wheeze, not labored Abdominal: Soft. can't tell if tender, moans if touch him anywhere including extremities Musculoskeletal:  
   Comments: Generalized weakness Neurological: obtunded, moves all ext, 
Skin: Right lower shin with ulcer, yellow drainage Sacrum reddened, no breakdown Left arm with skin tear Multiple ecchymotic, skin is friable/thin Data Review:  
   
Recent Days: 
Recent Labs 20 
1345 20 
0530 20 
2130 20 
0500 WBC  --   --  15.8* 14.9* 20.7* HGB 8.9* 8.7* 9.3* 9.1* 6.8* HCT 29.0* 28.2* 30.1* 29.5* 23.2*  
PLT  --   --  90* 92* 122* Recent Labs 20 
0400 20 
2130 20 
0530 20 
213 * 149* 153* 154* K 3.4* 3.6 3.1* 3.3*  
* 127* 125* 126* CO2 15* 16* 17* 16* * 113* 114* 75 BUN 24* 26* 32* 36* CREA 1.90* 1.95* 2.04* 1.97* CA 9.8 9.9 10.4* 10.7* MG  --   --   --  1.9 PHOS 1.5*  --   --   --   
ALB 1.4* 1.4* 1.6*  --   
TBILI  --  0.6 0.6  --   
ALT  --  12 13  --   
INR  --   --   --  1.9* No results for input(s): PH, PCO2, PO2, HCO3, FIO2 in the last 72 hours.  
 
24 Hour Results: 
Recent Results (from the past 24 hour(s)) IRON PROFILE Collection Time: 11/18/20  8:00 AM  
Result Value Ref Range Iron 29 (L) 35 - 150 ug/dL TIBC 100 (L) 250 - 450 ug/dL Iron % saturation 29 20 - 50 % VITAMIN B12 & FOLATE Collection Time: 11/18/20  8:00 AM  
Result Value Ref Range Vitamin B12 >2,000 (H) 193 - 986 pg/mL Folate 9.8 5.0 - 21.0 ng/mL GLUCOSE, POC Collection Time: 11/18/20  8:39 AM  
Result Value Ref Range Glucose (POC) 142 (H) 65 - 100 mg/dL Performed by Jeancarlos Black GLUCOSE, POC Collection Time: 11/18/20 12:27 PM  
Result Value Ref Range Glucose (POC) 135 (H) 65 - 100 mg/dL Performed by Jeancarlos Black HGB & HCT Collection Time: 11/18/20  1:45 PM  
Result Value Ref Range HGB 8.7 (L) 12.1 - 17.0 g/dL HCT 28.2 (L) 36.6 - 50.3 % GLUCOSE, POC Collection Time: 11/18/20  2:19 PM  
Result Value Ref Range Glucose (POC) 114 (H) 65 - 100 mg/dL Performed by Bert Ochoa GLUCOSE, POC Collection Time: 11/18/20  4:42 PM  
Result Value Ref Range Glucose (POC) 118 (H) 65 - 100 mg/dL Performed by Jeancarlos Black GLUCOSE, POC Collection Time: 11/18/20  9:24 PM  
Result Value Ref Range Glucose (POC) 113 (H) 65 - 100 mg/dL Performed by Mady Lynn   
HGB & HCT Collection Time: 11/18/20  9:30 PM  
Result Value Ref Range HGB 8.9 (L) 12.1 - 17.0 g/dL HCT 29.0 (L) 36.6 - 50.3 % METABOLIC PANEL, COMPREHENSIVE Collection Time: 11/18/20  9:30 PM  
Result Value Ref Range Sodium 149 (H) 136 - 145 mmol/L Potassium 3.6 3.5 - 5.1 mmol/L Chloride 127 (H) 97 - 108 mmol/L  
 CO2 16 (L) 21 - 32 mmol/L Anion gap 6 5 - 15 mmol/L Glucose 113 (H) 65 - 100 mg/dL BUN 26 (H) 6 - 20 mg/dL Creatinine 1.95 (H) 0.70 - 1.30 mg/dL BUN/Creatinine ratio 13 12 - 20 GFR est AA 41 (L) >60 ml/min/1.73m2 GFR est non-AA 34 (L) >60 ml/min/1.73m2 Calcium 9.9 8.5 - 10.1 mg/dL  Bilirubin, total 0.6 0.2 - 1.0 mg/dL AST (SGOT) 42 (H) 15 - 37 U/L  
 ALT (SGPT) 12 12 - 78 U/L Alk. phosphatase 105 45 - 117 U/L Protein, total 6.0 (L) 6.4 - 8.2 g/dL Albumin 1.4 (L) 3.5 - 5.0 g/dL Globulin 4.6 (H) 2.0 - 4.0 g/dL A-G Ratio 0.3 (L) 1.1 - 2.2 GLUCOSE, POC Collection Time: 11/19/20  1:25 AM  
Result Value Ref Range Glucose (POC) 108 (H) 65 - 100 mg/dL Performed by [facility] RENAL FUNCTION PANEL Collection Time: 11/19/20  4:00 AM  
Result Value Ref Range Sodium 148 (H) 136 - 145 mmol/L Potassium 3.4 (L) 3.5 - 5.1 mmol/L Chloride 124 (H) 97 - 108 mmol/L  
 CO2 15 (LL) 21 - 32 mmol/L Anion gap 9 5 - 15 mmol/L Glucose 125 (H) 65 - 100 mg/dL BUN 24 (H) 6 - 20 mg/dL Creatinine 1.90 (H) 0.70 - 1.30 mg/dL BUN/Creatinine ratio 13 12 - 20 GFR est AA 42 (L) >60 ml/min/1.73m2 GFR est non-AA 35 (L) >60 ml/min/1.73m2 Calcium 9.8 8.5 - 10.1 mg/dL Phosphorus 1.5 (L) 2.6 - 4.7 mg/dL Albumin 1.4 (L) 3.5 - 5.0 g/dL C REACTIVE PROTEIN, QT Collection Time: 11/19/20  6:15 AM  
Result Value Ref Range C-Reactive protein 28.30 (H) 0.00 - 0.60 mg/dL PROCALCITONIN Collection Time: 11/19/20  6:15 AM  
Result Value Ref Range Procalcitonin 5.52 (H) 0 ng/mL Assessment/  
 
Patient Active Problem List  
Diagnosis Code  Cellulitis of left upper extremity L03.114  
 Chronic atrial fibrillation (HCC) I48.20  
 HTN (hypertension), benign I10  
 COPD (chronic obstructive pulmonary disease) (HCC) J44.9  Dyslipidemia E78.5  Depression F32.9  
 UTI (urinary tract infection) N39.0  BRE (acute kidney injury) ([address] N17.9  AMS (altered mental status) R41.82  Sepsis ([facility] A41.9  Hypoglycemia E16.2  
 Encephalopathy acute G93.40 Plan: 
 
Septic shockpatient presented with above-mentioned symptomatology was found to meet severe sepsis criteria due to unclear etiology, of note patient still requires pressor support, based on patient's clinical examination is significant concern for meningitis given neck stiffness present on exam 
Follow-up blood cultures Follow up COVID 19 testing Follow-up urine cultures Follow-up inflammatory markers Continue vancomycin/zosyn/acyclovir for broad spectrum coverage Follow up Neurology recommendations Levophed for pressor support Follow up infectious disease consult further evaluation Continue to monitor Acute respiratory failurepatient found to have significant acute respiratory failure and obtunded at this time, unable to protect airway Recommend urgent endotracheal intubation Obtain sputum culture Obtain postintubation x-ray Continue antibiotics as above Obtain pulmonology critical care consult Atrial fibrillation with RVRpatient presented with atrial fibrillation with RVR likely secondary to ongoing severe sepsis, will recommend discontinuation of beta-blockers Continuation of amiodarone GTT Transthoracic echo shows preserved ejection fraction Cardiology consult appreciated, will continue to follow Anemialikely multifactorial, however concern for GI bleed, s/p 2 uprbc with appropriate response Continue protonix 40mg iv bid Continue to trend H/H Maintain active type and screen Follow up iron studies/b12/folate Hypokalemiaaggressively replete potassium and recheck potassium Obtain repeat serum magnesium, as well as repeat serum potassium Hyperlipidemiacontinue statin ProphylaxisSCDs FENcontinue n.p.o. will place NG tube start tube feedings, maintenance IV fluids, replete potassium and magnesium Full code by default, surrogate decision-maker is patient's wife, discussed in detail, agreeable to intubation Critical care time spent 60 minutes involving direct patient care reviewing patient's labs and coordination of care with nursing staff Care Plan discussed with: Patient/Family and Nurse Kwesi Tovar MD

## 2020-11-19 NOTE — PROGRESS NOTES
Nephrology Consult Patient: Mikel Hernandez MRN: [id number]  SSN: [id number] YOB: 1945  Age: 67 y.o. Sex: male Subjective: The patient is seen in ICU Got intubated On Vent with FIO2 30% Noticed swelling of ext Past Medical History:  
Diagnosis Date  Arthritis  Atrial fibrillation ([facility]  COPD (chronic obstructive pulmonary disease) ([facility] 8/12/2020  High cholesterol  History of vascular access device 08/12/2020 Rt Basilic 5fr dual PICC at 42cm arm cir 34.5cm  Hypertension Past Surgical History:  
Procedure Laterality Date  COLONOSCOPY N/A 9/20/2018 COLONOSCOPY performed by Tru Pritchett MD at [address] [facility] HX COLONOSCOPY    
 HX HIP REPLACEMENT Bilateral   
 HX TONSILLECTOMY  IR INSERT NON TUNL CVC OVER 5 YRS  11/17/2020 Family History Problem Relation Age of Onset  No Known Problems Mother  No Known Problems Father  No Known Problems Sister  No Known Problems Brother  No Known Problems Maternal Grandmother  No Known Problems Maternal Grandfather  No Known Problems Paternal Grandmother  No Known Problems Paternal Grandfather  No Known Problems Other  Stroke Neg Hx Social History Tobacco Use  Smoking status: Unknown If Ever Smoked  Smokeless tobacco: Never Used Substance Use Topics  Alcohol use: Never Alcohol/week: 8.0 standard drinks Types: 8 Shots of liquor per week Frequency: Never Current Facility-Administered Medications Medication Dose Route Frequency Provider Last Rate Last Dose  potassium chloride 10 mEq in 100 ml IVPB  10 mEq IntraVENous Arya Naz Taylor  mL/hr at 11/19/20 1103 10 mEq at 11/19/20 1103  
 dexmedeTOMidine (PRECEDEX) 400 mcg in 0.9% sodium chloride 104 mL infusion  0.1-1.5 mcg/kg/hr IntraVENous TITRATE Cody Alberto MD 9.6 mL/hr at 11/19/20 0841 0.4 mcg/kg/hr at 11/19/20 0841  
 fentaNYL (PF) 1,500 mcg/30 mL (50 mcg/mL) infusion  0-200 mcg/hr IntraVENous TITRATE Curry Warner DO      
 albumin human 25% (BUMINATE) solution 25 g  25 g IntraVENous Q6H Curry Warner,       
 dextrose 5% infusion  150 mL/hr IntraVENous CONTINUOUS Priya Sarabia MD      
 hydrocortisone Sod Succ (PF) (SOLU-CORTEF) injection 50 mg  50 mg IntraVENous Q6H Curry Warner DO      
 vasopressin (VASOSTRICT) 40 Units in 0.9% sodium chloride 40 mL infusion  0.04 Units/min IntraVENous CONTINUOUS Curry Warner DO      
 vancomycin (VANCOCIN) 750 mg in 0.9% sodium chloride 250 mL (VIAL-MATE)  750 mg IntraVENous ONCE Maddi Leyva MD      
 [START ON 11/20/2020] VANCOMYCIN INFORMATION NOTE   Other ONCE Naz Leyva MD      
 morphine injection 2 mg  2 mg IntraVENous Q4H PRN Mayrcarmen Gutierrez MD   2 mg at 11/19/20 1658  pantoprazole (PROTONIX) 40 mg in 0.9% sodium chloride 10 mL injection  40 mg IntraVENous BID Naz Maher MD   40 mg at 11/19/20 0930  calciTONIN (MIACALCIN) injection 370 Int'l Units  4 Int'l Units/kg SubCUTAneous Q12H Priya Sarabia MD   370 Int'l Units at 11/18/20 2138  
 0.9% sodium chloride infusion 250 mL  250 mL IntraVENous PRN Maddi Leyva MD      
 NOREPINephrine (LEVOPHED) 8 mg in 5% dextrose 250mL (32 mcg/mL) infusion  0.5-16 mcg/min IntraVENous TITRATE Marycarmen Gutierrez MD 30 mL/hr at 11/19/20 1037 16 mcg/min at 11/19/20 1037  
 acyclovir (ZOVIRAX) 800 mg in 0.9% sodium chloride 250 mL IVPB  10 mg/kg (Ideal) IntraVENous Q12H Marycarmen Gutierrez MD   800 mg at 11/19/20 0930  piperacillin-tazobactam (ZOSYN) 3.375 g in 0.9% sodium chloride (MBP/ADV) 100 mL MBP  3.375 g IntraVENous Q8H Cony Chen MD 25 mL/hr at 11/19/20 0430 3.375 g at 11/19/20 0430  
 0.9% sodium chloride infusion 250 mL  250 mL IntraVENous PRN Marycarmen Gutierrez MD      
 acetaminophen (TYLENOL) suppository 650 mg  650 mg Rectal Q4H PRN Shayy PONCE MD   650 mg at 11/16/20 1225  
 amiodarone (CORDARONE) 900 mg/250 ml D5W infusion  0.5-1 mg/min IntraVENous TITRATE Jose Christensen MD 16.7 mL/hr at 11/16/20 1736 1 mg/min at 11/16/20 1736  VANCOMYCIN INFORMATION NOTE 1 Each  1 Each Other Rx Dosing/Monitoring Carlos Oliver MD      
 acetaminophen (TYLENOL) tablet 650 mg  650 mg Oral Q4H PRN Rom Herring NP   650 mg at 11/19/20 0027  
 albuterol (PROVENTIL HFA, VENTOLIN HFA, PROAIR HFA) inhaler 2 Puff  2 Puff Inhalation Q4H PRN Rom Herring NP      
 docusate sodium (COLACE) capsule 100 mg  100 mg Oral BID Rom Herring NP   100 mg at 82/91/90 2897  
 folic acid (FOLVITE) tablet 1 mg  1 mg Oral DAILY Rom Herring NP   Stopped at 11/16/20 0900  pravastatin (PRAVACHOL) tablet 20 mg  20 mg Oral QHS Rom Herring NP      
 sertraline (ZOLOFT) tablet 50 mg  50 mg Oral DAILY Rom Herring NP   Stopped at 11/17/20 0900  thiamine mononitrate (B-1) tablet 100 mg  100 mg Oral DAILY Rom Herring NP   Stopped at 11/16/20 0900  digoxin (LANOXIN) injection 125 mcg  125 mcg IntraVENous DAILY Rom Herring NP   125 mcg at 11/19/20 5972  ondansetron (ZOFRAN) injection 4 mg  4 mg IntraVENous Q6H PRN Rom Herring NP      
 hydrOXYzine pamoate (VISTARIL) capsule 25 mg  25 mg Oral TID PRN Rom Herring NP   25 mg at 11/15/20 2217  risperiDONE (RisperDAL) tablet 2 mg  2 mg Oral QHS Rom Herring, ALANNA   2 mg at 11/18/20 2101  chlorhexidine (PERIDEX) 0.12 % mouthwash 15 mL  15 mL Oral Q12H Rom Herring NP   15 mL at 11/19/20 0930 Allergies Allergen Reactions  Codeine Rash Review of Systems: 
Unable to obtain Objective:  
 
Vitals:  
 11/19/20 0600 11/19/20 0700 11/19/20 0805 11/19/20 0850 BP: (!) 98/58 Pulse: (!) 115  (!) 112 Resp: 26  24 Temp:  98.3 °F (36.8 °C) SpO2: 98%  98% 98% Weight:      
Height:      
  
 
Physical Exam: 
General: sedated Eyes: sclera anicteric Oral Cavity: ET in place Neck: no JVD Chest: on vent Heart: normal sounds Abdomen: soft and non tender :  vargas Lower Extremities: no edema Skin: no rash Neuro: sedated Psychiatric: non-depressed Assessment:  
 
Hospital Problems  Date Reviewed: 11/15/2020 Codes Class Noted POA * (Principal) Sepsis ([facility] ICD-10-CM: A41.9 ICD-9-CM: 038.9, 995.91  11/16/2020 Yes Hypoglycemia ICD-10-CM: E16.2 ICD-9-CM: 251.2  11/16/2020 Yes Encephalopathy acute ICD-10-CM: G93.40 ICD-9-CM: 348.30  11/16/2020 Yes  
   
 UTI (urinary tract infection) ICD-10-CM: N39.0 ICD-9-CM: 599.0  11/15/2020 Unknown BRE (acute kidney injury) ([facility] ICD-10-CM: N17.9 ICD-9-CM: 584.9  11/15/2020 Unknown AMS (altered mental status) ICD-10-CM: G93.81 
ICD-9-CM: 780.97  11/15/2020 Unknown Plan: #1 severe hypercalcemia. -Etiology could be multifactorial including volume contraction/rule out primary hyperparathyroidism and multiple myeloma.   
-On admission corrected calcium was 14.6. -It has improved down to 11.8. 
-He is currently not on any calcium and vitamin D supplements.  
-ordered calcitonin 4 units/kg twice a day for 4 doses. -I will decrease IV fluids (noticed swelling of ext). -pending intact PTH level and serum free light chain ratio. 2.  Acute kidney injury.   
-Likely prerenal from volume depletion and sepsis.   
-On admission creatinine was 2.0.   
-Unknown baseline creatinine.   
-Clinically volume down.   
-On single pressor. -UA is consistent with UTI. - I will keep him on IV fluids.   
-He is currently not on any potential nephrotoxins. 3.  Severe hypokalemia.   
-From low p.o. intake and IV fluids.   
-His potassium is 3.4.   
-He was given IV KCl today.   
-His magnesium is okay. -iv KCL 40 meq 4.   Hypernatremia.   
-From free water deficit.   
-Sodium is down 153-->148.   
-was on hypotonic IV fluids.   
-I will put on water flushes via NG  
 
5. Altered mental status.   
-Etiology could be sepsis and metabolic encephalopathy.   
-got intubated this am 
-He will need correction of his electrolytes including sodium and calcium.   
-On IV antibiotics. On IV fluids. 6.  Severe sepsis.   
-Possibly from UTI. -ID is on board. On single pressor. On IV antibiotics. 7.  Thrombocytopenia. Platelet count is 90. Heam is on board. 8.   
 
Thank you for the consultation. Signed By: Kerri Perez MD   
 November 19, 2020

## 2020-11-19 NOTE — PROGRESS NOTES
Vancomycin Note Admission date [id number] Attending Cody Brambila Indication Height Ht Readings from Last 1 Encounters:  
11/18/20 188 cm (74\") Weight Wt Readings from Last 1 Encounters:  
11/19/20 92.1 kg (203 lb 0.7 oz) IBW Ideal body weight: 82.2 kg (181 lb 3.5 oz) SCr Creatinine Date Value Ref Range Status 11/19/2020 1.90 (H) 0.70 - 1.30 mg/dL Final  
11/18/2020 1.95 (H) 0.70 - 1.30 mg/dL Final  
11/18/2020 2.04 (H) 0.70 - 1.30 mg/dL Final  
  
CrCl (based on IBW) 38.5 ml/min Load/ER dose -  
Current regimen 750mg x 1 dose Trough Scheduled for 11/20 at 0400 Current Antimicrobial Therapy (168h ago, onward) Ordered     Start Stop 11/19/20 1054  VANCOMYCIN INFORMATION NOTE  Other,   ONCE    
 11/20/20 0400 11/20/20 1559  
 11/19/20 1054  vancomycin (VANCOCIN) 750 mg in 0.9% sodium chloride 250 mL (VIAL-MATE)  750 mg,   IntraVENous,   ONCE    
 11/19/20 1200 11/19/20 2359  
 11/17/20 1056  piperacillin-tazobactam (ZOSYN) 3.375 g in 0.9% sodium chloride (MBP/ADV) 100 mL MBP  3.375 g,   IntraVENous,   EVERY 8 HOURS    
 11/17/20 1100 --  
 11/17/20 0805  acyclovir (ZOVIRAX) 800 mg in 0.9% sodium chloride 250 mL IVPB  10 mg/kg (Ideal),   IntraVENous,   EVERY 12 HOURS    
 11/17/20 0900 --  
 11/16/20 1501  VANCOMYCIN INFORMATION NOTE 1 Each  1 Each,   Other,   RX DOSING/MONITORING    
 11/16/20 1500 --  
 
  
 
Random level resulted at 19.6. Patients renal function is slowly improving. Reduced dose to 750mg x1 with next trough scheduled with AM labs on 11/20.   
 
Submitted by: Robert Gandara, MARIVELD

## 2020-11-19 NOTE — PROGRESS NOTES
Progress Note 11/19/2020 12:30 PM 
NAME: Ariel Trejo MRN:  [id number] Admit Diagnosis: UTI (urinary tract infection) [N39.0] AMS (altered mental status) [R41.82] [facility] [N17.9] Assessment/Plan: 1. Atrial fibrillation with rapid ventricular response, patient now o IV amiodarone and experiencing intermittent rapid ventricular response. Patient is not anticoagulated due to anemia with drop in hemoglobin. On digoxin, will use intermittent IV Lopressor as tolerated by blood pressure for rate control. I am not resuming IV amiodarone drip due to limited venous lines. 2.  
 
2.  Sepsis with hypotension, patient is on IV Levophed and antibiotics 3. Respiratory failure patient currently intubated with ventilatory support and is closely followed by pulmonary. 4.  BRE 5. Altered mental status with encephalopathy. 6.  Condition guarded, care plan discussed with patient's wife on the bedside. []       High complexity decision making was performed in this patient at high risk for decompensation with multiple organ involvement. Subjective:  
 
Ariel Trejo denies chest pain, dyspnea. Discussed with RN events overnight. Review of Systems: 
 
 
Could NOT obtain due to: Altered mental status Objective:  
  
Physical Exam: 
 
Last 24hrs VS reviewed since prior progress note. Most recent are: 
 
Visit Vitals BP (!) 98/58 (BP 1 Location: Right arm, BP Patient Position: At rest) Pulse (!) 112 Temp 99.5 °F (37.5 °C) Resp 24 Ht 6' 2\" (1.88 m) Comment: est; noted admit 3x months ago, ht 6' Wt 92.1 kg (203 lb 0.7 oz) SpO2 98% BMI 26.07 kg/m² Intake/Output Summary (Last 24 hours) at 11/19/2020 1300 Last data filed at 11/19/2020 5062 Gross per 24 hour Intake 150 ml Output 2800 ml Net -2650 ml General Appearance: Well developed,  
Ears/Nose/Mouth/Throat: Breathing though mouth Neck: Supple.  
Chest: Lungs coarse breath sounds Cardiovascular: iregular rate and rhythm, S1S2 normal, no murmur. Abdomen: Soft, non-tender, bowel sounds are active. Extremities: No edema bilaterally. Skin: Warm and dry. []         Post-cath site without hematoma, bruit, tenderness, or thrill. Distal pulses intact. PMH/SH reviewed - no change compared to H&P Data Review Telemetry:  
 
EKG:  
[]  No new EKG for review Lab Data Personally Reviewed: 
 
Recent Labs 11/19/20 
0615 11/18/20 2130 11/18/20 
0530 WBC 13.1*  --   --  15.8* HGB 8.9* 8.9*   < > 9.3* HCT 28.7* 29.0*   < > 30.1*  
PLT 58*  --   --  90*  
 < > = values in this interval not displayed. Recent Labs 11/19/20 
1045 11/17/20 2130 INR 2.0* 1.9* PTP 22.6* 21.9* APTT 65.2*  --   
  
Recent Labs 11/19/20 
0400 11/18/20 2130 11/18/20 
0530 11/17/20 2130 * 149* 153* 154* K 3.4* 3.6 3.1* 3.3*  
* 127* 125* 126* CO2 15* 16* 17* 16*  
BUN 24* 26* 32* 36* CREA 1.90* 1.95* 2.04* 1.97* * 113* 114* 75  
CA 9.8 9.9 10.4* 10.7* MG  --   --   --  1.9 No results for input(s): CPK, CKNDX, TROIQ in the last 72 hours. No lab exists for component: CPKMB No results found for: CHOL, CHOLX, CHLST, CHOLV, HDL, HDLP, LDL, LDLC, DLDLP, TGLX, TRIGL, TRIGP, CHHD, CHHDX Recent Labs 11/19/20 
0400 11/18/20 2130 11/18/20 
0530 AP  --  105 122* TP  --  6.0* 6.2* ALB 1.4* 1.4* 1.6*  
GLOB  --  4.6* 4.6* No results for input(s): PH, PCO2, PO2 in the last 72 hours. Medications Personally Reviewed: 
 
Current Facility-Administered Medications Medication Dose Route Frequency  potassium chloride 10 mEq in 100 ml IVPB  10 mEq IntraVENous Q1H  
 dexmedeTOMidine (PRECEDEX) 400 mcg in 0.9% sodium chloride 104 mL infusion  0.1-1.5 mcg/kg/hr IntraVENous TITRATE  fentaNYL (PF) 1,500 mcg/30 mL (50 mcg/mL) infusion  0-200 mcg/hr IntraVENous TITRATE  albumin human 25% (BUMINATE) solution 25 g  25 g IntraVENous Q6H  
 dextrose 5% infusion  100 mL/hr IntraVENous CONTINUOUS  
 hydrocortisone Sod Succ (PF) (SOLU-CORTEF) injection 50 mg  50 mg IntraVENous Q6H  
 vasopressin (VASOSTRICT) 40 Units in 0.9% sodium chloride 40 mL infusion  0.04 Units/min IntraVENous CONTINUOUS  
 [START ON 11/20/2020] VANCOMYCIN INFORMATION NOTE   Other ONCE  
 meropenem (MERREM) 1 g in sterile water (preservative free) 20 mL IV syringe  1 g IntraVENous Q12H  
 0.9% sodium chloride infusion 250 mL  250 mL IntraVENous PRN  phytonadione (vitamin K1) (AQUA-MEPHYTON) 10 mg in 0.9% sodium chloride 50 mL IVPB  10 mg IntraVENous ONCE  
 morphine injection 2 mg  2 mg IntraVENous Q4H PRN  pantoprazole (PROTONIX) 40 mg in 0.9% sodium chloride 10 mL injection  40 mg IntraVENous BID  calciTONIN (MIACALCIN) injection 370 Int'l Units  4 Int'l Units/kg SubCUTAneous Q12H  
 0.9% sodium chloride infusion 250 mL  250 mL IntraVENous PRN  
 NOREPINephrine (LEVOPHED) 8 mg in 5% dextrose 250mL (32 mcg/mL) infusion  0.5-16 mcg/min IntraVENous TITRATE  acyclovir (ZOVIRAX) 800 mg in 0.9% sodium chloride 250 mL IVPB  10 mg/kg (Ideal) IntraVENous Q12H  
 0.9% sodium chloride infusion 250 mL  250 mL IntraVENous PRN  
 acetaminophen (TYLENOL) suppository 650 mg  650 mg Rectal Q4H PRN  
 amiodarone (CORDARONE) 900 mg/250 ml D5W infusion  0.5-1 mg/min IntraVENous TITRATE  VANCOMYCIN INFORMATION NOTE 1 Each  1 Each Other Rx Dosing/Monitoring  acetaminophen (TYLENOL) tablet 650 mg  650 mg Oral Q4H PRN  
 albuterol (PROVENTIL HFA, VENTOLIN HFA, PROAIR HFA) inhaler 2 Puff  2 Puff Inhalation Q4H PRN  
 docusate sodium (COLACE) capsule 100 mg  100 mg Oral BID  folic acid (FOLVITE) tablet 1 mg  1 mg Oral DAILY  pravastatin (PRAVACHOL) tablet 20 mg  20 mg Oral QHS  sertraline (ZOLOFT) tablet 50 mg  50 mg Oral DAILY  thiamine mononitrate (B-1) tablet 100 mg  100 mg Oral DAILY  digoxin (LANOXIN) injection 125 mcg  125 mcg IntraVENous DAILY  ondansetron (ZOFRAN) injection 4 mg  4 mg IntraVENous Q6H PRN  
 hydrOXYzine pamoate (VISTARIL) capsule 25 mg  25 mg Oral TID PRN  
 risperiDONE (RisperDAL) tablet 2 mg  2 mg Oral QHS  chlorhexidine (PERIDEX) 0.12 % mouthwash 15 mL  15 mL Oral Q12H Clinical Care time spent 30 minutes.  
  
Jaimie Oliver MD

## 2020-11-19 NOTE — CONSULTS
Pulmonology and Critical Care Consult Subjective:  
Consult Note: 11/19/2020 10:57 AM 
 
Chief Complaint: Confusion This patient has been seen and evaluated at the request of Dr. Pablo Taylor. Patient is a 39-year-old  male with a history of atrial fibrillation, osteoarthritis, hyperlipidemia, reported COPD, and hypertension who presented to [facility] on 11/15/2020 from his nursing home for altered mental status. He was found to be quite hypercalcemic at 13.7, which corrected to 14.6 when taking into effect his hypoalbuminemia. Additionally, he was febrile up to 103 and his white blood cell count was 20.1. His urinalysis was markedly abnormal, chest x-ray was clear, CT head was nonacute; therefore he was started on vancomycin and Zosyn for suspected urinary source of his sepsis. Infectious disease, neurology, cardiology, gastroenterology, hematology/oncology, and nephrology are all consulted on this case as well. Throughout his hospitalization his acute kidney injury had worsened and is now slowly improving with fluid administration. Unfortunately, his mental status has significantly worsened to the point where he was unable to protect his airway this morning. He was seen by the primary team and felt that intubation was necessary, which was completed early this morning. Additionally, it appears that his thrombocytopenia/anemia has also worsened throughout this hospitalization. Hematology is following closely and have ordered multiple labs for work-up. His hypercalcemia is also improved with fluids and calcitonin, there is also a large work-up for this as well. After his intubation, given that he was on vasopressors with Levophed, Precedex was started for sedation. He is currently resting in bed but he is tachypneic up to 36, clearly not comfortable on the ventilator.   I discussed the case with the bedside nurse as well as respiratory therapist, will start the patient on a fentanyl drip to assist with sedation/analgesia. Additionally, it appears that the patient was started on Levophed on 11/17/2020 for worsening hypotension and shock, likely infectious/hypovolemic. Levophed drip has been increased to 30 mcg/min today, some of which may be post--intubation hypotension. The bedside nurse is asking for an arterial line. In terms of his atrial fibrillation, the patient does have an order for an amiodarone drip. They are attempting to avoid beta-blocker/calcium channel blockers given his hypotension. Gastroenterology is also been following given his drop in hemoglobin, requiring blood transfusion, may be undergoing an EGD soon. Past Medical History:  
Diagnosis Date  Arthritis  Atrial fibrillation ([facility] [address]  COPD (chronic obstructive pulmonary disease) ([facility] [address] 8/12/2020  High cholesterol  History of vascular access device 08/12/2020 Rt Basilic 5fr dual PICC at 42cm arm cir 34.5cm  Hypertension Past Surgical History:  
Procedure Laterality Date  COLONOSCOPY N/A 9/20/2018 COLONOSCOPY performed by Krish Cheng MD at [facility] 112 HX COLONOSCOPY    
 HX HIP REPLACEMENT Bilateral   
 HX TONSILLECTOMY  IR INSERT NON TUNL CVC OVER 5 YRS  11/17/2020 Family History Problem Relation Age of Onset  No Known Problems Mother  No Known Problems Father  No Known Problems Sister  No Known Problems Brother  No Known Problems Maternal Grandmother  No Known Problems Maternal Grandfather  No Known Problems Paternal Grandmother  No Known Problems Paternal Grandfather  No Known Problems Other  Stroke Neg Hx Social History Tobacco Use  Smoking status: Unknown If Ever Smoked  Smokeless tobacco: Never Used Substance Use Topics  Alcohol use: Never Alcohol/week: 8.0 standard drinks Types: 8 Shots of liquor per week Frequency: Never Current Facility-Administered Medications Medication Dose Route Frequency Provider Last Rate Last Dose  potassium chloride 10 mEq in 100 ml IVPB  10 mEq IntraVENous aNz Dailey  mL/hr at 11/19/20 1037 10 mEq at 11/19/20 1037  
 dexmedeTOMidine (PRECEDEX) 400 mcg in 0.9% sodium chloride 104 mL infusion  0.1-1.5 mcg/kg/hr IntraVENous TITRATE Raine Khan MD 9.6 mL/hr at 11/19/20 0841 0.4 mcg/kg/hr at 11/19/20 0841  
 fentaNYL (PF) 1,500 mcg/30 mL (50 mcg/mL) infusion  0-200 mcg/hr IntraVENous TITRATE Curry Warner DO      
 albumin human 25% (BUMINATE) solution 25 g  25 g IntraVENous Q6H Curry Warner DO      
 dextrose 5% infusion  150 mL/hr IntraVENous CONTINUOUS Janet Grigsby MD      
 And  
 sodium bicarbonate 8.4 % (1 mEq/mL) injection 100 mEq  100 mEq IntraVENous Alvarez Olivares MD      
 hydrocortisone Sod Succ (PF) (SOLU-CORTEF) injection 50 mg  50 mg IntraVENous Q6H Curry Warner DO      
 vasopressin (VASOSTRICT) 40 Units in 0.9% sodium chloride 40 mL infusion  0.04 Units/min IntraVENous CONTINUOUS Curry Warner DO      
 vancomycin (VANCOCIN) 750 mg in 0.9% sodium chloride 250 mL (VIAL-MATE)  750 mg IntraVENous Lidia Bee MD      
 [START ON 11/20/2020] VANCOMYCIN INFORMATION NOTE   Other ONCE Naz Leyva MD      
 morphine injection 2 mg  2 mg IntraVENous Q4H PRN Raine Khan MD   2 mg at 11/19/20 2409  pantoprazole (PROTONIX) 40 mg in 0.9% sodium chloride 10 mL injection  40 mg IntraVENous BID Naz Cheek MD   40 mg at 11/19/20 0930  calciTONIN (MIACALCIN) injection 370 Int'l Units  4 Int'l Units/kg SubCUTAneous Q12H Janet Grigsby MD   370 Int'l Units at 11/18/20 2138  
 0.9% sodium chloride infusion 250 mL  250 mL IntraVENous PRN Raine Khan MD      
 NOREPINephrine (LEVOPHED) 8 mg in 5% dextrose 250mL (32 mcg/mL) infusion  0.5-16 mcg/min IntraVENous TITRATE Lidia Leyva MD 30 mL/hr at 11/19/20 1037 16 mcg/min at 11/19/20 1037  
 acyclovir (ZOVIRAX) 800 mg in 0.9% sodium chloride 250 mL IVPB  10 mg/kg (Ideal) IntraVENous Q12H Bulmaro Mariee MD   800 mg at 11/19/20 0930  piperacillin-tazobactam (ZOSYN) 3.375 g in 0.9% sodium chloride (MBP/ADV) 100 mL MBP  3.375 g IntraVENous Q8H Elyssa Kunz MD 25 mL/hr at 11/19/20 0430 3.375 g at 11/19/20 0430  
 0.9% sodium chloride infusion 250 mL  250 mL IntraVENous PRN Bulmaro Mariee MD      
 acetaminophen (TYLENOL) suppository 650 mg  650 mg Rectal Q4H PRN Sissy PONCE MD   650 mg at 11/16/20 1225  
 amiodarone (CORDARONE) 900 mg/250 ml D5W infusion  0.5-1 mg/min IntraVENous TITRATE Lisa Bernal MD 16.7 mL/hr at 11/16/20 1736 1 mg/min at 11/16/20 1736  VANCOMYCIN INFORMATION NOTE 1 Each  1 Each Other Rx Dosing/Monitoring Jolan Riedel, Jesus A, MD      
 acetaminophen (TYLENOL) tablet 650 mg  650 mg Oral Q4H PRN Rom Herring NP   650 mg at 11/19/20 0027  
 albuterol (PROVENTIL HFA, VENTOLIN HFA, PROAIR HFA) inhaler 2 Puff  2 Puff Inhalation Q4H PRN Rom Herring NP      
 docusate sodium (COLACE) capsule 100 mg  100 mg Oral BID Rom Herring NP   100 mg at 30/13/70 0974  
 folic acid (FOLVITE) tablet 1 mg  1 mg Oral DAILY Rom Herring NP   Stopped at 11/16/20 0900  pravastatin (PRAVACHOL) tablet 20 mg  20 mg Oral QHS Rom Herring NP      
 sertraline (ZOLOFT) tablet 50 mg  50 mg Oral DAILY Rom Herring NP   Stopped at 11/17/20 0900  thiamine mononitrate (B-1) tablet 100 mg  100 mg Oral DAILY Rom Herring NP   Stopped at 11/16/20 0900  digoxin (LANOXIN) injection 125 mcg  125 mcg IntraVENous DAILY Rom Herring NP   125 mcg at 11/19/20 4239  ondansetron (ZOFRAN) injection 4 mg  4 mg IntraVENous Q6H PRN Rom Herring NP      
 hydrOXYzine pamoate (VISTARIL) capsule 25 mg  25 mg Oral TID PRN Rom Herring NP   25 mg at 11/15/20 2217  risperiDONE (RisperDAL) tablet 2 mg  2 mg Oral QHS Rom Herring, NP   2 mg at 11/18/20 2101  chlorhexidine (PERIDEX) 0.12 % mouthwash 15 mL  15 mL Oral Q12H Rom Herring, NP   15 mL at 11/19/20 0930 Home Meds: 
No current facility-administered medications on file prior to encounter. Current Outpatient Medications on File Prior to Encounter Medication Sig Dispense Refill  metoprolol tartrate (LOPRESSOR) 25 mg tablet Take 2 Tabs by mouth two (2) times a day. 60 Tab 0  
 ammonium lactate (LAC-HYDRIN) 12 % lotion rub in to affected area well 1 Bottle 0  
 dilTIAZem ER (CARDIZEM CD) 240 mg capsule Take 1 Cap by mouth daily. 30 Cap 0  
 furosemide (LASIX) 20 mg tablet Take 1 Tab by mouth daily. 30 Tab 0  
 albuterol (ACCUNEB) 1.25 mg/3 mL nebu 3 mL by Nebulization route every two (2) hours as needed for Wheezing. 30 Nebule 0  
 acetaminophen (TYLENOL) 500 mg tablet Take 500 mg by mouth every four (4) hours as needed for Pain.  albuterol (PROVENTIL HFA, VENTOLIN HFA, PROAIR HFA) 90 mcg/actuation inhaler Take 2 Puffs by inhalation every four (4) hours as needed for Wheezing.  bisacodyL (DULCOLAX) 5 mg EC tablet Take 5 mg by mouth every four (4) hours as needed for Constipation.  lactobacillus rhamnosus gg 10 billion cell (CULTURELLE) 10 billion cell capsule Take 1 Cap by mouth every twelve (12) hours as needed.  docusate sodium (COLACE) 100 mg capsule Take 100 mg by mouth two (2) times a day.  folic acid (FOLVITE) 1 mg tablet Take 1 mg by mouth daily.  guaiFENesin ER (MUCINEX) 600 mg ER tablet Take 600 mg by mouth two (2) times a day.  loperamide (Imodium A-D) 2 mg tablet Take 2 mg by mouth every four (4) hours as needed for Diarrhea.  megestroL (MEGACE) 400 mg/10 mL (40 mg/mL) suspension Take 400 mg by mouth daily.  nystatin (MYCOSTATIN) 100,000 unit/mL suspension Take 1 tsp by mouth four (4) times daily. swish and spit  thiamine HCL (B-1) 100 mg tablet Take 100 mg by mouth daily.  sertraline (ZOLOFT) 50 mg tablet Take 50 mg by mouth daily.  apixaban (ELIQUIS) 5 mg tablet Take 1 Tab by mouth two (2) times a day. 60 Tab 4  pravastatin (PRAVACHOL) 20 mg tablet Take 20 mg by mouth nightly. Allergies Allergen Reactions  Codeine Rash Review of Systems: 
Review of systems not obtained due to patient factors. Objective:  
 
Blood pressure (!) 98/58, pulse (!) 112, temperature 98.3 °F (36.8 °C), resp. rate 24, height 6' 2\" (1.88 m), weight 92.1 kg (203 lb 0.7 oz), SpO2 98 %. Temp (24hrs), Av.9 °F (37.2 °C), Min:98 °F (36.7 °C), Max:99.5 °F (37.5 °C) Intake and Output: 
Current Shift: 701 - 1900 In: -  
Out: 805 [[facility]:907] Last 3 Shifts: 1901 - 700 In: 430 [I.V.:150] Out: 5250 [YFTRB:6820] Physical Exam:  
General appearance: delirious, mild distress, appears older than stated age, confused Head: Normocephalic, without obvious abnormality, atraumatic Eyes: negative Neck: no adenopathy, no carotid bruit, no JVD; neck is stiff Lungs: clear to auscultation bilaterally, doing well on the ventilator at AC//18/28%5 Heart: irregularly irregular rhythm, no rub, no obvious murmur Abdomen: soft, non-tender. Bowel sounds normal. No masses,  no organomegaly; NG tube in place. Pulses: 2+ and symmetric Skin: Dry, intact, with bruising throughout the arms and legs Lymph nodes: Cervical, supraclavicular, and axillary nodes normal. 
Neurologic: Sedated on the ventilator, not following any commands. Additional comments:None Lab/Data Review: All lab results for the last 24 hours reviewed. Recent Results (from the past 24 hour(s)) GLUCOSE, POC Collection Time: 20 12:27 PM  
Result Value Ref Range Glucose (POC) 135 (H) 65 - 100 mg/dL Performed by Otis Frey HGB & HCT Collection Time: 20  1:45 PM  
Result Value Ref Range HGB 8.7 (L) 12.1 - 17.0 g/dL  HCT 28.2 (L) 36.6 - 50.3 % GLUCOSE, POC Collection Time: 11/18/20  2:19 PM  
Result Value Ref Range Glucose (POC) 114 (H) 65 - 100 mg/dL Performed by Bert Ochoa GLUCOSE, POC Collection Time: 11/18/20  4:42 PM  
Result Value Ref Range Glucose (POC) 118 (H) 65 - 100 mg/dL Performed by Oracio Taveras GLUCOSE, POC Collection Time: 11/18/20  9:24 PM  
Result Value Ref Range Glucose (POC) 113 (H) 65 - 100 mg/dL Performed by Mercedes Barlow   
HGB & HCT Collection Time: 11/18/20  9:30 PM  
Result Value Ref Range HGB 8.9 (L) 12.1 - 17.0 g/dL HCT 29.0 (L) 36.6 - 50.3 % METABOLIC PANEL, COMPREHENSIVE Collection Time: 11/18/20  9:30 PM  
Result Value Ref Range Sodium 149 (H) 136 - 145 mmol/L Potassium 3.6 3.5 - 5.1 mmol/L Chloride 127 (H) 97 - 108 mmol/L  
 CO2 16 (L) 21 - 32 mmol/L Anion gap 6 5 - 15 mmol/L Glucose 113 (H) 65 - 100 mg/dL BUN 26 (H) 6 - 20 mg/dL Creatinine 1.95 (H) 0.70 - 1.30 mg/dL BUN/Creatinine ratio 13 12 - 20 GFR est AA 41 (L) >60 ml/min/1.73m2 GFR est non-AA 34 (L) >60 ml/min/1.73m2 Calcium 9.9 8.5 - 10.1 mg/dL Bilirubin, total 0.6 0.2 - 1.0 mg/dL AST (SGOT) 42 (H) 15 - 37 U/L  
 ALT (SGPT) 12 12 - 78 U/L Alk. phosphatase 105 45 - 117 U/L Protein, total 6.0 (L) 6.4 - 8.2 g/dL Albumin 1.4 (L) 3.5 - 5.0 g/dL Globulin 4.6 (H) 2.0 - 4.0 g/dL A-G Ratio 0.3 (L) 1.1 - 2.2 GLUCOSE, POC Collection Time: 11/19/20  1:25 AM  
Result Value Ref Range Glucose (POC) 108 (H) 65 - 100 mg/dL Performed by Mercedes [facility] RENAL FUNCTION PANEL Collection Time: 11/19/20  4:00 AM  
Result Value Ref Range Sodium 148 (H) 136 - 145 mmol/L Potassium 3.4 (L) 3.5 - 5.1 mmol/L Chloride 124 (H) 97 - 108 mmol/L  
 CO2 15 (LL) 21 - 32 mmol/L Anion gap 9 5 - 15 mmol/L Glucose 125 (H) 65 - 100 mg/dL BUN 24 (H) 6 - 20 mg/dL Creatinine 1.90 (H) 0.70 - 1.30 mg/dL BUN/Creatinine ratio 13 12 - 20  GFR est AA 42 (L) >60 ml/min/1.73m2 GFR est non-AA 35 (L) >60 ml/min/1.73m2 Calcium 9.8 8.5 - 10.1 mg/dL Phosphorus 1.5 (L) 2.6 - 4.7 mg/dL Albumin 1.4 (L) 3.5 - 5.0 g/dL Usman Wasserman Collection Time: 11/19/20  6:00 AM  
Result Value Ref Range Vancomycin, random 19.6 ug/mL Reported dose date BLOOD Reported dose: BLOOD Units CBC WITH AUTOMATED DIFF Collection Time: 11/19/20  6:15 AM  
Result Value Ref Range WBC 13.1 (H) 4.1 - 11.1 K/uL  
 RBC 3.48 (L) 4.10 - 5.70 M/uL HGB 8.9 (L) 12.1 - 17.0 g/dL HCT 28.7 (L) 36.6 - 50.3 % MCV 82.5 80.0 - 99.0 FL  
 MCH 25.6 (L) 26.0 - 34.0 PG  
 MCHC 31.0 30.0 - 36.5 g/dL  
 RDW 19.5 (H) 11.5 - 14.5 % PLATELET 58 (L) 481 - 400 K/uL MPV 10.6 8.9 - 12.9 FL  
 NEUTROPHILS PENDING % LYMPHOCYTES PENDING % MONOCYTES PENDING % EOSINOPHILS PENDING % BASOPHILS PENDING % IMMATURE GRANULOCYTES PENDING %  
 ABS. NEUTROPHILS PENDING K/UL  
 ABS. LYMPHOCYTES PENDING K/UL  
 ABS. MONOCYTES PENDING K/UL  
 ABS. EOSINOPHILS PENDING K/UL  
 ABS. BASOPHILS PENDING K/UL  
 ABS. IMM. GRANS. PENDING K/UL  
 DF PENDING   
C REACTIVE PROTEIN, QT Collection Time: 11/19/20  6:15 AM  
Result Value Ref Range C-Reactive protein 28.30 (H) 0.00 - 0.60 mg/dL PROCALCITONIN Collection Time: 11/19/20  6:15 AM  
Result Value Ref Range Procalcitonin 5.52 (H) 0 ng/mL RETICULOCYTE COUNT Collection Time: 11/19/20  6:15 AM  
Result Value Ref Range Reticulocyte count 1.2 0.7 - 2.1 % Absolute Retic Cnt. 0.0424 (L) 0.0620 - 0.0950 M/ul GLUCOSE, POC Collection Time: 11/19/20  9:34 AM  
Result Value Ref Range Glucose (POC) 127 (H) 65 - 100 mg/dL Performed by Samuel Roblero Chest X-Ray:  
XR CHEST PORT Final Result Stable right neck central venous catheter. Now present, there is an ET tube 4-5 
cm above the samuel. NG tube projects below the left hemidiaphragm.  Improved 
aeration through the right lung; clearing patchy reticular markings. No 
interstitial or alveolar pulmonary edema. No pneumothorax or sizable pleural 
effusion. XR CHEST PORT Final Result Impression:   
Successful placement of a triple-lumen central venous catheter. The catheter is  
ready for use. IR INSERT NON TUNL CVC OVER 5 YRS Final Result Impression:   
Successful placement of a triple-lumen central venous catheter. The catheter is  
ready for use. [facility] Final Result Impression:   
Successful placement of a triple-lumen central venous catheter. The catheter is  
ready for use. XR CHEST PORT Final Result Impression: The cardiomediastinal silhouette is appropriate for age, technique,  
and lung expansion. Pulmonary vasculature is not congested. The lungs are  
essentially clear. No effusion or pneumothorax is seen. US RETROPERITONEUM COMP Final Result Impression: Normal exam  
  
  
XR CHEST PORT Final Result IMPRESSION:  No evidence of an acute cardiopulmonary process. CT HEAD WO CONT Final Result IMPRESSION: Chronic findings as above. CT CHEST WO CONT    (Results Pending) CT ABD PELV WO CONT    (Results Pending) XR CHEST PORT    (Results Pending) CT imaging: CT Results  (Last 48 hours) None PFTs: PFT Results  (Last 3 results in the past 10 years) None Assessment:  
 
Patient is a 79-year-old  male with a history of atrial fibrillation, osteoarthritis, hyperlipidemia, reported COPD, and hypertension who presented to [facility] on 11/15/2020 from his nursing home for altered mental status. He has been admitted to the ICU and is currently in septic shock on vasopressors and has been intubated on 11/19/2020 for failure to protect his airway due to altered mental status. Plan:  
 
1.)  Acute respiratory failure 
-Intubated on 11/19/2020 for failure to protect his airway.   Otherwise, the patient has no primary pulmonary issue at this time and is doing very well on the ventilator. 
-Would keep the patient on AC//18/20 8%/5, will obtain an ABG and chest x-ray now. He is currently saturating at 99%. -Agree with Precedex which is currently at 1, will add a fentanyl drip at 100. Nurses able to titrate both drips for a RASS score of -2. -CXR in the morning. 
 
2.)  Septic shock 
-Patient appears to have ESBL E. coli in his urine, would recommend switching from Zosyn to meropenem. Infectious disease is following, appreciate their recommendations. -Repeat blood cultures today given worsening shock. 
-Currently on Levophed at 30 mcg/min, wean as tolerated for MAP greater than 65 mmHg. -We will start the patient on vasopressin at 0.04 units/min, stress dose hydrocortisone 50 mg IV every 6 hours, and I agree with supplemental albumin 25 g IV q6 hours x8 doses for severe hypoalbuminemia. 
-Checking a lactate now and in the morning. 
-Patient already has a right IJ central line; we will attempt to place a radial arterial line today if the family is consentable. -COVID-19 test is negative 
 
3.)  Acute encephalopathy 
-Likely multifactorial from sepsis, urinary tract infection, and multiple electrolyte abnormalities including hypernatremia, hypercalcemia, and hypokalemia. -?meningitis given stiff neck, however unable to perform spinal tap due to thrombocytopenia. 
-Continue on current antibiotic regimen as above; neurology/nephrology/hematology following closely. 
-Ordering an ammonia level and TSH/free T4 today. 
-Intubated today for failure to protect his airway. Sedation as above. 4.)  Acute kidney injury 
-Creatinine seem to plateau around 5.62 and has very steadily been decreasing with improved calcium level and fluid administration. 
-Likely prerenal from volume depletion and sepsis. Should continue to improve with adequate vasopressor support. -Nephrology following closely, appreciate their assistance.  
 
5.) Hypercalcemia 
-Corrected calcium 14.6 on admission, this has steadily improved with calcitonin fluids. 
-Nephrology following closely 
-Myeloma work-up pending 
 
6.)  Acute blood loss anemia 
-Hemoglobin had decreased to 6.8 in 11/17/2020 and he received 2 units of packed red blood cells. -This is responded well, and his hemoglobin has remained fairly stable. 
-Has been having evidence of significant bruising/bleeding. GI is following closely, pending an endoscopy evaluation which will certainly be much easier given that he has a secure airway now. 
-Hematology following. 
 
7.)  Nonanion gap metabolic acidosis 
-Serum bicarb is steadily decreased to 15 this morning with an anion gap of 9. 
-Unclear etiology, but likely combination of sepsis and BRE. -Checking a serum lactate now. 
-It appears he is getting 2 amps of sodium bicarb today per nephrology. 8.)  Thrombocytopenia 
-Platelet count has steadily decreased to 58 this morning, hematology is following closely. 
-I will order DIC labs. -Although, likely due to overwhelming sepsis. 9.)  Atrial fibrillation with RVR 
-Appears to be relatively stable, continue to check electrolytes every day. -TSH pending. 
-Unable to get beta-blocker/calcium channel blockers due to hypotension. Continue amiodarone drip. CODE STATUS: Full Code Lines/Tubes: ET tube, right IJ central line, Mmcillan catheter, NG tube Prophylaxis: 
Stress Ulcer Protocol Active: Yes, Protonix 40 mg IV twice daily Deep Vein Thrombosis Protocol Active: Yes, SCD's (with bleeding his pharmacologic DVT prophylaxis has been stopped) Nutrition: Patient is on a D5 drip for nutrition, unable to do tube feeds given amount of vasopressors Activity: Bedrest given clinical instability. Disposition and Family: Recommend goals of care discussion with the family, would push for DNR status as prognosis remains very guarded.  
 
Total critical care time spent with patient: 65 minutes with personal interpretation of lab values and chest imaging, ordering of multiple lab values, direct visualization of the patient's ventilator and management along with respiratory therapist, direct management of vasopressor support, long discussion with the respiratory therapist and the bedside nurse regarding the plan of care. Rachel Alvarado DO 
[facility] 11/19/2020 
10:57 AM

## 2020-11-20 NOTE — PROGRESS NOTES
Consult for Vancomycin Dosing by Pharmacy by Dr. Kari Trevino Consult provided for this 67y.o. year old male , for indication of sepsis secondary to UTI. Day of Therapy: 5 Goal of Level(s): 15-20mcg/dL Other Current Antibiotics: Merrem Significant Cultures:  
    Date                             Culture                                       Organism 11/15                            Urine                                          E. coli Serum Creatinine Creatinine Date Value Ref Range Status 11/20/2020 2.09 (H) 0.70 - 1.30 mg/dL Final  
11/20/2020 2.03 (H) 0.70 - 1.30 mg/dL Final  
11/19/2020 1.90 (H) 0.70 - 1.30 mg/dL Final  
  
Creatinine Clearance Estimated Creatinine Clearance: 35 mL/min (A) (based on SCr of 2.09 mg/dL (H)). BUN Lab Results Component Value Date/Time BUN 26 (H) 11/20/2020 05:00 AM  
 BUN 25 (H) 11/20/2020 05:00 AM  
  
WBC Lab Results Component Value Date/Time WBC 13.1 (H) 11/19/2020 06:15 AM  
  
Temp 97.8 °F (36.6 °C) Last Level:  
Vancomycin,trough Date Value Ref Range Status 11/20/2020 19.4 (H) 5.0 - 10.0 ug/mL Final  
  Comment:  
    
Trough levels of 15-20 ug/mL should be targeted for patients with coagulase negative Staphylococcus and MRSA pneumonia, endocarditis,osteomyelitis, meningitis, and bacteremia, as well as patients not responding to lower levels. Trough levels of 10-15 ug/mL for infections from other sources (e.g. urinary tract, cellulitis) are appropriate. All patients receiving concomitant nephrotoxic therapies should have their function closely monitored regardless of peak or trough levels. Ht Readings from Last 1 Encounters:  
11/20/20 188 cm (74.02\") Wt Readings from Last 1 Encounters:  
11/20/20 92.8 kg (204 lb 9.4 oz) Ideal body weight: 82.2 kg (181 lb 4.8 oz) Adjusted ideal body weight: 86.5 kg (190 lb 9.8 oz) Previous Regimen: 750mg IV x1 New Regimen:  
Patient is still BRE.   Pharmacy will give Vancomycin 750mg IV x1 today and draw a random level in the morning. Pharmacy to follow daily and will make changes to dose and/or frequency based on clinical status. _________________________________ Ana Russell

## 2020-11-20 NOTE — PROGRESS NOTES
Pulmonology and Critical Care Progress Note Subjective: Chief Complaint: Confusion Patient seen and examined in his room this morning, I had a long discussion about the care plan in detail with the bedside nurse as well as respiratory therapist.  His wife is also at the bedside and I had a long discussion regarding the patient's care plan and prognosis as well. She stated that he does have an advanced directive but that if his heart were to stop she would not think that he would want resuscitation. The wife has signed the DNR form and I have switched his CODE STATUS to DNR in the computer. Patient's Precedex was stopped last night due to bradycardia, this has been stable in the 50s now. He is only on fentanyl at 70 and he is quite sedated still. His lactate level continues to increase despite significantly improve blood pressures today. He is off of vasopressin and Levophed this morning and his maps are above 65. We did have to start him on a bicarb drip last night due to persistent acidosis despite adequate CO2 removal with the ventilator and his ABG this morning was reviewed personally and showed 7.4/23/93. Given his increasing lactic acid level despite significantly improved vasopressor requirements, a CT of the chest/abdomen/pelvis has been ordered to evaluate for areas of ischemia. He is currently on AC//30/28%/5 Chest x-ray was reviewed this morning and is showing some increased perihilar fullness consistent with vascular congestion. Current Facility-Administered Medications Medication Dose Route Frequency Provider Last Rate Last Dose  vancomycin (VANCOCIN) 750 mg in 0.9% sodium chloride 250 mL (VIAL-MATE)  750 mg IntraVENous Amina Drummond MD      
 [START ON 11/21/2020] VANCOMYCIN RANDOM LAB REMINDER   Other Logan Fang MD      
 furosemide (LASIX) injection 40 mg  40 mg IntraVENous ONCE Efrain Jhaveri DO      
 hydrocortisone Sod Succ (PF) (SOLU-CORTEF) injection 50 mg  50 mg IntraVENous Q8H Curry Warner, DO      
 dexmedeTOMidine (PRECEDEX) 400 mcg in 0.9% sodium chloride 104 mL infusion  0.1-1.5 mcg/kg/hr IntraVENous TITRATE Antione Leyva MD   Stopped at 11/20/20 0100  
 fentaNYL (PF) 1,500 mcg/30 mL (50 mcg/mL) infusion  0-200 mcg/hr IntraVENous TITRATE Corine Reed, DO 1.6 mL/hr at 11/20/20 0025 80 mcg/hr at 11/20/20 0025  albumin human 25% (BUMINATE) solution 25 g  25 g IntraVENous Q6H Curry Warner, DO   25 g at 11/20/20 0527  
 vasopressin (VASOSTRICT) 40 Units in 0.9% sodium chloride 40 mL infusion  0.04 Units/min IntraVENous CONTINUOUS Curry Warner, DO 2.4 mL/hr at 11/19/20 2033 0.04 Units/min at 11/19/20 2033  meropenem (MERREM) 1 g in sterile water (preservative free) 20 mL IV syringe  1 g IntraVENous Q12H Duy Finney MD   1 g at 11/20/20 0029  
 0.9% sodium chloride infusion 250 mL  250 mL IntraVENous PRN Isaac Bhatia MD      
 metoprolol (LOPRESSOR) injection 2.5 mg  2.5 mg IntraVENous Q6H Timbo Farfan MD   2.5 mg at 11/19/20 1441  sodium bicarbonate (8.4%) 150 mEq in dextrose 5% 1,000 mL infusion   IntraVENous CONTINUOUS Curry Warner  mL/hr at 11/20/20 0910  morphine injection 2 mg  2 mg IntraVENous Q4H PRN Jazlyn Nice MD   2 mg at 11/19/20 9499  pantoprazole (PROTONIX) 40 mg in 0.9% sodium chloride 10 mL injection  40 mg IntraVENous BID Jazlyn Nice MD   40 mg at 11/20/20 3327  calciTONIN (MIACALCIN) injection 370 Int'l Units  4 Int'l Units/kg SubCUTAneous Q12H Venecia Ojeda MD   370 Int'l Units at 11/20/20 0916  
 0.9% sodium chloride infusion 250 mL  250 mL IntraVENous PRN Antione Leyva MD      
 NOREPINephrine (LEVOPHED) 8 mg in 5% dextrose 250mL (32 mcg/mL) infusion  0.5-16 mcg/min IntraVENous TITRATE Jazlyn Nice MD   Stopped at 11/19/20 2106  acyclovir (ZOVIRAX) 800 mg in 0.9% sodium chloride 250 mL IVPB  10 mg/kg (Ideal) IntraVENous Q12H Lin Walsh MD   800 mg at 11/20/20 9017  
 0.9% sodium chloride infusion 250 mL  250 mL IntraVENous PRN Lin Walsh MD      
 acetaminophen (TYLENOL) suppository 650 mg  650 mg Rectal Q4H PRN Felix PONCE MD   650 mg at 11/16/20 1225  
 amiodarone (CORDARONE) 900 mg/250 ml D5W infusion  0.5-1 mg/min IntraVENous TITRATE Gregory Jones MD 16.7 mL/hr at 11/16/20 1736 1 mg/min at 11/16/20 1736  VANCOMYCIN INFORMATION NOTE 1 Each  1 Each Other Rx Dosing/Monitoring Carlos Paige MD      
 acetaminophen (TYLENOL) tablet 650 mg  650 mg Oral Q4H PRN Nevaeh Rom A, NP   650 mg at 11/19/20 0027  
 albuterol (PROVENTIL HFA, VENTOLIN HFA, PROAIR HFA) inhaler 2 Puff  2 Puff Inhalation Q4H PRN Nevaeh Rom A, NP      
 docusate sodium (COLACE) capsule 100 mg  100 mg Oral BID Nevaeh Rom ROSARIO, ALANNA   Stopped at 14/47/45 6364  
 folic acid (FOLVITE) tablet 1 mg  1 mg Oral DAILY Zapeggy Jan ALANNA PONCE   Stopped at 11/16/20 0900  pravastatin (PRAVACHOL) tablet 20 mg  20 mg Oral QHS Nevaeh Jan ROSARIO, ALANNA      
 sertraline (ZOLOFT) tablet 50 mg  50 mg Oral DAILY Zapeggy Jan ROSARIO, ALANNA   Stopped at 11/17/20 0900  thiamine mononitrate (B-1) tablet 100 mg  100 mg Oral DAILY Zapeggy Jan ROSARIO, ALANNA   Stopped at 11/16/20 0900  digoxin (LANOXIN) injection 125 mcg  125 mcg IntraVENous DAILY Nevaeh Rom ROSARIO, ALANNA   Stopped at 11/20/20 0900  
 ondansetron (ZOFRAN) injection 4 mg  4 mg IntraVENous Q6H PRN Nevaeh Rom ALANNA PONCE      
 hydrOXYzine pamoate (VISTARIL) capsule 25 mg  25 mg Oral TID PRN Nevaeh Rom A, NP   25 mg at 11/15/20 2217  risperiDONE (RisperDAL) tablet 2 mg  2 mg Oral QHS Rom Herring NP   2 mg at 11/19/20 2107  chlorhexidine (PERIDEX) 0.12 % mouthwash 15 mL  15 mL Oral Q12H Rom Herring NP   15 mL at 11/20/20 9404 Allergies Allergen Reactions  Codeine Rash Review of Systems: 
Review of systems not obtained due to patient factors. Objective:  
 
Blood pressure (!) 112/57, pulse 72, temperature 97.8 °F (36.6 °C), resp. rate 26, height 6' 2.02\" (1.88 m), weight 92.8 kg (204 lb 9.4 oz), SpO2 96 %. Temp (24hrs), Av.3 °F (37.4 °C), Min:97.8 °F (36.6 °C), Max:100.2 °F (37.9 °C) Intake and Output: 
Current Shift: 701 - 1900 In: -  
Out: 1 Last 3 Shifts: 1901 - 700 In: 4251.7 [I.V.:3305.7] Out: 1013 Nemesio Marcial [[id number]] Physical Exam:  
General appearance: Obtunded on the ventilator despite only being on fentanyl, no distress, appears older than stated age, chronically ill-appearing Head: Normocephalic, without obvious abnormality, atraumatic Eyes: negative Neck: no obvious adenopathy, no carotid bruit; neck is stiff Lungs: clear to auscultation bilaterally, doing well on the ventilator at AC//18/28%5 Heart: irregularly irregular rhythm, bradycardic, no rub, no obvious murmur Abdomen: soft, non-tender. Bowel sounds normal. No masses,  no organomegaly; NG tube in place. Pulses: 2+ and symmetric Skin: Dry, intact, with bruising throughout the arms and legs. +2--3 pitting edema in extremities bilaterally. Lymph nodes: Cervical, supraclavicular, and axillary nodes normal. 
Neurologic: Sedated on the ventilator, not following any commands. Additional comments:None Lab/Data Review: All lab results for the last 24 hours reviewed. Recent Results (from the past 24 hour(s)) LACTIC ACID Collection Time: 20 10:45 AM  
Result Value Ref Range Lactic acid 3.8 (HH) 0.4 - 2.0 mmol/L PROTHROMBIN TIME + INR Collection Time: 20 10:45 AM  
Result Value Ref Range Prothrombin time 22.6 (H) 11.9 - 14.7 sec INR 2.0 (H) 0.9 - 1.1 PTT Collection Time: 20 10:45 AM  
Result Value Ref Range aPTT 65.2 (H) 23.0 - 35.7 sec  
 aPTT, therapeutic range   68 - 109 sec FIBRINOGEN Collection Time: 20 10:45 AM  
Result Value Ref Range  Fibrinogen 529 220 - 535 mg/dL AMMONIA Collection Time: 11/19/20 10:45 AM  
Result Value Ref Range Ammonia 11 <32 umol/L  
TSH 3RD GENERATION Collection Time: 11/19/20 10:45 AM  
Result Value Ref Range TSH 4.33 (H) 0.36 - 3.74 uIU/mL GLUCOSE, POC Collection Time: 11/19/20 11:43 AM  
Result Value Ref Range Glucose (POC) 139 (H) 65 - 100 mg/dL Performed by Jono MALLORY, ALLOCATE Collection Time: 11/19/20 12:30 PM  
Result Value Ref Range Unit number [id number] Blood component type FP 24H,Thaw Unit division 00 Status of unit Issued,final   
 TRANSFUSION STATUS Ok to transfuse Unit number [id number] Blood component type FP 24H,Thaw Unit division 00 Status of unit Issued,final   
 TRANSFUSION STATUS Ok to transfuse BLOOD GAS, ARTERIAL Collection Time: 11/19/20  3:20 PM  
Result Value Ref Range pH 7.207 (LL) 7.35 - 7.45    
 PCO2 36 35 - 45 mmHg PO2 106 (H) 75 - 100 mmHg O2 SAT 99 >95 % BICARBONATE 14 (L) 22 - 26 mmol/L  
 BASE DEFICIT 12.7 (H) 0 - 2 mmol/L  
 O2 METHOD VENT    
 FIO2 28.0 % MODE AssistControl/Pressure Control Tidal volume 500 SET RATE 18    
 EPAP/CPAP/PEEP 5.0    
 SITE Right Radial    
 [facility]ON'S TEST PASS Critical value read back CALLED [facility]   
LACTIC ACID Collection Time: 11/19/20  5:30 PM  
Result Value Ref Range Lactic acid 6.1 (HH) 0.4 - 2.0 mmol/L  
BLOOD GAS, ARTERIAL Collection Time: 11/19/20  6:20 PM  
Result Value Ref Range pH 7.229 (LL) 7.35 - 7.45    
 PCO2 30 (L) 35 - 45 mmHg PO2 100 75 - 100 mmHg O2  >95 % BICARBONATE 13 (L) 22 - 26 mmol/L  
 BASE DEFICIT 13.7 (H) 0 - 2 mmol/L  
 O2 METHOD VENT    
 FIO2 28.0 % MODE Assist Control/Volume Control Tidal volume 500 SET RATE 30    
 EPAP/CPAP/PEEP 5.0    
 SITE Right Radial    
 DAMON'S TEST PASS Critical value read back Teetee Thornton RN CALLED WITH READ BACK HGB & HCT  Collection Time: 11/19/20 6:20 PM  
Result Value Ref Range HGB 8.5 (L) 12.1 - 17.0 g/dL HCT 28.1 (L) 36.6 - 50.3 % GLUCOSE, POC Collection Time: 11/19/20  8:18 PM  
Result Value Ref Range Glucose (POC) 160 (H) 65 - 100 mg/dL Performed by Jj Branch, POC Collection Time: 11/20/20 12:37 AM  
Result Value Ref Range Glucose (POC) 190 (H) 65 - 100 mg/dL Performed by Fabiana Ma METABOLIC PANEL, COMPREHENSIVE Collection Time: 11/20/20  5:00 AM  
Result Value Ref Range Sodium 144 136 - 145 mmol/L Potassium 4.6 3.5 - 5.1 mmol/L Chloride 116 (H) 97 - 108 mmol/L  
 CO2 19 (L) 21 - 32 mmol/L Anion gap 9 5 - 15 mmol/L Glucose 162 (H) 65 - 100 mg/dL BUN 26 (H) 6 - 20 mg/dL Creatinine 2.09 (H) 0.70 - 1.30 mg/dL BUN/Creatinine ratio 12 12 - 20 GFR est AA 38 (L) >60 ml/min/1.73m2 GFR est non-AA 31 (L) >60 ml/min/1.73m2 Calcium 8.6 8.5 - 10.1 mg/dL Bilirubin, total 1.4 (H) 0.2 - 1.0 mg/dL AST (SGOT) 373 (H) 15 - 37 U/L  
 ALT (SGPT) 62 12 - 78 U/L Alk. phosphatase 104 45 - 117 U/L Protein, total 6.5 6.4 - 8.2 g/dL Albumin 2.7 (L) 3.5 - 5.0 g/dL Globulin 3.8 2.0 - 4.0 g/dL A-G Ratio 0.7 (L) 1.1 - 2.2 LACTIC ACID Collection Time: 11/20/20  5:00 AM  
Result Value Ref Range Lactic acid 6.3 (HH) 0.4 - 2.0 mmol/L MAGNESIUM Collection Time: 11/20/20  5:00 AM  
Result Value Ref Range Magnesium 1.5 (L) 1.6 - 2.4 mg/dL Mardelle Meadow Collection Time: 11/20/20  5:00 AM  
Result Value Ref Range Vancomycin,trough 19.4 (H) 5.0 - 10.0 ug/mL Reported dose date Not provided Reported dose: Not provided Units RENAL FUNCTION PANEL Collection Time: 11/20/20  5:00 AM  
Result Value Ref Range Sodium 144 136 - 145 mmol/L Potassium 4.6 3.5 - 5.1 mmol/L Chloride 116 (H) 97 - 108 mmol/L  
 CO2 19 (L) 21 - 32 mmol/L Anion gap 9 5 - 15 mmol/L Glucose 158 (H) 65 - 100 mg/dL BUN 25 (H) 6 - 20 mg/dL  Creatinine 2.03 (H) 0.70 - 1.30 mg/dL BUN/Creatinine ratio 12 12 - 20 GFR est AA 39 (L) >60 ml/min/1.73m2 GFR est non-AA 32 (L) >60 ml/min/1.73m2 Calcium 8.6 8.5 - 10.1 mg/dL Phosphorus 3.3 2.6 - 4.7 mg/dL Albumin 2.7 (L) 3.5 - 5.0 g/dL C REACTIVE PROTEIN, QT Collection Time: 11/20/20  5:00 AM  
Result Value Ref Range C-Reactive protein 20.10 (H) 0.00 - 0.60 mg/dL PROCALCITONIN Collection Time: 11/20/20  5:00 AM  
Result Value Ref Range Procalcitonin 6.39 (H) 0 ng/mL HEPATIC FUNCTION PANEL Collection Time: 11/20/20  5:00 AM  
Result Value Ref Range Protein, total 6.5 6.4 - 8.2 g/dL Albumin 2.7 (L) 3.5 - 5.0 g/dL Globulin 3.8 2.0 - 4.0 g/dL A-G Ratio 0.7 (L) 1.1 - 2.2 Bilirubin, total 1.4 (H) 0.2 - 1.0 mg/dL Bilirubin, direct 0.8 (H) 0.0 - 0.2 mg/dL Alk. phosphatase 105 45 - 117 U/L  
 AST (SGOT) 381 (H) 15 - 37 U/L  
 ALT (SGPT) 61 12 - 78 U/L  
PHOSPHORUS Collection Time: 11/20/20  5:00 AM  
Result Value Ref Range Phosphorus 3.4 2.6 - 4.7 mg/dL BLOOD GAS, ARTERIAL Collection Time: 11/20/20  5:15 AM  
Result Value Ref Range pH 7.422 7.35 - 7.45    
 PCO2 23 (L) 35 - 45 mmHg PO2 93 75 - 100 mmHg O2 SAT 99 >95 % BICARBONATE 17 (L) 22 - 26 mmol/L  
 BASE DEFICIT 8.8 (H) 0 - 2 mmol/L  
 O2 METHOD VENT    
 FIO2 21.0 % MODE Assist Control/Volume Control Tidal volume 550 SET RATE 30    
 EPAP/CPAP/PEEP 5.0    
 SITE Right Radial    
 DAMON'S TEST PASS    
GLUCOSE, POC Collection Time: 11/20/20  6:24 AM  
Result Value Ref Range Glucose (POC) 195 (H) 65 - 100 mg/dL Performed by MAGGIE BAGLEY   
ECHO ADULT COMPLETE Collection Time: 11/20/20  9:30 AM  
Result Value Ref Range Pulmonic Regurgitant End Max Velocity 176.00 cm/s AoV PG 12.00 mmHg IVSd 1.02 (A) 0.6 - 1.0 cm LVIDd 4.43 4.2 - 5.9 cm LVIDs 3.04 cm Pulmonic Regurgitant End Max Velocity 67.90 cm/s LVOT Peak Gradient 2.00 mmHg  LVPWd 1.07 (A) 0.6 - 1.0 cm LV E' Septal Velocity 13.00 cm/s LV ED Vol A2C 86.90 cm3 BP EF 59.4 55 - 100 % LV ES Vol A2C 28.10 cm3 E/E' septal 8.62 LV Ejection Fraction MOD 2C 31 % TV MG 81.00 mmHg Mitral Regurgitant Velocity Time Integral 186.00 cm Pulmonic Regurgitant End Max Velocity 534.00 cm/s Mitral Valve E Wave Deceleration Time 130.00 ms MV A Oscar 33.30 cm/s  
 MV E Oscar 112.00 cm/s  
 MV E/A 3.36 Pulmonic Regurgitant End Max Velocity 126.00 cm/s Pulmonic Valve Systolic Peak Instantaneous Gradient 6.00 mmHg Est. RA Pressure 15.00 mmHg RVIDd 4.98 cm RVSP 61.00 mmHg Tricuspid Valve Max Velocity 339.00 cm/s Triscuspid Valve Regurgitation Peak Gradient 46.00 mmHg Tapse 1.40 (A) 1.5 - 2.0 cm  
 LV Mass .9 88 - 224 g LV Mass AL Index 72.4 49 - 115 g/m2 Chest X-Ray:  
XR CHEST PORT Final Result Stable right neck central venous catheter. Now present, there is an ET tube 4-5 
cm above the samuel. NG tube projects below the left hemidiaphragm. Improved 
aeration through the right lung; clearing patchy reticular markings. No 
interstitial or alveolar pulmonary edema. No pneumothorax or sizable pleural 
effusion. XR CHEST PORT Final Result Impression:   
Successful placement of a triple-lumen central venous catheter. The catheter is  
ready for use. IR INSERT NON TUNL CVC OVER 5 YRS Final Result Impression:   
Successful placement of a triple-lumen central venous catheter. The catheter is  
ready for use. IR [facility] Final Result Impression:   
Successful placement of a triple-lumen central venous catheter. The catheter is  
ready for use. XR CHEST PORT Final Result Impression: The cardiomediastinal silhouette is appropriate for age, technique,  
and lung expansion. Pulmonary vasculature is not congested. The lungs are  
essentially clear. No effusion or pneumothorax is seen. US RETROPERITONEUM COMP Final Result Impression: Normal exam  
  
  
XR CHEST PORT Final Result IMPRESSION:  No evidence of an acute cardiopulmonary process. CT HEAD WO CONT Final Result IMPRESSION: Chronic findings as above. CT CHEST WO CONT    (Results Pending) CT ABD PELV WO CONT    (Results Pending) XR CHEST PORT    (Results Pending) CT imaging: CT Results  (Last 48 hours) None PFTs: PFT Results  (Last 3 results in the past 10 years) None Assessment:  
 
Patient is a 78-year-old  male with a history of atrial fibrillation, osteoarthritis, hyperlipidemia, reported COPD, and hypertension who presented to [facility] on 11/15/2020 from his nursing home for altered mental status. He has been admitted to the ICU and is currently in septic shock on vasopressors and has been intubated on 11/19/2020 for failure to protect his airway due to altered mental status. Plan:  
 
1.)  Acute respiratory failure 
-Intubated on 11/19/2020 for failure to protect his airway. Otherwise, the patient has no primary pulmonary issue at this time and is doing very well on the ventilator. 
-Acidosis is improved on bicarb drip, therefore will change his ventilator settings to AC//26/21%/5, will obtain an ABG and chest x-ray in the morning. He is currently saturating at 99%. -Precedex is off due to bradycardia however he does not need sedation at this time due to being perpetually obtunded and altered, w continue fentanyl drip at 70. Nurses able to titrate both drips for a RASS score of -2. 
-Overall poor prognosis. -Now that his vasopressor requirements have significantly improved, okay to start tube feeds today. 2.)  Septic shock 
-Patient appears to have ESBL E. coli in his urine, Zosyn has been switched to meropenem. Being continued on vancomycin as well. Infectious disease is following, appreciate their recommendations.  
-Repeat blood cultures 11/19/2020 today given worsening shock currently pending. Admission blood cultures no growth to date. - Levophed/vasopressin have been weaned off since starting the patient on meropenem/hydrocortisone/albumin. 
-Continue albumin 25 g IV q6 hours for another 24 hours. 
-We will slowly wean hydrocortisone to 50 mg IV every 8 today. 
-Checking a lactate again this afternoon and in the morning given slowly increasing lactate level. CT chest/abdomen/pelvis currently pending. 
-Patient has a right IJ central line and a right radial A-line in place. 
-Checking a mixed venous gas and a transthoracic echocardiogram to rule out a cardiogenic component of his shock. -COVID-19 test is negative 
-Patient is massively fluid overloaded on exam and his pressor requirements have significantly improved, will give Lasix 40 mg IV x1 today. 3.)  Acute encephalopathy 
-Likely multifactorial from sepsis, urinary tract infection, and multiple electrolyte abnormalities including hypernatremia, hypercalcemia, and hypokalemia. -?meningitis given stiff neck, however unable to perform spinal tap due to thrombocytopenia. CBC pending today. 
-Continue on current antibiotic regimen as above; neurology/nephrology/hematology following closely. 
-Ammonia level normal, TSH mildly elevated, free T4 pending. 
-Concerning that he is not on any sedation other than fentanyl at 70 and he is obtunded. He is breathing along with the ventilator. 
-Intubated today for failure to protect his airway. Sedation as above. 4.)  Acute kidney injury 
-Creatinine seem to plateau around 4.30 and had very steadily been decreasing with improved calcium level and fluid administration. 
-Likely prerenal from volume depletion and sepsis. -Worsened slightly to 2.09 today, possible ATN. -Nephrology following closely, appreciate their assistance.  
 
5.)  Hypercalcemia 
-Corrected calcium 14.6 on admission, this has steadily improved every day with calcitonin fluids. 
-Nephrology following closely 
-Myeloma work-up pending 
 
6.)  Acute blood loss anemia 
-Hemoglobin had decreased to 6.8 in 11/17/2020 and he received 2 units of packed red blood cells. -This is responded well to blood products, and his hemoglobin has remained fairly stable. CBC pending today. 
-Has been having evidence of significant bruising/bleeding. GI is following closely, pending an endoscopy evaluation which will certainly be much easier given that he has a secure airway now. 
-Hematology following. 
 
7.)  Nonanion gap metabolic acidosis 
-Serum bicarb had steadily decreased to 15 yesterday morning with an anion gap of 9. ABG yesterday 7.22/30/100 despite adequate ventilator-assisted CO2 removal. 
-Required starting a sodium bicarbonate drip yesterday at 100 cc/h and his serum bicarb has improved to 19 and his pH is 7.44 today. -Ventilator adjustments as above, we will reduce the respiratory rate today. 
-Unclear etiology, but likely combination of sepsis and BRE. -Serum lactate continues to rise, CT chest/abdomen/pelvis currently pending. 
-Continue to trend serum lactates. 8.)  Thrombocytopenia 
-Platelet count has steadily decreased to 58 yesterday morning, hematology is following closely. -INR 2.0, PT 22.6, PTT 65.2 but fibrinogen is normal, which is not consistent with DIC. Patient received FFP and vitamin K. 
-Although, likely due to overwhelming sepsis. 9.)  Atrial fibrillation with RVR 
-Appears to be relatively stable, continue to check electrolytes every day. -TSH mildly elevated, free T4 pending. 
-Cardiology following closely, appreciate their recommendations. 
-Amiodarone drip held yesterday due to issues with access, however this can be resumed if necessary now that he is off multiple drips. Only on Lopressor 2.5 mg IV every 6 hours. CODE STATUS: DNR Lines/Tubes: ET tube, right IJ central line, Mcmillan catheter, NG tube Prophylaxis: 
Stress Ulcer Protocol Active: Yes, Protonix 40 mg IV twice daily Deep Vein Thrombosis Protocol Active: Yes, SCD's (with bleeding his pharmacologic DVT prophylaxis has been stopped) Nutrition: Patient is off vasopressors, okay to start tube feeds today. Activity: Bedrest given clinical instability. Disposition and Family: Long discussion with the patient's wife at the bedside today regarding goals of care, DNR status is now in effect. Total critical care time spent with patient: 65 minutes with personal interpretation of lab values and chest imaging, ordering of multiple lab values, direct visualization of the patient's ventilator and management along with respiratory therapist, direct management of vasopressor support, long discussion with the respiratory therapist and the bedside nurse regarding the plan of care. Nic Hernandes DO 
[facility] 11/20/2020 
10:57 AM

## 2020-11-20 NOTE — PROGRESS NOTES
Hospitalist Progress Note Daily Progress Note: 11/20/2020 
 
62-year-old male sent here from [facility] with reports from staff of altered mental status, lethargy, not eating and dehydration. He has a history of atrial fibrillation in rvr on amio drip. Ua suggestive uti, zosyn initiated 11/15. Beck, cr 2.18, us rp pending. Subjective:  
Patient seen and evaluated at bedside, overnight events noted, patient currently intubated and sedated, currently on 1 pressor, discussed with RN at bedside. Problem List: 
Problem List as of 11/20/2020 Date Reviewed: 11/15/2020 Codes Class Noted - Resolved * (Principal) Sepsis ([facility] [address] ICD-10-CM: A41.9 ICD-9-CM: 038.9, 995.91  11/16/2020 - Present Hypoglycemia ICD-10-CM: E16.2 ICD-9-CM: 251.2  11/16/2020 - Present Encephalopathy acute ICD-10-CM: G93.40 ICD-9-CM: 348.30  11/16/2020 - Present UTI (urinary tract infection) ICD-10-CM: N39.0 ICD-9-CM: 599.0  11/15/2020 - Present BECK (acute kidney injury) ([facility] [address] ICD-10-CM: N17.9 ICD-9-CM: 584.9  11/15/2020 - Present AMS (altered mental status) ICD-10-CM: A66.23 
ICD-9-CM: 780.97  11/15/2020 - Present Chronic atrial fibrillation (HCC) ICD-10-CM: I48.20 ICD-9-CM: 427.31  8/12/2020 - Present HTN (hypertension), benign ICD-10-CM: I10 
ICD-9-CM: 401.1  8/12/2020 - Present COPD (chronic obstructive pulmonary disease) (HCC) ICD-10-CM: J44.9 ICD-9-CM: 322  8/12/2020 - Present Dyslipidemia ICD-10-CM: E78.5 ICD-9-CM: 272.4  8/12/2020 - Present Depression ICD-10-CM: F32.9 ICD-9-CM: 886  8/12/2020 - Present Cellulitis of left upper extremity ICD-10-CM: L03.114 
ICD-9-CM: 682.3  8/11/2020 - Present Medications reviewed Current Facility-Administered Medications Medication Dose Route Frequency  dexmedeTOMidine (PRECEDEX) 400 mcg in 0.9% sodium chloride 104 mL infusion  0.1-1.5 mcg/kg/hr IntraVENous TITRATE  fentaNYL (PF) 1,500 mcg/30 mL (50 mcg/mL) infusion  0-200 mcg/hr IntraVENous TITRATE  albumin human 25% (BUMINATE) solution 25 g  25 g IntraVENous Q6H  
 hydrocortisone Sod Succ (PF) (SOLU-CORTEF) injection 50 mg  50 mg IntraVENous Q6H  
 vasopressin (VASOSTRICT) 40 Units in 0.9% sodium chloride 40 mL infusion  0.04 Units/min IntraVENous CONTINUOUS  
 meropenem (MERREM) 1 g in sterile water (preservative free) 20 mL IV syringe  1 g IntraVENous Q12H  
 0.9% sodium chloride infusion 250 mL  250 mL IntraVENous PRN  
 metoprolol (LOPRESSOR) injection 2.5 mg  2.5 mg IntraVENous Q6H  
 sodium bicarbonate (8.4%) 150 mEq in dextrose 5% 1,000 mL infusion   IntraVENous CONTINUOUS  
 morphine injection 2 mg  2 mg IntraVENous Q4H PRN  pantoprazole (PROTONIX) 40 mg in 0.9% sodium chloride 10 mL injection  40 mg IntraVENous BID  calciTONIN (MIACALCIN) injection 370 Int'l Units  4 Int'l Units/kg SubCUTAneous Q12H  
 0.9% sodium chloride infusion 250 mL  250 mL IntraVENous PRN  
 NOREPINephrine (LEVOPHED) 8 mg in 5% dextrose 250mL (32 mcg/mL) infusion  0.5-16 mcg/min IntraVENous TITRATE  acyclovir (ZOVIRAX) 800 mg in 0.9% sodium chloride 250 mL IVPB  10 mg/kg (Ideal) IntraVENous Q12H  
 0.9% sodium chloride infusion 250 mL  250 mL IntraVENous PRN  
 acetaminophen (TYLENOL) suppository 650 mg  650 mg Rectal Q4H PRN  
 amiodarone (CORDARONE) 900 mg/250 ml D5W infusion  0.5-1 mg/min IntraVENous TITRATE  VANCOMYCIN INFORMATION NOTE 1 Each  1 Each Other Rx Dosing/Monitoring  acetaminophen (TYLENOL) tablet 650 mg  650 mg Oral Q4H PRN  
 albuterol (PROVENTIL HFA, VENTOLIN HFA, PROAIR HFA) inhaler 2 Puff  2 Puff Inhalation Q4H PRN  
 docusate sodium (COLACE) capsule 100 mg  100 mg Oral BID  folic acid (FOLVITE) tablet 1 mg  1 mg Oral DAILY  pravastatin (PRAVACHOL) tablet 20 mg  20 mg Oral QHS  sertraline (ZOLOFT) tablet 50 mg  50 mg Oral DAILY  thiamine mononitrate (B-1) tablet 100 mg  100 mg Oral DAILY  digoxin (LANOXIN) injection 125 mcg  125 mcg IntraVENous DAILY  ondansetron (ZOFRAN) injection 4 mg  4 mg IntraVENous Q6H PRN  
 hydrOXYzine pamoate (VISTARIL) capsule 25 mg  25 mg Oral TID PRN  
 risperiDONE (RisperDAL) tablet 2 mg  2 mg Oral QHS  chlorhexidine (PERIDEX) 0.12 % mouthwash 15 mL  15 mL Oral Q12H Review of Systems:  
Unobtainable 2/2 encephalopathic/acute illness Objective:  
Physical Exam:  
 
Visit Vitals BP (!) 125/49 (BP 1 Location: Right arm, BP Patient Position: At rest) Pulse (!) 57 Temp 99.5 °F (37.5 °C) Resp 25 Ht 6' 2\" (1.88 m) Comment: est; noted admit 3x months ago, ht 6' Wt 92.8 kg (204 lb 9.4 oz) SpO2 99% BMI 26.27 kg/m² O2 Flow Rate (L/min): 0.5 l/min O2 Device: Ventilator Temp (24hrs), Av.3 °F (37.4 °C), Min:98.4 °F (36.9 °C), Max:100.2 °F (37.9 °C) No intake/output data recorded.  1901 -  0700 In: 4241.7 [I.V.:3305.7] Out: 1013 Nemesio Marcial [[id number]] Constitutional: He appears well-developed. Obtunded Head: Normocephalic and atraumatic. Eyes: Pupils are equal, round, and reactive to light. Mouth: oral mucosa dry Neck: No thyromegaly present, significant neck stiffness on examination Cardiovascular:Irreg/irreg Lungs:Effort normal with fair ae. No wheeze, not labored Abdominal: Soft. can't tell if tender, moans if touch him anywhere including extremities Musculoskeletal:  
   Comments: Generalized weakness Neurological: obtunded, moves all ext, 
Skin: Right lower shin with ulcer, yellow drainage Sacrum reddened, no breakdown Left arm with skin tear Multiple ecchymotic, skin is friable/thin Data Review:  
   
Recent Days: 
Recent Labs 20 
1820 20 
0615 11/20 
0530 20 WBC  --  13.1*  --   --  15.8* 14.9* HGB 8.5* 8.9* 8.9*   < > 9.3* 9.1*  
HCT 28.1* 28.7* 29.0*   < > 30.1* 29.5* PLT  --  58*  --   --  90* 92*  
 < > = values in this interval not displayed.   
 
Recent Labs  
  11/20/20 
0500 11/19/20 
1045 11/19/20 
0615 11/19/20 
0400 11/18/20 
2130 11/18/20 
0530  11/17/20 
2130   144  --   --  148* 149* 153*  --  154* K 4.6  4.6  --   --  3.4* 3.6 3.1*  --  3.3*  
*  116*  --   --  124* 127* 125*  --  126* CO2 19*  19*  --   --  15* 16* 17*  --  16* *  158*  --   --  125* 113* 114*  --  75 BUN 26*  25*  --   --  24* 26* 32*  --  36* CREA 2.09*  2.03*  --   --  1.90* 1.95* 2.04*  --  1.97* CA 8.6  8.6  --  10.0 9.8 9.9 10.4*  --  10.7* MG 1.5*  --   --   --   --   --   --  1.9 PHOS 3.4  3.3  --   --  1.5*  --   --   --   --   
ALB 2.7*  2.7*  2.7*  --   --  1.4* 1.4* 1.6*   < >  --   
TBILI 1.4*  1.4*  --   --   --  0.6 0.6  --   --   
ALT 61  62  --   --   --  12 13  --   --   
INR  --  2.0*  --   --   --   --   --  1.9*  
 < > = values in this interval not displayed. Recent Labs  
  11/20/20 
0515 11/19/20 
1820 11/19/20 
1520 PH 7.422 7.229* 7.207* PCO2 23* 30* 36  
PO2 93 100 106* HCO3 17* 13* 14* FIO2 21.0 28.0 28.0  
 
 
24 Hour Results: 
Recent Results (from the past 24 hour(s)) GLUCOSE, POC Collection Time: 11/19/20  9:34 AM  
Result Value Ref Range Glucose (POC) 127 (H) 65 - 100 mg/dL Performed by Jeancarlos Black LACTIC ACID Collection Time: 11/19/20 10:45 AM  
Result Value Ref Range Lactic acid 3.8 (HH) 0.4 - 2.0 mmol/L PROTHROMBIN TIME + INR Collection Time: 11/19/20 10:45 AM  
Result Value Ref Range Prothrombin time 22.6 (H) 11.9 - 14.7 sec INR 2.0 (H) 0.9 - 1.1 PTT Collection Time: 11/19/20 10:45 AM  
Result Value Ref Range aPTT 65.2 (H) 23.0 - 35.7 sec  
 aPTT, therapeutic range   68 - 109 sec FIBRINOGEN Collection Time: 11/19/20 10:45 AM  
Result Value Ref Range Fibrinogen 529 220 - 535 mg/dL AMMONIA Collection Time: 11/19/20 10:45 AM  
Result Value Ref Range Ammonia 11 <32 umol/L  
TSH 3RD GENERATION  Collection Time: 11/19/20 10:45 AM  
Result Value Ref Range TSH 4.33 (H) 0.36 - 3.74 uIU/mL GLUCOSE, POC Collection Time: 11/19/20 11:43 AM  
Result Value Ref Range Glucose (POC) 139 (H) 65 - 100 mg/dL Performed by Imani MALLORY, ALLOCATE Collection Time: 11/19/20 12:30 PM  
Result Value Ref Range Unit number [id number] Blood component type FP 24H,Thaw Unit division 00 Status of unit Issued TRANSFUSION STATUS Ok to transfuse Unit number [id number] Blood component type FP 24H,Thaw Unit division 00 Status of unit Issued TRANSFUSION STATUS Ok to transfuse BLOOD GAS, ARTERIAL Collection Time: 11/19/20  3:20 PM  
Result Value Ref Range pH 7.207 (LL) 7.35 - 7.45    
 PCO2 36 35 - 45 mmHg PO2 106 (H) 75 - 100 mmHg O2 SAT 99 >95 % BICARBONATE 14 (L) 22 - 26 mmol/L  
 BASE DEFICIT 12.7 (H) 0 - 2 mmol/L  
 O2 METHOD VENT    
 FIO2 28.0 % MODE AssistControl/Pressure Control Tidal volume 500 SET RATE 18    
 EPAP/CPAP/PEEP 5.0    
 SITE Right Radial    
 DAMON'S TEST PASS Critical value read back CALLED [facility]   
LACTIC ACID Collection Time: 11/19/20  5:30 PM  
Result Value Ref Range Lactic acid 6.1 (HH) 0.4 - 2.0 mmol/L  
BLOOD GAS, ARTERIAL Collection Time: 11/19/20  6:20 PM  
Result Value Ref Range pH 7.229 (LL) 7.35 - 7.45    
 PCO2 30 (L) 35 - 45 mmHg PO2 100 75 - 100 mmHg O2  >95 % BICARBONATE 13 (L) 22 - 26 mmol/L  
 BASE DEFICIT 13.7 (H) 0 - 2 mmol/L  
 O2 METHOD VENT    
 FIO2 28.0 % MODE Assist Control/Volume Control Tidal volume 500 SET RATE 30    
 EPAP/CPAP/PEEP 5.0    
 SITE Right Radial    
 DAMON'S TEST PASS Critical value read back Mariam Horne RN CALLED WITH READ BACK HGB & HCT Collection Time: 11/19/20  6:20 PM  
Result Value Ref Range HGB 8.5 (L) 12.1 - 17.0 g/dL HCT 28.1 (L) 36.6 - 50.3 % GLUCOSE, POC Collection Time: 11/19/20  8:18 PM  
Result Value Ref Range  Glucose (POC) 160 (H) 65 - 100 mg/dL Performed by Marlene Garsia, POC Collection Time: 11/20/20 12:37 AM  
Result Value Ref Range Glucose (POC) 190 (H) 65 - 100 mg/dL Performed by Kartela METABOLIC PANEL, COMPREHENSIVE Collection Time: 11/20/20  5:00 AM  
Result Value Ref Range Sodium 144 136 - 145 mmol/L Potassium 4.6 3.5 - 5.1 mmol/L Chloride 116 (H) 97 - 108 mmol/L  
 CO2 19 (L) 21 - 32 mmol/L Anion gap 9 5 - 15 mmol/L Glucose 162 (H) 65 - 100 mg/dL BUN 26 (H) 6 - 20 mg/dL Creatinine 2.09 (H) 0.70 - 1.30 mg/dL BUN/Creatinine ratio 12 12 - 20 GFR est AA 38 (L) >60 ml/min/1.73m2 GFR est non-AA 31 (L) >60 ml/min/1.73m2 Calcium 8.6 8.5 - 10.1 mg/dL Bilirubin, total 1.4 (H) 0.2 - 1.0 mg/dL AST (SGOT) 373 (H) 15 - 37 U/L  
 ALT (SGPT) 62 12 - 78 U/L Alk. phosphatase 104 45 - 117 U/L Protein, total 6.5 6.4 - 8.2 g/dL Albumin 2.7 (L) 3.5 - 5.0 g/dL Globulin 3.8 2.0 - 4.0 g/dL A-G Ratio 0.7 (L) 1.1 - 2.2 LACTIC ACID Collection Time: 11/20/20  5:00 AM  
Result Value Ref Range Lactic acid 6.3 (HH) 0.4 - 2.0 mmol/L MAGNESIUM Collection Time: 11/20/20  5:00 AM  
Result Value Ref Range Magnesium 1.5 (L) 1.6 - 2.4 mg/dL Abhishek Hemp Collection Time: 11/20/20  5:00 AM  
Result Value Ref Range Vancomycin,trough 19.4 (H) 5.0 - 10.0 ug/mL Reported dose date Not provided Reported dose: Not provided Units RENAL FUNCTION PANEL Collection Time: 11/20/20  5:00 AM  
Result Value Ref Range Sodium 144 136 - 145 mmol/L Potassium 4.6 3.5 - 5.1 mmol/L Chloride 116 (H) 97 - 108 mmol/L  
 CO2 19 (L) 21 - 32 mmol/L Anion gap 9 5 - 15 mmol/L Glucose 158 (H) 65 - 100 mg/dL BUN 25 (H) 6 - 20 mg/dL Creatinine 2.03 (H) 0.70 - 1.30 mg/dL BUN/Creatinine ratio 12 12 - 20 GFR est AA 39 (L) >60 ml/min/1.73m2 GFR est non-AA 32 (L) >60 ml/min/1.73m2 Calcium 8.6 8.5 - 10.1 mg/dL  Phosphorus 3.3 2.6 - 4.7 mg/dL Albumin 2.7 (L) 3.5 - 5.0 g/dL C REACTIVE PROTEIN, QT Collection Time: 11/20/20  5:00 AM  
Result Value Ref Range C-Reactive protein 20.10 (H) 0.00 - 0.60 mg/dL PROCALCITONIN Collection Time: 11/20/20  5:00 AM  
Result Value Ref Range Procalcitonin 6.39 (H) 0 ng/mL HEPATIC FUNCTION PANEL Collection Time: 11/20/20  5:00 AM  
Result Value Ref Range Protein, total 6.5 6.4 - 8.2 g/dL Albumin 2.7 (L) 3.5 - 5.0 g/dL Globulin 3.8 2.0 - 4.0 g/dL A-G Ratio 0.7 (L) 1.1 - 2.2 Bilirubin, total 1.4 (H) 0.2 - 1.0 mg/dL Bilirubin, direct 0.8 (H) 0.0 - 0.2 mg/dL Alk. phosphatase 105 45 - 117 U/L  
 AST (SGOT) 381 (H) 15 - 37 U/L  
 ALT (SGPT) 61 12 - 78 U/L  
PHOSPHORUS Collection Time: 11/20/20  5:00 AM  
Result Value Ref Range Phosphorus 3.4 2.6 - 4.7 mg/dL BLOOD GAS, ARTERIAL Collection Time: 11/20/20  5:15 AM  
Result Value Ref Range pH 7.422 7.35 - 7.45    
 PCO2 23 (L) 35 - 45 mmHg PO2 93 75 - 100 mmHg O2 SAT 99 >95 % BICARBONATE 17 (L) 22 - 26 mmol/L  
 BASE DEFICIT 8.8 (H) 0 - 2 mmol/L  
 O2 METHOD VENT    
 FIO2 21.0 % MODE Assist Control/Volume Control Tidal volume 550 SET RATE 30    
 EPAP/CPAP/PEEP 5.0    
 SITE Right Radial    
 DAMON'S TEST PASS    
GLUCOSE, POC Collection Time: 11/20/20  6:24 AM  
Result Value Ref Range Glucose (POC) 195 (H) 65 - 100 mg/dL Performed by Ines Ruiz Assessment/  
 
Patient Active Problem List  
Diagnosis Code  Cellulitis of left upper extremity L03.114  
 Chronic atrial fibrillation (HCC) I48.20  
 HTN (hypertension), benign I10  
 COPD (chronic obstructive pulmonary disease) (HCC) J44.9  Dyslipidemia E78.5  Depression F32.9  
 UTI (urinary tract infection) N39.0  BRE (acute kidney injury) ([facility] [address] N17.9  AMS (altered mental status) R41.82  Sepsis ([facility] A41.9  Hypoglycemia E16.2  
 Encephalopathy acute G93.40 Plan: 
 
Septic shockpatient presented with above-mentioned symptomatology was found to meet severe sepsis criteria secondary to combination of urinary tract infection as well as developing bilateral pneumonia, patient is COVID-19 negative, 
follow-up blood cultures Follow-up urine cultures Follow-up inflammatory markers Continue vancomycin/Meropenem/Acyclovir for broad spectrum coverage Levophed for pressor support Follow up infectious disease consult further evaluation Critical care recommendations appreciated will continue to follow Acute respiratory failure/pneumoniapatient was found to be in acute respiratory failure requiring emergent endotracheal intubation, likely secondary to developing bilateral pneumonia, patient currently still requiring 1 pressor Follow-up sputum culture Follow-up blood cultures Continue vancomycin and meropenem for broad-spectrum antibiotic coverage Pulmonology consult appreciated, will continue to follow Infectious disease recommendations appreciated, will continue to follow Hypoactive bowel soundspatient found to have hypoactive bowel sounds with elevation of lactic acid concern for possible ongoing intra-abdominal pathology Obtain CT abdomen pelvis without contrast stat for further evaluation Continue to monitor Atrial fibrillation with RVRpatient presented with atrial fibrillation with RVR likely secondary to ongoing severe sepsis, will recommend discontinuation of beta-blockers Continue digoxin at this time Transthoracic echo shows preserved ejection fraction Cardiology consult appreciated, will continue to follow Anemialikely multifactorial, however concern for GI bleed, s/p 2 uprbc with appropriate response, currently hemoglobin hematocrit remained stable Continue protonix 40mg iv bid Continue to trend H/H Maintain active type and screen Follow-up gastroenterology recommendations Follow-up hematology recommendations Hypokalemiaresolved Hyperlipidemiacontinue statin ProphylaxisSCDs FENcontinue tube feeding, maintenance IV fluids, replete potassium and magnesium Full code by default,  
surrogate decision-maker is patient's wife, discussed in detail Critical care time spent 60 minutes involving direct patient care reviewing patient's labs and coordination of care with nursing staff Care Plan discussed with: Patient/Family and Nurse Praneeth Guillen MD

## 2020-11-20 NOTE — PROGRESS NOTES
Nephrology Consult Patient: Hu Gomes MRN: [id number]  SSN: [id number] YOB: 1945  Age: 67 y.o. Sex: male Subjective: The patient is seen in ICU Got intubated On Vent with FIO2 30% Noticed swelling of ext Cr 2.0 Past Medical History:  
Diagnosis Date  Arthritis  Atrial fibrillation ([facility]  COPD (chronic obstructive pulmonary disease) ([facility] [address] 8/12/2020  High cholesterol  History of vascular access device 08/12/2020 Rt Basilic 5fr dual PICC at 42cm arm cir 34.5cm  Hypertension Past Surgical History:  
Procedure Laterality Date  COLONOSCOPY N/A 9/20/2018 COLONOSCOPY performed by Valery Rojo MD at [address] HX COLONOSCOPY    
 HX HIP REPLACEMENT Bilateral   
 HX TONSILLECTOMY  IR INSERT NON TUNL CVC OVER 5 YRS  11/17/2020 Family History Problem Relation Age of Onset  No Known Problems Mother  No Known Problems Father  No Known Problems Sister  No Known Problems Brother  No Known Problems Maternal Grandmother  No Known Problems Maternal Grandfather  No Known Problems Paternal Grandmother  No Known Problems Paternal Grandfather  No Known Problems Other  Stroke Neg Hx Social History Tobacco Use  Smoking status: Unknown If Ever Smoked  Smokeless tobacco: Never Used Substance Use Topics  Alcohol use: Never Alcohol/week: 8.0 standard drinks Types: 8 Shots of liquor per week Frequency: Never Current Facility-Administered Medications Medication Dose Route Frequency Provider Last Rate Last Dose  [START ON 11/21/2020] VANCOMYCIN RANDOM LAB REMINDER   Other Matteo Negron MD      
 hydrocortisone Sod Succ (PF) (SOLU-CORTEF) injection 50 mg  50 mg IntraVENous Q8H Curry Warner DO      
 dexmedeTOMidine (PRECEDEX) 400 mcg in 0.9% sodium chloride 104 mL infusion  0.1-1.5 mcg/kg/hr IntraVENous TITRATE Akosua Leyva MD   Stopped at 11/20/20 0100  
 fentaNYL (PF) 1,500 mcg/30 mL (50 mcg/mL) infusion  0-200 mcg/hr IntraVENous TITRATE Natividad Johnor, DO 1.4 mL/hr at 11/20/20 1026 70 mcg/hr at 11/20/20 1026  
 albumin human 25% (BUMINATE) solution 25 g  25 g IntraVENous Q6H MurHenry mendozan, DO   25 g at 11/20/20 1154  vasopressin (VASOSTRICT) 40 Units in 0.9% sodium chloride 40 mL infusion  0.04 Units/min IntraVENous CONTINUOUS Curry Warner, DO 2.4 mL/hr at 11/19/20 2033 0.04 Units/min at 11/19/20 2033  meropenem (MERREM) 1 g in sterile water (preservative free) 20 mL IV syringe  1 g IntraVENous Q12H Anthony Mendoza MD   1 g at 11/20/20 1154  
 0.9% sodium chloride infusion 250 mL  250 mL IntraVENous PRN Lynn Bhatia MD      
 sodium bicarbonate (8.4%) 150 mEq in dextrose 5% 1,000 mL infusion   IntraVENous CONTINUOUS Curry Warner,  mL/hr at 11/20/20 0910  morphine injection 2 mg  2 mg IntraVENous Q4H PRN Amber Sherman MD   2 mg at 11/19/20 7160  pantoprazole (PROTONIX) 40 mg in 0.9% sodium chloride 10 mL injection  40 mg IntraVENous BID Amber Sherman MD   40 mg at 11/20/20 6285  calciTONIN (MIACALCIN) injection 370 Int'l Units  4 Int'l Units/kg SubCUTAneous Q12H Pa Mcconnell MD   370 Int'l Units at 11/20/20 0916  
 0.9% sodium chloride infusion 250 mL  250 mL IntraVENous PRN Joseph Leyva MD      
 NOREPINephrine (LEVOPHED) 8 mg in 5% dextrose 250mL (32 mcg/mL) infusion  0.5-16 mcg/min IntraVENous TITRATE Amber Sherman MD   Stopped at 11/19/20 2106  acyclovir (ZOVIRAX) 800 mg in 0.9% sodium chloride 250 mL IVPB  10 mg/kg (Ideal) IntraVENous Q12H Naz Leyva MD   800 mg at 11/20/20 0435  
 0.9% sodium chloride infusion 250 mL  250 mL IntraVENous PRN Joseph Leyva MD      
 acetaminophen (TYLENOL) suppository 650 mg  650 mg Rectal Q4H PRN Abdiaziz PONCE MD   650 mg at 11/16/20 1225  
 amiodarone (CORDARONE) 900 mg/250 ml D5W infusion  0.5-1 mg/min IntraVENous TITRATE Gregory Jones MD 16.7 mL/hr at 11/16/20 1736 1 mg/min at 11/16/20 1736  VANCOMYCIN INFORMATION NOTE 1 Each  1 Each Other Rx Dosing/Monitoring Carlos Paige MD      
 acetaminophen (TYLENOL) tablet 650 mg  650 mg Oral Q4H PRN [facility] Rom ROSARIO, NP   650 mg at 11/19/20 0027  
 albuterol (PROVENTIL HFA, VENTOLIN HFA, PROAIR HFA) inhaler 2 Puff  2 Puff Inhalation Q4H PRN [facility] Rom ROSARIO, NP      
 docusate sodium (COLACE) capsule 100 mg  100 mg Oral BID [facility] Rom ROSARIO, NP   Stopped at 92/79/96 7454  
 folic acid (FOLVITE) tablet 1 mg  1 mg Oral DAILY [facility] Rom ROSARIO, NP   Stopped at 11/16/20 0900  pravastatin (PRAVACHOL) tablet 20 mg  20 mg Oral QHS [facility] Rom ROSARIO, ALANNA      
 sertraline (ZOLOFT) tablet 50 mg  50 mg Oral DAILY [facility] Rom ROSARIO, NP   Stopped at 11/17/20 0900  thiamine mononitrate (B-1) tablet 100 mg  100 mg Oral DAILY [facility] Rom ROSARIO, ALANNA   Stopped at 11/16/20 0900  
 ondansetron (ZOFRAN) injection 4 mg  4 mg IntraVENous Q6H PRN [facility] Rom ROSARIO, NP      
 hydrOXYzine pamoate (VISTARIL) capsule 25 mg  25 mg Oral TID PRN [facility] Rom ROSARIO, NP   25 mg at 11/15/20 2217  risperiDONE (RisperDAL) tablet 2 mg  2 mg Oral QHS [facility] Rom ROSARIO, NP   2 mg at 11/19/20 2105  chlorhexidine (PERIDEX) 0.12 % mouthwash 15 mL  15 mL Oral Q12H [facility] Jan A, NP   15 mL at 11/20/20 4879 Allergies Allergen Reactions  Codeine Rash Review of Systems: 
Unable to obtain Objective:  
 
Vitals:  
 11/20/20 0900 11/20/20 0921 11/20/20 1000 11/20/20 1100 BP: (!) 128/56 101/61 (!) 112/57 108/64 Pulse: (!) 59  72 69 Resp: 30  26 26 Temp:      
SpO2: 100%  96% 98% Weight:  92.8 kg (204 lb 9.4 oz) Height:  6' 2.02\" (1.88 m) Physical Exam: 
General: sedated Eyes: sclera anicteric Oral Cavity: ET in place Neck: no JVD Chest: on vent Heart: normal sounds Abdomen: soft and non tender :  vargas Lower Extremities: no edema Skin: no rash Neuro: sedated Psychiatric: non-depressed Assessment:  
 
Hospital Problems  Date Reviewed: 11/15/2020 Codes Class Noted POA * (Principal) Sepsis ([facility] ICD-10-CM: A41.9 ICD-9-CM: 038.9, 995.91  11/16/2020 Yes Hypoglycemia ICD-10-CM: E16.2 ICD-9-CM: 251.2  11/16/2020 Yes Encephalopathy acute ICD-10-CM: G93.40 ICD-9-CM: 348.30  11/16/2020 Yes  
   
 UTI (urinary tract infection) ICD-10-CM: N39.0 ICD-9-CM: 599.0  11/15/2020 Unknown BRE (acute kidney injury) ([facility] ICD-10-CM: N17.9 ICD-9-CM: 584.9  11/15/2020 Unknown AMS (altered mental status) ICD-10-CM: E49.12 
ICD-9-CM: 780.97  11/15/2020 Unknown Plan: #1 severe hypercalcemia. -Etiology could be multifactorial including volume contraction/rule out primary hyperparathyroidism and multiple myeloma.   
-On admission corrected calcium was 14.6. -It has improved down to 9.6. 
-He is currently not on any calcium and vitamin D supplements.  
-ordered calcitonin 4 units/kg twice a day for 4 doses. -I will keep IV fluids (noticed swelling of ext). -pending intact PTH level and serum free light chain ratio. 2.  Acute kidney injury.   
-Likely prerenal from volume depletion and sepsis.   
-On admission creatinine was 2.0.   
-Unknown baseline creatinine.   
-Clinically volume down.   
-On single pressor. -UA is consistent with UTI. - I will keep him on IV fluids.   
-He is currently not on any potential nephrotoxins. 3.  Severe hypokalemia.   
-From low p.o. intake and IV fluids.   
-resolved, K 4.6  
-iv KCL 40 meq 4. Hypernatremia.   
-From free water deficit.   
-Sodium is down 153-->144.   
-was on hypotonic IV fluids.   
-I will put on water flushes via NG  
 
5.   Altered mental status.   
-Etiology could be sepsis and metabolic encephalopathy.   
-got intubated this am 
-He will need correction of his electrolytes including sodium and calcium.   
-On IV antibiotics. On IV fluids. 6.  Severe sepsis.   
-Possibly from UTI. -ID is on board. On single pressor. On IV antibiotics. 7.  Thrombocytopenia. Platelet count is 90. Heam is on board. Signed By: Leila Lew MD   
 November 20, 2020

## 2020-11-20 NOTE — PROGRESS NOTES
Hematology Oncology Progress Note Interval History :  
Patients condition worsened overninght and required intubation. Remains unresponse. RN reports some blood and clots when intubated. Some bleeding noted even with suctioning. No akbar blood in urine . Coffee groungs in NG tube. Wife is at bedside and reports that he was discharged from [address] [place] [address] last week when he had hypercalcemia and UTI and got zometa. He had ESBL e.coli  uti. CT scan there showed some renal mass and was recommended outpatient work up with urology. 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 Subjective: He remains unresponsive  On pressors and intubated. Current Facility-Administered Medications Medication Dose Route Frequency  dexmedeTOMidine (PRECEDEX) 400 mcg in 0.9% sodium chloride 104 mL infusion  0.1-1.5 mcg/kg/hr IntraVENous TITRATE  fentaNYL (PF) 1,500 mcg/30 mL (50 mcg/mL) infusion  0-200 mcg/hr IntraVENous TITRATE  albumin human 25% (BUMINATE) solution 25 g  25 g IntraVENous Q6H  
 hydrocortisone Sod Succ (PF) (SOLU-CORTEF) injection 50 mg  50 mg IntraVENous Q6H  
 vasopressin (VASOSTRICT) 40 Units in 0.9% sodium chloride 40 mL infusion  0.04 Units/min IntraVENous CONTINUOUS  
 [START ON 11/20/2020] VANCOMYCIN INFORMATION NOTE   Other ONCE  
 meropenem (MERREM) 1 g in sterile water (preservative free) 20 mL IV syringe  1 g IntraVENous Q12H  
 0.9% sodium chloride infusion 250 mL  250 mL IntraVENous PRN  
 metoprolol (LOPRESSOR) injection 2.5 mg  2.5 mg IntraVENous Q6H  
 sodium bicarbonate (8.4%) 150 mEq in dextrose 5% 1,000 mL infusion   IntraVENous CONTINUOUS  
 morphine injection 2 mg  2 mg IntraVENous Q4H PRN  pantoprazole (PROTONIX) 40 mg in 0.9% sodium chloride 10 mL injection  40 mg IntraVENous BID  calciTONIN (MIACALCIN) injection 370 Int'l Units  4 Int'l Units/kg SubCUTAneous Q12H  
 0.9% sodium chloride infusion 250 mL  250 mL IntraVENous PRN  
 NOREPINephrine (LEVOPHED) 8 mg in 5% dextrose 250mL (32 mcg/mL) infusion  0.5-16 mcg/min IntraVENous TITRATE  acyclovir (ZOVIRAX) 800 mg in 0.9% sodium chloride 250 mL IVPB  10 mg/kg (Ideal) IntraVENous Q12H  
 0.9% sodium chloride infusion 250 mL  250 mL IntraVENous PRN  
 acetaminophen (TYLENOL) suppository 650 mg  650 mg Rectal Q4H PRN  
 amiodarone (CORDARONE) 900 mg/250 ml D5W infusion  0.5-1 mg/min IntraVENous TITRATE  VANCOMYCIN INFORMATION NOTE 1 Each  1 Each Other Rx Dosing/Monitoring  acetaminophen (TYLENOL) tablet 650 mg  650 mg Oral Q4H PRN  
 albuterol (PROVENTIL HFA, VENTOLIN HFA, PROAIR HFA) inhaler 2 Puff  2 Puff Inhalation Q4H PRN  
 docusate sodium (COLACE) capsule 100 mg  100 mg Oral BID  folic acid (FOLVITE) tablet 1 mg  1 mg Oral DAILY  pravastatin (PRAVACHOL) tablet 20 mg  20 mg Oral QHS  sertraline (ZOLOFT) tablet 50 mg  50 mg Oral DAILY  thiamine mononitrate (B-1) tablet 100 mg  100 mg Oral DAILY  digoxin (LANOXIN) injection 125 mcg  125 mcg IntraVENous DAILY  ondansetron (ZOFRAN) injection 4 mg  4 mg IntraVENous Q6H PRN  
 hydrOXYzine pamoate (VISTARIL) capsule 25 mg  25 mg Oral TID PRN  
 risperiDONE (RisperDAL) tablet 2 mg  2 mg Oral QHS  chlorhexidine (PERIDEX) 0.12 % mouthwash 15 mL  15 mL Oral Q12H Review of Systems: not obtainable due to AMS. Objective:  
 
Patient Vitals for the past 8 hrs: 
 BP Temp Pulse Resp SpO2  
11/19/20 1900 (!) 135/55 98.4 °F (36.9 °C) 70 30 100 % 11/19/20 1830 (!) 143/57      
11/19/20 1800 (!) 136/53  70 30 100 % 11/19/20 1730 (!) 139/53      
11/19/20 1715 (!) 134/50 99.3 °F (37.4 °C) 65 30 100 % 11/19/20 1700 (!) 131/47 99.2 °F (37.3 °C) 63 30 100 % 11/19/20 1623 (!) 120/44 99.2 °F (37.3 °C) 60 30 100 % 11/19/20 1600 (!) 121/45  (!) 59 30 100 % 11/19/20 1530 (!) 98/37      
11/19/20 1500 (!) 124/49 100 °F (37.8 °C) 68 22 100 %  
20 1430 (!) 85/42      
20 1415 (!) 100/44      
20 1400 (!) 107/45  96 23 98 % 20 1358   (!) 102 25 98 % 20 1340 (!) 114/52 99.6 °F (37.6 °C) (!) 103 25 98 % 20 1300 (!) 67/19  94 23 98 % Temp (24hrs), Av.1 °F (37.3 °C), Min:98.3 °F (36.8 °C), Max:100 °F (37.8 °C) Physical Exam: 
constitutional Elderly white male , with altered mental status, Well nourished. Well developed. Head Normocephalic; no scars Eyes Conjunctivae and sclerae are clear and without icterus. Pupils are reactive and equal.  
ENMT ET tube is present. .  
Neck Supple without masses or thyromegaly. No jugular venous distension. Hematologic/Lymphatic No petechiae or purpura. No tender or palpable lymph nodes in the cervical, supraclavicular, axillary or inguinal area. Respiratory Lungs are clear to auscultation without rhonchi or wheezing. Cardiovascular Regular rate and rhythm of heart without murmurs, gallops or rubs. Chest / Line Site Chest is symmetric with no chest wall deformities. Abdomen Non-tender, non-distended, no masses, ascites or hepatosplenomegaly. Good bowel sounds. No guarding or rebound tenderness. No pulsatile masses. Musculoskeletal No tenderness or swelling, normal range of motion without obvious weakness. Extremities No visible deformities, no cyanosis, clubbing or edema. Skin No rashes, scars, or lesions suggestive of malignancy. No petechiae, purpura, or ecchymoses. No excoriations. Neurologic Altered mental status. Edmundo Granda AMS>  
 
 
 
 
Lab/Data Review: 
Recent Labs 20 
1820 20 
0615 20 
0530 20 WBC  --  13.1*  --   --  15.8* 14.9* HGB 8.5* 8.9* 8.9*   < > 9.3* 9.1*  
HCT 28.1* 28.7* 29.0*   < > 30.1* 29.5* PLT  --  58*  --   --  90* 92*  
 < > = values in this interval not displayed. Recent Labs   20 
1045 20 
0400 20 
2130 20 0530 11/17/20 
2130 NA  --  148* 149* 153* 154* K  --  3.4* 3.6 3.1* 3.3*  
CL  --  124* 127* 125* 126* CO2  --  15* 16* 17* 16* GLU  --  125* 113* 114* 75 BUN  --  24* 26* 32* 36* CREA  --  1.90* 1.95* 2.04* 1.97* CA  --  9.8 9.9 10.4* 10.7* MG  --   --   --   --  1.9 PHOS  --  1.5*  --   --   --   
ALB  --  1.4* 1.4* 1.6*  --   
TBILI  --   --  0.6 0.6  --   
ALT  --   --  12 13  --   
INR 2.0*  --   --   --  1.9* Recent Labs 11/19/20 
1820 11/19/20 
1520 PH 7.229* 7.207* PCO2 30* 36 PO2 100 106* HCO3 13* 14* FIO2 28.0 28.0 Radiology:  
Ct Head Wo Cont Result Date: 11/15/2020 IMPRESSION: Chronic findings as above. Us Retroperitoneum Comp Result Date: 11/17/2020 Impression: Normal exam  
 
Ir Us Guided Vascular Access Result Date: 11/17/2020 Impression: Successful placement of a triple-lumen central venous catheter. The catheter is ready for use. Xr Chest [facility] Result Date: 11/17/2020 Impression: Successful placement of a triple-lumen central venous catheter. The catheter is ready for use. Xr Chest [facility] Result Date: 11/17/2020 Impression: The cardiomediastinal silhouette is appropriate for age, technique, and lung expansion. Pulmonary vasculature is not congested. The lungs are essentially clear. No effusion or pneumothorax is seen. Xr Chest [facility] Result Date: 11/15/2020 IMPRESSION:  No evidence of an acute cardiopulmonary process. Ir Insert Non Tunl Cvc Over 5 Yrs Result Date: 11/17/2020 Impression: Successful placement of a triple-lumen central venous catheter. The catheter is ready for use. Assessment /Plan:  
 
Principal Problem: 
  Sepsis ([facility] (11/16/2020) Active Problems: 
  UTI (urinary tract infection) (11/15/2020) BRE (acute kidney injury) ([facility] (11/15/2020) AMS (altered mental status) (11/15/2020) Hypoglycemia (11/16/2020) Encephalopathy acute (11/16/2020) 1) 67 yr old male admitted with AMS. Change in mental status likely due to UTI, sepsis, hypercalcemia , hypernatremia and renal failure.  
-ct head did not show any acute pathology.  
-Being eval by neurology. Likely metabolic encephalapathy from sepsis. 2) ESBL E.coli  UTI. On abx. Id seeing the patient.  
  
2) Severe anemia: This is normocytic anemia. Both have decreased since admission. Likely related to sepsis. -In the setting hypercalcemia and renal fialure need to r/o Multiple myeloma. -check labs for myeloma.  
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  
-check IRON studies, b12 and folate levels. LDH ,r gonzales and haptoglobin- pending. _Seen by GI for bleeding.  
  
Received 2 utnis of transfusion with good response.  
-transfuse for hb <7.5gm/dl. 2) Thrombocytopenia: platelets dropped to 58k today. -Transfuse for platelet count <43V or for bleeding. REvewied peripheral blood smear shows neutrophils with increased granulation, bands suggestive of infection. RBC appear normal. NO evidence of increased schistocytes . Platelets decreased. No abnormal cells seen. 4) Coagulapathy: Prolonged PT/PTT. Fibrinogen is normal.  
-likely early DIC . -Recommend to transfuse 2 unit of FFP  
-vitamin K 10mg po daily.   
3) Hypercalcemia: etiology not very clear. High on admission close to 14 Improved with hydration and calcitonin. Normal now. .  
-check for myeloma.  
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  
-check ct scan of the chest abdomen and pelvis without contrat to eval for any solid tumor leison /bone mets. - unstable to go for ct scans. -psa is normal . 
-check cea and ca 19-9/  
  
D/w wife at bedside.  
  
  
Signed By: Oleg Mcgovern MD   
  November 18, 2020 Oleg Mcgovern MD 
11/19/2020

## 2020-11-20 NOTE — PROGRESS NOTES
Progress Note Patient: Ligia Slater MRN: [id number]  SSN: [id number] YOB: 1945  Age: 67 y.o. Sex: male Admit Date: 11/15/2020 LOS: 5 days Subjective:  
patient exam today, intubated NG tube has no coffee-ground, Not to start tube feeding yet, Past Medical History:  
Diagnosis Date  Arthritis  [facility]  COPD (chronic obstructive pulmonary disease) ([facility] 8/12/2020  High cholesterol  History of vascular access device 08/12/2020 Rt Basilic 5fr dual PICC at 42cm arm cir 34.5cm  Hypertension Current Facility-Administered Medications:  
  [START ON 11/21/2020] VANCOMYCIN RANDOM LAB REMINDER, , Other, ONCE, Delilah Tavera MD 
  hydrocortisone Sod Succ (PF) (SOLU-CORTEF) injection 50 mg, 50 mg, IntraVENous, Q8H, Curry Warner, DO, 50 mg at 11/20/20 1534 
  0.45% sodium chloride infusion, 100 mL/hr, IntraVENous, CONTINUOUS, Kandice Easton MD 
  dexmedeTOMidine (PRECEDEX) 400 mcg in 0.9% sodium chloride 104 mL infusion, 0.1-1.5 mcg/kg/hr, IntraVENous, TITRATE, Naz Leyva MD, Stopped at 11/20/20 0100 
  fentaNYL (PF) 1,500 mcg/30 mL (50 mcg/mL) infusion, 0-200 mcg/hr, IntraVENous, TITRATE, Curry Warner DO, Last Rate: 1.4 mL/hr at 11/20/20 1026, 70 mcg/hr at 11/20/20 1026 
  albumin human 25% (BUMINATE) solution 25 g, 25 g, IntraVENous, Q6H, Curry Warner, DO, 25 g at 11/20/20 1154   vasopressin (VASOSTRICT) 40 Units in 0.9% sodium chloride 40 mL infusion, 0.04 Units/min, IntraVENous, CONTINUOUS, Curry Warner DO, Last Rate: 2.4 mL/hr at 11/19/20 2033, 0.04 Units/min at 11/19/20 2033   meropenem (MERREM) 1 g in sterile water (preservative free) 20 mL IV syringe, 1 g, IntraVENous, Q12H, Danae Robledo MD, 1 g at 11/20/20 1154 
  0.9% sodium chloride infusion 250 mL, 250 mL, IntraVENous, PRN, Nalluri, Elvin Salcido MD 
  morphine injection 2 mg, 2 mg, IntraVENous, Q4H PRN, Brian Restrepo MD, 2 mg at 11/19/20 1364   pantoprazole (PROTONIX) 40 mg in 0.9% sodium chloride 10 mL injection, 40 mg, IntraVENous, BID, Naz Leyva MD, 40 mg at 11/20/20 5222   calciTONIN (MIACALCIN) injection 370 Int'l Units, 4 Int'l Units/kg, SubCUTAneous, Q12H, Kandice Easton MD, 370 Int'l Units at 11/20/20 0916 
  0.9% sodium chloride infusion 250 mL, 250 mL, IntraVENous, PRN, Naz Leyva MD 
  NOREPINephrine (LEVOPHED) 8 mg in 5% dextrose 250mL (32 mcg/mL) infusion, 0.5-16 mcg/min, IntraVENous, TITRATE, Naz Leyva MD, Stopped at 11/19/20 2106   acyclovir (ZOVIRAX) 800 mg in 0.9% sodium chloride 250 mL IVPB, 10 mg/kg (Ideal), IntraVENous, Q12H, Naz Leyva MD, 800 mg at 11/20/20 0924 
  0.9% sodium chloride infusion 250 mL, 250 mL, IntraVENous, PRN, Naz Leyva MD 
  acetaminophen (TYLENOL) suppository 650 mg, 650 mg, Rectal, Q4H PRN, Lucio PONCE MD, 650 mg at 11/16/20 1225 
  amiodarone (CORDARONE) 900 mg/250 ml D5W infusion, 0.5-1 mg/min, IntraVENous, TITRATE, Beatriz Hung MD, Last Rate: 16.7 mL/hr at 11/16/20 1736, 1 mg/min at 11/16/20 1736   VANCOMYCIN INFORMATION NOTE 1 Each, 1 Each, Other, Rx Dosing/Monitoring, Hazel Garcia MD 
  acetaminophen (TYLENOL) tablet 650 mg, 650 mg, Oral, Q4H PRN, Rom Herring NP, 650 mg at 11/19/20 0027 
  albuterol (PROVENTIL HFA, VENTOLIN HFA, PROAIR HFA) inhaler 2 Puff, 2 Puff, Inhalation, Q4H PRN, Rom Herring NP 
  docusate sodium (COLACE) capsule 100 mg, 100 mg, Oral, BID, Rom Herring, NP, Stopped at 92/46/68 4916 
  folic acid (FOLVITE) tablet 1 mg, 1 mg, Oral, DAILY, Rom Herring NP, Stopped at 11/16/20 0900   pravastatin (PRAVACHOL) tablet 20 mg, 20 mg, Oral, QHS, Rom Herring, NP 
  sertraline (ZOLOFT) tablet 50 mg, 50 mg, Oral, DAILY, Rom Herring, NP, Stopped at 11/17/20 0900   thiamine mononitrate (B-1) tablet 100 mg, 100 mg, Oral, DAILY, Nevaeh, Rom PONCE NP, Stopped at 11/16/20 0900 
  ondansetron [facility]) injection 4 mg, 4 mg, IntraVENous, Q6H PRN, Rom Herring NP 
  hydrOXYzine pamoate (VISTARIL) capsule 25 mg, 25 mg, Oral, TID PRN, Rom Herring NP, 25 mg at 11/15/20 2217   risperiDONE (RisperDAL) tablet 2 mg, 2 mg, Oral, QHS, Rom Herring, ALANNA, 2 mg at 11/19/20 2105   chlorhexidine (PERIDEX) 0.12 % mouthwash 15 mL, 15 mL, Oral, Q12H, Rom Herring NP, 15 mL at 11/20/20 4152 Objective:  
 
Vitals:  
 11/20/20 0900 11/20/20 0921 11/20/20 1000 11/20/20 1100 BP: (!) 128/56 101/61 (!) 112/57 108/64 Pulse: (!) 59  72 69 Resp: 30  26 26 Temp:      
SpO2: 100%  96% 98% Weight:  92.8 kg (204 lb 9.4 oz) Height:  6' 2.02\" (1.88 m) Intake and Output: 
Current Shift: 11/20 0701 - 11/20 1900 In: -  
Out: 1 Last three shifts: 11/18 1901 - 11/20 0700 In: 4251.7 [I.V.:3305.7] Out: 1013 [facility] [[id number]] Physical Exam:  
Physical Exam  
Constitutional: He is sedated and intubated. HENT:  
Head: Atraumatic. Neck: Normal carotid pulses, no hepatojugular reflux and no JVD present. Cardiovascular: An irregular rhythm present. Tachycardia present. Pulmonary/Chest: Breath sounds normal. No apnea and no bradypnea. He is intubated. Abdominal: Soft. He exhibits pulsatile liver. He exhibits no shifting dullness, no distension and no fluid wave. Skin: Skin is intact. No pallor. Lab/Data Review: 
Recent Results (from the past 24 hour(s)) LACTIC ACID Collection Time: 11/19/20  5:30 PM  
Result Value Ref Range Lactic acid 6.1 (HH) 0.4 - 2.0 mmol/L  
BLOOD GAS, ARTERIAL Collection Time: 11/19/20  6:20 PM  
Result Value Ref Range pH 7.229 (LL) 7.35 - 7.45    
 PCO2 30 (L) 35 - 45 mmHg PO2 100 75 - 100 mmHg O2  >95 % BICARBONATE 13 (L) 22 - 26 mmol/L  
 BASE DEFICIT 13.7 (H) 0 - 2 mmol/L  
 O2 METHOD VENT    
 FIO2 28.0 % MODE Assist Control/Volume Control Tidal volume 500  SET RATE 30    
 EPAP/CPAP/PEEP 5.0    
 SITE Right Radial    
 DAMON'S TEST PASS Critical value read back Savanah Westfall, RN CALLED WITH READ BACK HGB & HCT Collection Time: 11/19/20  6:20 PM  
Result Value Ref Range HGB 8.5 (L) 12.1 - 17.0 g/dL HCT 28.1 (L) 36.6 - 50.3 % GLUCOSE, POC Collection Time: 11/19/20  8:18 PM  
Result Value Ref Range Glucose (POC) 160 (H) 65 - 100 mg/dL Performed by Cj Escobar, POC Collection Time: 11/20/20 12:37 AM  
Result Value Ref Range Glucose (POC) 190 (H) 65 - 100 mg/dL Performed by Reina Lowry METABOLIC PANEL, COMPREHENSIVE Collection Time: 11/20/20  5:00 AM  
Result Value Ref Range Sodium 144 136 - 145 mmol/L Potassium 4.6 3.5 - 5.1 mmol/L Chloride 116 (H) 97 - 108 mmol/L  
 CO2 19 (L) 21 - 32 mmol/L Anion gap 9 5 - 15 mmol/L Glucose 162 (H) 65 - 100 mg/dL BUN 26 (H) 6 - 20 mg/dL Creatinine 2.09 (H) 0.70 - 1.30 mg/dL BUN/Creatinine ratio 12 12 - 20 GFR est AA 38 (L) >60 ml/min/1.73m2 GFR est non-AA 31 (L) >60 ml/min/1.73m2 Calcium 8.6 8.5 - 10.1 mg/dL Bilirubin, total 1.4 (H) 0.2 - 1.0 mg/dL AST (SGOT) 373 (H) 15 - 37 U/L  
 ALT (SGPT) 62 12 - 78 U/L Alk. phosphatase 104 45 - 117 U/L Protein, total 6.5 6.4 - 8.2 g/dL Albumin 2.7 (L) 3.5 - 5.0 g/dL Globulin 3.8 2.0 - 4.0 g/dL A-G Ratio 0.7 (L) 1.1 - 2.2 LACTIC ACID Collection Time: 11/20/20  5:00 AM  
Result Value Ref Range Lactic acid 6.3 (HH) 0.4 - 2.0 mmol/L MAGNESIUM Collection Time: 11/20/20  5:00 AM  
Result Value Ref Range Magnesium 1.5 (L) 1.6 - 2.4 mg/dL Janetanibal AlbertoRadford Collection Time: 11/20/20  5:00 AM  
Result Value Ref Range Vancomycin,trough 19.4 (H) 5.0 - 10.0 ug/mL Reported dose date Not provided Reported dose: Not provided Units RENAL FUNCTION PANEL Collection Time: 11/20/20  5:00 AM  
Result Value Ref Range Sodium 144 136 - 145 mmol/L  Potassium 4.6 3.5 - 5.1 mmol/L Chloride 116 (H) 97 - 108 mmol/L  
 CO2 19 (L) 21 - 32 mmol/L Anion gap 9 5 - 15 mmol/L Glucose 158 (H) 65 - 100 mg/dL BUN 25 (H) 6 - 20 mg/dL Creatinine 2.03 (H) 0.70 - 1.30 mg/dL BUN/Creatinine ratio 12 12 - 20 GFR est AA 39 (L) >60 ml/min/1.73m2 GFR est non-AA 32 (L) >60 ml/min/1.73m2 Calcium 8.6 8.5 - 10.1 mg/dL Phosphorus 3.3 2.6 - 4.7 mg/dL Albumin 2.7 (L) 3.5 - 5.0 g/dL CBC WITH AUTOMATED DIFF Collection Time: 11/20/20  5:00 AM  
Result Value Ref Range WBC 5.1 4.1 - 11.1 K/uL  
 RBC 2.94 (L) 4.10 - 5.70 M/uL HGB 7.3 (L) 12.1 - 17.0 g/dL HCT 24.5 (L) 36.6 - 50.3 % MCV 83.3 80.0 - 99.0 FL  
 MCH 24.8 (L) 26.0 - 34.0 PG  
 MCHC 29.8 (L) 30.0 - 36.5 g/dL  
 RDW 19.6 (H) 11.5 - 14.5 % PLATELET 35 (LL) 850 - 400 K/uL NEUTROPHILS 49 32 - 75 % LYMPHOCYTES 36 12 - 49 % MONOCYTES 1 (L) 5 - 13 % EOSINOPHILS 0 0 - 7 % BASOPHILS 0 0 - 1 % METAMYELOCYTES 1 (H) 0 % MYELOCYTES 5 (H) 0 % PROMYELOCYTES 8 (H) 0 % NRBC 7.0  WBC IMMATURE GRANULOCYTES 0 %  
 ABS. NEUTROPHILS 2.5 1.8 - 8.0 K/UL  
 ABS. LYMPHOCYTES 1.9 0.8 - 3.5 K/UL  
 ABS. MONOCYTES 0.1 0.0 - 1.0 K/UL  
 ABS. EOSINOPHILS 0.0 0.0 - 0.4 K/UL  
 ABS. BASOPHILS 0.0 0.0 - 0.1 K/UL  
 ABSOLUTE NRBC 0.36 K/uL  
 ABS. IMM. GRANS. 0.0 K/UL  
 DF Manual    
 RBC COMMENTS Anisocytosis 1+ RBC COMMENTS Target Cells 1+ RBC COMMENTS Hypochromia 1+ C REACTIVE PROTEIN, QT Collection Time: 11/20/20  5:00 AM  
Result Value Ref Range C-Reactive protein 20.10 (H) 0.00 - 0.60 mg/dL PROCALCITONIN Collection Time: 11/20/20  5:00 AM  
Result Value Ref Range Procalcitonin 6.39 (H) 0 ng/mL HEPATIC FUNCTION PANEL Collection Time: 11/20/20  5:00 AM  
Result Value Ref Range Protein, total 6.5 6.4 - 8.2 g/dL Albumin 2.7 (L) 3.5 - 5.0 g/dL Globulin 3.8 2.0 - 4.0 g/dL A-G Ratio 0.7 (L) 1.1 - 2.2 Bilirubin, total 1.4 (H) 0.2 - 1.0 mg/dL Bilirubin, direct 0.8 (H) 0.0 - 0.2 mg/dL Alk. phosphatase 105 45 - 117 U/L  
 AST (SGOT) 381 (H) 15 - 37 U/L  
 ALT (SGPT) 61 12 - 78 U/L  
PHOSPHORUS Collection Time: 11/20/20  5:00 AM  
Result Value Ref Range Phosphorus 3.4 2.6 - 4.7 mg/dL BLOOD GAS, ARTERIAL Collection Time: 11/20/20  5:15 AM  
Result Value Ref Range pH 7.422 7.35 - 7.45    
 PCO2 23 (L) 35 - 45 mmHg PO2 93 75 - 100 mmHg O2 SAT 99 >95 % BICARBONATE 17 (L) 22 - 26 mmol/L  
 BASE DEFICIT 8.8 (H) 0 - 2 mmol/L  
 O2 METHOD VENT    
 FIO2 21.0 % MODE Assist Control/Volume Control Tidal volume 550 SET RATE 30    
 EPAP/CPAP/PEEP 5.0    
 SITE Right Radial    
 DAMON'S TEST PASS    
GLUCOSE, POC Collection Time: 11/20/20  6:24 AM  
Result Value Ref Range Glucose (POC) 195 (H) 65 - 100 mg/dL Performed by MAGGIE BAGLEY   
ECHO ADULT COMPLETE Collection Time: 11/20/20  9:30 AM  
Result Value Ref Range Pulmonic Regurgitant End Max Velocity 176.00 cm/s AoV PG 12.00 mmHg IVSd 1.02 (A) 0.6 - 1.0 cm LVIDd 4.43 4.2 - 5.9 cm LVIDs 3.04 cm Pulmonic Regurgitant End Max Velocity 67.90 cm/s LVOT Peak Gradient 2.00 mmHg LVPWd 1.07 (A) 0.6 - 1.0 cm  
 LV E' Septal Velocity 13.00 cm/s LV ED Vol A2C 86.90 cm3 BP EF 59.4 55 - 100 % LV ES Vol A2C 28.10 cm3 E/E' septal 8.62 LV Ejection Fraction MOD 2C 31 % TV MG 81.00 mmHg Mitral Regurgitant Velocity Time Integral 186.00 cm Pulmonic Regurgitant End Max Velocity 534.00 cm/s Mitral Valve E Wave Deceleration Time 130.00 ms MV A Oscar 33.30 cm/s  
 MV E Oscar 112.00 cm/s  
 MV E/A 3.36 Pulmonic Regurgitant End Max Velocity 126.00 cm/s Pulmonic Valve Systolic Peak Instantaneous Gradient 6.00 mmHg Est. RA Pressure 15.00 mmHg RVIDd 4.98 cm RVSP 61.00 mmHg Tricuspid Valve Max Velocity 339.00 cm/s Triscuspid Valve Regurgitation Peak Gradient 46.00 mmHg  Tapse 1.40 (A) 1.5 - 2.0 cm  
 LV Mass .9 88 - 224 g LV Mass AL Index 72.4 49 - 115 g/m2 BLOOD GAS, ARTERIAL Collection Time: 11/20/20 10:15 AM  
Result Value Ref Range pH 7.38 7.35 - 7.45    
 PCO2 32 (L) 35 - 45 mmHg PO2 40 (LL) 75 - 100 mmHg O2 SAT 74 (LL) >95 % BICARBONATE 19 (L) 22 - 26 mmol/L  
 BASE DEFICIT 5.6 (H) 0 - 2 mmol/L  
 O2 METHOD VENT    
 FIO2 21.0 % MODE Assist Control/Volume Control Tidal volume 550 SET RATE 26 [address] XR CHEST PORT Final Result IMPRESSION: Developing bilateral perihilar and infrahilar opacities, with  
differential diagnosis including pneumonia, atelectasis, and edema. Short-term  
radiographic follow-up recommended. XR CHEST PORT Final Result XR CHEST PORT Final Result Impression:   
Successful placement of a triple-lumen central venous catheter. The catheter is  
ready for use. IR INSERT NON TUNL CVC OVER 5 YRS Final Result Impression:   
Successful placement of a triple-lumen central venous catheter. The catheter is  
ready for use. IR [facility] Final Result Impression:   
Successful placement of a triple-lumen central venous catheter. The catheter is  
ready for use. XR CHEST PORT Final Result Impression: The cardiomediastinal silhouette is appropriate for age, technique,  
and lung expansion. Pulmonary vasculature is not congested. The lungs are  
essentially clear. No effusion or pneumothorax is seen. US RETROPERITONEUM COMP Final Result Impression: Normal exam  
  
  
XR CHEST PORT Final Result IMPRESSION:  No evidence of an acute cardiopulmonary process. CT HEAD WO CONT Final Result IMPRESSION: Chronic findings as above. CT CHEST WO CONT    (Results Pending) CT ABD PELV WO CONT    (Results Pending) XR CHEST PORT    (Results Pending) XR ABD FLAT/ ERECT    (Results Pending) Assessment:  
 
Principal Problem: 
  Sepsis ([address] (11/16/2020) Active Problems: UTI (urinary tract infection) (11/15/2020) RBE (acute kidney injury) ([facility] [address] (11/15/2020) AMS (altered mental status) (11/15/2020) Hypoglycemia (11/16/2020) Encephalopathy acute (11/16/2020) 
 
 anemia, possible GI blood loss, 
Gastric erosion noted, esophagitis noted, COVID 19 negative Plan:  
Continue  On PPI per ng 
Monitoring hemoglobin closely 
osmolite on tube feeding  45 ml/hr Plan:  
 
 
Signed By: Ed Horta MD   
 November 20, 2020 Thank you for allowing me to participate in this patients care [facility] Referring Physician Hetal Marsh DO

## 2020-11-20 NOTE — PROGRESS NOTES
Progress Note Patient: Camila Sierra MRN: [id number]  SSN: [id number] YOB: 1945  Age: 67 y.o. Sex: male Admit Date: 11/15/2020 LOS: 5 days Subjective:  
Patient followed for severe sepsis with altered mental status and suspected UTI. Blood cultures negative so far and urine culture has grown ESBL E. Coli. He is currently on IV Vancomycin and Meropenem. Patient remains intubated. CXR today showing perihilar and infrahilar opacities suggestive of pneumonia. Family member at bedside. Objective:  
 
Vitals:  
 11/20/20 0806 11/20/20 0900 11/20/20 0921 11/20/20 1000 BP:  (!) 128/56 101/61 (!) 112/57 Pulse: 66 (!) 59  72 Resp: 30   26 Temp:      
SpO2: 98%   96% Weight:   204 lb 9.4 oz (92.8 kg) Height:   6' 2.02\" (1.88 m) Intake and Output: 
Current Shift: 11/20 0701 - 11/20 1900 In: -  
Out: 1 Last three shifts: 11/18 1901 - 11/20 0700 In: 4251.7 [I.V.:3305.7] Out: 1013 [facility] [[id number]] Physical Exam:  
 Constitutional:   
   General: He is in acute distress. Appearance: He is ill-appearing. He is not diaphoretic. HENT:  
   Head: orotracheal tube Neck: ?stiffness Cardiovascular:  
   Rate and Rhythm: Tachycardia present. Rhythm irregular. Heart sounds: No murmur. Pulmonary:  
   Breath sounds: No wheezing, rhonchi or rales. Abdominal:  
   Palpations: Abdomen is soft. Tenderness: There is no abdominal tenderness. Genitourinary: 
   Comments: Mcmillan catheter Musculoskeletal:  
   Right lower leg: No edema. Left lower leg: No edema. Comments: 2x3 cm open wound right medial calf with dry base Skin: 
   Findings: No erythema or rash. Neurological:  
   Comments: Unable to assess Psychiatric:  
   Comments: Unable to assess Lab/Data Review: WBC 13,100 Lactic acid 6.3 Procalcitonin 6.39 <5.52 <8.18 <12.30 CRP 20.10 <28.30 <31.40 <35.90 Covid-19 Not detected Blood cultures (11/15) No growth at 4 days 
Blood cultures (11/19) Pending Urine culture (11/15) >100,000 cfu/ml ESBL E. Coli 
 
CXR (11/20)  Developing bilateral perihilar and infrahilar opacities, with 
differential diagnosis including pneumonia, atelectasis, and edema. Assessment:  
 
Principal Problem: 
  Sepsis ([facility] (11/16/2020) Active Problems: 
  UTI (urinary tract infection) (11/15/2020) BRE (acute kidney injury) ([facility] (11/15/2020) AMS (altered mental status) (11/15/2020) Hypoglycemia (11/16/2020) Encephalopathy acute (11/16/2020) 1. Severe sepsis with tachycardia, tachypnea, leukocytosis, elevated procalcitonin and CRP, resolving 2. UTI with marked pyuria and bacteriuria, secondary to ESBL E. Coli, Day #2 IV Meropenem. 3. Altered mental status, possibly secondary to above 4. Rule out GI bleed 5. Atrial fibrillation 6. Covid-19 negative 7. Acute hypoxic respiratory failure, intubated Comment:  I am not impressed by his CXR suggesting pneumonia. In brooke case, Meropenem and Vancomycin would be reasonable coverage. Will interrogate his sputum. Plan: 1. Continue Meropenem and Vancomycin 2. Send sputum culture 3. Follow-up blood culture 4. In am, repeat CBC, procalcitonin and CRP Signed By: Susan Ortega MD   
 November 20, 2020

## 2020-11-21 NOTE — PROGRESS NOTES
Pulmonology and Critical Care Progress Note Subjective: Chief Complaint: Confusion Patient seen and examined in his room this morning, discussed with the RN. His wife is also at the bedside and she has signed the DNR form. CODE STATUS to DNR in the computer. Patient's Precedex was stopped last night due to bradycardia, this has been stable in the 50s now. He is on fentanyl at 150 and he is quite sedated still. He is off of vasopressin and Levophed since yesterday morning and his maps are above 65. Given his increasing lactic acid level despite significantly improved vasopressor requirements, a CT of the chest/abdomen/pelvis has been ordered to evaluate for areas of ischemia. He is currently on AC//30/28%/5 Chest x-ray was reviewed this morning and is showing some increased right basilar airspace disease. Also small pleural effusions. Current Facility-Administered Medications Medication Dose Route Frequency Provider Last Rate Last Dose  potassium chloride 10 mEq in 100 ml IVPB  10 mEq IntraVENous Q1H Jeannette Leyva  mL/hr at 11/21/20 1015 10 mEq at 11/21/20 1015  hydrocortisone Sod Succ (PF) (SOLU-CORTEF) injection 50 mg  50 mg IntraVENous Q8H Murkelly Curry, DO   50 mg at 11/21/20 0746  
 0.45% sodium chloride infusion  100 mL/hr IntraVENous CONTINUOUS Janes Ferreira  mL/hr at 11/21/20 0300 100 mL/hr at 11/21/20 0300  pantoprazole (PROTONIX) granules for oral suspension 40 mg  40 mg Per NG tube ACB Graeme Arias MD   40 mg at 11/21/20 0746  
 0.9% sodium chloride infusion 250 mL  250 mL IntraVENous PRN Jag Bhatia MD      
 dexmedeTOMidine (PRECEDEX) 400 mcg in 0.9% sodium chloride 104 mL infusion  0.1-1.5 mcg/kg/hr IntraVENous TITRATE Jeannette Leyva MD   Stopped at 11/20/20 0100  
 fentaNYL (PF) 1,500 mcg/30 mL (50 mcg/mL) infusion  0-200 mcg/hr IntraVENous TITRATE Meryle Sluder, DO 2.5 mL/hr at 11/20/20 2230 125 mcg/hr at 11/20/20 2230  
 vasopressin (VASOSTRICT) 40 Units in 0.9% sodium chloride 40 mL infusion  0.04 Units/min IntraVENous CONTINUOUS Curry Warner DO 2.4 mL/hr at 11/19/20 2033 0.04 Units/min at 11/19/20 2033  meropenem (MERREM) 1 g in sterile water (preservative free) 20 mL IV syringe  1 g IntraVENous Q12H Nyasia Schwarz MD   1 g at 11/21/20 0025  
 0.9% sodium chloride infusion 250 mL  250 mL IntraVENous PRN Doron Bhatia MD      
 morphine injection 2 mg  2 mg IntraVENous Q4H PRN Kaycee Jeffries MD   2 mg at 11/19/20 0743  
 0.9% sodium chloride infusion 250 mL  250 mL IntraVENous PRN Kaycee Jeffries MD      
 NOREPINephrine (LEVOPHED) 8 mg in 5% dextrose 250mL (32 mcg/mL) infusion  0.5-16 mcg/min IntraVENous TITRATE Maryann Lynn MD   Stopped at 11/19/20 2106  
 0.9% sodium chloride infusion 250 mL  250 mL IntraVENous PRN Kaycee Jeffries MD      
 acetaminophen (TYLENOL) suppository 650 mg  650 mg Rectal Q4H PRN Martínez PONCE MD   650 mg at 11/16/20 1225  
 amiodarone (CORDARONE) 900 mg/250 ml D5W infusion  0.5-1 mg/min IntraVENous TITRATE Shweta Estevez MD 16.7 mL/hr at 11/16/20 1736 1 mg/min at 11/16/20 1736  acetaminophen (TYLENOL) tablet 650 mg  650 mg Oral Q4H PRN Rom Herring NP   650 mg at 11/19/20 0027  
 albuterol (PROVENTIL HFA, VENTOLIN HFA, PROAIR HFA) inhaler 2 Puff  2 Puff Inhalation Q4H PRN Rom Herring NP      
 docusate sodium (COLACE) capsule 100 mg  100 mg Oral BID Rom Herring NP   Stopped at 90/34/86 9698  
 folic acid (FOLVITE) tablet 1 mg  1 mg Oral DAILY Rom Herring NP   1 mg at 11/21/20 0801  pravastatin (PRAVACHOL) tablet 20 mg  20 mg Oral QHS Nevaeh Rom ROSARIO, NP   20 mg at 11/21/20 0021  sertraline (ZOLOFT) tablet 50 mg  50 mg Oral DAILY Nevaeh Jan ROSARIO, NP   50 mg at 11/21/20 8566  thiamine mononitrate (B-1) tablet 100 mg  100 mg Oral DAILY Nevaeh Rom ROSARIO, NP   100 mg at 20 0801  
 ondansetron (ZOFRAN) injection 4 mg  4 mg IntraVENous Q6H PRN peggy Rom A, NP      
 hydrOXYzine pamoate (VISTARIL) capsule 25 mg  25 mg Oral TID PRN  , NP   25 mg at 11/15/20 2217  risperiDONE (RisperDAL) tablet 2 mg  2 mg Oral QHS  , NP   2 mg at 20 0021  chlorhexidine (PERIDEX) 0.12 % mouthwash 15 mL  15 mL Oral Q12H , NP   15 mL at 20 0801 Allergies Allergen Reactions  Codeine Rash Review of Systems: 
Review of systems not obtained due to patient factors. Objective:  
 
Blood pressure (!) 160/119, pulse 86, temperature 100 °F (37.8 °C), resp. rate 25, height 6' 2.02\" (1.88 m), weight (!) 224 kg (493 lb 13.3 oz), SpO2 98 %. Temp (24hrs), Av.1 °F (36.7 °C), Min:97.4 °F (36.3 °C), Max:100 °F (37.8 °C) Intake and Output: 
Current Shift: No intake/output data recorded. Last 3 Shifts:  1901 -  0700 In: 6476.2 [I.V.:5384.2] Out: 8730 [Urine:2550] Physical Exam:  
General appearance: Elderly male who is sedated on the ventilator, on fentanyl, no distress, appears older than stated age, chronically ill-appearing Head: Normocephalic, without obvious abnormality, atraumatic Eyes: negative Neck: no obvious adenopathy, no carotid bruit; neck is stiff Lungs: He has few scattered rhonchi. Equal breath sounds bilaterally. Small thick secretions per RN. Heart: irregularly irregular rhythm, bradycardic, no rub, no obvious murmur Abdomen: soft, non-tender. Bowel sounds normal. No masses,  no organomegaly; NG tube in place. He also has diarrhea and has a rectal tube in place. Pulses: 2+ and symmetric Skin: Dry, intact, with bruising throughout the arms and legs. +3 pitting edema in extremities bilaterally. Lymph nodes: Cervical, supraclavicular, and axillary nodes normal. 
Neurologic: Sedated on the ventilator, not following any commands. Additional comments:None Lab/Data Review: All lab results for the last 24 hours reviewed. Recent Results (from the past 24 hour(s)) CULTURE, RESPIRATORY/SPUTUM/BRONCH W GRAM STAIN Collection Time: 11/20/20 12:00 PM  
 Specimen: Sputum Result Value Ref Range Special Requests: No Special Requests GRAM STAIN Few WBCs seen GRAM STAIN No Epithelial cells seen GRAM STAIN Occasional Yeast    
 Culture result: PENDING   
HGB & HCT Collection Time: 11/20/20  3:00 PM  
Result Value Ref Range HGB 6.7 (L) 12.1 - 17.0 g/dL HCT 21.4 (L) 36.6 - 50.3 % LACTIC ACID Collection Time: 11/20/20  3:00 PM  
Result Value Ref Range Lactic acid 4.5 (HH) 0.4 - 2.0 mmol/L  
CBC WITH AUTOMATED DIFF Collection Time: 11/20/20  5:00 PM  
Result Value Ref Range WBC 5.0 4.1 - 11.1 K/uL  
 RBC 2.70 (L) 4.10 - 5.70 M/uL HGB 6.8 (L) 12.1 - 17.0 g/dL HCT 21.9 (L) 36.6 - 50.3 % MCV 81.1 80.0 - 99.0 FL  
 MCH 25.2 (L) 26.0 - 34.0 PG  
 MCHC 31.1 30.0 - 36.5 g/dL  
 RDW 19.2 (H) 11.5 - 14.5 % PLATELET 32 (LL) 369 - 400 K/uL NRBC 2.2 (H) 0  WBC ABSOLUTE NRBC 0.12 (H) 0.00 - 0.01 K/uL NEUTROPHILS 47 32 - 75 % LYMPHOCYTES 46 12 - 49 % MONOCYTES 7 5 - 13 % EOSINOPHILS 0 0 - 7 % BASOPHILS 0 0 - 1 % NRBC 3.0  WBC IMMATURE GRANULOCYTES 0 %  
 ABS. NEUTROPHILS 2.4 1.8 - 8.0 K/UL  
 ABS. LYMPHOCYTES 2.3 0.8 - 3.5 K/UL  
 ABS. MONOCYTES 0.4 0.0 - 1.0 K/UL  
 ABS. EOSINOPHILS 0.0 0.0 - 0.4 K/UL  
 ABS. BASOPHILS 0.0 0.0 - 0.1 K/UL  
 ABSOLUTE NRBC 0.15 K/uL  
 ABS. IMM. GRANS. 0.0 K/UL  
 DF Manual    
 RBC COMMENTS Microcytosis 4+ RBC COMMENTS Rouleaux 2+ RBC COMMENTS Hypochromia 1+ RBC COMMENTS Anisocytosis 4+ PROTHROMBIN TIME + INR Collection Time: 11/20/20  6:10 PM  
Result Value Ref Range Prothrombin time 18.4 (H) 11.9 - 14.7 sec INR 1.5 (H) 0.9 - 1.1 PTT Collection Time: 11/20/20  6:10 PM  
Result Value Ref Range  aPTT 49.8 (H) 23.0 - 35.7 sec  
 aPTT, therapeutic range   68 - 109 sec PLATELETS, ALLOCATE Collection Time: 11/20/20  7:45 PM  
Result Value Ref Range Unit number [id number] Blood component type PLTPH LR,1 Unit division 00 Status of unit Issued,final   
 TRANSFUSION STATUS Ok to transfuse TYPE & SCREEN Collection Time: 11/20/20 11:45 PM  
Result Value Ref Range Crossmatch Expiration 11/23/2020,2359 ABO/Rh(D) Vermaxia TuneCore Negative Antibody screen Negative Unit number [id number] Blood component type RC LR,1 Unit division 00 Status of unit Issued TRANSFUSION STATUS Ok to transfuse Crossmatch result Compatible HEPATIC FUNCTION PANEL Collection Time: 11/21/20  6:00 AM  
Result Value Ref Range Protein, total 6.0 (L) 6.4 - 8.2 g/dL Albumin 2.8 (L) 3.5 - 5.0 g/dL Globulin 3.2 2.0 - 4.0 g/dL A-G Ratio 0.9 (L) 1.1 - 2.2 Bilirubin, total 1.1 (H) 0.2 - 1.0 mg/dL Bilirubin, direct 0.5 (H) 0.0 - 0.2 mg/dL Alk. phosphatase 97 45 - 117 U/L  
 AST (SGOT) 173 (H) 15 - 37 U/L  
 ALT (SGPT) 47 12 - 78 U/L  
LACTIC ACID Collection Time: 11/21/20  6:00 AM  
Result Value Ref Range Lactic acid 2.7 (HH) 0.4 - 2.0 mmol/L  
VANCOMYCIN, RANDOM Collection Time: 11/21/20  6:00 AM  
Result Value Ref Range Vancomycin, random 23.8 ug/mL CBC WITH AUTOMATED DIFF Collection Time: 11/21/20  6:00 AM  
Result Value Ref Range WBC 5.4 4.1 - 11.1 K/uL  
 RBC 3.41 (L) 4.10 - 5.70 M/uL HGB 8.8 (L) 12.1 - 17.0 g/dL HCT 27.8 (L) 36.6 - 50.3 % MCV 81.5 80.0 - 99.0 FL  
 MCH 25.8 (L) 26.0 - 34.0 PG  
 MCHC 31.7 30.0 - 36.5 g/dL  
 RDW 18.3 (H) 11.5 - 14.5 % PLATELET 41 (LL) 172 - 400 K/uL MPV 10.9 8.9 - 12.9 FL  
 NRBC 2.8 (H) 0  WBC ABSOLUTE NRBC 0.15 (H) 0.00 - 0.01 K/uL NEUTROPHILS PENDING % LYMPHOCYTES PENDING % MONOCYTES PENDING % EOSINOPHILS PENDING % BASOPHILS 0 0 - 1 % IMMATURE GRANULOCYTES 16 (H) 0.0 - 0.5 % ABS. NEUTROPHILS PENDING K/UL ABS. LYMPHOCYTES PENDING K/UL  
 ABS. MONOCYTES PENDING K/UL  
 ABS. EOSINOPHILS PENDING K/UL  
 ABS. BASOPHILS 0.0 0.0 - 0.1 K/UL  
 ABS. IMM. GRANS. 0.8 (H) 0.00 - 0.04 K/UL  
 DF AUTOMATED    
C REACTIVE PROTEIN, QT Collection Time: 11/21/20  6:00 AM  
Result Value Ref Range C-Reactive protein 10.50 (H) 0.00 - 0.60 mg/dL PROCALCITONIN Collection Time: 11/21/20  6:00 AM  
Result Value Ref Range Procalcitonin 5.42 (H) 0 ng/mL RENAL FUNCTION PANEL Collection Time: 11/21/20  6:00 AM  
Result Value Ref Range Sodium 148 (H) 136 - 145 mmol/L Potassium 3.0 (L) 3.5 - 5.1 mmol/L Chloride 114 (H) 97 - 108 mmol/L  
 CO2 24 21 - 32 mmol/L Anion gap 10 5 - 15 mmol/L Glucose 120 (H) 65 - 100 mg/dL BUN 36 (H) 6 - 20 mg/dL Creatinine 2.05 (H) 0.70 - 1.30 mg/dL BUN/Creatinine ratio 18 12 - 20 GFR est AA 38 (L) >60 ml/min/1.73m2 GFR est non-AA 32 (L) >60 ml/min/1.73m2 Calcium 7.9 (L) 8.5 - 10.1 mg/dL Phosphorus 1.8 (L) 2.6 - 4.7 mg/dL Albumin 2.9 (L) 3.5 - 5.0 g/dL Chest X-Ray:  
XR CHEST PORT Final Result Stable right neck central venous catheter. Now present, there is an ET tube 4-5 
cm above the samuel. NG tube projects below the left hemidiaphragm. Improved 
aeration through the right lung; clearing patchy reticular markings. No 
interstitial or alveolar pulmonary edema. No pneumothorax or sizable pleural 
effusion. XR CHEST PORT Final Result Impression:   
Successful placement of a triple-lumen central venous catheter. The catheter is  
ready for use. IR INSERT NON TUNL CVC OVER 5 YRS Final Result Impression:   
Successful placement of a triple-lumen central venous catheter. The catheter is  
ready for use. IR Zamzam Final Result Impression:   
Successful placement of a triple-lumen central venous catheter. The catheter is  
ready for use. XR CHEST PORT Final Result Impression: The cardiomediastinal silhouette is appropriate for age, technique,  
and lung expansion. Pulmonary vasculature is not congested. The lungs are  
essentially clear. No effusion or pneumothorax is seen. US RETROPERITONEUM COMP Final Result Impression: Normal exam  
  
  
XR CHEST PORT Final Result IMPRESSION:  No evidence of an acute cardiopulmonary process. CT HEAD WO CONT Final Result IMPRESSION: Chronic findings as above. CT CHEST WO CONT    (Results Pending) CT ABD PELV WO CONT    (Results Pending) XR CHEST PORT    (Results Pending) CT imaging: CT Results  (Last 48 hours) None PFTs: PFT Results  (Last 3 results in the past 10 years) None Assessment:  
 
Patient is a 42-year-old  male with a history of atrial fibrillation, osteoarthritis, hyperlipidemia, reported COPD, and hypertension who [facility] to [facility]15/[facility] from his nursing home for altered mental status. He has been admitted to the ICU and is currently in septic shock on vasopressors and has been intubated on 11/19/2020 for failure to protect his airway due to altered mental status. Plan:  
 
1.)  Acute respiratory failure 
-Intubated on 11/19/2020 for failure to protect his airway. Chest x-ray done this morning showed somewhat increased right basilar airspace disease. Small pleural effusions. Blood gases were not done this morning. Will obtain now. Arterial blood gases done on 11/20 showed a pH of 7.38/32/40. His current vent settings include a tidal volume of 550/ 21% assist control of 26 and PEEP of 5. Will obtain blood gases and make vent changes accordingly. Repeat chest x-ray and blood gas in the morning.   
 
-Decrease fentanyl infusion, target RASS of -1 
-Overall poor prognosis. -Now that he is off vasopressors. He is started on tube feeds.  
 
2.)  Septic shock 
-Patient appears to have ESBL E. coli in his urine, Maximiliano has been switched to meropenem. Being continued on vancomycin as well. Infectious disease is following, appreciate their recommendations. -Repeat blood cultures 11/19/2020 today given worsening shock currently pending. Admission blood cultures no growth to date. - Levophed/vasopressin have been weaned off since starting the patient on meropenem/hydrocortisone/albumin. 
-Continue albumin 25 g IV q6 hours for another 24 hours. 
-We will slowly wean hydrocortisone to 50 mg IV every 8 today. CT chest/abdomen/pelvis currently pending. 
-Patient has a right IJ central line and a right radial A-line in place. 
-Checking a transthoracic echocardiogram to rule out a cardiogenic component of his shock. -COVID-19 test is negative 
-Patient is massively fluid overloaded on exam nephrology on the case 3.)  Acute encephalopathy 
-Likely multifactorial from sepsis, urinary tract infection, and multiple electrolyte abnormalities including hypernatremia, hypercalcemia, and hypokalemia. -?meningitis given stiff neck, however unable to perform spinal tap due to thrombocytopenia.   
-Continue on current antibiotic regimen as above; neurology/nephrology/hematology following closely. 
-Ammonia level normal, TSH mildly elevated, free T4 pending. 4.)  Acute kidney injury 
-Creatinine seem to plateau around 6.54 and had very steadily been decreasing with improved calcium level and fluid administration. 
-Likely prerenal from volume depletion and sepsis. -Worsened slightly to 2.09, possible ATN. -Nephrology following closely, appreciate their assistance. 5.)  Hypercalcemia 
-Corrected calcium 14.6 on admission, this has steadily improved every day with calcitonin fluids. 
-Nephrology following closely 
-Myeloma work-up pending 
 
6.)  Acute blood loss anemia 
-Hemoglobin had decreased to 6.8 in 11/17/2020 and he received 2 units of packed red blood cells.  
-This is responded well to blood products, and his hemoglobin has remained fairly stable. CBC pending today. 
-Has been having evidence of significant bruising/bleeding. GI is following closely, pending an endoscopy evaluation which will certainly be much easier given that he has a secure airway now. 
-Hematology following. 
 
7.)  Nonanion gap metabolic acidosis 
-Unclear etiology, but likely combination of sepsis and BRE. -Serum lactate continues to rise, CT chest/abdomen/pelvis currently pending. 
-Continue to trend serum lactates. 8.)  Thrombocytopenia 
-Platelet count has steadily decreased to 58 yesterday morning, hematology is following closely. -INR 2.0, PT 22.6, PTT 65.2 but fibrinogen is normal, which is not consistent with DIC. Patient received FFP and vitamin K. 
-Although, likely due to overwhelming sepsis. 9.)  Atrial fibrillation with RVR 
-Appears to be relatively stable, continue to check electrolytes every day. -TSH mildly elevated, free T4 pending. 
-Cardiology following closely, appreciate their recommendations. 
-Amiodarone drip held yesterday due to issues with access, however this can be resumed if necessary now that he is off multiple drips. Only on Lopressor 2.5 mg IV every 6 hours. CODE STATUS: DNR Lines/Tubes: ET tube, right IJ central line, Mcmillan catheter, NG tube Prophylaxis: 
Stress Ulcer Protocol Active: Yes, Protonix 40 mg IV twice daily Deep Vein Thrombosis Protocol Active: Yes, SCD's (with bleeding his pharmacologic DVT prophylaxis has been stopped) Nutrition: Patient is off vasopressors, okay to start tube feeds today. Activity: Bedrest given clinical instability. Disposition and Family: Discussed with the wife at the bedside. DNR status is now in effect. Total critical care time spent with patient: 50 minutes with direct visualization of the patient's ventilator and management along with respiratory therapist, long discussion with the respiratory therapist and the bedside nurse regarding the plan of care.  
 
 
Bekah Jacob Payton Chamberlain MD 
[facility] 11/21/2020 
10:57 AM

## 2020-11-21 NOTE — PROGRESS NOTES
Progress Note Patient: Isaac Wilhelm MRN: [id number]  SSN: [id number] YOB: 1945  Age: 67 y.o. Sex: male Admit Date: 11/15/2020 LOS: 6 days Subjective:  
Patient followed for severe sepsis with altered mental status and suspected UTI. Blood cultures negative so far and urine culture has grown ESBL E. Coli. He has low grade temperature with normal WBC and decreasing CRP and procalcitonin. He is currently on IV Vancomycin and Meropenem. Patient remains intubated. CXR today showing patchy right airspace disease. Objective:  
 
Vitals:  
 11/21/20 0600 11/21/20 0651 11/21/20 0700 11/21/20 3101 BP: (!) 160/119 Pulse: 86 Resp: 25 Temp:   100 °F (37.8 °C) SpO2: 95%   98% Weight:  (!) 493 lb 13.3 oz (224 kg) Height:      
  
 
Intake and Output: 
Current Shift: No intake/output data recorded. Last three shifts: 11/19 1901 - 11/21 0700 In: 6476.2 [I.V.:5384.2] Out: 6874 [Urine:2550] Physical Exam:  
 Constitutional:   
   General: He is in acute distress. Appearance: He is ill-appearing. He is not diaphoretic. HENT:  
   Head: orotracheal tube Neck: ?stiffness Cardiovascular:  
   Rate and Rhythm: Tachycardia present. Rhythm irregular. Heart sounds: No murmur. Pulmonary:  
   Breath sounds: No wheezing, rhonchi or rales. Abdominal:  
   Palpations: Abdomen is soft. Tenderness: There is no abdominal tenderness. Genitourinary: 
   Comments: Mcmillan catheter Musculoskeletal:  
   Right lower leg: No edema. Left lower leg: No edema. Comments: 2x3 cm open wound right medial calf with dry base Skin: 
   Findings: No erythema or rash. Neurological:  
   Comments: Unable to assess Psychiatric:  
   Comments: Unable to assess Lab/Data Review: WBC 5,400 PLT 41,000 Lactic acid 2.7 Procalcitonin 5.42 <6.39 <12.30 CRP 10.50 <20.10 <35.90 Covid-19 Not detected Blood cultures (11/15) No growth FINAL Blood cultures (11/19) No growth at 2 days Urine culture (11/15) >100,000 cfu/ml ESBL E. Coli Sputum culture (11/20) Pending CXR (11/21)  Slight interval increase of patchy right basilar airspace disease. Small pleural 
effusions. No evidence of pneumothorax. Assessment:  
 
Principal Problem: 
  [facility] (11/16/2020) Active Problems: 
  UTI (urinary tract infection) (11/15/2020) BRE (acute kidney injury) ([facility] (11/15/2020) AMS (altered mental status) (11/15/2020) Hypoglycemia (11/16/2020) Encephalopathy acute (11/16/2020) 1. Severe sepsis with tachycardia, tachypnea, leukocytosis, elevated procalcitonin and CRP, resolving 2. UTI with marked pyuria and bacteriuria, secondary to ESBL E. Coli, Day #3 IV Meropenem. 3. Altered mental status, possibly secondary to above 4. Rule out GI bleed 5. Atrial fibrillation 6. Covid-19 negative 7. Acute hypoxic respiratory failure, intubated Comment:  WBC normal, CRP and procalcitonin decreasing. Still not impressed for pneumonia. Plan: 1. Continue Meropenem 2. Discontinue Vancomycin for now 3. Follow-up sputum culture and pending blood cultures 4. In am, repeat procalcitonin and CRP 5. Also discontinue Acyclovir for now Signed By: Hannah Alfaro MD   
 November 21, 2020

## 2020-11-21 NOTE — PROGRESS NOTES
Nephrology Consult Patient: Katelynn Fontaine MRN: [id number]  SSN: [id number] YOB: 1945  Age: 67 y.o. Sex: male Subjective: The patient is seen in ICU On Vent with FIO2 30% Noticed mild swelling of ext Cr 2.0 
K 3.0 Na 148 Past Medical History:  
Diagnosis Date  Arthritis  Atrial fibrillation ([facility]  COPD (chronic obstructive pulmonary disease) ([facility] 8/12/2020  High cholesterol  History of vascular access device 08/12/2020 Rt Basilic 5fr dual PICC at 42cm arm cir 34.5cm  Hypertension Past Surgical History:  
Procedure Laterality Date  COLONOSCOPY N/A 9/20/2018 COLONOSCOPY performed by Nima Powell MD at [address] [facility] HX COLONOSCOPY    
 HX HIP REPLACEMENT Bilateral   
 HX TONSILLECTOMY  IR INSERT NON TUNL CVC OVER 5 YRS  11/17/2020 Family History Problem Relation Age of Onset  No Known Problems Mother  No Known Problems Father  No Known Problems Sister  No Known Problems Brother  No Known Problems Maternal Grandmother  No Known Problems Maternal Grandfather  No Known Problems Paternal Grandmother  No Known Problems Paternal Grandfather  No Known Problems Other  Stroke Neg Hx Social History Tobacco Use  Smoking status: Unknown If Ever Smoked  Smokeless tobacco: Never Used Substance Use Topics  Alcohol use: Never Alcohol/week: 8.0 standard drinks Types: 8 Shots of liquor per week Frequency: Never Current Facility-Administered Medications Medication Dose Route Frequency Provider Last Rate Last Dose  potassium chloride 10 mEq in 100 ml IVPB  10 mEq IntraVENous Mitali Nevarez  mL/hr at 11/21/20 0752 10 mEq at 11/21/20 3980  hydrocortisone Sod Succ (PF) (SOLU-CORTEF) injection 50 mg  50 mg IntraVENous Q8H Curry Warner DO   50 mg at 11/21/20 0746  
 0.45% sodium chloride infusion  100 mL/hr IntraVENous CONTINUOUS Rustam MD Kandice 100 mL/hr at 11/21/20 0300 100 mL/hr at 11/21/20 0300  pantoprazole (PROTONIX) granules for oral suspension 40 mg  40 mg Per NG tube ACB Dianna Condon MD   40 mg at 11/21/20 0746  
 0.9% sodium chloride infusion 250 mL  250 mL IntraVENous PRN Lilli Bhatia MD      
 dexmedeTOMidine (PRECEDEX) 400 mcg in 0.9% sodium chloride 104 mL infusion  0.1-1.5 mcg/kg/hr IntraVENous TITRATE Lidia Leyva MD   Stopped at 11/20/20 0100  
 fentaNYL (PF) 1,500 mcg/30 mL (50 mcg/mL) infusion  0-200 mcg/hr IntraVENous TITRATE Prieto Renteria DO 2.5 mL/hr at 11/20/20 2230 125 mcg/hr at 11/20/20 2230  
 vasopressin (VASOSTRICT) 40 Units in 0.9% sodium chloride 40 mL infusion  0.04 Units/min IntraVENous CONTINUOUS Curry Warner DO 2.4 mL/hr at 11/19/20 2033 0.04 Units/min at 11/19/20 2033  meropenem (MERREM) 1 g in sterile water (preservative free) 20 mL IV syringe  1 g IntraVENous Q12H Oliver Cobb MD   1 g at 11/21/20 0025  
 0.9% sodium chloride infusion 250 mL  250 mL IntraVENous PRN Lilli Bhatia MD      
 morphine injection 2 mg  2 mg IntraVENous Q4H PRN Raine Khan MD   2 mg at 11/19/20 0743  
 0.9% sodium chloride infusion 250 mL  250 mL IntraVENous PRN Raine Khan MD      
 NOREPINephrine (LEVOPHED) 8 mg in 5% dextrose 250mL (32 mcg/mL) infusion  0.5-16 mcg/min IntraVENous TITRATE Raine Khan MD   Stopped at 11/19/20 2106  acyclovir (ZOVIRAX) 800 mg in 0.9% sodium chloride 250 mL IVPB  10 mg/kg (Ideal) IntraVENous Q12H Naz Leyva MD   800 mg at 11/20/20 2130  
 0.9% sodium chloride infusion 250 mL  250 mL IntraVENous PRN Lidia Leyva MD      
 acetaminophen (TYLENOL) suppository 650 mg  650 mg Rectal Q4H PRN Dieudonne PONCE MD   650 mg at 11/16/20 1225  
 amiodarone (CORDARONE) 900 mg/250 ml D5W infusion  0.5-1 mg/min IntraVENous TITRATE Dioni Arreaga MD 16.7 mL/hr at 11/16/20 1736 1 mg/min at 11/16/20 1736  VANCOMYCIN INFORMATION NOTE 1 Each  1 Each Other Rx Dosing/Monitoring Carlos Parada MD      
 acetaminophen (TYLENOL) tablet 650 mg  650 mg Oral Q4H PRN peggy Rom ROSARIO, NP   650 mg at 11/19/20 0027  
 albuterol (PROVENTIL HFA, VENTOLIN HFA, PROAIR HFA) inhaler 2 Puff  2 Puff Inhalation Q4H PRN Rom Herring, NP      
 docusate sodium (COLACE) capsule 100 mg  100 mg Oral BID Rom Herring, NP   Stopped at 32/81/60 7265  
 folic acid (FOLVITE) tablet 1 mg  1 mg Oral DAILY Zapeggy Rom ROSARIO, NP   1 mg at 11/21/20 0801  pravastatin (PRAVACHOL) tablet 20 mg  20 mg Oral QHS peggy Rom ROSARIO, NP   20 mg at 11/21/20 0021  sertraline (ZOLOFT) tablet 50 mg  50 mg Oral DAILY Zapeggy Jan ROSARIO, NP   50 mg at 11/21/20 4660  thiamine mononitrate (B-1) tablet 100 mg  100 mg Oral DAILY [facility] NP   100 mg at 11/21/20 0801  
 [facility] injection 4 mg  4 mg IntraVENous Q6H PRN Rom Herring, ALANNA      
 hydrOXYzine pamoate (VISTARIL) capsule 25 mg  25 mg Oral TID PRN Nevaeh Rom ROSARIO, NP   25 mg at 11/15/20 2217  risperiDONE (RisperDAL) tablet 2 mg  2 mg Oral QHS Nevaeh Rom ROSARIO, NP   2 mg at 11/21/20 0021  chlorhexidine (PERIDEX) 0.12 % mouthwash 15 mL  15 mL Oral Q12H Nevaeh Rom ROSARIO, NP   15 mL at 11/21/20 0801 Allergies Allergen Reactions  Codeine Rash Review of Systems: 
Unable to obtain Objective:  
 
Vitals:  
 11/21/20 0600 11/21/20 0651 11/21/20 0700 11/21/20 8998 BP: (!) 160/119 Pulse: 86 Resp: 25 Temp:   100 °F (37.8 °C) SpO2: 95%   98% Weight:  (!) 224 kg (493 lb 13.3 oz) Height:      
  
 
Physical Exam: 
General: sedated Eyes: sclera anicteric Oral Cavity: ET in place Neck: no JVD Chest: on vent Heart: normal sounds Abdomen: soft and non tender :  vargas Lower Extremities: no edema Skin: no rash Neuro: sedated Assessment:  
 
Hospital Problems  Date Reviewed: 11/15/2020        Codes Class Noted POA * (Principal) Sepsis ([facility] ICD-10-CM: A41.9 ICD-9-CM: 038.9, 995.91  11/16/2020 Yes Hypoglycemia ICD-10-CM: E16.2 ICD-9-CM: 251.2  11/16/2020 Yes Encephalopathy acute ICD-10-CM: G93.40 ICD-9-CM: 348.30  11/16/2020 Yes  
   
 UTI (urinary tract infection) ICD-10-CM: N39.0 ICD-9-CM: 599.0  11/15/2020 Unknown BRE (acute kidney injury) ([facility] ICD-10-CM: N17.9 ICD-9-CM: 584.9  11/15/2020 Unknown AMS (altered mental status) ICD-10-CM: B64.64 
ICD-9-CM: 780.97  11/15/2020 Unknown Plan: #1 severe hypercalcemia.   
-On admission corrected calcium was 14.6. -It has improved down to 8.8. 
-He is currently not on any calcium and vitamin D supplements.  
-ordered calcitonin 4 units/kg twice a day for 4 doses. -I will keep IV fluids (noticed swelling of ext). - intact PTH level < 6.3 and pending serum free light chain ratio. 2.  Acute kidney injury.   
-Likely prerenal from volume depletion and sepsis.   
-On admission creatinine was 2.0.   
-Unknown baseline creatinine.   
-Clinically volume down.   
-On single pressor. -UA is consistent with UTI. - I will keep him on IV fluids.   
-He is currently not on any potential nephrotoxins. 3.  Severe hypokalemia.   
-From low p.o. intake and IV fluids.   
-K 3.0  
-iv KCL ordered 4. Hypernatremia.   
-From free water deficit.   
-Sodium is down 153-->148. on hypotonic IV fluids.   
-I will put on water flushes via NG  
 
5. Altered mental status.   
-Etiology could be sepsis and metabolic encephalopathy.   
-got intubated this am 
-He will need correction of his electrolytes including sodium and calcium.   
-On IV antibiotics. On IV fluids. 6.  Severe sepsis.   
-Possibly from UTI. -ID is on board. On single pressor. On IV antibiotics. 7.  Thrombocytopenia. Platelet count is 90. Heam is on board. Signed By: Lynnette Restrepo MD   
 November 21, 2020

## 2020-11-21 NOTE — PROGRESS NOTES
Hospitalist Progress Note Daily Progress Note: 11/21/2020 
 
69-year-old male sent here from [facility] with reports from staff of altered mental status, lethargy, not eating and dehydration. He has a history of atrial fibrillation in rvr on amio drip. Ua suggestive uti, zosyn initiated 11/15. Beck, cr 2.18, us rp pending. Subjective:  
Patient seen and evaluated at bedside, overnight events noted, patient currently intubated and sedated, received 2 uprbc overnight, currently off of pressors, discussed with RN at bedside. Problem List: 
Problem List as of 11/21/2020 Date Reviewed: 11/15/2020 Codes Class Noted - Resolved * (Principal) Sepsis ([facility] ICD-10-CM: A41.9 ICD-9-CM: 038.9, 995.91  11/16/2020 - Present Hypoglycemia ICD-10-CM: E16.2 ICD-9-CM: 251.2  11/16/2020 - Present Encephalopathy acute ICD-10-CM: G93.40 ICD-9-CM: 348.30  11/16/2020 - Present UTI (urinary tract infection) ICD-10-CM: N39.0 ICD-9-CM: 599.0  11/15/2020 - Present BECK (acute kidney injury) ([facility] ICD-10-CM: N17.9 ICD-9-CM: 584.9  11/15/2020 - Present AMS (altered mental status) ICD-10-CM: G09.00 
ICD-9-CM: 780.97  11/15/2020 - Present Chronic atrial fibrillation (HCC) ICD-10-CM: I48.20 ICD-9-CM: 427.31  8/12/2020 - Present HTN (hypertension), benign ICD-10-CM: I10 
ICD-9-CM: 401.1  8/12/2020 - Present COPD (chronic obstructive pulmonary disease) (HCC) ICD-10-CM: J44.9 ICD-9-CM: 811  8/12/2020 - Present Dyslipidemia ICD-10-CM: E78.5 ICD-9-CM: 272.4  8/12/2020 - Present Depression ICD-10-CM: F32.9 ICD-9-CM: 329  8/12/2020 - Present Cellulitis of left upper extremity ICD-10-CM: L03.114 
ICD-9-CM: 682.3  8/11/2020 - Present Medications reviewed Current Facility-Administered Medications Medication Dose Route Frequency  potassium chloride 10 mEq in 100 ml IVPB  10 mEq IntraVENous Q1H  
 hydrocortisone Sod Succ (PF) (SOLU-CORTEF) injection 50 mg  50 mg IntraVENous Q8H  
 0.45% sodium chloride infusion  100 mL/hr IntraVENous CONTINUOUS  
 pantoprazole (PROTONIX) granules for oral suspension 40 mg  40 mg Per NG tube ACB  
 0.9% sodium chloride infusion 250 mL  250 mL IntraVENous PRN  
 dexmedeTOMidine (PRECEDEX) 400 mcg in 0.9% sodium chloride 104 mL infusion  0.1-1.5 mcg/kg/hr IntraVENous TITRATE  fentaNYL (PF) 1,500 mcg/30 mL (50 mcg/mL) infusion  0-200 mcg/hr IntraVENous TITRATE  albumin human 25% (BUMINATE) solution 25 g  25 g IntraVENous Q6H  
 vasopressin (VASOSTRICT) 40 Units in 0.9% sodium chloride 40 mL infusion  0.04 Units/min IntraVENous CONTINUOUS  
 meropenem (MERREM) 1 g in sterile water (preservative free) 20 mL IV syringe  1 g IntraVENous Q12H  
 0.9% sodium chloride infusion 250 mL  250 mL IntraVENous PRN  
 morphine injection 2 mg  2 mg IntraVENous Q4H PRN  
 0.9% sodium chloride infusion 250 mL  250 mL IntraVENous PRN  
 NOREPINephrine (LEVOPHED) 8 mg in 5% dextrose 250mL (32 mcg/mL) infusion  0.5-16 mcg/min IntraVENous TITRATE  acyclovir (ZOVIRAX) 800 mg in 0.9% sodium chloride 250 mL IVPB  10 mg/kg (Ideal) IntraVENous Q12H  
 0.9% sodium chloride infusion 250 mL  250 mL IntraVENous PRN  
 acetaminophen (TYLENOL) suppository 650 mg  650 mg Rectal Q4H PRN  
 amiodarone (CORDARONE) 900 mg/250 ml D5W infusion  0.5-1 mg/min IntraVENous TITRATE  VANCOMYCIN INFORMATION NOTE 1 Each  1 Each Other Rx Dosing/Monitoring  acetaminophen (TYLENOL) tablet 650 mg  650 mg Oral Q4H PRN  
 albuterol (PROVENTIL HFA, VENTOLIN HFA, PROAIR HFA) inhaler 2 Puff  2 Puff Inhalation Q4H PRN  
 docusate sodium (COLACE) capsule 100 mg  100 mg Oral BID  folic acid (FOLVITE) tablet 1 mg  1 mg Oral DAILY  pravastatin (PRAVACHOL) tablet 20 mg  20 mg Oral QHS  sertraline (ZOLOFT) tablet 50 mg  50 mg Oral DAILY  thiamine mononitrate (B-1) tablet 100 mg  100 mg Oral DAILY  ondansetron (ZOFRAN) injection 4 mg  4 mg IntraVENous Q6H PRN  
 hydrOXYzine pamoate (VISTARIL) capsule 25 mg  25 mg Oral TID PRN  
 risperiDONE (RisperDAL) tablet 2 mg  2 mg Oral QHS  chlorhexidine (PERIDEX) 0.12 % mouthwash 15 mL  15 mL Oral Q12H Review of Systems:  
Unobtainable 2/2 encephalopathic/acute illness Objective:  
Physical Exam:  
 
Visit Vitals /78 (BP 1 Location: Right arm, BP Patient Position: At rest) Pulse 71 Temp 97.4 °F (36.3 °C) Resp 26 Ht 6' 2.02\" (1.88 m) Wt (!) 224 kg (493 lb 13.3 oz) SpO2 98% BMI 63.38 kg/m² O2 Flow Rate (L/min): 0.5 l/min O2 Device: Ventilator, Endotracheal tube Temp (24hrs), Av.9 °F (36.6 °C), Min:97.4 °F (36.3 °C), Max:98.1 °F (36.7 °C) No intake/output data recorded.  1901 -  0700 In: 5796.2 [I.V.:4854.2] Out: 5569 [Urine:2550] Constitutional: He appears well-developed. Obtunded Head: Normocephalic and atraumatic. Eyes: Pupils are equal, round, and reactive to light. Mouth: oral mucosa dry Neck: No thyromegaly present, significant neck stiffness on examination Cardiovascular:Irreg/irreg Lungs:Effort normal with fair ae. No wheeze, not labored Abdominal: Soft. can't tell if tender, moans if touch him anywhere including extremities Musculoskeletal:  
   Comments: Generalized weakness Neurological: obtunded, moves all ext, 
Skin: Right lower shin with ulcer, yellow drainage Sacrum reddened, no breakdown Left arm with skin tear Multiple ecchymotic, skin is friable/thin Data Review:  
   
Recent Days: 
Recent Labs  
  20 
1700 20 
1500 20 
0500  20 
0615 WBC 5.0  --  5.1  --  13.1* HGB 6.8* 6.7* 7.3*   < > 8.9* HCT 21.9* 21.4* 24.5*   < > 28.7*  
PLT 32*  --  35*  --  58*  
 < > = values in this interval not displayed. Recent Labs  
  20 
0600 20 
1810 20 
0500 20 
1045 20 
0615 20 
0400 20 
2130 *  --  144  144  --   --  148* 149*  
K 3.0*  --  4.6  4.6  --   --  3.4* 3.6 *  --  116*  116*  --   --  124* 127* CO2 24  --  19*  19*  --   --  15* 16* *  --  162*  158*  --   --  125* 113* BUN 36*  --  26*  25*  --   --  24* 26* CREA 2.05*  --  2.09*  2.03*  --   --  1.90* 1.95* CA 7.9*  --  8.6  8.6  --  10.0 9.8 9.9 MG  --   --  1.5*  --   --   --   --   
PHOS 1.8*  --  3.4  3.3  --   --  1.5*  --   
ALB 2.9*  --  2.7*  2.7*  2.7*  --   --  1.4* 1.4* TBILI  --   --  1.4*  1.4*  --   --   --  0.6 ALT  --   --  61  62  --   --   --  12 INR  --  1.5*  --  2.0*  --   --   --   
 
Recent Labs  
  11/20/20 
1015 11/20/20 
0515 11/19/20 
1820 PH 7.38 7.422 7.229* PCO2 32* 23* 30* PO2 40* 93 100 HCO3 19* 17* 13* FIO2 21.0 21.0 28.0  
 
 
24 Hour Results: 
Recent Results (from the past 24 hour(s)) ECHO ADULT COMPLETE Collection Time: 11/20/20  9:30 AM  
Result Value Ref Range Pulmonic Regurgitant End Max Velocity 176.00 cm/s AoV PG 12.00 mmHg IVSd 1.02 (A) 0.6 - 1.0 cm LVIDd 4.43 4.2 - 5.9 cm LVIDs 3.04 cm Pulmonic Regurgitant End Max Velocity 67.90 cm/s LVOT Peak Gradient 2.00 mmHg LVPWd 1.07 (A) 0.6 - 1.0 cm  
 LV E' Septal Velocity 13.00 cm/s LV ED Vol A2C 86.90 cm3 BP EF 59.4 55 - 100 % LV ES Vol A2C 28.10 cm3 E/E' septal 8.62 LV Ejection Fraction MOD 2C 31 % TV MG 81.00 mmHg Mitral Regurgitant Velocity Time Integral 186.00 cm Pulmonic Regurgitant End Max Velocity 534.00 cm/s Mitral Valve E Wave Deceleration Time 130.00 ms MV A Oscar 33.30 cm/s  
 MV E Oscar 112.00 cm/s  
 MV E/A 3.36 Pulmonic Regurgitant End Max Velocity 126.00 cm/s Pulmonic Valve Systolic Peak Instantaneous Gradient 6.00 mmHg Est. RA Pressure 15.00 mmHg RVIDd 4.98 cm RVSP 61.00 mmHg Tricuspid Valve Max Velocity 339.00 cm/s Triscuspid Valve Regurgitation Peak Gradient 46.00 mmHg Tapse 1.40 (A) 1.5 - 2.0 cm  
 LV Mass .9 88 - 224 g  LV Mass AL Index 72.4 49 - 115 g/m2 BLOOD GAS, ARTERIAL Collection Time: 11/20/20 10:15 AM  
Result Value Ref Range pH 7.38 7.35 - 7.45    
 PCO2 32 (L) 35 - 45 mmHg PO2 40 (LL) 75 - 100 mmHg O2 SAT 74 (LL) >95 % BICARBONATE 19 (L) 22 - 26 mmol/L  
 BASE DEFICIT 5.6 (H) 0 - 2 mmol/L  
 O2 METHOD VENT    
 FIO2 21.0 % MODE Assist Control/Volume Control Tidal volume 550 SET RATE 26 [address] CULTURE, RESPIRATORY/SPUTUM/BRONCH W GRAM STAIN Collection Time: 11/20/20 12:00 PM  
 Specimen: Sputum Result Value Ref Range Special Requests: No Special Requests GRAM STAIN Few WBCs seen GRAM STAIN No Epithelial cells seen GRAM STAIN Occasional Yeast    
 Culture result: PENDING   
HGB & HCT Collection Time: 11/20/20  3:00 PM  
Result Value Ref Range HGB 6.7 (L) 12.1 - 17.0 g/dL HCT 21.4 (L) 36.6 - 50.3 % LACTIC ACID Collection Time: 11/20/20  3:00 PM  
Result Value Ref Range Lactic acid 4.5 (HH) 0.4 - 2.0 mmol/L  
CBC WITH AUTOMATED DIFF Collection Time: 11/20/20  5:00 PM  
Result Value Ref Range WBC 5.0 4.1 - 11.1 K/uL  
 RBC 2.70 (L) 4.10 - 5.70 M/uL HGB 6.8 (L) 12.1 - 17.0 g/dL HCT 21.9 (L) 36.6 - 50.3 % MCV 81.1 80.0 - 99.0 FL  
 MCH 25.2 (L) 26.0 - 34.0 PG  
 MCHC 31.1 30.0 - 36.5 g/dL  
 RDW 19.2 (H) 11.5 - 14.5 % PLATELET 32 (LL) 413 - 400 K/uL NRBC 2.2 (H) 0  WBC ABSOLUTE NRBC 0.12 (H) 0.00 - 0.01 K/uL NEUTROPHILS 47 32 - 75 % LYMPHOCYTES 46 12 - 49 % MONOCYTES 7 5 - 13 % EOSINOPHILS 0 0 - 7 % BASOPHILS 0 0 - 1 % NRBC 3.0  WBC IMMATURE GRANULOCYTES 0 %  
 ABS. NEUTROPHILS 2.4 1.8 - 8.0 K/UL  
 ABS. LYMPHOCYTES 2.3 0.8 - 3.5 K/UL  
 ABS. MONOCYTES 0.4 0.0 - 1.0 K/UL  
 ABS. EOSINOPHILS 0.0 0.0 - 0.4 K/UL  
 ABS. BASOPHILS 0.0 0.0 - 0.1 K/UL  
 ABSOLUTE NRBC 0.15 K/uL  
 ABS. IMM. GRANS. 0.0 K/UL  
 DF Manual    
 RBC COMMENTS Microcytosis 4+ RBC COMMENTS Rouleaux 2+ RBC COMMENTS Hypochromia 1+ RBC COMMENTS Anisocytosis 4+ PROTHROMBIN TIME + INR Collection Time: 11/20/20  6:10 PM  
Result Value Ref Range Prothrombin time 18.4 (H) 11.9 - 14.7 sec INR 1.5 (H) 0.9 - 1.1 PTT Collection Time: 11/20/20  6:10 PM  
Result Value Ref Range aPTT 49.8 (H) 23.0 - 35.7 sec  
 aPTT, therapeutic range   68 - 109 sec PLATELETS, ALLOCATE Collection Time: 11/20/20  7:45 PM  
Result Value Ref Range Unit number [id number] Blood component type PLTPH LR,1 Unit division 00 Status of unit Issued,final   
 TRANSFUSION STATUS Ok to transfuse TYPE & SCREEN Collection Time: 11/20/20 11:45 PM  
Result Value Ref Range Crossmatch Expiration 11/23/2020,2359 ABO/Rh(D) Hetal Maid Negative Antibody screen Negative Unit number [id number] Blood component type RC LR,1 Unit division 00 Status of unit Issued TRANSFUSION STATUS Ok to transfuse Crossmatch result Compatible LACTIC ACID Collection Time: 11/21/20  6:00 AM  
Result Value Ref Range Lactic acid 2.7 (HH) 0.4 - 2.0 mmol/L  
C REACTIVE PROTEIN, QT Collection Time: 11/21/20  6:00 AM  
Result Value Ref Range C-Reactive protein 10.50 (H) 0.00 - 0.60 mg/dL PROCALCITONIN Collection Time: 11/21/20  6:00 AM  
Result Value Ref Range Procalcitonin 5.42 (H) 0 ng/mL RENAL FUNCTION PANEL Collection Time: 11/21/20  6:00 AM  
Result Value Ref Range Sodium 148 (H) 136 - 145 mmol/L Potassium 3.0 (L) 3.5 - 5.1 mmol/L Chloride 114 (H) 97 - 108 mmol/L  
 CO2 24 21 - 32 mmol/L Anion gap 10 5 - 15 mmol/L Glucose 120 (H) 65 - 100 mg/dL BUN 36 (H) 6 - 20 mg/dL Creatinine 2.05 (H) 0.70 - 1.30 mg/dL BUN/Creatinine ratio 18 12 - 20 GFR est AA 38 (L) >60 ml/min/1.73m2 GFR est non-AA 32 (L) >60 ml/min/1.73m2 Calcium 7.9 (L) 8.5 - 10.1 mg/dL Phosphorus 1.8 (L) 2.6 - 4.7 mg/dL Albumin 2.9 (L) 3.5 - 5.0 g/dL Assessment/  
 Patient Active Problem List  
Diagnosis Code  Cellulitis of left upper extremity L03.114  
 Chronic atrial fibrillation ([facility]) I48.20  
 HTN (hypertension), benign I10  
 COPD (chronic obstructive pulmonary disease) ([facility]) J44.9  Dyslipidemia E78.5  Depression F32.9  
 UTI (urinary tract infection) N39.0  BRE (acute kidney injury) ([facility] N17.9  AMS (altered mental status) R41.82  Sepsis ([facility] A41.9  Hypoglycemia E16.2  
 Encephalopathy acute G93.40 Plan: 
 
Septic shockpatient presented with above-mentioned symptomatology was found to meet severe sepsis criteria secondary to combination of urinary tract infection as well as developing bilateral pneumonia, patient is COVID-19 negative, currently improving, patient been titrated off of levophed 
follow-up blood cultures Follow-up urine cultures Follow-up inflammatory markers Continue vancomycin/Meropenem/Acyclovir for broad spectrum coverage MIVF Infectious disease recommendations appreciated, will continue to follow Critical care recommendations appreciated will continue to follow Acute respiratory failure/pneumoniapatient was found to be in acute respiratory failure requiring emergent endotracheal intubation, likely secondary to developing bilateral pneumonia, patient currently off of pressors Follow-up sputum culture Follow-up blood cultures Continue vancomycin and meropenem for broad-spectrum antibiotic coverage Attempt weaning trial 
Pulmonology consult appreciated, will continue to follow Infectious disease recommendations appreciated, will continue to follow Hypoactive bowel soundscurrently improved on today's exam likely secondary to ileus, patient's abdominal x-ray was negative for any free air, of note patient had 2 bowel movements yesterday Atrial fibrillation with RVRpatient presented with atrial fibrillation with RVR likely secondary to ongoing severe sepsis Continue digoxin at this time Transthoracic echo shows preserved ejection fraction Cardiology consult appreciated, will continue to follow Anemialikely multifactorial, however concern for GI bleed, s/p 2 uprbc overnight with appropriate response, currently hemoglobin hematocrit remained stable Continue protonix 40mg iv bid Continue to trend H/H Maintain active type and screen Follow-up gastroenterology recommendations Follow-up hematology recommendations Hypokalemiareplete K and recheck K Hyperlipidemiacontinue statin ProphylaxisSCDs FENcontinue tube feeding, maintenance IV fluids, replete potassium and magnesium DNR,  
surrogate decision-maker is patient's wife Critical care time spent 50 minutes involving direct patient care reviewing patient's labs and coordination of care with nursing staff Care Plan discussed with: Patient/Family and Nurse Darrel Soto MD

## 2020-11-21 NOTE — PROGRESS NOTES
Updated clinical information sent to [facility]). Patient is still being reviewed for acceptance. SW to continue to follow. DEVIN Wagner

## 2020-11-21 NOTE — PROGRESS NOTES
Hematology Oncology Progress Note Interval History :  
He remians intubated. No bleeding reported by RN. Off pressors. Lactate is high at 6. Planned to do ct scan of the abodmen and pelvis. On sedation. Subjective: He remains unresponsive  On pressors and intubated. Current Facility-Administered Medications Medication Dose Route Frequency  [START ON 11/21/2020] VANCOMYCIN RANDOM LAB REMINDER   Other ONCE  hydrocortisone Sod Succ (PF) (SOLU-CORTEF) injection 50 mg  50 mg IntraVENous Q8H  
 0.45% sodium chloride infusion  100 mL/hr IntraVENous CONTINUOUS  
 [START ON 11/21/2020] pantoprazole (PROTONIX) granules for oral suspension 40 mg  40 mg Per NG tube ACB  dexmedeTOMidine (PRECEDEX) 400 mcg in 0.9% sodium chloride 104 mL infusion  0.1-1.5 mcg/kg/hr IntraVENous TITRATE  fentaNYL (PF) 1,500 mcg/30 mL (50 mcg/mL) infusion  0-200 mcg/hr IntraVENous TITRATE  albumin human 25% (BUMINATE) solution 25 g  25 g IntraVENous Q6H  
 vasopressin (VASOSTRICT) 40 Units in 0.9% sodium chloride 40 mL infusion  0.04 Units/min IntraVENous CONTINUOUS  
 meropenem (MERREM) 1 g in sterile water (preservative free) 20 mL IV syringe  1 g IntraVENous Q12H  
 0.9% sodium chloride infusion 250 mL  250 mL IntraVENous PRN  
 morphine injection 2 mg  2 mg IntraVENous Q4H PRN  
 calciTONIN (MIACALCIN) injection 370 Int'l Units  4 Int'l Units/kg SubCUTAneous Q12H  
 0.9% sodium chloride infusion 250 mL  250 mL IntraVENous PRN  
 NOREPINephrine (LEVOPHED) 8 mg in 5% dextrose 250mL (32 mcg/mL) infusion  0.5-16 mcg/min IntraVENous TITRATE  acyclovir (ZOVIRAX) 800 mg in 0.9% sodium chloride 250 mL IVPB  10 mg/kg (Ideal) IntraVENous Q12H  
 0.9% sodium chloride infusion 250 mL  250 mL IntraVENous PRN  
 acetaminophen (TYLENOL) suppository 650 mg  650 mg Rectal Q4H PRN  
 amiodarone (CORDARONE) 900 mg/250 ml D5W infusion  0.5-1 mg/min IntraVENous TITRATE  VANCOMYCIN INFORMATION NOTE 1 Each  1 Each Other Rx Dosing/Monitoring  acetaminophen (TYLENOL) tablet 650 mg  650 mg Oral Q4H PRN  
 albuterol (PROVENTIL HFA, VENTOLIN HFA, PROAIR HFA) inhaler 2 Puff  2 Puff Inhalation Q4H PRN  
 docusate sodium (COLACE) capsule 100 mg  100 mg Oral BID  folic acid (FOLVITE) tablet 1 mg  1 mg Oral DAILY  pravastatin (PRAVACHOL) tablet 20 mg  20 mg Oral QHS  sertraline (ZOLOFT) tablet 50 mg  50 mg Oral DAILY  thiamine mononitrate (B-1) tablet 100 mg  100 mg Oral DAILY  ondansetron (ZOFRAN) injection 4 mg  4 mg IntraVENous Q6H PRN  
 hydrOXYzine pamoate (VISTARIL) capsule 25 mg  25 mg Oral TID PRN  
 risperiDONE (RisperDAL) tablet 2 mg  2 mg Oral QHS  chlorhexidine (PERIDEX) 0.12 % mouthwash 15 mL  15 mL Oral Q12H Review of Systems: not obtainable due to AMS. Objective:  
 
Patient Vitals for the past 8 hrs: 
 BP Temp Pulse Resp SpO2  
20 1924 (!) 126/58 98.1 °F (36.7 °C) 64 26 98 % 20 1800 (!) 115/53  85 26 96 % 20 1729   63 26 99 % 20 1700 (!) 116/50  65 26 98 % 20 1600 (!) 117/51  65 26 97 % 20 1500 (!) 125/59  72 26 97 % 20 1400 (!) 103/47  69 26 97 % 20 1300 (!) 99/54  68 26 99 % 20 1200 (!) 110/57  61 26 98 % Temp (24hrs), Av.2 °F (37.3 °C), Min:97.8 °F (36.6 °C), Max:100.2 °F (37.9 °C) Physical Exam: 
constitutional Elderly white male , with altered mental status, Well nourished. Well developed. Head Normocephalic; no scars Eyes Conjunctivae and sclerae are clear and without icterus. Pupils are reactive and equal.  
ENMT ET tube is present. .  
Neck Supple without masses or thyromegaly. No jugular venous distension. Hematologic/Lymphatic No petechiae or purpura. No tender or palpable lymph nodes in the cervical, supraclavicular, axillary or inguinal area.   
Respiratory Lungs are clear to auscultation without rhonchi or wheezing. Cardiovascular Regular rate and rhythm of heart without murmurs, gallops or rubs. Chest / Line Site Chest is symmetric with no chest wall deformities. Abdomen Non-tender, non-distended, no masses, ascites or hepatosplenomegaly. Good bowel sounds. No guarding or rebound tenderness. No pulsatile masses. Musculoskeletal No tenderness or swelling, normal range of motion without obvious weakness. Extremities No visible deformities, no cyanosis, clubbing or edema. Skin No rashes, scars, or lesions suggestive of malignancy. No petechiae, purpura, or ecchymoses. No excoriations. Neurologic Altered mental status. [facility] AMS>  
 
 
 
 
Lab/Data Review: 
Recent Labs  
  11/20/20 
1700 11/20/20 
1500 11/20/20 
0500  11/19/20 
0615 WBC 5.0  --  5.1  --  13.1* HGB 6.8* 6.7* 7.3*   < > 8.9* HCT 21.9* 21.4* 24.5*   < > 28.7*  
PLT 32*  --  35*  --  58*  
 < > = values in this interval not displayed. Recent Labs  
  11/20/20 
0500 11/19/20 
1045 11/19/20 
0615 11/19/20 
0400 11/18/20 
2130 11/18/20 
0530  11/17/20 
2130   144  --   --  148* 149* 153*  --  154* K 4.6  4.6  --   --  3.4* 3.6 3.1*  --  3.3*  
*  116*  --   --  124* 127* 125*  --  126* CO2 19*  19*  --   --  15* 16* 17*  --  16* *  158*  --   --  125* 113* 114*  --  75 BUN 26*  25*  --   --  24* 26* 32*  --  36* CREA 2.09*  2.03*  --   --  1.90* 1.95* 2.04*  --  1.97* CA 8.6  8.6  --  10.0 9.8 9.9 10.4*  --  10.7* MG 1.5*  --   --   --   --   --   --  1.9 PHOS 3.4  3.3  --   --  1.5*  --   --   --   --   
ALB 2.7*  2.7*  2.7*  --   --  1.4* 1.4* 1.6*   < >  --   
TBILI 1.4*  1.4*  --   --   --  0.6 0.6  --   --   
ALT 61  62  --   --   --  12 13  --   --   
INR  --  2.0*  --   --   --   --   --  1.9*  
 < > = values in this interval not displayed. Recent Labs  
  11/20/20 
1015 11/20/20 
0515 11/19/20 
1820 PH 7.38 7.422 7.229* PCO2 32* 23* 30* PO2 40* 93 100 HCO3 19* 17* 13* FIO2 21.0 21.0 28.0 Radiology: Xr Abd Flat/ Erect Result Date: 11/20/2020 IMPRESSION: No evidence of free air. No acute appearing findings. Ct Head Wo Cont Result Date: 11/15/2020 IMPRESSION: Chronic findings as above. Us Retroperitoneum Comp Result Date: 11/17/2020 Impression: Normal exam  
 
Ir Us Guided Vascular Access Result Date: 11/17/2020 Impression: Successful placement of a triple-lumen central venous catheter. The catheter is ready for use. Xr Chest [facility] Result Date: 11/20/2020 IMPRESSION: Developing bilateral perihilar and infrahilar opacities, with differential diagnosis including pneumonia, atelectasis, and edema. Short-term radiographic follow-up recommended. Xr Chest [facility] Result Date: 11/17/2020 Impression: Successful placement of a triple-lumen central venous catheter. The catheter is ready for use. Xr Chest [facility] Result Date: 11/17/2020 Impression: The cardiomediastinal silhouette is appropriate for age, technique, and lung expansion. Pulmonary vasculature is not congested. The lungs are essentially clear. No effusion or pneumothorax is seen. Xr Chest [facility] Result Date: 11/15/2020 IMPRESSION:  No evidence of an acute cardiopulmonary process. Ir Insert Non Tunl Cvc Over 5 Yrs Result Date: 11/17/2020 Impression: Successful placement of a triple-lumen central venous catheter. The catheter is ready for use. Assessment /Plan:  
 
Principal Problem: 
  Sepsis ([address] (11/16/2020) Active Problems: 
  UTI (urinary tract infection) (11/15/2020) BRE (acute kidney injury) ([address] (11/15/2020) AMS (altered mental status) (11/15/2020) Hypoglycemia (11/16/2020) Encephalopathy acute (11/16/2020) 1) 67 yr old male admitted with AMS. Change in mental status likely due to UTI, sepsis, hypercalcemia , hypernatremia and renal failure.  
-ct head did not show any acute pathology.  
-Being eval by neurology. Likely metabolic encephalapathy from sepsis. -now intubated and sedated. 2) ESBL E.coli  UTI. On abx. Id seeing the patient.  
  
2) Severe anemia: This is normocytic anemia. Both have decreased since admission. Likely related to sepsis. -In the setting hypercalcemia and renal fialure need to r/o Multiple myeloma. -check labs for myeloma.  
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  
-check IRON studies, b12 and folate levels. LDH ,r gonzales and haptoglobin- pending. _Seen by GI for bleeding.  
  
Received 2 utnis of transfusion with good response.  
-transfuse for hb <7.5gm/dl. -appears to be bleeding/? Occult 2) Thrombocytopenia: platelets dropped to 58k today. -Transfuse for platelet count <51C or for bleeding. REvewied peripheral blood smear shows neutrophils with increased granulation, bands suggestive of infection. RBC appear normal. NO evidence of increased schistocytes . Platelets decreased. No abnormal cells seen. -Recommend tranfusion of  1 unit of platelets due to concern for bleeding. 4) Coagulapathy: Prolonged PT/PTT. Fibrinogen is normal.  
-likely early DIC S/p 2  unit of FFP  
-vitamin K 10mg po daily. Repeat coags in am 
  
3) Hypercalcemia: etiology not very clear. High on admission close to 14 Improved with hydration and calcitonin. Normal now. .  
-check for myeloma.  
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  
-check ct scan of the chest abdomen and pelvis without contrat to eval for any solid tumor leison /bone mets. - unstable to go for ct scans. -psa is normal . 
-check cea and ca 19-9/  
  
D/w wife at bedside.  
  
  
Signed By: Bubba Swenson MD   
  November 18, 2020 Bubba Swenson MD 
11/20/2020

## 2020-11-21 NOTE — PROGRESS NOTES
Dr. Montana Hardeman notified of PLT count 42. No orders received at this time. Will continue to monitor.

## 2020-11-21 NOTE — PROGRESS NOTES
0715-Bedside and Verbal shift change report given to Isha Mcnulty RN (oncoming nurse) by Kay Finley RN (offgoing nurse). Report included the following information SBAR, Kardex, Intake/Output, MAR and Recent Results. Reab Garcia RN, CCRN

## 2020-11-21 NOTE — PROGRESS NOTES
Hematology Oncology Progress Note Interval History :  
He remians intubated. No bleeding reported by RN. Off pressors. Lactate is improving. He required packed red blood cells and platelets last night. No other major events. FiO2 requirements have come down. Subjective: He remains unresponsive   intubated. Wife is at bedside Current Facility-Administered Medications Medication Dose Route Frequency  potassium chloride 10 mEq in 100 ml IVPB  10 mEq IntraVENous Q1H  
 hydrocortisone Sod Succ (PF) (SOLU-CORTEF) injection 50 mg  50 mg IntraVENous Q8H  
 0.45% sodium chloride infusion  100 mL/hr IntraVENous CONTINUOUS  
 pantoprazole (PROTONIX) granules for oral suspension 40 mg  40 mg Per NG tube ACB  
 0.9% sodium chloride infusion 250 mL  250 mL IntraVENous PRN  
 dexmedeTOMidine (PRECEDEX) 400 mcg in 0.9% sodium chloride 104 mL infusion  0.1-1.5 mcg/kg/hr IntraVENous TITRATE  fentaNYL (PF) 1,500 mcg/30 mL (50 mcg/mL) infusion  0-200 mcg/hr IntraVENous TITRATE  vasopressin (VASOSTRICT) 40 Units in 0.9% sodium chloride 40 mL infusion  0.04 Units/min IntraVENous CONTINUOUS  
 meropenem (MERREM) 1 g in sterile water (preservative free) 20 mL IV syringe  1 g IntraVENous Q12H  
 0.9% sodium chloride infusion 250 mL  250 mL IntraVENous PRN  
 morphine injection 2 mg  2 mg IntraVENous Q4H PRN  
 0.9% sodium chloride infusion 250 mL  250 mL IntraVENous PRN  
 NOREPINephrine (LEVOPHED) 8 mg in 5% dextrose 250mL (32 mcg/mL) infusion  0.5-16 mcg/min IntraVENous TITRATE  
 0.9% sodium chloride infusion 250 mL  250 mL IntraVENous PRN  
 acetaminophen (TYLENOL) suppository 650 mg  650 mg Rectal Q4H PRN  
 amiodarone (CORDARONE) 900 mg/250 ml D5W infusion  0.5-1 mg/min IntraVENous TITRATE  acetaminophen (TYLENOL) tablet 650 mg  650 mg Oral Q4H PRN  
 albuterol (PROVENTIL HFA, VENTOLIN HFA, PROAIR HFA) inhaler 2 Puff  2 Puff Inhalation Q4H PRN  
 docusate sodium (COLACE) capsule 100 mg 100 mg Oral BID  folic acid (FOLVITE) tablet 1 mg  1 mg Oral DAILY  pravastatin (PRAVACHOL) tablet 20 mg  20 mg Oral QHS  sertraline (ZOLOFT) tablet 50 mg  50 mg Oral DAILY  thiamine mononitrate (B-1) tablet 100 mg  100 mg Oral DAILY  ondansetron (ZOFRAN) injection 4 mg  4 mg IntraVENous Q6H PRN  
 hydrOXYzine pamoate (VISTARIL) capsule 25 mg  25 mg Oral TID PRN  
 risperiDONE (RisperDAL) tablet 2 mg  2 mg Oral QHS  chlorhexidine (PERIDEX) 0.12 % mouthwash 15 mL  15 mL Oral Q12H Review of Systems: not obtainable due to AMS. Objective:  
 
Patient Vitals for the past 8 hrs: 
 BP Temp Pulse Resp SpO2  
20 1534     95 % 20 1200 132/69  95 26 97 % 20 1132     95 % 20 1100 128/74 98 °F (36.7 °C) 87 26 96 % 20 1000 121/66  80 25 96 % 20 0900 130/74  80 26 95 % 20 0800 124/65  74 24 98 % Temp (24hrs), Av.1 °F (36.7 °C), Min:97.4 °F (36.3 °C), Max:100 °F (37.8 °C) Physical Exam: 
constitutional Elderly white male , with altered mental status, Well nourished. Well developed. Head Normocephalic; no scars Eyes Conjunctivae and sclerae are clear and without icterus. Pupils are reactive and equal.  
ENMT ET tube is present. .  
Neck Supple without masses or thyromegaly. No jugular venous distension. Hematologic/Lymphatic No petechiae or purpura. No tender or palpable lymph nodes in the cervical, supraclavicular, axillary or inguinal area. Respiratory Lungs are clear to auscultation without rhonchi or wheezing. Cardiovascular Regular rate and rhythm of heart without murmurs, gallops or rubs. Chest / Line Site Chest is symmetric with no chest wall deformities. Abdomen Non-tender, non-distended, no masses, ascites or hepatosplenomegaly. Good bowel sounds. No guarding or rebound tenderness. No pulsatile masses. Musculoskeletal No tenderness or swelling, normal range of motion without obvious weakness. Extremities No visible deformities, no cyanosis, clubbing or edema. Skin No rashes, scars, or lesions suggestive of malignancy. No petechiae, purpura, or ecchymoses. No excoriations. Neurologic Altered mental status. Edmundo Cartagena [facility] AMS>  
 
 
 
 
Lab/Data Review: 
Recent Labs  
  11/21/20 
0600 11/20/20 
1700 11/20/20 
1500 11/20/20 
0500 WBC 5.4 5.0  --  5.1 HGB 8.8* 6.8* 6.7* 7.3* HCT 27.8* 21.9* 21.4* 24.5*  
PLT 41* 32*  --  35* Recent Labs  
  11/21/20 
0600 11/20/20 
1810 11/20/20 
0500 11/19/20 
1045 11/19/20 
0615 11/19/20 
0400 11/18/20 
2130 *  --  144  144  --   --  148* 149*  
K 3.0*  --  4.6  4.6  --   --  3.4* 3.6 *  --  116*  116*  --   --  124* 127* CO2 24  --  19*  19*  --   --  15* 16* *  --  162*  158*  --   --  125* 113* BUN 36*  --  26*  25*  --   --  24* 26* CREA 2.05*  --  2.09*  2.03*  --   --  1.90* 1.95* CA 7.9*  --  8.6  8.6  --  10.0 9.8 9.9 MG  --   --  1.5*  --   --   --   --   
PHOS 1.8*  --  3.4  3.3  --   --  1.5*  --   
ALB 2.8*  2.9*  --  2.7*  2.7*  2.7*  --   --  1.4* 1.4* TBILI 1.1*  --  1.4*  1.4*  --   --   --  0.6 ALT 47  --  61  62  --   --   --  12 INR  --  1.5*  --  2.0*  --   --   --   
 
Recent Labs  
  11/21/20 
1115 11/20/20 
1015 11/20/20 
0515 PH 7.482* 7.38 7.422 PCO2 9* 32* 23* PO2 162* 40* 93 HCO3 12* 19* 17* FIO2 21.0 21.0 21.0 Radiology: Xr Abd Flat/ Erect Result Date: 11/20/2020 IMPRESSION: No evidence of free air. No acute appearing findings. Ct Head Wo Cont Result Date: 11/15/2020 IMPRESSION: Chronic findings as above. Us Retroperitoneum Comp Result Date: 11/17/2020 Impression: Normal exam  
 
Ir Us Guided Vascular Access Result Date: 11/17/2020 Impression: Successful placement of a triple-lumen central venous catheter. The catheter is ready for use. Xr Chest [facility] Result Date: 11/21/2020 Findings/impression: Stable support hardware.  Slight interval increase of patchy right basilar airspace disease. Small pleural effusions. No evidence of pneumothorax. Cardiomediastinal contours are stable. Increasing central vascular congestion. Visualized osseous structures are unchanged. Xr Chest [facility] Result Date: 11/20/2020 IMPRESSION: Developing bilateral perihilar and infrahilar opacities, with differential diagnosis including pneumonia, atelectasis, and edema. Short-term radiographic follow-up recommended. Xr Chest [facility] Result Date: 11/17/2020 Impression: Successful placement of a triple-lumen central venous catheter. The catheter is ready for use. Xr Chest [facility] Result Date: 11/17/2020 Impression: The cardiomediastinal silhouette is appropriate for age, technique, and lung expansion. Pulmonary vasculature is not congested. The lungs are essentially clear. No effusion or pneumothorax is seen. Xr Chest [facility] Result Date: 11/15/2020 IMPRESSION:  No evidence of an acute cardiopulmonary process. Ir Insert Non Tunl Cvc Over 5 Yrs Result Date: 11/17/2020 Impression: Successful placement of a triple-lumen central venous catheter. The catheter is ready for use. Assessment /Plan:  
 
 
1) 67 yr old male admitted with AMS. Change in mental status likely due to UTI, sepsis, hypercalcemia , hypernatremia and renal failure.  
-ct head did not show any acute pathology.  
-Being eval by neurology. Likely metabolic encephalapathy from sepsis. -now intubated and sedated.   
2) ESBL E.coli  UTI. On abx. Id seeing the patient.  
  
2) Severe anemia: This is normocytic anemia. Both have decreased since admission. Likely related to sepsis. -In the setting hypercalcemia and renal fialure need to r/o Multiple myeloma. -check labs for myeloma.  
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  
-check IRON studies, b12 and folate levels. LDH ,r gonzales and haptoglobin- pending.   
_Seen by GI for bleeding.  
  Received 2 more units of transfusion last night with good response. Hemoglobin improved to 8.8 g 
-transfuse for hb <7.5gm/dl. -appears to be bleeding/? Occult   
2) Thrombocytopenia: Patient required a unit of platelets last night. Platelet count improved to 41,000 today 
-Transfuse for platelet count <10A or for bleeding. REvewied peripheral blood smear shows neutrophils with increased granulation, bands suggestive of infection. RBC appear normal. NO evidence of increased schistocytes . Platelets decreased. No abnormal cells seen. -Recommend tranfusion of  1 unit of platelets due to concern for bleeding.   
4) Coagulapathy: Prolonged PT/PTT. Fibrinogen is normal.  
-likely early DIC S/p 2  unit of FFP  
-vitamin K 10mg po daily. Coags are improving slightly INR down to 1.5 3) Hypercalcemia: etiology not very clear. High on admission close to 14 Improved with hydration and calcitonin. Normal now. .  
-check for myeloma.  
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  
-check ct scan of the chest abdomen and pelvis without contrat to eval for any solid tumor leison /bone mets. - unstable to go for ct scans. -psa is normal . 
-check cea and ca 19-9/ -results are pending 
  
D/w wife at bedside. Basim Bermudez MD 
11/21/2020

## 2020-11-22 NOTE — PROGRESS NOTES
Hematology Oncology Progress Note Interval History :  
No major events overninght. He remians intubated. No bleeding reported by RN. Off pressors. Lactate is improving. Subjective: He remains unresponsive   intubated. Wife is at bedside Current Facility-Administered Medications Medication Dose Route Frequency  hydrocortisone Sod Succ (PF) (SOLU-CORTEF) injection 50 mg  50 mg IntraVENous Q8H  
 0.45% sodium chloride infusion  100 mL/hr IntraVENous CONTINUOUS  
 pantoprazole (PROTONIX) granules for oral suspension 40 mg  40 mg Per NG tube ACB  
 0.9% sodium chloride infusion 250 mL  250 mL IntraVENous PRN  
 dexmedeTOMidine (PRECEDEX) 400 mcg in 0.9% sodium chloride 104 mL infusion  0.1-1.5 mcg/kg/hr IntraVENous TITRATE  fentaNYL (PF) 1,500 mcg/30 mL (50 mcg/mL) infusion  0-200 mcg/hr IntraVENous TITRATE  vasopressin (VASOSTRICT) 40 Units in 0.9% sodium chloride 40 mL infusion  0.04 Units/min IntraVENous CONTINUOUS  
 meropenem (MERREM) 1 g in sterile water (preservative free) 20 mL IV syringe  1 g IntraVENous Q12H  
 0.9% sodium chloride infusion 250 mL  250 mL IntraVENous PRN  
 morphine injection 2 mg  2 mg IntraVENous Q4H PRN  
 0.9% sodium chloride infusion 250 mL  250 mL IntraVENous PRN  
 NOREPINephrine (LEVOPHED) 8 mg in 5% dextrose 250mL (32 mcg/mL) infusion  0.5-16 mcg/min IntraVENous TITRATE  
 0.9% sodium chloride infusion 250 mL  250 mL IntraVENous PRN  
 acetaminophen (TYLENOL) suppository 650 mg  650 mg Rectal Q4H PRN  
 amiodarone (CORDARONE) 900 mg/250 ml D5W infusion  0.5-1 mg/min IntraVENous TITRATE  acetaminophen (TYLENOL) tablet 650 mg  650 mg Oral Q4H PRN  
 albuterol (PROVENTIL HFA, VENTOLIN HFA, PROAIR HFA) inhaler 2 Puff  2 Puff Inhalation Q4H PRN  
 docusate sodium (COLACE) capsule 100 mg  100 mg Oral BID  folic acid (FOLVITE) tablet 1 mg  1 mg Oral DAILY  pravastatin (PRAVACHOL) tablet 20 mg  20 mg Oral QHS  sertraline (ZOLOFT) tablet 50 mg  50 mg Oral DAILY  thiamine mononitrate (B-1) tablet 100 mg  100 mg Oral DAILY  ondansetron (ZOFRAN) injection 4 mg  4 mg IntraVENous Q6H PRN  
 hydrOXYzine pamoate (VISTARIL) capsule 25 mg  25 mg Oral TID PRN  
 risperiDONE (RisperDAL) tablet 2 mg  2 mg Oral QHS  chlorhexidine (PERIDEX) 0.12 % mouthwash 15 mL  15 mL Oral Q12H Review of Systems: not obtainable due to AMS. Objective:  
 
Patient Vitals for the past 8 hrs: 
 BP Temp Pulse Resp SpO2  
20 1200 137/74  (!) 106 14 97 % 20 1114   (!) 123 24 96 % 20 1107 93/77  95 20 97 % 20 1100  98.6 °F (37 °C)     
20 1000 (!) 142/71  (!) 113 19 98 % 20 0900 134/65  90 19 96 % 20 0800 135/82  (!) 101 19 96 % Temp (24hrs), Av.4 °F (36.9 °C), Min:97.5 °F (36.4 °C), Max:100 °F (37.8 °C) Physical Exam: 
constitutional Elderly white male , with altered mental status, Well nourished. Well developed. Head Normocephalic; no scars Eyes Conjunctivae and sclerae are clear and without icterus. Pupils are reactive and equal.  
ENMT ET tube is present. .  
Neck Supple without masses or thyromegaly. No jugular venous distension. Hematologic/Lymphatic No petechiae or purpura. No tender or palpable lymph nodes in the cervical, supraclavicular, axillary or inguinal area. Respiratory Lungs are clear to auscultation without rhonchi or wheezing. Cardiovascular Regular rate and rhythm of heart without murmurs, gallops or rubs. Chest / Line Site Chest is symmetric with no chest wall deformities. Abdomen Non-tender, non-distended, no masses, ascites or hepatosplenomegaly. Good bowel sounds. No guarding or rebound tenderness. No pulsatile masses. Musculoskeletal No tenderness or swelling, normal range of motion without obvious weakness. Extremities Positive for leg edema. Skin No rashes, scars, or lesions suggestive of malignancy. No petechiae, purpura, or ecchymoses.  No excoriations. Neurologic Altered mental status. [facility] AMS>  
 
 
 
 
Lab/Data Review: 
Recent Labs  
  11/22/20 
0350 11/21/20 
0600 11/20/20 
1700 WBC 4.8 5.4 5.0 HGB 9.7* 8.8* 6.8* HCT 30.5* 27.8* 21.9*  
PLT 47* 41* 32* Recent Labs  
  11/22/20 
0350 11/21/20 
0600 11/20/20 
1810 11/20/20 
0500  148*  --  144  144  
K 3.7 3.0*  --  4.6  4.6 * 114*  --  116*  116* CO2 24 24  --  19*  19* * 120*  --  162*  158* BUN 39* 36*  --  26*  25* CREA 1.86* 2.05*  --  2.09*  2.03* CA 7.4* 7.9*  --  8.6  8.6 MG  --   --   --  1.5* PHOS  --  1.8*  --  3.4  3.3 ALB  --  2.8*  2.9*  --  2.7*  2.7*  2.7* TBILI  --  1.1*  --  1.4*  1.4* ALT  --  47  --  61  62 INR  --   --  1.5*  --   
 
Recent Labs  
  11/22/20 
0340 11/21/20 
1115 11/20/20 
1015 PH 7.447 7.482* 7.38  
PCO2 31* 9* 32* PO2 140* 162* 40* HCO3 23 12* 19* FIO2 21.0 21.0 21.0 Radiology: Xr Abd Flat/ Erect Result Date: 11/20/2020 IMPRESSION: No evidence of free air. No acute appearing findings. Ct Head Wo Cont Result Date: 11/15/2020 IMPRESSION: Chronic findings as above. Us Retroperitoneum Comp Result Date: 11/17/2020 Impression: Normal exam  
 
Ir Us Guided Vascular Access Result Date: 11/17/2020 Impression: Successful placement of a triple-lumen central venous catheter. The catheter is ready for use. Xr Chest [facility] Result Date: 11/22/2020 Impression: Little interval change. Xr Chest [facility] Result Date: 11/21/2020 Findings/impression: Stable support hardware. Slight interval increase of patchy right basilar airspace disease. Small pleural effusions. No evidence of pneumothorax. Cardiomediastinal contours are stable. Increasing central vascular congestion. Visualized osseous structures are unchanged. Xr Chest [facility] Result Date: 11/20/2020 IMPRESSION: Developing bilateral perihilar and infrahilar opacities, with differential diagnosis including pneumonia, atelectasis, and edema. Short-term radiographic follow-up recommended. Xr Chest [facility] Result Date: 11/17/2020 Impression: Successful placement of a triple-lumen central venous catheter. The catheter is ready for use. Xr Chest [facility] Result Date: 11/17/2020 Impression: The cardiomediastinal silhouette is appropriate for age, technique, and lung expansion. Pulmonary vasculature is not congested. The lungs are essentially clear. No effusion or pneumothorax is seen. Xr Chest [facility] Result Date: 11/15/2020 IMPRESSION:  No evidence of an acute cardiopulmonary process. Ir Insert Non Tunl Cvc Over 5 Yrs Result Date: 11/17/2020 Impression: Successful placement of a triple-lumen central venous catheter. The catheter is ready for use. Assessment /Plan:  
 
 
1) 67 yr old male admitted with AMS. Change in mental status likely due to UTI, sepsis, hypercalcemia , hypernatremia and renal failure.  
-ct head did not show any acute pathology.  
-Being eval by neurology. Likely metabolic encephalapathy from sepsis. -now intubated and sedated.   
2) ESBL E.coli  UTI. On abx. Id seeing the patient.  
  
2) Severe anemia: This is normocytic anemia. Both have decreased since admission. Likely related to sepsis. -In the setting hypercalcemia and renal fialure need to r/o Multiple myeloma. -check labs for myeloma.  
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 _Seen by GI for bleeding.  
  
Received 2 more units of transfusion last night with good response. Hemoglobin improved to 9.7gm/dl today.  
-transfuse for hb <7.5gm/dl. -appears to be bleeding/? Occult   
2) Thrombocytopenia: Patient required a unit of platelets last night. Platelet count improved to 47,000 today 
-Transfuse for platelet count <45I or for bleeding. REvewied peripheral blood smear shows neutrophils with increased granulation, bands suggestive of infection.  RBC appear normal. NO evidence of increased schistocytes . Platelets decreased. No abnormal cells seen. -Recommend tranfusion of  1 unit of platelets due to concern for bleeding.   
4) Coagulapathy: Prolonged PT/PTT. Fibrinogen is normal.  
-likely early DIC S/p 2  unit of FFP  
-vitamin K 10mg po daily. Coags are improving slightly INR down to 1.5 3) Hypercalcemia: etiology not very clear. High on admission close to 14 Improved with hydration and calcitonin. Normal now. .  
-check for myeloma.  
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  
-check ct scan of the chest abdomen and pelvis without contrat to eval for any solid tumor leison /bone mets. - unstable to go for ct scans. -psa is normal . 
-check cea and ca 19-9/ -results are pending 
  
D/w wife at bedside. Tramaine Vora MD 
11/22/2020

## 2020-11-22 NOTE — PROGRESS NOTES
0330- A line removed as waveform dampened and no longer accurate in readings. Manual pressure held in excess of 35 minutes d/t coag issues. Quick Clot and pressure dressing applied once hemostasis achieved. 0700-Bedside and Verbal shift change report given to Charanjit Vaughn RN (oncoming nurse) by Higinio Gregg RN (offgoing nurse). Report included the following information SBAR, Kardex, Intake/Output, MAR and Recent Results. Debbie Garcia RN, CCRN

## 2020-11-22 NOTE — PROGRESS NOTES
Hospitalist Progress Note Daily Progress Note: 11/22/2020 
 
71-year-old male sent here from [facility] with reports from staff of altered mental status, lethargy, not eating and dehydration. He has a history of atrial fibrillation in rvr on amio drip. Ua suggestive uti, zosyn initiated 11/15. Beck, cr 2.18, us rp pending. Subjective:  
Patient seen and evaluated at bedside, overnight events noted, patient currently intubated responding to painful/verbal stimuli, currently off of pressors, discussed with RN at bedside. Problem List: 
Problem List as of 11/22/2020 Date Reviewed: 11/15/2020 Codes Class Noted - Resolved * (Principal) Sepsis ([place]) ICD-10-CM: A41.9 ICD-9-CM: 038.9, 995.91  11/16/2020 - Present Hypoglycemia ICD-10-CM: E16.2 ICD-9-CM: 251.2  11/16/2020 - Present Encephalopathy acute ICD-10-CM: G93.40 ICD-9-CM: 348.30  11/16/2020 - Present UTI (urinary tract infection) ICD-10-CM: N39.0 ICD-9-CM: 599.0  11/15/2020 - Present BECK (acute kidney injury) ([place]) ICD-10-CM: N17.9 ICD-9-CM: 584.9  11/15/2020 - Present AMS (altered mental status) ICD-10-CM: Z73.32 
ICD-9-CM: 780.97  11/15/2020 - Present Chronic atrial fibrillation (HCC) ICD-10-CM: I48.20 ICD-9-CM: 427.31  8/12/2020 - Present HTN (hypertension), benign ICD-10-CM: I10 
ICD-9-CM: 401.1  8/12/2020 - Present COPD (chronic obstructive pulmonary disease) (HCC) ICD-10-CM: J44.9 ICD-9-CM: 306  8/12/2020 - Present Dyslipidemia ICD-10-CM: E78.5 ICD-9-CM: 272.4  8/12/2020 - Present Depression ICD-10-CM: F32.9 ICD-9-CM: 625  8/12/2020 - Present Cellulitis of left upper extremity ICD-10-CM: L03.114 
ICD-9-CM: 682.3  8/11/2020 - Present Medications reviewed Current Facility-Administered Medications Medication Dose Route Frequency  potassium chloride 10 mEq in 100 ml IVPB  10 mEq IntraVENous Q1H  
 hydrocortisone Sod Succ (PF) (SOLU-CORTEF) injection 50 mg  50 mg IntraVENous Q8H  
 0.45% sodium chloride infusion  100 mL/hr IntraVENous CONTINUOUS  
 pantoprazole (PROTONIX) granules for oral suspension 40 mg  40 mg Per NG tube ACB  
 0.9% sodium chloride infusion 250 mL  250 mL IntraVENous PRN  
 dexmedeTOMidine (PRECEDEX) 400 mcg in 0.9% sodium chloride 104 mL infusion  0.1-1.5 mcg/kg/hr IntraVENous TITRATE  fentaNYL (PF) 1,500 mcg/30 mL (50 mcg/mL) infusion  0-200 mcg/hr IntraVENous TITRATE  vasopressin (VASOSTRICT) 40 Units in 0.9% sodium chloride 40 mL infusion  0.04 Units/min IntraVENous CONTINUOUS  
 meropenem (MERREM) 1 g in sterile water (preservative free) 20 mL IV syringe  1 g IntraVENous Q12H  
 0.9% sodium chloride infusion 250 mL  250 mL IntraVENous PRN  
 morphine injection 2 mg  2 mg IntraVENous Q4H PRN  
 0.9% sodium chloride infusion 250 mL  250 mL IntraVENous PRN  
 NOREPINephrine (LEVOPHED) 8 mg in 5% dextrose 250mL (32 mcg/mL) infusion  0.5-16 mcg/min IntraVENous TITRATE  
 0.9% sodium chloride infusion 250 mL  250 mL IntraVENous PRN  
 acetaminophen (TYLENOL) suppository 650 mg  650 mg Rectal Q4H PRN  
 amiodarone (CORDARONE) 900 mg/250 ml D5W infusion  0.5-1 mg/min IntraVENous TITRATE  acetaminophen (TYLENOL) tablet 650 mg  650 mg Oral Q4H PRN  
 albuterol (PROVENTIL HFA, VENTOLIN HFA, PROAIR HFA) inhaler 2 Puff  2 Puff Inhalation Q4H PRN  
 docusate sodium (COLACE) capsule 100 mg  100 mg Oral BID  folic acid (FOLVITE) tablet 1 mg  1 mg Oral DAILY  pravastatin (PRAVACHOL) tablet 20 mg  20 mg Oral QHS  sertraline (ZOLOFT) tablet 50 mg  50 mg Oral DAILY  thiamine mononitrate (B-1) tablet 100 mg  100 mg Oral DAILY  ondansetron (ZOFRAN) injection 4 mg  4 mg IntraVENous Q6H PRN  
 hydrOXYzine pamoate (VISTARIL) capsule 25 mg  25 mg Oral TID PRN  
 risperiDONE (RisperDAL) tablet 2 mg  2 mg Oral QHS  chlorhexidine (PERIDEX) 0.12 % mouthwash 15 mL  15 mL Oral Q12H Review of Systems:  
Unobtainable 2/2 encephalopathic/acute illness Objective:  
Physical Exam:  
 
Visit Vitals /85 (BP 1 Location: Right leg, BP Patient Position: At rest) Pulse (!) 129 Temp 97.9 °F (36.6 °C) Resp 26 Ht 6' 2.02\" (1.88 m) Wt 101.6 kg (224 lb) SpO2 97% BMI 28.75 kg/m² O2 Flow Rate (L/min): 0.5 l/min O2 Device: Ventilator Temp (24hrs), Av.9 °F (36.6 °C), Min:97.5 °F (36.4 °C), Max:98.3 °F (36.8 °C) No intake/output data recorded.  1901 -  0700 In: 7357.1 [I.V.:5387.1] Out: 9424 [Urine:2605] Constitutional: He appears well-developed. Obtunded Head: Normocephalic and atraumatic. Eyes: Pupils are equal, round, and reactive to light. Mouth: oral mucosa dry Neck: No thyromegaly present, significant neck stiffness on examination Cardiovascular:Irreg/irreg Lungs:Effort normal with fair ae. No wheeze, not labored Abdominal: Soft. can't tell if tender, moans if touch him anywhere including extremities Musculoskeletal:  
   Comments: Generalized weakness Neurological: obtunded, moves all ext, 
Skin: Right lower shin with ulcer, yellow drainage Sacrum reddened, no breakdown Left arm with skin tear Multiple ecchymotic, skin is friable/thin Data Review:  
   
Recent Days: 
Recent Labs  
  20 
0350 20 
0600 20 
1700 WBC 4.8 5.4 5.0 HGB 9.7* 8.8* 6.8* HCT 30.5* 27.8* 21.9*  
PLT 47* 41* 32* Recent Labs  
  20 
0350 20 
0600 20 
1810 20 
0500 20 
1045  148*  --  144  144  --   
K 3.7 3.0*  --  4.6  4.6  --   
* 114*  --  116*  116*  --   
CO2 24 24  --  19*  19*  --   
* 120*  --  162*  158*  --   
BUN 39* 36*  --  26*  25*  --   
CREA 1.86* 2.05*  --  2.09*  2.03*  --   
CA 7.4* 7.9*  --  8.6  8.6  --   
MG  --   --   --  1.5*  --   
PHOS  --  1.8*  --  3.4  3.3  --   
ALB  --  2.8*  2.9*  --  2.7*  2.7*  2.7*  --   
TBILI  --  1.1*  --  1.4*  1.4*  --   
ALT  -- 47  --  61  62  --   
INR  --   --  1.5*  --  2.0* Recent Labs  
  11/22/20 
0340 11/21/20 
1115 11/20/20 
1015 PH 7.447 7.482* 7.38  
PCO2 31* 9* 32* PO2 140* 162* 40* HCO3 23 12* 19* FIO2 21.0 21.0 21.0  
 
 
24 Hour Results: 
Recent Results (from the past 24 hour(s)) BLOOD GAS, ARTERIAL Collection Time: 11/21/20 11:15 AM  
Result Value Ref Range pH 7.482 (H) 7.35 - 7.45    
 PCO2 9 (L) 35 - 45 mmHg PO2 162 (H) 75 - 100 mmHg O2  >95 % BICARBONATE 12 (L) 22 - 26 mmol/L  
 BASE DEFICIT 16.0 (H) 0 - 2 mmol/L  
 O2 METHOD Room air FIO2 21.0 % Tidal volume 550 SITE Arterial Line DAMON'S TEST PASS Critical value read back [place] RN   
GLUCOSE, POC Collection Time: 11/21/20  3:45 PM  
Result Value Ref Range Glucose (POC) 135 (H) 65 - 100 mg/dL Performed by Joan Life   
BLOOD GAS, ARTERIAL Collection Time: 11/22/20  3:40 AM  
Result Value Ref Range pH 7.447 7.35 - 7.45    
 PCO2 31 (L) 35 - 45 mmHg PO2 140 (H) 75 - 100 mmHg O2  >95 % BICARBONATE 23 22 - 26 mmol/L  
 BASE DEFICIT 2.1 (H) 0 - 2 mmol/L  
 O2 METHOD VENT    
 FIO2 21.0 % MODE Assist Control/Volume Control Tidal volume 500 SET RATE 20    
 EPAP/CPAP/PEEP 5.0    
 SITE Right Radial    
 DAMON'S TEST PASS    
CBC W/O DIFF Collection Time: 11/22/20  3:50 AM  
Result Value Ref Range WBC 4.8 4.1 - 11.1 K/uL  
 RBC 3.72 (L) 4.10 - 5.70 M/uL HGB 9.7 (L) 12.1 - 17.0 g/dL HCT 30.5 (L) 36.6 - 50.3 % MCV 82.0 80.0 - 99.0 FL  
 MCH 26.1 26.0 - 34.0 PG  
 MCHC 31.8 30.0 - 36.5 g/dL  
 RDW 18.5 (H) 11.5 - 14.5 % PLATELET 47 (LL) 094 - 400 K/uL METABOLIC PANEL, BASIC Collection Time: 11/22/20  3:50 AM  
Result Value Ref Range Sodium 145 136 - 145 mmol/L Potassium 3.7 3.5 - 5.1 mmol/L Chloride 114 (H) 97 - 108 mmol/L  
 CO2 24 21 - 32 mmol/L Anion gap 7 5 - 15 mmol/L Glucose 119 (H) 65 - 100 mg/dL  BUN 39 (H) 6 - 20 mg/dL Creatinine 1.86 (H) 0.70 - 1.30 mg/dL BUN/Creatinine ratio 21 (H) 12 - 20 GFR est AA 43 (L) >60 ml/min/1.73m2 GFR est non-AA 36 (L) >60 ml/min/1.73m2 Calcium 7.4 (L) 8.5 - 10.1 mg/dL C REACTIVE PROTEIN, QT Collection Time: 11/22/20  3:50 AM  
Result Value Ref Range C-Reactive protein 6.69 (H) 0.00 - 0.60 mg/dL PROCALCITONIN Collection Time: 11/22/20  3:50 AM  
Result Value Ref Range Procalcitonin 3.48 (H) 0 ng/mL Assessment/  
 
Patient Active Problem List  
Diagnosis Code  Cellulitis of left upper extremity L03.114  
 Chronic atrial fibrillation (HCC) I48.20  
 HTN (hypertension), benign I10  
 COPD (chronic obstructive pulmonary disease) (HCC) J44.9  Dyslipidemia E78.5  Depression F32.9  
 UTI (urinary tract infection) N39.0  BRE (acute kidney injury) ([address] N17.9  AMS (altered mental status) R41.82  Sepsis ([address] A41.9  Hypoglycemia E16.2  
 Encephalopathy acute G93.40 Plan: 
 
Septic shockpatient presented with above-mentioned symptomatology was found to meet severe sepsis criteria secondary to combination of urinary tract infection as well as developing bilateral pneumonia, patient is COVID-19 negative, currently improving, patient been titrated off of levophed 
follow-up blood cultures Urine Culture consistent with ESBL E. Coli Follow-up inflammatory markers Continue meropenem for Abx coverage [facility] Infectious disease recommendations appreciated, will continue to follow Critical care recommendations appreciated will continue to follow Acute respiratory failure/pneumoniapatient was found to be in acute respiratory failure requiring emergent endotracheal intubation, likely secondary to developing bilateral pneumonia, patient currently off of pressors Follow-up sputum culture Follow-up blood cultures Continue Meropenem for Abx coverage Attempt weaning trial 
Pulmonology consult appreciated, will continue to follow Infectious disease recommendations appreciated, will continue to follow Hypoactive bowel soundsresolved Atrial fibrillation with RVRpatient presented with atrial fibrillation with RVR likely secondary to ongoing severe sepsis Continue digoxin at this time Transthoracic echo shows preserved ejection fraction Cardiology consult appreciated, will continue to follow Anemialikely multifactorial, however concern for GI bleed, s/p 2 uprbc 2 days back with appropriate response, currently hemoglobin hematocrit remained stable Continue protonix 40mg iv bid Continue to trend H/H Maintain active type and screen Follow-up gastroenterology recommendations Follow-up hematology recommendations Hypokalemiareplete K and recheck K Thrombocytopenialikely multifactorial, concern for possible early DIC, however platelet count remained stable Hematology recommendations appreciated Patient currently does not have any active bleeding Recommend platelet transfusion if platelet count is less than 20,000 with active bleeding Hyperlipidemiacontinue statin ProphylaxisSCDs FENcontinue tube feeding, maintenance IV fluids, replete potassium and magnesium DNR,  
surrogate decision-maker is patient's wife Critical care time spent 50 minutes involving direct patient care reviewing patient's labs and coordination of care with nursing staff Care Plan discussed with: Patient/Family and Nurse Meghna Sánchez MD

## 2020-11-22 NOTE — PROGRESS NOTES
Progress Note Patient: Yunior Ahumada MRN: [id number]  SSN: [id number] YOB: 1945  Age: 67 y.o. Sex: male Admit Date: 11/15/2020 LOS: 7 days Subjective:  
Patient followed for severe sepsis with altered mental status and suspected UTI. Blood cultures negative so far and urine culture has grown ESBL E. Coli. He has low grade temperatures with normal WBC and decreasing CRP and procalcitonin. Sputum culture grew normal aguilar and CXR today is unchanged. He is currently on IV Meropenem alone. Patient remains intubated. Objective:  
 
Vitals:  
 11/22/20 0711 11/22/20 0800 11/22/20 0900 11/22/20 1000 BP:  135/82 134/65 (!) 142/71 Pulse: (!) 129 (!) 101 90 (!) 113 Resp: 26 19 19 19 Temp:      
SpO2: 97% 96% 96% 98% Weight:      
Height:      
  
 
Intake and Output: 
Current Shift: 11/22 0701 - 11/22 1900 In: 180 Out: - Last three shifts: 11/20 1901 - 11/22 0700 In: 7357.1 [I.V.:5387.1] Out: 6889 [Urine:2605] Physical Exam:  
 Constitutional:   
   General: He is in acute distress. Appearance: He is ill-appearing. He is not diaphoretic. HENT:  
   Head: orotracheal tube Neck: ?stiffness Cardiovascular:  
   Rate and Rhythm: Tachycardia present. Rhythm irregular. Heart sounds: No murmur. Pulmonary:  
   Breath sounds: No wheezing, rhonchi or rales. Abdominal:  
   Palpations: Abdomen is soft. Tenderness: There is no abdominal tenderness. Genitourinary: 
   Comments: Mcmillan catheter Musculoskeletal:  
   Right lower leg: No edema. Left lower leg: No edema. Comments: 2x3 cm open wound right medial calf with dry base Skin: 
   Findings: No erythema or rash. Neurological:  
   Comments: Unable to assess Psychiatric:  
   Comments: Unable to assess Lab/Data Review: WBC 4,800 PLT 47,000 Lactic acid 2.7 Procalcitonin 3.48 <5.42 <6.39 <12.30 CRP 6.69 <10.50 <20.10 <35.90 Covid-19 Not detected Blood cultures (11/15) No growth FINAL Blood cultures (11/19) No growth at 2 days Urine culture (11/15) >100,000 cfu/ml ESBL E. Coli Sputum culture (11/20) Normal aguilar FINAL 
 
CXR (11/22)  Little interval change Assessment:  
 
Principal Problem: 
  Sepsis ([facility] (11/16/2020) Active Problems: 
  UTI (urinary tract infection) (11/15/2020) BRE (acute kidney injury) ([facility] [address] (11/15/2020) AMS (altered mental status) (11/15/2020) Hypoglycemia (11/16/2020) Encephalopathy acute (11/16/2020) 1. Severe sepsis with tachycardia, tachypnea, leukocytosis, elevated procalcitonin and CRP, resolving 2. UTI with marked pyuria and bacteriuria, secondary to ESBL E. Coli, Day #4 IV Meropenem. 3. Altered mental status, possibly secondary to above 4. Rule out GI bleed 5. Atrial fibrillation 6. Covid-19 negative 7. Acute hypoxic respiratory failure, intubated Comment:  WBC normal, CRP and procalcitonin decreasing. Sputum culture does not support pneumonia. Plan: 1. Continue Meropenem 2. Follow-up pending blood cultures 3. In am, repeat procalcitonin and CRP, done Signed By: Brianne Washington MD   
 November 22, 2020

## 2020-11-22 NOTE — PROGRESS NOTES
Nephrology Consult Patient: Ligia Slater MRN: [id number]  SSN: [id number] YOB: 1945  Age: 67 y.o. Sex: male Subjective: The patient is seen in ICU On Vent with FIO2 30% Noticed mild swelling of ext Cr 1.8 (improving) K 3.7 Na 145 Good UOP Past Medical History:  
Diagnosis Date  Arthritis  Atrial fibrillation ([facility] [address]  COPD (chronic obstructive pulmonary disease) ([facility] [address] 8/12/2020  High cholesterol  History of vascular access device 08/12/2020 Rt Basilic 5fr dual PICC at 42cm arm cir 34.5cm  Hypertension Past Surgical History:  
Procedure Laterality Date  COLONOSCOPY N/A 9/20/2018 COLONOSCOPY performed by Sweetie Guy MD at [address] [facility] HX COLONOSCOPY    
 HX HIP REPLACEMENT Bilateral   
 HX TONSILLECTOMY  IR INSERT NON TUNL CVC OVER 5 YRS  11/17/2020 Family History Problem Relation Age of Onset  No Known Problems Mother  No Known Problems Father  No Known Problems Sister  No Known Problems Brother  No Known Problems Maternal Grandmother  No Known Problems Maternal Grandfather  No Known Problems Paternal Grandmother  No Known Problems Paternal Grandfather  No Known Problems Other  Stroke Neg Hx Social History Tobacco Use  Smoking status: Unknown If Ever Smoked  Smokeless tobacco: Never Used Substance Use Topics  Alcohol use: Never Alcohol/week: 8.0 standard drinks Types: 8 Shots of liquor per week Frequency: Never Current Facility-Administered Medications Medication Dose Route Frequency Provider Last Rate Last Dose  potassium chloride 10 mEq in 100 ml IVPB  10 mEq IntraVENous Q1H Naz Leyva MD      
 hydrocortisone Sod Succ (PF) (SOLU-CORTEF) injection 50 mg  50 mg IntraVENous Q8H Curry Warner DO   50 mg at 11/22/20 0600  
 0.45% sodium chloride infusion  100 mL/hr IntraVENous CONTINUOUS Antoinette Donald  mL/hr at 11/22/20 0230 100 mL/hr at 11/22/20 0230  pantoprazole (PROTONIX) granules for oral suspension 40 mg  40 mg Per NG tube ACB Dory Smith MD   40 mg at 11/21/20 0746  
 0.9% sodium chloride infusion 250 mL  250 mL IntraVENous PRN Allyssa Bhatia MD      
 dexmedeTOMidine (PRECEDEX) 400 mcg in 0.9% sodium chloride 104 mL infusion  0.1-1.5 mcg/kg/hr IntraVENous TITRATE Divina Leyva MD   Stopped at 11/20/20 0100  
 fentaNYL (PF) 1,500 mcg/30 mL (50 mcg/mL) infusion  0-200 mcg/hr IntraVENous TITRATE Pavan Art, DO 3 mL/hr at 11/22/20 0533 150 mcg/hr at 11/22/20 0533  
 vasopressin (VASOSTRICT) 40 Units in 0.9% sodium chloride 40 mL infusion  0.04 Units/min IntraVENous CONTINUOUS Curry Warner, DO 2.4 mL/hr at 11/19/20 2033 0.04 Units/min at 11/19/20 2033  meropenem (MERREM) 1 g in sterile water (preservative free) 20 mL IV syringe  1 g IntraVENous Q12H Vita Rodriguez MD   1 g at 11/22/20 0031  
 0.9% sodium chloride infusion 250 mL  250 mL IntraVENous PRN Allyssa Bhatia MD      
 morphine injection 2 mg  2 mg IntraVENous Q4H PRN Jelani Mahmood MD   2 mg at 11/19/20 0743  
 0.9% sodium chloride infusion 250 mL  250 mL IntraVENous PRN Jelani Mahmood MD      
 NOREPINephrine (LEVOPHED) 8 mg in 5% dextrose 250mL (32 mcg/mL) infusion  0.5-16 mcg/min IntraVENous TITRATE Divina Bravo MD   Stopped at 11/19/20 2106  
 0.9% sodium chloride infusion 250 mL  250 mL IntraVENous PRN Jelani Mahmood MD      
 acetaminophen (TYLENOL) suppository 650 mg  650 mg Rectal Q4H PRN Marianne PONCE MD   650 mg at 11/16/20 1225  
 amiodarone (CORDARONE) 900 mg/250 ml D5W infusion  0.5-1 mg/min IntraVENous TITRATE José Aviles MD 16.7 mL/hr at 11/16/20 1736 1 mg/min at 11/16/20 1736  acetaminophen (TYLENOL) tablet 650 mg  650 mg Oral Q4H PRN Rom Herring NP   650 mg at 11/19/20 0027  
 albuterol (PROVENTIL HFA, VENTOLIN HFA, PROAIR HFA) inhaler 2 Puff  2 Puff Inhalation Q4H PRN [facility] Rom A, NP      
 docusate sodium (COLACE) capsule 100 mg  100 mg Oral BID [facility], [facility]   Stopped at 31/91/76 8549  
 folic acid (FOLVITE) tablet 1 mg  1 mg Oral DAILY [facility] Jan A, NP   1 mg at 11/21/20 0801  pravastatin (PRAVACHOL) tablet 20 mg  20 mg Oral QHS [facility] Jan A, NP   20 mg at 11/21/20 2215  sertraline (ZOLOFT) tablet 50 mg  50 mg Oral DAILY [facility]   50 mg at 11/21/20 0826  thiamine mononitrate (B-1) tablet 100 mg  100 mg Oral DAILY [facility], Jan A, NP   100 mg at 11/21/20 0801  
 ondansetron [facility]) injection 4 mg  4 mg IntraVENous Q6H PRN [facility] Jan A, NP      
 hydrOXYzine pamoate (VISTARIL) capsule 25 mg  25 mg Oral TID PRN [facility] Jan A, NP   25 mg at 11/15/20 2217  risperiDONE (RisperDAL) tablet 2 mg  2 mg Oral QHS [facility], Jan A, NP   2 mg at 11/21/20 2215  chlorhexidine (PERIDEX) 0.12 % mouthwash 15 mL  15 mL Oral Q12H [facility], Jan A, NP   15 mL at 11/21/20 2100 Allergies Allergen Reactions  Codeine Rash Review of Systems: 
Unable to obtain Objective:  
 
Vitals:  
 11/22/20 0500 11/22/20 0600 11/22/20 0700 11/22/20 0711 BP: 137/85 Pulse: (!) 112 (!) 104  (!) 129 Resp: 19 19 26 Temp:   100 °F (37.8 °C) SpO2: 96% 96%  97% Weight: 101.6 kg (224 lb) Height:      
  
 
Physical Exam: 
General: sedated Eyes: sclera anicteric Oral Cavity: ET in place Neck: no JVD Chest: on vent Heart: normal sounds Abdomen: soft and non tender :  vargas Lower Extremities: no edema Skin: no rash Neuro: sedated Assessment:  
 
Hospital Problems  Date Reviewed: 11/15/2020 Codes Class Noted POA * (Principal) Sepsis ([facility] [address] ICD-10-CM: A41.9 ICD-9-CM: 038.9, 995.91  11/16/2020 Yes Hypoglycemia ICD-10-CM: E16.2 ICD-9-CM: 251.2  11/16/2020 Yes Encephalopathy acute ICD-10-CM: G93.40 ICD-9-CM: 348.30  11/16/2020 Yes  UTI (urinary tract infection) ICD-10-CM: N39.0 ICD-9-CM: 599.0  11/15/2020 Unknown BRE (acute kidney injury) ([facility] [address] ICD-10-CM: N17.9 ICD-9-CM: 584.9  11/15/2020 Unknown AMS (altered mental status) ICD-10-CM: D15.97 
ICD-9-CM: 780.97  11/15/2020 Unknown Plan: #1 severe hypercalcemia.   
-On admission corrected calcium was 14.6. -It has improved down to 8.5. 
-He is currently not on any calcium and vitamin D supplements. -s/p received calcitonin 4 units/kg twice a day for 4 doses. - intact PTH level < 6.3 and pending serum free light chain ratio. 
-will dc IVF. 2.  Acute kidney injury.   
-Likely prerenal from volume depletion and sepsis.   
-On admission creatinine was 2.0.   
-Unknown baseline creatinine.     
-On single pressor. -UA is consistent with UTI. - I will dc IV fluids.   
-He is currently not on any potential nephrotoxins. 3.  Severe hypokalemia.   
-From low p.o. intake and IV fluids.   
-K 3.0 -->3.7 4. Hypernatremia.   
-From free water deficit.   
-Sodium is down 153-->148->145.   
 -will dc  hypotonic IV fluids.   
-I will keep on water flushes via NG  
 
5. Altered mental status.   
-Etiology could be sepsis and metabolic encephalopathy.   
-on vent support now 
-He will need correction of his electrolytes including sodium and calcium.   
-On IV antibiotics. On IV fluids. 6.  Severe sepsis.   
-Possibly from UTI. -ID is on board. On single pressor. On IV antibiotics. 7.  Thrombocytopenia. Platelet count is 90. Heam is on board. Signed By: Steve Richards MD   
 November 22, 2020

## 2020-11-22 NOTE — PROGRESS NOTES
Pulmonology and Critical Care Progress Note Subjective: Chief Complaint: Confusion Patient seen and examined in his room this morning, discussed with the RN. His wife is also at the bedside. Patient is DNR. Patient's Precedex was stopped due to bradycardia, this has been stable now. According to the nurse he is not tolerating his tube feeds. He is on fentanyl at 150 and he will wake up however not necessarily following commands. The wife tells me that at baseline he has advanced dementia. He is off of vasopressin and Levophed since 11/20 morning and his maps are above 65. Chest x-ray was reviewed this morning and is showing no change in right basilar airspace disease. Also small pleural effusions. Blood gases discussed below. Ventilator was adjusted yesterday am after blood gases were obtained. Current Facility-Administered Medications Medication Dose Route Frequency Provider Last Rate Last Dose  potassium chloride 10 mEq in 100 ml IVPB  10 mEq IntraVENous Rishabh Correa  mL/hr at 11/22/20 0943 10 mEq at 11/22/20 8678  hydrocortisone Sod Succ (PF) (SOLU-CORTEF) injection 50 mg  50 mg IntraVENous Q8H Curry Warner DO   50 mg at 11/22/20 0600  
 0.45% sodium chloride infusion  100 mL/hr IntraVENous CONTINUOUS Thornton Bernheim,  mL/hr at 11/22/20 0230 100 mL/hr at 11/22/20 0230  pantoprazole (PROTONIX) granules for oral suspension 40 mg  40 mg Per NG tube ACB Patrice Neumann MD   40 mg at 11/22/20 0943  
 0.9% sodium chloride infusion 250 mL  250 mL IntraVENous PRN Kati Bhatia MD      
 dexmedeTOMidine (PRECEDEX) 400 mcg in 0.9% sodium chloride 104 mL infusion  0.1-1.5 mcg/kg/hr IntraVENous TITRATE Bernadette Leyva MD   Stopped at 11/20/20 0100  
 fentaNYL (PF) 1,500 mcg/30 mL (50 mcg/mL) infusion  0-200 mcg/hr IntraVENous TITRATE Deondre Price DO 3 mL/hr at 11/22/20 0533 150 mcg/hr at 11/22/20 0533  
 vasopressin (VASOSTRICT) 40 Units in 0.9% sodium chloride 40 mL infusion  0.04 Units/min IntraVENous CONTINUOUS Curry Warner DO 2.4 mL/hr at 11/19/20 2033 0.04 Units/min at 11/19/20 2033  meropenem (MERREM) 1 g in sterile water (preservative free) 20 mL IV syringe  1 g IntraVENous Q12H Laith Elizabeth MD   1 g at 11/22/20 0031  
 0.9% sodium chloride infusion 250 mL  250 mL IntraVENous PRN Pro Bhatia MD      
 morphine injection 2 mg  2 mg IntraVENous Q4H PRN Alex Moise MD   2 mg at 11/19/20 0743  
 0.9% sodium chloride infusion 250 mL  250 mL IntraVENous PRN Alex Moise MD      
 NOREPINephrine (LEVOPHED) 8 mg in 5% dextrose 250mL (32 mcg/mL) infusion  0.5-16 mcg/min IntraVENous TITRATE Moriah Izquierdo MD   Stopped at 11/19/20 2106  
 0.9% sodium chloride infusion 250 mL  250 mL IntraVENous PRN Alex Moise MD      
 acetaminophen (TYLENOL) suppository 650 mg  650 mg Rectal Q4H PRN Cl PONCE MD   650 mg at 11/16/20 1225  
 amiodarone (CORDARONE) 900 mg/250 ml D5W infusion  0.5-1 mg/min IntraVENous TITRATE Lamonte Pina MD 16.7 mL/hr at 11/16/20 1736 1 mg/min at 11/16/20 1736  acetaminophen (TYLENOL) tablet 650 mg  650 mg Oral Q4H PRN Rom Herring NP   650 mg at 11/19/20 0027  
 albuterol (PROVENTIL HFA, VENTOLIN HFA, PROAIR HFA) inhaler 2 Puff  2 Puff Inhalation Q4H PRN Rom Herring NP      
 docusate sodium (COLACE) capsule 100 mg  100 mg Oral BID Rom Herring NP   Stopped at 18/36/97 5179  
 folic acid (FOLVITE) tablet 1 mg  1 mg Oral DAILY Rom Herring NP   1 mg at 11/22/20 8041  pravastatin (PRAVACHOL) tablet 20 mg  20 mg Oral QHS Rom Herring NP   20 mg at 11/21/20 2215  sertraline (ZOLOFT) tablet 50 mg  50 mg Oral DAILY Rom Herring NP   50 mg at 11/22/20 8525  thiamine mononitrate (B-1) tablet 100 mg  100 mg Oral DAILY Rom Herring NP   100 mg at 11/22/20 0698  ondansetron (ZOFRAN) injection 4 mg  4 mg IntraVENous Q6H PRN Rom Herring A, NP      
 hydrOXYzine pamoate (VISTARIL) capsule 25 mg  25 mg Oral TID PRN Rom Herring, NP   25 mg at 11/15/20 2217  risperiDONE (RisperDAL) tablet 2 mg  2 mg Oral QHS Rom Herring A, NP   2 mg at 20 2215  chlorhexidine (PERIDEX) 0.12 % mouthwash 15 mL  15 mL Oral Q12H Rom Herring, NP   15 mL at 20 2715 Allergies Allergen Reactions  Codeine Rash Review of Systems: 
Review of systems not obtained due to patient factors. Objective:  
 
Blood pressure (!) 142/71, pulse (!) 113, temperature 100 °F (37.8 °C), resp. rate 19, height 6' 2.02\" (1.88 m), weight 101.6 kg (224 lb), SpO2 98 %. Temp (24hrs), Av.3 °F (36.8 °C), Min:97.5 °F (36.4 °C), Max:100 °F (37.8 °C) Intake and Output: 
Current Shift: 701 - 1900 In: 180 Out: - Last 3 Shifts: 1901 - 700 In: 7357.1 [I.V.:5387.1] Out: 3422 [Urine:2605] Physical Exam:  
General appearance: Elderly male who is sedated on the ventilator, on fentanyl, no distress, appears older than stated age, chronically ill-appearing Head: Normocephalic, without obvious abnormality, atraumatic Eyes: negative Neck: no obvious adenopathy, no carotid bruit; neck is stiff Lungs: He has few scattered rhonchi. Equal breath sounds bilaterally. Small secretions per RN. Heart: irregularly irregular rhythm, bradycardic, no rub, no obvious murmur Abdomen: soft, non-tender. Bowel sounds normal. No masses,  no organomegaly; NG tube in place. He also has diarrhea and has a rectal tube in place. He is not tolerating his tube feeds. Pulses: 2+ and symmetric Skin: Dry, intact, with bruising throughout the arms and legs. +3 pitting edema in extremities bilaterally. Lymph nodes: Cervical, supraclavicular, and axillary nodes normal. 
Neurologic: Sedated on the ventilator, not following any commands. Additional comments:None Lab/Data Review: All lab results for the last 24 hours reviewed. Recent Results (from the past 24 hour(s)) BLOOD GAS, ARTERIAL Collection Time: 11/21/20 11:15 AM  
Result Value Ref Range pH 7.482 (H) 7.35 - 7.45    
 PCO2 9 (L) 35 - 45 mmHg PO2 162 (H) 75 - 100 mmHg O2  >95 % BICARBONATE 12 (L) 22 - 26 mmol/L  
 BASE DEFICIT 16.0 (H) 0 - 2 mmol/L  
 O2 METHOD Room air FIO2 21.0 % Tidal volume 550 SITE Arterial Line DAMON'S TEST PASS Critical value read back Aurora SPARROW   
GLUCOSE, POC Collection Time: 11/21/20  3:45 PM  
Result Value Ref Range Glucose (POC) 135 (H) 65 - 100 mg/dL Performed by Sindhu Can   
BLOOD GAS, ARTERIAL Collection Time: 11/22/20  3:40 AM  
Result Value Ref Range pH 7.447 7.35 - 7.45    
 PCO2 31 (L) 35 - 45 mmHg PO2 140 (H) 75 - 100 mmHg O2  >95 % BICARBONATE 23 22 - 26 mmol/L  
 BASE DEFICIT 2.1 (H) 0 - 2 mmol/L  
 O2 METHOD VENT    
 FIO2 21.0 % MODE Assist Control/Volume Control Tidal volume 500 SET RATE 20    
 EPAP/CPAP/PEEP 5.0    
 SITE Right Radial    
 DAMON'S TEST PASS    
CBC W/O DIFF Collection Time: 11/22/20  3:50 AM  
Result Value Ref Range WBC 4.8 4.1 - 11.1 K/uL  
 RBC 3.72 (L) 4.10 - 5.70 M/uL HGB 9.7 (L) 12.1 - 17.0 g/dL HCT 30.5 (L) 36.6 - 50.3 % MCV 82.0 80.0 - 99.0 FL  
 MCH 26.1 26.0 - 34.0 PG  
 MCHC 31.8 30.0 - 36.5 g/dL  
 RDW 18.5 (H) 11.5 - 14.5 % PLATELET 47 (LL) 962 - 400 K/uL METABOLIC PANEL, BASIC Collection Time: 11/22/20  3:50 AM  
Result Value Ref Range Sodium 145 136 - 145 mmol/L Potassium 3.7 3.5 - 5.1 mmol/L Chloride 114 (H) 97 - 108 mmol/L  
 CO2 24 21 - 32 mmol/L Anion gap 7 5 - 15 mmol/L Glucose 119 (H) 65 - 100 mg/dL BUN 39 (H) 6 - 20 mg/dL Creatinine 1.86 (H) 0.70 - 1.30 mg/dL BUN/Creatinine ratio 21 (H) 12 - 20 GFR est AA 43 (L) >60 ml/min/1.73m2 GFR est non-AA 36 (L) >60 ml/min/1.73m2  Calcium 7.4 (L) 8.5 - 10.1 mg/dL C REACTIVE PROTEIN, QT Collection Time: 11/22/20  3:50 AM  
Result Value Ref Range C-Reactive protein 6.69 (H) 0.00 - 0.60 mg/dL PROCALCITONIN Collection Time: 11/22/20  3:50 AM  
Result Value Ref Range Procalcitonin 3.48 (H) 0 ng/mL GLUCOSE, POC Collection Time: 11/22/20  9:54 AM  
Result Value Ref Range Glucose (POC) 131 (H) 65 - 100 mg/dL Performed by Ronn Almendarez Chest X-Ray:  
XR CHEST PORT Final Result Stable right neck central venous catheter. Now present, there is an ET tube 4-5 
cm above the samuel. NG tube projects below the left hemidiaphragm. Improved 
aeration through the right lung; clearing patchy reticular markings. No 
interstitial or alveolar pulmonary edema. No pneumothorax or sizable pleural 
effusion. XR CHEST PORT Final Result Impression:   
Successful placement of a triple-lumen central venous catheter. The catheter is  
ready for use. IR INSERT NON TUNL CVC OVER 5 YRS Final Result Impression:   
Successful placement of a triple-lumen central venous catheter. The catheter is  
ready for use. IR [facility] Final Result Impression:   
Successful placement of a triple-lumen central venous catheter. The catheter is  
ready for use. XR CHEST PORT Final Result Impression: The cardiomediastinal silhouette is appropriate for age, technique,  
and lung expansion. Pulmonary vasculature is not congested. The lungs are  
essentially clear. No effusion or pneumothorax is seen. US RETROPERITONEUM COMP Final Result Impression: Normal exam  
  
  
XR CHEST PORT Final Result IMPRESSION:  No evidence of an acute cardiopulmonary process. CT HEAD WO CONT Final Result IMPRESSION: Chronic findings as above. CT CHEST WO CONT    (Results Pending) CT ABD PELV WO CONT    (Results Pending) XR CHEST PORT    (Results Pending) CT imaging: CT Results  (Last 48 hours) None PFTs:  
PFT Results  (Last 3 results in the past 10 years) None Assessment:  
 
Patient is a 69-year-old  male with a history of atrial fibrillation, osteoarthritis, hyperlipidemia, reported COPD, and hypertension who presented to [facility] on 11/15/2020 from his nursing home for altered mental status. He has been admitted to the ICU and is currently in septic shock on vasopressors and has been intubated on 11/19/2020 for failure to protect his airway due to altered mental status. Plan:  
 
1.)  Acute respiratory failure 
-Intubated on 11/19/2020 for failure to protect his airway. Chest x-ray done this morning showed no change in right basilar airspace disease. Small pleural effusions. Blood gases this morning showed 7.447/31/140. His current vent settings include a tidal volume of 500/ 21% assist control of 20 and PEEP of 5. He is not acidotic anymore. Will decrease his vent rate to 15. Repeat blood gas and chest x-ray in the morning. Weaning trials in the morning. 
 
-Decrease fentanyl infusion, target RASS of -1 
-Overall poor prognosis. -Now that he is off vasopressors, he was started on tube feeds. However he has had increased residuals. 2.)  Septic shock 
-Patient appears to have ESBL E. coli in his urine, Zosyn has been switched to meropenem. Being continued on vancomycin as well. Infectious disease is following, appreciate their recommendations. -Repeat blood cultures 11/19/2020 given worsening shock currently pending. Admission blood cultures no growth to date. - Levophed/vasopressin have been weaned off since starting the patient on meropenem/hydrocortisone/albumin. 
-We will slowly wean hydrocortisone. 
-Patient has a right IJ central line and a right radial A-line in place. 
-Checking a transthoracic echocardiogram to rule out a cardiogenic component of his shock.  
-COVID-19 test is negative 
-Patient is massively fluid overloaded on exam nephrology on the case 3.)  Acute encephalopathy 
-Likely multifactorial from sepsis, urinary tract infection, and multiple electrolyte abnormalities including hypernatremia, hypercalcemia, and hypokalemia.  
-Continue on current antibiotic regimen as above; neurology/nephrology/hematology following closely. 
-Ammonia level normal, TSH mildly elevated, free T4 pending. 4.)  Acute kidney injury 
-Creatinine seem to plateau around 3.03 and had very steadily been decreasing with improved calcium level and fluid administration. 
-Likely prerenal from volume depletion and sepsis. -Worsened slightly to 2.09, possible ATN. -Nephrology following closely, appreciate their assistance. 5.)  Hypercalcemia 
-Corrected calcium 14.6 on admission, this has steadily improved every day with calcitonin fluids. 
-Nephrology following closely 
-Myeloma work-up pending 
 
6.)  Acute blood loss anemia 
-Hemoglobin had decreased to 6.8 in 11/17/2020 and he received 2 units of packed red blood cells. -This is responded well to blood products, and his hemoglobin has remained fairly stable.  
-Has been having evidence of significant bruising/bleeding. GI is following closely, pending an endoscopy evaluation which will certainly be much easier given that he has a secure airway now. 
-Hematology following. 
 
7.)  Nonanion gap metabolic acidosis 
-Unclear etiology, but likely combination of sepsis and BRE. -Overall improved. Canceled CT of the chest abdomen pelvis. 8.)  Thrombocytopenia 
- hematology is following closely. -INR 2.0, PT 22.6, PTT 65.2 but fibrinogen is normal, which is not consistent with DIC. Patient received FFP and vitamin K. 
-likely due to overwhelming sepsis. 9.)  Atrial fibrillation with RVR 
-Appears to be relatively stable, continue to check electrolytes every day.  
-TSH mildly elevated, free T4 pending. 
-Cardiology following closely, appreciate their recommendations. 
-Amiodarone drip held due to issues with access, however resumed CODE STATUS: DNR Lines/Tubes: ET tube, right IJ central line, Mcmillan catheter, NG tube Prophylaxis: 
Stress Ulcer Protocol Active: Yes, Protonix 40 mg IV twice daily Deep Vein Thrombosis Protocol Active: Yes, SCD's (with bleeding his pharmacologic DVT prophylaxis has been stopped) Nutrition: Tube feeds. Activity: Bedrest given clinical instability. Disposition and Family: Discussed with the wife at the bedside. DNR status is now in effect. Total critical care time spent with patient: 50 minutes with direct visualization of the patient's ventilator and management along with respiratory therapist, long discussion with the respiratory therapist and the bedside nurse regarding the plan of care. Faustina Dunbar MD 
[facility] 11/22/2020 
10:57 AM

## 2020-11-23 NOTE — PROGRESS NOTES
Progress Note 
 
 
11/23/2020 12:30 PM 
NAME: Kenzie Nielson MRN:  [id number] Admit Diagnosis: UTI (urinary tract infection) [N39.0] AMS (altered mental status) [R41.82] BRE (acute kidney injury) ([facility] [address] [N17.9] Assessment/Plan:  
Atrial fibrillation with rapid ventricular response, hypokalemia with IV amiodarone and digoxin, rate has picked up again, will use more digoxin, check digoxin level in a.m. Hypotension, was on IV Levophed, weaned off Altered mental status,? Baseline, reportedly with decreased intake, lethargy Anemia, dropping hemoglobin, now stable Hypokalemia, corrected  
 
  
 []       High complexity decision making was performed in this patient at high risk for decompensation with multiple organ involvement. Subjective:  
 
Kenzie Nielson is intubated  
discussed with RN events overnight. Review of Systems: 
 
 
Could NOT obtain due to: Altered mental status Objective:  
  
Physical Exam: 
 
Last 24hrs VS reviewed since prior progress note. Most recent are: 
 
Visit Vitals BP 93/79 Pulse (!) 109 Temp 98.4 °F (36.9 °C) Resp 16 Ht 6' 2.02\" (1.88 m) Wt 101.6 kg (224 lb) SpO2 100% BMI 28.75 kg/m² Intake/Output Summary (Last 24 hours) at 11/23/2020 1526 Last data filed at 11/23/2020 0400 Gross per 24 hour Intake 2793.65 ml Output 410 ml Net 2383.65 ml General Appearance: Intubated Ears/Nose/Mouth/Throat: Intubated  
neck: Supple. Chest: Lungs coarse breath sounds Cardiovascular: iregular rate and rhythm, S1S2 normal, no murmur. Abdomen: Soft, non-tender, bowel sounds are active. Extremities: No edema bilaterally. Skin: Warm and dry. []         Post-cath site without hematoma, bruit, tenderness, or thrill. Distal pulses intact. PMH/SH reviewed - no change compared to H&P Data Review Telemetry:  
 
EKG:  
[]  No new EKG for review T brain without acute pathology CT chest with pleural effusions and compressive atelectasis, having thoracentesis Lab Data Personally Reviewed: 
 
Recent Labs  
  11/23/20 
0430 11/22/20 
0350 WBC 6.7 4.8 HGB 10.3* 9.7* HCT 33.0* 30.5* PLT 76* 47* Recent Labs  
  11/23/20 
0430 11/20/20 
1810 INR 1.5* 1.5* PTP 17.6* 18.4* APTT 35.5 49.8* Recent Labs  
  11/23/20 
0430 11/22/20 
0350 11/21/20 
0600  145 148* K 4.4 3.7 3.0*  
* 114* 114* CO2 22 24 24 BUN 42* 39* 36* CREA 1.94* 1.86* 2.05* * 119* 120* CA 7.0* 7.4* 7.9* No results for input(s): CPK, CKNDX, TROIQ in the last 72 hours. No lab exists for component: CPKMB No results found for: CHOL, CHOLX, CHLST, CHOLV, HDL, HDLP, LDL, LDLC, DLDLP, TGLX, TRIGL, TRIGP, CHHD, CHHDX Recent Labs  
  11/23/20 
0430 11/21/20 
0600 AP  --  97  
TP  --  6.0* ALB 2.8* 2.8*  2.9*  
GLOB  --  3.2 Recent Labs  
  11/23/20 
0445 11/22/20 
0340 PH 7.354 7.447 PCO2 33* 31* PO2 56* 140* Medications Personally Reviewed: 
 
Current Facility-Administered Medications Medication Dose Route Frequency  furosemide (LASIX) injection 40 mg  40 mg IntraVENous Q12H  hydrocortisone Sod Succ (PF) (SOLU-CORTEF) injection 50 mg  50 mg IntraVENous Q8H  
 pantoprazole (PROTONIX) granules for oral suspension 40 mg  40 mg Per NG tube ACB  fentaNYL (PF) 1,500 mcg/30 mL (50 mcg/mL) infusion  0-200 mcg/hr IntraVENous TITRATE  vasopressin (VASOSTRICT) 40 Units in 0.9% sodium chloride 40 mL infusion  0.04 Units/min IntraVENous CONTINUOUS  
 meropenem (MERREM) 1 g in sterile water (preservative free) 20 mL IV syringe  1 g IntraVENous Q12H  
 morphine injection 2 mg  2 mg IntraVENous Q4H PRN  
 NOREPINephrine (LEVOPHED) 8 mg in 5% dextrose 250mL (32 mcg/mL) infusion  0.5-16 mcg/min IntraVENous TITRATE  acetaminophen (TYLENOL) suppository 650 mg  650 mg Rectal Q4H PRN  
 amiodarone (CORDARONE) 900 mg/250 ml D5W infusion  0.5-1 mg/min IntraVENous TITRATE  acetaminophen (TYLENOL) tablet 650 mg  650 mg Oral Q4H PRN  
 albuterol (PROVENTIL HFA, VENTOLIN HFA, PROAIR HFA) inhaler 2 Puff  2 Puff Inhalation Q4H PRN  
 docusate sodium (COLACE) capsule 100 mg  100 mg Oral BID  folic acid (FOLVITE) tablet 1 mg  1 mg Oral DAILY  pravastatin (PRAVACHOL) tablet 20 mg  20 mg Oral QHS  sertraline (ZOLOFT) tablet 50 mg  50 mg Oral DAILY  thiamine mononitrate (B-1) tablet 100 mg  100 mg Oral DAILY  ondansetron (ZOFRAN) injection 4 mg  4 mg IntraVENous Q6H PRN  
 hydrOXYzine pamoate (VISTARIL) capsule 25 mg  25 mg Oral TID PRN  
 risperiDONE (RisperDAL) tablet 2 mg  2 mg Oral QHS  chlorhexidine (PERIDEX) 0.12 % mouthwash 15 mL  15 mL Oral Q12H Mehul Kwon MD

## 2020-11-23 NOTE — PROGRESS NOTES
Mr. Carmen Garcia is scheduled for a nuclear testing for December 7, 2020. He is currently in ICU with SEPSIS AND INTUBATED. It looks like when he does get discharged he will be going to a SNF. Did you want to still proceed with his testing or postpone to a later date? Thank you,  Binu Ye.

## 2020-11-23 NOTE — PROGRESS NOTES
Pulmonology and Critical Care Progress Note Subjective: Chief Complaint: Confusion Patient seen and examined in ICU Overnight events noted Remains critically ill Intubated on mechanical ventilation Currently on assist control rate of 15, tidal volume 500, FiO2 21%, PEEP of 5 ABGs show pH of 7.35, PCO2 33, PO2 56, bicarb 19, saturation 88% Chest x-ray reviewed and shows cardiomegaly with vascular congestion bilaterally and small right-sided pleural effusion suspicious for CHF Current Facility-Administered Medications Medication Dose Route Frequency Provider Last Rate Last Dose  furosemide (LASIX) injection 40 mg  40 mg IntraVENous Q12H Angelina Soto MD   40 mg at 11/23/20 4690  hydrocortisone Sod Succ (PF) (SOLU-CORTEF) injection 50 mg  50 mg IntraVENous Q8H Curry Warner DO   50 mg at 11/23/20 0526  
 pantoprazole (PROTONIX) granules for oral suspension 40 mg  40 mg Per NG tube ACB Gianna Casillas MD   40 mg at 11/22/20 0943  
 fentaNYL (PF) 1,500 mcg/30 mL (50 mcg/mL) infusion  0-200 mcg/hr IntraVENous TITRATE Zhen Low DO 4 mL/hr at 11/23/20 0856 200 mcg/hr at 11/23/20 4190  vasopressin (VASOSTRICT) 40 Units in 0.9% sodium chloride 40 mL infusion  0.04 Units/min IntraVENous CONTINUOUS Curry Warner DO 2.4 mL/hr at 11/19/20 2033 0.04 Units/min at 11/19/20 2033  meropenem (MERREM) 1 g in sterile water (preservative free) 20 mL IV syringe  1 g IntraVENous Q12H Yogesh Adair MD   1 g at 11/23/20 0017  morphine injection 2 mg  2 mg IntraVENous Q4H PRN Angelina Soto MD   2 mg at 11/23/20 0739  
 NOREPINephrine (LEVOPHED) 8 mg in 5% dextrose 250mL (32 mcg/mL) infusion  0.5-16 mcg/min IntraVENous TITRATE Angelina Soto MD   Stopped at 11/23/20 7713  acetaminophen (TYLENOL) suppository 650 mg  650 mg Rectal Q4H PRN Laci PONCE MD   650 mg at 11/16/20 1225  
 amiodarone (CORDARONE) 900 mg/250 ml D5W infusion  0.5-1 mg/min IntraVENous TITRATE Oneyda Hare MD 16.7 mL/hr at 20 1736 1 mg/min at 20 1736  acetaminophen (TYLENOL) tablet 650 mg  650 mg Oral Q4H PRN [facility] , [place]   650 mg at 20 0027  
 albuterol (PROVENTIL HFA, VENTOLIN HFA, PROAIR HFA) inhaler 2 Puff  2 Puff Inhalation Q4H PRN [facility] Rom ROSARIO, NP      
 docusate sodium (COLACE) capsule 100 mg  100 mg Oral BID [facility] Rom , NP   Stopped at  2296  
 folic acid (FOLVITE) tablet 1 mg  1 mg Oral DAILY [facility] Rom , NP   1 mg at 20 9769  pravastatin (PRAVACHOL) tablet 20 mg  20 mg Oral QHS [facility] [address] [facility] Rom , NP   20 mg at 20 2227  sertraline (ZOLOFT) tablet 50 mg  50 mg Oral DAILY [facility] [address] [facility] Rom , NP   50 mg at 20 4427  thiamine mononitrate (B-1) tablet 100 mg  100 mg Oral DAILY [facility] [address] [facility] Rom , NP   100 mg at 20 6402  ondansetron (ZOFRAN) injection 4 mg  4 mg IntraVENous Q6H PRN [facility] , NP      
 hydrOXYzine pamoate (VISTARIL) capsule 25 mg  25 mg Oral TID PRN [facility] , NP   25 mg at 20 1857  
 risperiDONE (RisperDAL) tablet 2 mg  2 mg Oral QHS [facility] [address] [facility] Rom , NP   2 mg at 20 2227  chlorhexidine (PERIDEX) 0.12 % mouthwash 15 mL  15 mL Oral Q12H [facility] [address] Rom , NP   15 mL at 20 3382 Allergies Allergen Reactions  Precedex [Dexmedetomidine] Other (comments) Severe bradycardia  Codeine Rash Review of Systems: 
Review of systems not obtained due to patient factors. Objective:  
 
Blood pressure (!) 111/90, pulse (!) 108, temperature 98.8 °F (37.1 °C), resp. rate 21, height 6' 2.02\" (1.88 m), weight 101.6 kg (224 lb), SpO2 95 %. Temp (24hrs), Av.6 °F (37 °C), Min:97.8 °F (36.6 °C), Max:99.5 °F (37.5 °C) Intake and Output: 
Current Shift: No intake/output data recorded. Last 3 Shifts:  1901 -  0700 In: 4570.4 [I.V.:3790.4] Out: 1540 [[id number]] Physical Exam:  
General appearance: Elderly male who is sedated on the ventilator, on fentanyl, no distress, appears older than stated age, chronically ill-appearing Head: Normocephalic, without obvious abnormality, atraumatic Eyes: negative Neck: no obvious adenopathy, no carotid bruit; neck is stiff Lungs: He has few scattered rhonchi. Equal breath sounds bilaterally. Small secretions per RN. Heart: irregularly irregular rhythm, bradycardic, no rub, no obvious murmur Abdomen: soft, non-tender. Bowel sounds normal. No masses,  no organomegaly; NG tube in place. He also has diarrhea and has a rectal tube in place. He is not tolerating his tube feeds. Pulses: 2+ and symmetric Skin: Dry, intact, with bruising throughout the arms and legs. +3 pitting edema in extremities bilaterally. Lymph nodes: Cervical, supraclavicular, and axillary nodes normal. 
Neurologic: Sedated on the ventilator, not following any commands. Additional comments:None Lab/Data Review: All lab results for the last 24 hours reviewed. Recent Results (from the past 24 hour(s)) GLUCOSE, POC Collection Time: 11/22/20  4:15 PM  
Result Value Ref Range Glucose (POC) 121 (H) 65 - 100 mg/dL Performed by Jorge Meraz   
RENAL FUNCTION PANEL Collection Time: 11/23/20  4:30 AM  
Result Value Ref Range Sodium 141 136 - 145 mmol/L Potassium 4.4 3.5 - 5.1 mmol/L Chloride 111 (H) 97 - 108 mmol/L  
 CO2 22 21 - 32 mmol/L Anion gap 8 5 - 15 mmol/L Glucose 128 (H) 65 - 100 mg/dL BUN 42 (H) 6 - 20 mg/dL Creatinine 1.94 (H) 0.70 - 1.30 mg/dL BUN/Creatinine ratio 22 (H) 12 - 20 GFR est AA 41 (L) >60 ml/min/1.73m2 GFR est non-AA 34 (L) >60 ml/min/1.73m2 Calcium 7.0 (L) 8.5 - 10.1 mg/dL Phosphorus 3.7 2.6 - 4.7 mg/dL Albumin 2.8 (L) 3.5 - 5.0 g/dL C REACTIVE PROTEIN, QT Collection Time: 11/23/20  4:30 AM  
Result Value Ref Range C-Reactive protein 4.59 (H) 0.00 - 0.60 mg/dL PROCALCITONIN  Collection Time: 11/23/20  4:30 AM  
Result Value Ref Range Procalcitonin 1.54 (H) 0 ng/mL CBC WITH AUTOMATED DIFF Collection Time: 11/23/20  4:30 AM  
Result Value Ref Range WBC 7.0 4.1 - 11.1 K/uL  
 RBC 3.96 (L) 4.10 - 5.70 M/uL  
 HGB 10.3 (L) 12.1 - 17.0 g/dL HCT 33.0 (L) 36.6 - 50.3 % MCV 83.3 80.0 - 99.0 FL  
 MCH 26.0 26.0 - 34.0 PG  
 MCHC 31.2 30.0 - 36.5 g/dL  
 RDW 19.0 (H) 11.5 - 14.5 % PLATELET 76 (L) 962 - 400 K/uL NRBC 6.9 (H) 0  WBC ABSOLUTE NRBC 0.49 (H) 0.00 - 0.01 K/uL NEUTROPHILS PENDING % LYMPHOCYTES PENDING % MONOCYTES PENDING % EOSINOPHILS PENDING % BASOPHILS PENDING % IMMATURE GRANULOCYTES PENDING %  
 ABS. NEUTROPHILS PENDING K/UL  
 ABS. LYMPHOCYTES PENDING K/UL  
 ABS. MONOCYTES PENDING K/UL  
 ABS. EOSINOPHILS PENDING K/UL  
 ABS. BASOPHILS PENDING K/UL  
 ABS. IMM. GRANS. PENDING K/UL  
 DF PENDING   
PTT Collection Time: 11/23/20  4:30 AM  
Result Value Ref Range aPTT 35.5 23.0 - 35.7 sec  
 aPTT, therapeutic range   68 - 109 sec PROTHROMBIN TIME + INR Collection Time: 11/23/20  4:30 AM  
Result Value Ref Range Prothrombin time 17.6 (H) 11.9 - 14.7 sec INR 1.5 (H) 0.9 - 1.1 BLOOD GAS, ARTERIAL Collection Time: 11/23/20  4:45 AM  
Result Value Ref Range pH 7.354 7.35 - 7.45    
 PCO2 33 (L) 35 - 45 mmHg PO2 56 (L) 75 - 100 mmHg O2 SAT 88 (L) >95 % BICARBONATE 19 (L) 22 - 26 mmol/L  
 BASE DEFICIT 6.2 (H) 0 - 2 mmol/L  
 O2 METHOD VENT    
 FIO2 21.0 % MODE Assist Control/Volume Control Tidal volume 500 SET RATE 15    
 IPAP/PIP 0    
 EPAP/CPAP/PEEP 5.0    
 SITE Right Radial    
 DAMON'S TEST PASS    
GLUCOSE, POC Collection Time: 11/23/20  8:36 AM  
Result Value Ref Range Glucose (POC) 146 (H) 65 - 100 mg/dL Performed by [facility] Room Chest X-Ray:  
XR CHEST PORT Final Result Stable right neck central venous catheter. Now present, there is an ET tube 4-5 
cm above the samuel. NG tube projects below the left hemidiaphragm. Improved 
aeration through the right lung; clearing patchy reticular markings. No 
interstitial or alveolar pulmonary edema. No pneumothorax or sizable pleural 
effusion. XR CHEST PORT Final Result Impression:   
Successful placement of a triple-lumen central venous catheter. The catheter is  
ready for use. IR INSERT NON TUNL CVC OVER 5 YRS Final Result Impression:   
Successful placement of a triple-lumen central venous catheter. The catheter is  
ready for use. IR [facility] Final Result Impression:   
Successful placement of a triple-lumen central venous catheter. The catheter is  
ready for use. XR CHEST PORT Final Result Impression: The cardiomediastinal silhouette is appropriate for age, technique,  
and lung expansion. Pulmonary vasculature is not congested. The lungs are  
essentially clear. No effusion or pneumothorax is seen. US RETROPERITONEUM COMP Final Result Impression: Normal exam  
  
  
XR CHEST PORT Final Result IMPRESSION:  No evidence of an acute cardiopulmonary process. CT HEAD WO CONT Final Result IMPRESSION: Chronic findings as above. CT CHEST WO CONT    (Results Pending) CT ABD PELV WO CONT    (Results Pending) XR CHEST PORT    (Results Pending) CT imaging: CT Results  (Last 48 hours) None PFTs: PFT Results  (Last 3 results in the past 10 years) None Assessment:  
 
Patient is a 58-year-old  male with a history of atrial fibrillation, osteoarthritis, hyperlipidemia, reported COPD, and hypertension who presented to [facility] on 11/15/2020 from his nursing home for altered mental status. He has been admitted to the ICU and is currently in septic shock on vasopressors and has been intubated on 11/19/2020 for failure to protect his airway due to altered mental status. Plan:  
 
1.)  Acute respiratory failure Likely due to CHF, altered mental status, to protect airway Currently intubated on mechanical ventilation Intubated on 11/19/2020 ABGs and chest x-ray reviewed as outlined above Also appears quite volume overloaded clinically Continue diuresis O2 increased to 30% Will initiate weaning with SIMV and pressure support as tolerated 2.)  Septic shock ESBL E. coli On parenteral antibiotic coverage Further changes in antibiotics based on clinical response and culture results Remains on Levophed Covid negative Appears quite volume overloaded clinically 3.)  Acute encephalopathy Likely multifactorial from sepsis, urinary tract infection, and multiple electrolyte abnormalities including hypernatremia, hypercalcemia, and hypokalemia. Continue on current antibiotic regimen as above; neurology/nephrology/hematology following closely. Ammonia level normal, TSH mildly elevated, free T4 pending. 4.)  Acute kidney injury Creatinine seem to plateau around 6.12 and had very steadily been decreasing with improved calcium level and fluid administration. Likely prerenal from volume depletion and sepsis. Worsened slightly to 2.09, possible ATN. Nephrology following closely, appreciate their assistance. 5.)  Atrial fibrillation with RVR/diastolic congestive heart failure Presented with A. fib RVR Stable Likely secondary to sepsis Echo shows preserved ejection fraction Cardiology input appreciated Continue digoxin Quite volume overloaded Continue diuresis 6) Hypercalcemia Corrected calcium 14.6 on admission, this has steadily improved every day with calcitonin fluids. Nephrology following closely Myeloma work-up pending 
 
7.)  Acute blood loss anemia Multifactorial 
Status post 2 units PRBCs Monitor clinically Continue Protonix Appreciate GI pathology input 
 
8.)  Nonanion gap metabolic acidosis Unclear etiology, but likely combination of sepsis and BRE. Overall improved. Canceled CT of the chest abdomen pelvis.  
 
9.) Thrombocytopenia 
hematology is following closely. INR 2.0, PT 22.6, PTT 65.2 but fibrinogen is normal, which is not consistent with DIC. Patient received FFP and vitamin K. 
likely due to overwhelming sepsis. CODE STATUS: DNR Lines/Tubes: ET tube, right IJ central line, Mcmillan catheter, NG tube Prophylaxis: 
Stress Ulcer Protocol Active: Yes, Protonix 40 mg IV twice daily Deep Vein Thrombosis Protocol Active: Yes, SCD's (with bleeding his pharmacologic DVT prophylaxis has been stopped) Nutrition: Tube feeds. Activity: Bedrest given clinical instability. Disposition: DNR status is now in effect. Total critical care time spent with patient: 50 minutes with direct visualization of the patient's ventilator and management along with respiratory therapist, long discussion with the respiratory therapist and the bedside nurse regarding the plan of care. Bright Boyer MD 
[facility] 11/23/2020 
10:57 AM

## 2020-11-23 NOTE — PROGRESS NOTES
Patient's Nuclear stress test for December 7, 2020 is cancelled at this time per Dr. Li Lai.  Patient is currently in ICU.

## 2020-11-23 NOTE — PROGRESS NOTES
Renal Progress Note Patient: Brian Flores MRN: [id number]  SSN: [id number] YOB: 1945  Age: 67 y.o. Sex: male Admit Date: 11/15/2020 LOS: 8 days Subjective:  
Patient seen in ICU, on ventilator, sedated, Hypercalcemia resolved, 
Creatinine 1.94 today, Patient received a dose of Lasix this morning Current Facility-Administered Medications Medication Dose Route Frequency  furosemide (LASIX) injection 40 mg  40 mg IntraVENous Q12H  hydrocortisone Sod Succ (PF) (SOLU-CORTEF) injection 50 mg  50 mg IntraVENous Q8H  
 pantoprazole (PROTONIX) granules for oral suspension 40 mg  40 mg Per NG tube ACB  fentaNYL (PF) 1,500 mcg/30 mL (50 mcg/mL) infusion  0-200 mcg/hr IntraVENous TITRATE  vasopressin (VASOSTRICT) 40 Units in 0.9% sodium chloride 40 mL infusion  0.04 Units/min IntraVENous CONTINUOUS  
 meropenem (MERREM) 1 g in sterile water (preservative free) 20 mL IV syringe  1 g IntraVENous Q12H  
 morphine injection 2 mg  2 mg IntraVENous Q4H PRN  
 NOREPINephrine (LEVOPHED) 8 mg in 5% dextrose 250mL (32 mcg/mL) infusion  0.5-16 mcg/min IntraVENous TITRATE  acetaminophen (TYLENOL) suppository 650 mg  650 mg Rectal Q4H PRN  
 acetaminophen (TYLENOL) tablet 650 mg  650 mg Oral Q4H PRN  
 albuterol (PROVENTIL HFA, VENTOLIN HFA, PROAIR HFA) inhaler 2 Puff  2 Puff Inhalation Q4H PRN  
 docusate sodium (COLACE) capsule 100 mg  100 mg Oral BID  folic acid (FOLVITE) tablet 1 mg  1 mg Oral DAILY  pravastatin (PRAVACHOL) tablet 20 mg  20 mg Oral QHS  sertraline (ZOLOFT) tablet 50 mg  50 mg Oral DAILY  thiamine mononitrate (B-1) tablet 100 mg  100 mg Oral DAILY  ondansetron (ZOFRAN) injection 4 mg  4 mg IntraVENous Q6H PRN  
 hydrOXYzine pamoate (VISTARIL) capsule 25 mg  25 mg Oral TID PRN  
 risperiDONE (RisperDAL) tablet 2 mg  2 mg Oral QHS  chlorhexidine (PERIDEX) 0.12 % mouthwash 15 mL  15 mL Oral Q12H Vitals:  
 11/23/20 1200 11/23/20 1300 11/23/20 1316 11/23/20 1400 BP: 131/81 104/70  93/79 Pulse: (!) 128 (!) 120 (!) 128 (!) 109 Resp: 15 16 14 16 Temp:      
SpO2: 96% 96% 98% 100% Weight:      
Height:      
 
Objective:  
General: On ventilator, sedated, no acute distress. HEENT: no Icterus, no Pallor, ET tube in place  
neck: Neck is supple, No JVD Lungs: Decreased breath sounds at the bases, no rhonchi, few scattered rales+, CVS: heart sounds normal, no murmurs, no rubs. GI: soft, nontender, normal BS. Extremeties: no cyanosis, 1-2+ dependent edema+ Neuro: On ventilator, sedated Skin: normal skin turgor, no skin rashes. Intake and Output: 
Current Shift: 11/23 0701 - 11/23 1900 In: -  
Out: 203 [UCEBS:695] Last three shifts: 11/21 1901 - 11/23 0700 In: 4570.4 [I.V.:3790.4] Out: 1540 [ZYWG:1686] Lab/Data Review: 
Recent Labs  
  11/23/20 
0430 11/22/20 
0350 11/21/20 
0600 WBC 6.7 4.8 5.4 HGB 10.3* 9.7* 8.8* HCT 33.0* 30.5* 27.8* PLT 76* 47* 41* Recent Labs  
  11/23/20 
0430 11/22/20 
0350 11/21/20 
0600 11/20/20 
1810  145 148*  --   
K 4.4 3.7 3.0*  --   
* 114* 114*  --   
CO2 22 24 24  --   
* 119* 120*  --   
BUN 42* 39* 36*  --   
CREA 1.94* 1.86* 2.05*  --   
CA 7.0* 7.4* 7.9*  --   
PHOS 3.7  --  1.8*  --   
ALB 2.8*  --  2.8*  2.9*  --   
TBILI  --   --  1.1*  --   
ALT  --   --  47  --   
INR 1.5*  --   --  1.5* Recent Labs  
  11/23/20 
0445 11/22/20 
0340 11/21/20 
1115 PH 7.354 7.447 7.482* PCO2 33* 31* 9*  
PO2 56* 140* 162* HCO3 19* 23 12* FIO2 21.0 21.0 21.0 Recent Results (from the past 24 hour(s)) GLUCOSE, POC Collection Time: 11/22/20  4:15 PM  
Result Value Ref Range Glucose (POC) 121 (H) 65 - 100 mg/dL Performed by Shila Alpers   
RENAL FUNCTION PANEL Collection Time: 11/23/20  4:30 AM  
Result Value Ref Range Sodium 141 136 - 145 mmol/L Potassium 4.4 3.5 - 5.1 mmol/L  Chloride 111 (H) 97 - 108 mmol/L  
 CO2 22 21 - 32 mmol/L Anion gap 8 5 - 15 mmol/L Glucose 128 (H) 65 - 100 mg/dL BUN 42 (H) 6 - 20 mg/dL Creatinine 1.94 (H) 0.70 - 1.30 mg/dL BUN/Creatinine ratio 22 (H) 12 - 20 GFR est AA 41 (L) >60 ml/min/1.73m2 GFR est non-AA 34 (L) >60 ml/min/1.73m2 Calcium 7.0 (L) 8.5 - 10.1 mg/dL Phosphorus 3.7 2.6 - 4.7 mg/dL Albumin 2.8 (L) 3.5 - 5.0 g/dL C REACTIVE PROTEIN, QT Collection Time: 11/23/20  4:30 AM  
Result Value Ref Range C-Reactive protein 4.59 (H) 0.00 - 0.60 mg/dL PROCALCITONIN Collection Time: 11/23/20  4:30 AM  
Result Value Ref Range Procalcitonin 1.54 (H) 0 ng/mL CBC WITH AUTOMATED DIFF Collection Time: 11/23/20  4:30 AM  
Result Value Ref Range WBC 6.7 4.1 - 11.1 K/uL  
 RBC 3.96 (L) 4.10 - 5.70 M/uL  
 HGB 10.3 (L) 12.1 - 17.0 g/dL HCT 33.0 (L) 36.6 - 50.3 % MCV 83.3 80.0 - 99.0 FL  
 MCH 26.0 26.0 - 34.0 PG  
 MCHC 31.2 30.0 - 36.5 g/dL  
 RDW 19.0 (H) 11.5 - 14.5 % PLATELET 76 (L) 848 - 400 K/uL NRBC 6.9 (H) 0  WBC ABSOLUTE NRBC 0.49 (H) 0.00 - 0.01 K/uL NEUTROPHILS 26 (L) 32 - 75 % BAND NEUTROPHILS 2 0 - 6 % LYMPHOCYTES 22 12 - 49 % MONOCYTES 3 (L) 5 - 13 % EOSINOPHILS 0 0 - 7 % BASOPHILS 0 0 - 1 % METAMYELOCYTES 7 (H) 0 % MYELOCYTES 24 (H) 0 % PROMYELOCYTES 14 (H) 0 % BLASTS 2 (H) 0 % NRBC 4.0  WBC IMMATURE GRANULOCYTES 0 %  
 ABS. NEUTROPHILS 1.9 1.8 - 8.0 K/UL  
 ABS. LYMPHOCYTES 1.5 0.8 - 3.5 K/UL  
 ABS. MONOCYTES 0.2 0.0 - 1.0 K/UL  
 ABS. EOSINOPHILS 0.0 0.0 - 0.4 K/UL  
 ABS. BASOPHILS 0.0 0.0 - 0.1 K/UL  
 ABSOLUTE NRBC 0.27 K/uL  
 ABS. IMM. GRANS. 0.0 K/UL  
 DF Manual    
 RBC COMMENTS Microcytosis 1+ PTT Collection Time: 11/23/20  4:30 AM  
Result Value Ref Range aPTT 35.5 23.0 - 35.7 sec  
 aPTT, therapeutic range   68 - 109 sec PROTHROMBIN TIME + INR Collection Time: 11/23/20  4:30 AM  
Result Value Ref Range  Prothrombin time 17.6 (H) 11.9 - 14.7 sec INR 1.5 (H) 0.9 - 1.1 BLOOD GAS, ARTERIAL Collection Time: 11/23/20  4:45 AM  
Result Value Ref Range pH 7.354 7.35 - 7.45    
 PCO2 33 (L) 35 - 45 mmHg PO2 56 (L) 75 - 100 mmHg O2 SAT 88 (L) >95 % BICARBONATE 19 (L) 22 - 26 mmol/L  
 BASE DEFICIT 6.2 (H) 0 - 2 mmol/L  
 O2 METHOD VENT    
 FIO2 21.0 % MODE Assist Control/Volume Control Tidal volume 500 SET RATE 15    
 IPAP/PIP 0    
 EPAP/CPAP/PEEP 5.0    
 SITE Right Radial    
 DAMON'S TEST PASS    
GLUCOSE, POC Collection Time: 11/23/20  8:36 AM  
Result Value Ref Range Glucose (POC) 146 (H) 65 - 100 mg/dL Performed by Bakari Stevenson, POC Collection Time: 11/23/20  1:16 PM  
Result Value Ref Range Glucose (POC) 119 (H) 65 - 100 mg/dL Performed by Stacy Toro Assessment and Plan: #1 severe hypercalcemia.  -On admission corrected calcium was 14.6. Etiology unclear, suspected multiple myeloma, immunoelectrophoresis studies pending 
 low PTH compatible with hypercalcemia, borderline low vitamin D level Check ACE level Recommend to check a urine random calcium and creatinine to assess for fractional excretion of calcium Hypercalcemia resolved and repeat calcium level today is 7.0 with a low albumin of 2.8 He is currently not on any calcium and vitamin D supplements. Off IVF now, will continue to monitor  calcium levels 2. Fluid overload/dependent edema+:   
probably with IV hydration and hypoalb Will give Iv albumin with IV lasix for 4 doses 3. Acute kidney injury on ?? CKD Renal functions remained elevated despite adequate hydration, suspect baseline chronic kidney disease Recommend to check a urine random protein creatinine ratio to assess for proteinuria 
  
3. Severe hypokalemia. improved Continue to monitor 4. Hypernatremia.  -From free water deficit.   
-Resolved now . Off IV fluids 
 continue to monitor sodium levels 5.   Acute respiratory failure, on ventilator Sepsis/UTI Pulmonary and ID following the patient Continue IV antibiotics as per ID 
  
6. Atrial fibrillation with rapid ventricular rate : Heart rates have been 110s to 120s now Cardiology following the patient, on amiodarone and digoxin Signed By: Shahrzad Guadalupe MD   
 November 23, 2020

## 2020-11-23 NOTE — PROGRESS NOTES
Progress Note Patient: Brian Flores MRN: [id number]  SSN: [id number] YOB: 1945  Age: 67 y.o. Sex: male Admit Date: 11/15/2020 LOS: 8 days Subjective:  
Patient followed for severe sepsis with altered mental status and suspected UTI. Blood cultures negative so far and urine culture has grown ESBL E. Coli. He has low grade temperatures with normal WBC and decreasing CRP and procalcitonin. Sputum culture grew normal aguilar and CXR today suggestive of CHF. He is currently on IV Meropenem alone. Patient remains intubated. Objective:  
 
Vitals:  
 11/23/20 0700 11/23/20 0812 11/23/20 0815 11/23/20 1130 BP: (!) 111/90 Pulse: (!) 134 (!) 108 Resp: 13 21 Temp: 98.8 °F (37.1 °C)  98.8 °F (37.1 °C) 98.4 °F (36.9 °C) SpO2: 100% 95% Weight:      
Height:      
  
 
Intake and Output: 
Current Shift: No intake/output data recorded. Last three shifts: 11/21 1901 - 11/23 0700 In: 4570.4 [I.V.:3790.4] Out: 1540 [DBZGQ:2754] Physical Exam:  
 Constitutional:   
   General: He is in acute distress. Appearance: He is ill-appearing. He is not diaphoretic. HENT:  
   Head: orotracheal tube Neck: ?stiffness Cardiovascular:  
   Rate and Rhythm: Tachycardia present. Rhythm irregular. Heart sounds: No murmur. Pulmonary:  
   Breath sounds: No wheezing, rhonchi or rales. Abdominal:  
   Palpations: Abdomen is soft. Tenderness: There is no abdominal tenderness. Genitourinary: 
   Comments: Mcmillan catheter Musculoskeletal:  
   Right lower leg: No edema. Left lower leg: No edema. Comments: 2x3 cm open wound right medial calf with dry base Skin: 
   Findings: No erythema or rash. Neurological:  
   Comments: Unable to assess Psychiatric:  
   Comments: Unable to assess Lab/Data Review: WBC 6,700 PLT 76,000 Lactic acid 2.7 Procalcitonin 1.54 < 3.48 <5.42 <6.39 <12.30 CRP 4.59 <6.69 <10.50 <20.10 <35.90 Covid-19 Not detected Blood cultures (11/15) No growth FINAL Blood cultures (11/19) No growth at 4 days Urine culture (11/15) >100,000 cfu/ml ESBL E. Coli Sputum culture (11/20) Normal aguilar FINAL 
 
CXR (11/23)  Findings favored to represent CHF or volume overload; 
correlate clinically. Assessment:  
 
Principal Problem: 
  Sepsis ([facility] [address] (11/16/2020) Active Problems: 
  UTI (urinary tract infection) (11/15/2020) BRE (acute kidney injury) ([facility] [address] (11/15/2020) AMS (altered mental status) (11/15/2020) Hypoglycemia (11/16/2020) Encephalopathy acute (11/16/2020) 1. Severe sepsis with tachycardia, tachypnea, leukocytosis, elevated procalcitonin and CRP, resolving 2. UTI with marked pyuria and bacteriuria, secondary to ESBL E. Coli, Day #4 IV Meropenem. 3. Altered mental status, possibly secondary to above 4. Rule out GI bleed 5. Atrial fibrillation 6. Covid-19 negative 7. Acute hypoxic respiratory failure, intubated Comment:  WBC normal, CRP and procalcitonin decreasing. Plan: 1. Continue Meropenem 2. Follow-up pending blood cultures 3. In am, repeat procalcitonin and CRP, done Signed By: Yamel Cota MD   
 November 23, 2020

## 2020-11-23 NOTE — PROGRESS NOTES
SHANON Plan: 
 
-SNF [facility]) -PCP follow up Patient has been accepted to [facility] for SNF, when medically stable. Clinton Iraheta, DEVIN

## 2020-11-23 NOTE — PROGRESS NOTES
1900- Bedside Report. Pt was recently started on precedex and fentanyl attempting to be weaned. HR Noted to be having significant pauses and HR in 30's Precedex stopped and discontinued. Fentanyl rate increased as pt appears uncomfortable and is asynchronous with ventilator. 1927-Once Precedex wore off pt noted to be in afib RVR. Rate 120's-130s. 1945-HR remains with vast fluctuations from 50's - 130's. Paged Dr. Shelley Soares- No return call yet from [facility] 3 pt hypotensive Levophed restarted. 2035- Return call from Bruce Mixon. He is in agreement with levophed and requests to call cards about Metoprolol. 2050- HR and BP improved. Will continue to monitor. 0725-Bedside and Verbal shift change report given to [address] [place] [address] (oncoming nurse) by Brianna Mueller RN (offgoing nurse). Report included the following information SBAR, Kardex, Intake/Output, MAR and Recent Results. Debbie Garcia RN, CCRN

## 2020-11-23 NOTE — PROGRESS NOTES
Hospitalist Progress Note Daily Progress Note: 11/23/2020 
 
78-year-old male sent here from [facility] with reports from staff of altered mental status, lethargy, not eating and dehydration. He has a history of atrial fibrillation in rvr on amio drip. Ua suggestive uti, zosyn initiated 11/15. Beck, cr 2.18, us rp pending. Subjective:  
Patient seen and evaluated at bedside, overnight events noted, patient currently intubated responding to painful/verbal stimuli, discussed with RN at bedside. Problem List: 
Problem List as of 11/23/2020 Date Reviewed: 11/15/2020 Codes Class Noted - Resolved * (Principal) Sepsis ([facility] [address] ICD-10-CM: A41.9 ICD-9-CM: 038.9, 995.91  11/16/2020 - Present Hypoglycemia ICD-10-CM: E16.2 ICD-9-CM: 251.2  11/16/2020 - Present Encephalopathy acute ICD-10-CM: G93.40 ICD-9-CM: 348.30  11/16/2020 - Present UTI (urinary tract infection) ICD-10-CM: N39.0 ICD-9-CM: 599.0  11/15/2020 - Present BECK (acute kidney injury) ([facility] [address] ICD-10-CM: N17.9 ICD-9-CM: 584.9  11/15/2020 - Present AMS (altered mental status) ICD-10-CM: R39.63 
ICD-9-CM: 780.97  11/15/2020 - Present Chronic atrial fibrillation (HCC) ICD-10-CM: I48.20 ICD-9-CM: 427.31  8/12/2020 - Present HTN (hypertension), benign ICD-10-CM: I10 
ICD-9-CM: 401.1  8/12/2020 - Present COPD (chronic obstructive pulmonary disease) (HCC) ICD-10-CM: J44.9 ICD-9-CM: 828  8/12/2020 - Present Dyslipidemia ICD-10-CM: E78.5 ICD-9-CM: 272.4  8/12/2020 - Present Depression ICD-10-CM: F32.9 ICD-9-CM: 629  8/12/2020 - Present Cellulitis of left upper extremity ICD-10-CM: L03.114 
ICD-9-CM: 682.3  8/11/2020 - Present Medications reviewed Current Facility-Administered Medications Medication Dose Route Frequency  furosemide (LASIX) injection 40 mg  40 mg IntraVENous Q12H  
 metoprolol (LOPRESSOR) injection 5 mg  5 mg IntraVENous Q6H  
 hydrocortisone Sod Succ (PF) (SOLU-CORTEF) injection 50 mg  50 mg IntraVENous Q8H  
 pantoprazole (PROTONIX) granules for oral suspension 40 mg  40 mg Per NG tube ACB  fentaNYL (PF) 1,500 mcg/30 mL (50 mcg/mL) infusion  0-200 mcg/hr IntraVENous TITRATE  vasopressin (VASOSTRICT) 40 Units in 0.9% sodium chloride 40 mL infusion  0.04 Units/min IntraVENous CONTINUOUS  
 meropenem (MERREM) 1 g in sterile water (preservative free) 20 mL IV syringe  1 g IntraVENous Q12H  
 morphine injection 2 mg  2 mg IntraVENous Q4H PRN  
 NOREPINephrine (LEVOPHED) 8 mg in 5% dextrose 250mL (32 mcg/mL) infusion  0.5-16 mcg/min IntraVENous TITRATE  acetaminophen (TYLENOL) suppository 650 mg  650 mg Rectal Q4H PRN  
 amiodarone (CORDARONE) 900 mg/250 ml D5W infusion  0.5-1 mg/min IntraVENous TITRATE  acetaminophen (TYLENOL) tablet 650 mg  650 mg Oral Q4H PRN  
 albuterol (PROVENTIL HFA, VENTOLIN HFA, PROAIR HFA) inhaler 2 Puff  2 Puff Inhalation Q4H PRN  
 docusate sodium (COLACE) capsule 100 mg  100 mg Oral BID  folic acid (FOLVITE) tablet 1 mg  1 mg Oral DAILY  pravastatin (PRAVACHOL) tablet 20 mg  20 mg Oral QHS  sertraline (ZOLOFT) tablet 50 mg  50 mg Oral DAILY  thiamine mononitrate (B-1) tablet 100 mg  100 mg Oral DAILY  ondansetron (ZOFRAN) injection 4 mg  4 mg IntraVENous Q6H PRN  
 hydrOXYzine pamoate (VISTARIL) capsule 25 mg  25 mg Oral TID PRN  
 risperiDONE (RisperDAL) tablet 2 mg  2 mg Oral QHS  chlorhexidine (PERIDEX) 0.12 % mouthwash 15 mL  15 mL Oral Q12H Review of Systems:  
Unobtainable 2/2 encephalopathic/acute illness Objective:  
Physical Exam:  
 
Visit Vitals BP (!) 84/69 (BP 1 Location: Left arm, BP Patient Position: At rest) Pulse (!) 107 Temp 97.9 °F (36.6 °C) Resp 14 Ht 6' 2.02\" (1.88 m) Wt 101.6 kg (224 lb) SpO2 94% BMI 28.75 kg/m² O2 Flow Rate (L/min): 0.5 l/min O2 Device: Ventilator, Endotracheal tube Temp (24hrs), Av.6 °F (37 °C), Min:97.8 °F (36.6 °C), Max:99.5 °F (37.5 °C) No intake/output data recorded. 11/21 1901 - 11/23 0700 In: 4570.4 [I.V.:3790.4] Out: 1540 [YTAPP:4068] Constitutional: Currently intubated and sedated Head: Normocephalic and atraumatic. Eyes: Pupils are equal, round, and reactive to light. Mouth: oral mucosa dry Neck: No thyromegaly present, significant neck stiffness on examination Cardiovascular:Irreg/irreg Lungs:decreased air entry bilaterally in bilateral lower lung zones Abdominal: Soft. non tender Neurological: obtunded, moves all ext, 
Skin: Right lower shin with ulcer, yellow drainage Sacrum reddened, no breakdown Left arm with skin tear Multiple ecchymotic, skin is friable/thin Data Review:  
   
Recent Days: 
Recent Labs  
  11/23/20 
0430 11/22/20 
0350 11/21/20 
0600 WBC 7.0 4.8 5.4 HGB 10.3* 9.7* 8.8* HCT 33.0* 30.5* 27.8* PLT 76* 47* 41* Recent Labs  
  11/23/20 
0430 11/22/20 
0350 11/21/20 
0600 11/20/20 
1810  145 148*  --   
K 4.4 3.7 3.0*  --   
* 114* 114*  --   
CO2 22 24 24  --   
* 119* 120*  --   
BUN 42* 39* 36*  --   
CREA 1.94* 1.86* 2.05*  --   
CA 7.0* 7.4* 7.9*  --   
PHOS 3.7  --  1.8*  --   
ALB 2.8*  --  2.8*  2.9*  --   
TBILI  --   --  1.1*  --   
ALT  --   --  47  --   
INR 1.5*  --   --  1.5* Recent Labs  
  11/23/20 
0445 11/22/20 
0340 11/21/20 
1115 PH 7.354 7.447 7.482* PCO2 33* 31* 9*  
PO2 56* 140* 162* HCO3 19* 23 12* FIO2 21.0 21.0 21.0  
 
 
24 Hour Results: 
Recent Results (from the past 24 hour(s)) GLUCOSE, POC Collection Time: 11/22/20  9:54 AM  
Result Value Ref Range Glucose (POC) 131 (H) 65 - 100 mg/dL Performed by Dee Dee Montes, POC Collection Time: 11/22/20  4:15 PM  
Result Value Ref Range Glucose (POC) 121 (H) 65 - 100 mg/dL Performed by Bessie Ozuna   
RENAL FUNCTION PANEL Collection Time: 11/23/20  4:30 AM  
Result Value Ref Range  Sodium 141 136 - 145 mmol/L Potassium 4.4 3.5 - 5.1 mmol/L Chloride 111 (H) 97 - 108 mmol/L  
 CO2 22 21 - 32 mmol/L Anion gap 8 5 - 15 mmol/L Glucose 128 (H) 65 - 100 mg/dL BUN 42 (H) 6 - 20 mg/dL Creatinine 1.94 (H) 0.70 - 1.30 mg/dL BUN/Creatinine ratio 22 (H) 12 - 20 GFR est AA 41 (L) >60 ml/min/1.73m2 GFR est non-AA 34 (L) >60 ml/min/1.73m2 Calcium 7.0 (L) 8.5 - 10.1 mg/dL Phosphorus 3.7 2.6 - 4.7 mg/dL Albumin 2.8 (L) 3.5 - 5.0 g/dL C REACTIVE PROTEIN, QT Collection Time: 11/23/20  4:30 AM  
Result Value Ref Range C-Reactive protein 4.59 (H) 0.00 - 0.60 mg/dL PROCALCITONIN Collection Time: 11/23/20  4:30 AM  
Result Value Ref Range Procalcitonin 1.54 (H) 0 ng/mL CBC WITH AUTOMATED DIFF Collection Time: 11/23/20  4:30 AM  
Result Value Ref Range WBC 7.0 4.1 - 11.1 K/uL  
 RBC 3.96 (L) 4.10 - 5.70 M/uL  
 HGB 10.3 (L) 12.1 - 17.0 g/dL HCT 33.0 (L) 36.6 - 50.3 % MCV 83.3 80.0 - 99.0 FL  
 MCH 26.0 26.0 - 34.0 PG  
 MCHC 31.2 30.0 - 36.5 g/dL  
 RDW 19.0 (H) 11.5 - 14.5 % PLATELET 76 (L) 376 - 400 K/uL NRBC 6.9 (H) 0  WBC ABSOLUTE NRBC 0.49 (H) 0.00 - 0.01 K/uL NEUTROPHILS PENDING % LYMPHOCYTES PENDING % MONOCYTES PENDING % EOSINOPHILS PENDING % BASOPHILS PENDING % IMMATURE GRANULOCYTES PENDING %  
 ABS. NEUTROPHILS PENDING K/UL  
 ABS. LYMPHOCYTES PENDING K/UL  
 ABS. MONOCYTES PENDING K/UL  
 ABS. EOSINOPHILS PENDING K/UL  
 ABS. BASOPHILS PENDING K/UL  
 ABS. IMM. GRANS. PENDING K/UL  
 DF PENDING   
PTT Collection Time: 11/23/20  4:30 AM  
Result Value Ref Range aPTT 35.5 23.0 - 35.7 sec  
 aPTT, therapeutic range   68 - 109 sec PROTHROMBIN TIME + INR Collection Time: 11/23/20  4:30 AM  
Result Value Ref Range Prothrombin time 17.6 (H) 11.9 - 14.7 sec INR 1.5 (H) 0.9 - 1.1 BLOOD GAS, ARTERIAL Collection Time: 11/23/20  4:45 AM  
Result Value Ref Range pH 7.354 7.35 - 7.45    
 PCO2 33 (L) 35 - 45 mmHg PO2 56 (L) 75 - 100 mmHg O2 SAT 88 (L) >95 % BICARBONATE 19 (L) 22 - 26 mmol/L  
 BASE DEFICIT 6.2 (H) 0 - 2 mmol/L  
 O2 METHOD VENT    
 FIO2 21.0 % MODE Assist Control/Volume Control Tidal volume 500 SET RATE 15    
 IPAP/PIP 0    
 EPAP/CPAP/PEEP 5.0    
 SITE Right Radial    
 DAMON'S TEST PASS Assessment/  
 
Patient Active Problem List  
Diagnosis Code  Cellulitis of left upper extremity L03.114  
 Chronic atrial fibrillation (HCC) I48.20  
 HTN (hypertension), benign I10  
 COPD (chronic obstructive pulmonary disease) ([facility]) J44.9  Dyslipidemia E78.5  Depression F32.9  
 UTI (urinary tract infection) N39.0  BRE (acute kidney injury) ([facility] [address] N17.9  AMS (altered mental status) R41.82  Sepsis ([facility] [address] A41.9  Hypoglycemia E16.2  
 Encephalopathy acute G93.40 Plan: 
 
Septic shockpatient presented with above-mentioned symptomatology was found to meet severe sepsis criteria secondary to combination of urinary tract infection as well as developing bilateral pneumonia, patient is COVID-19 negative, currently improving, patient been titrated off of levophed 
follow-up blood cultures Urine Culture consistent with ESBL E. Coli Follow-up inflammatory markers Continue meropenem for Abx coverage Discontinue IVF Infectious disease recommendations appreciated, will continue to follow Critical care recommendations appreciated will continue to follow Acute respiratory failure/pneumoniapatient was found to be in acute respiratory failure requiring emergent endotracheal intubation, likely secondary to developing bilateral pneumonia, patient currently off of pressors Follow-up sputum culture Follow-up blood cultures Continue Meropenem for Abx coverage Attempt weaning trial 
Pulmonology consult appreciated, will continue to follow Infectious disease recommendations appreciated, will continue to follow Hypoactive bowel soundsresolved Atrial fibrillation with RVRpatient presented with atrial fibrillation with RVR likely secondary to ongoing severe sepsis Continue digoxin at this time Transthoracic echo shows preserved ejection fraction Cardiology consult appreciated, will continue to follow Acute on chronic diastolic heart failurepatient was found to have significant bilateral lower extremity edema as well as radiographic evidence of volume overload consistent with acute on chronic diastolic heart failure Discontinue IV fluids Lasix 40 mg IV twice daily Continue to monitor intake and output Cardiology consult appreciated, will continue to follow Anemialikely multifactorial, however concern for GI bleed, s/p 2 uprbc 2 days back with appropriate response, currently hemoglobin hematocrit remained stable Continue protonix 40mg iv bid Continue to trend H/H Maintain active type and screen Follow-up gastroenterology recommendations Follow-up hematology recommendations Hypokalemiareplete K and recheck K Thrombocytopenialikely multifactorial, concern for possible early DIC, however platelet count remained stable Hematology recommendations appreciated Patient currently does not have any active bleeding Recommend platelet transfusion if platelet count is less than 20,000 with active bleeding Hyperlipidemiacontinue statin ProphylaxisSCDs FENcontinue tube feeding, replete potassium and magnesium DNR,  
surrogate decision-maker is patient's wife Critical care time spent 55 minutes involving direct patient care reviewing patient's labs and coordination of care with nursing staff Care Plan discussed with: Patient/Family and Nurse Nasima Callejas MD

## 2020-11-24 NOTE — PROGRESS NOTES
Renal Progress Note Patient: Ariel Trejo MRN: [id number]  SSN: [id number] YOB: 1945  Age: 67 y.o. Sex: male Admit Date: 11/15/2020 LOS: 9 days Subjective:  
Patient seen in ICU, on ventilator, sedated, Hypercalcemia resolved, hypocalcemic today Creatinine 2.06 today, On IV lasix with albumin, good output+ Current Facility-Administered Medications Medication Dose Route Frequency  pantoprazole (PROTONIX) 40 mg in 0.9% sodium chloride 10 mL injection  40 mg IntraVENous BID  
 glycopyrrolate (ROBINUL) tablet 1 mg  1 mg Oral TID  metoclopramide HCl (REGLAN) injection 10 mg  10 mg IntraVENous Q6H  
 furosemide (LASIX) injection 40 mg  40 mg IntraVENous Q12H  
 albumin human 25% (BUMINATE) solution 12.5 g  12.5 g IntraVENous Q12H  
 midazolam in normal saline (VERSED) 1 mg/mL infusion  0-10 mg/hr IntraVENous TITRATE  hydrocortisone Sod Succ (PF) (SOLU-CORTEF) injection 50 mg  50 mg IntraVENous Q8H  
 fentaNYL (PF) 1,500 mcg/30 mL (50 mcg/mL) infusion  0-200 mcg/hr IntraVENous TITRATE  vasopressin (VASOSTRICT) 40 Units in 0.9% sodium chloride 40 mL infusion  0.04 Units/min IntraVENous CONTINUOUS  
 meropenem (MERREM) 1 g in sterile water (preservative free) 20 mL IV syringe  1 g IntraVENous Q12H  
 morphine injection 2 mg  2 mg IntraVENous Q4H PRN  
 NOREPINephrine (LEVOPHED) 8 mg in 5% dextrose 250mL (32 mcg/mL) infusion  0.5-16 mcg/min IntraVENous TITRATE  acetaminophen (TYLENOL) suppository 650 mg  650 mg Rectal Q4H PRN  
 acetaminophen (TYLENOL) tablet 650 mg  650 mg Oral Q4H PRN  
 albuterol (PROVENTIL HFA, VENTOLIN HFA, PROAIR HFA) inhaler 2 Puff  2 Puff Inhalation Q4H PRN  
 docusate sodium (COLACE) capsule 100 mg  100 mg Oral BID  folic acid (FOLVITE) tablet 1 mg  1 mg Oral DAILY  pravastatin (PRAVACHOL) tablet 20 mg  20 mg Oral QHS  sertraline (ZOLOFT) tablet 50 mg  50 mg Oral DAILY  thiamine mononitrate (B-1) tablet 100 mg  100 mg Oral DAILY  ondansetron (ZOFRAN) injection 4 mg  4 mg IntraVENous Q6H PRN  
 hydrOXYzine pamoate (VISTARIL) capsule 25 mg  25 mg Oral TID PRN  chlorhexidine (PERIDEX) 0.12 % mouthwash 15 mL  15 mL Oral Q12H Vitals:  
 11/24/20 0700 11/24/20 0918 11/24/20 1100 11/24/20 1114 BP: 94/71  129/82 Pulse: 88 96 (!) 126 (!) 120 Resp:  12 29 22 Temp: (!) 96.4 °F (35.8 °C)  99.9 °F (37.7 °C) SpO2: 100% 100% 100% 100% Weight:      
Height:      
 
Objective:  
General: On ventilator, sedated, no acute distress. HEENT: no Icterus, no Pallor, ET tube in place  
neck: Neck is supple, No JVD Lungs: Decreased breath sounds at the bases, no rhonchi, few scattered rales+, CVS: heart sounds normal, no murmurs, no rubs. GI: soft, nontender, normal BS. Extremeties: no cyanosis, 1+ dependent edema+ Neuro: On ventilator, sedated Skin: normal skin turgor, no skin rashes. Intake and Output: 
Current Shift: 11/24 0701 - 11/24 1900 In: -  
Out: 750 [Urine:750] Last three shifts: 11/22 1901 - 11/24 0700 In: 2427.7 [I.V.:2427.7] Out: 2600 [Urine:2560; Drains:40] Lab/Data Review: 
Recent Labs  
  11/24/20 
0400 11/23/20 
0430 11/22/20 
0350 WBC 8.9 6.7 4.8 HGB 8.8* 10.3* 9.7* HCT 28.9* 33.0* 30.5* PLT 62* 76* 47* Recent Labs  
  11/24/20 
0345 11/23/20 
0430 11/22/20 
0350  141 145  
K 3.9 4.4 3.7 * 111* 114* CO2 25 22 24 * 128* 119* BUN 53* 42* 39* CREA 2.06* 1.94* 1.86* CA 6.1* 7.0* 7.4* PHOS 5.6* 3.7  --   
ALB 2.8* 2.8*  --   
INR  --  1.5*  --   
 
Recent Labs  
  11/24/20 
0435 11/23/20 
0445 11/22/20 
0340 PH 7.260* 7.354 7.447 PCO2 48* 33* 31* PO2 71* 56* 140* HCO3 20* 19* 23 FIO2 30.0 21.0 21.0 Recent Results (from the past 24 hour(s)) GLUCOSE, POC Collection Time: 11/23/20  1:16 PM  
Result Value Ref Range Glucose (POC) 119 (H) 65 - 100 mg/dL Performed by Odalis Taylor, FLUID  Collection Time: 11/23/20 3:15 PM  
Result Value Ref Range FLUID TYPE(15) PLEURAL FLUID FLUID PH 7.0 GLUCOSE, POC Collection Time: 11/23/20  6:02 PM  
Result Value Ref Range Glucose (POC) 105 (H) 65 - 100 mg/dL Performed by Bert Ochoa GLUCOSE, POC Collection Time: 11/23/20  8:10 PM  
Result Value Ref Range Glucose (POC) 103 (H) 65 - 100 mg/dL Performed by Parag Riggs GLUCOSE, POC Collection Time: 11/23/20 11:44 PM  
Result Value Ref Range Glucose (POC) 95 65 - 100 mg/dL Performed by Parag Riggs PROCALCITONIN Collection Time: 11/24/20  3:45 AM  
Result Value Ref Range Procalcitonin 1.09 (H) 0 ng/mL C REACTIVE PROTEIN, QT Collection Time: 11/24/20  3:45 AM  
Result Value Ref Range C-Reactive protein 3.61 (H) 0.00 - 0.60 mg/dL DIGOXIN Collection Time: 11/24/20  3:45 AM  
Result Value Ref Range Digoxin level 2.4 (H) 0.90 - 2.0 ng/mL RENAL FUNCTION PANEL Collection Time: 11/24/20  3:45 AM  
Result Value Ref Range Sodium 143 136 - 145 mmol/L Potassium 3.9 3.5 - 5.1 mmol/L Chloride 112 (H) 97 - 108 mmol/L  
 CO2 25 21 - 32 mmol/L Anion gap 6 5 - 15 mmol/L Glucose 102 (H) 65 - 100 mg/dL BUN 53 (H) 6 - 20 mg/dL Creatinine 2.06 (H) 0.70 - 1.30 mg/dL BUN/Creatinine ratio 26 (H) 12 - 20 GFR est AA 38 (L) >60 ml/min/1.73m2 GFR est non-AA 32 (L) >60 ml/min/1.73m2 Calcium 6.1 (LL) 8.5 - 10.1 mg/dL Phosphorus 5.6 (H) 2.6 - 4.7 mg/dL Albumin 2.8 (L) 3.5 - 5.0 g/dL CBC W/O DIFF Collection Time: 11/24/20  4:00 AM  
Result Value Ref Range WBC 8.9 4.1 - 11.1 K/uL  
 RBC 3.39 (L) 4.10 - 5.70 M/uL HGB 8.8 (L) 12.1 - 17.0 g/dL HCT 28.9 (L) 36.6 - 50.3 % MCV 85.3 80.0 - 99.0 FL  
 MCH 26.0 26.0 - 34.0 PG  
 MCHC 30.4 30.0 - 36.5 g/dL  
 RDW 18.5 (H) 11.5 - 14.5 % PLATELET 62 (L) 887 - 400 K/uL GLUCOSE, POC Collection Time: 11/24/20  4:33 AM  
Result Value Ref Range Glucose (POC) 104 (H) 65 - 100 mg/dL  Performed by Leory Inks BLOOD GAS, ARTERIAL Collection Time: 11/24/20  4:35 AM  
Result Value Ref Range pH 7.260 (L) 7.35 - 7.45    
 PCO2 48 (H) 35 - 45 mmHg PO2 71 (L) 75 - 100 mmHg O2 SAT 95 (L) >95 % BICARBONATE 20 (L) 22 - 26 mmol/L  
 BASE DEFICIT 5.7 (H) 0 - 2 mmol/L  
 O2 METHOD VENT    
 FIO2 30.0 % MODE SIMV Tidal volume 500 PRESSURE SUPPORT 10.0 EPAP/CPAP/PEEP 5.0    
 SITE Right Radial    
 DAMON'S TEST PASS Critical value read back  [address] [place]   
GLUCOSE, POC Collection Time: 11/24/20  8:05 AM  
Result Value Ref Range Glucose (POC) 111 (H) 65 - 100 mg/dL Performed by Geni Mack, POC Collection Time: 11/24/20 12:23 PM  
Result Value Ref Range Glucose (POC) 100 65 - 100 mg/dL Performed by Kristina Antunez Assessment and Plan: #1 severe hypercalcemia.  -On admission corrected calcium was 14.6. Etiology unclear, suspected multiple myeloma, immunoelectrophoresis studies pending for 5 days 
low PTH compatible with hypercalcemia, borderline low vitamin D level ACE level pending Recommend to check a urine random calcium and creatinine to assess for fractional excretion of calcium Hypercalcemia resolved and repeat calcium level today is 6.1 Will give IV calcium gluconate 1gm today and add calcium carbonate 2gmsx1 dose through NG tube Off IVF now, will continue to monitor  calcium levels 2. Fluid overload/dependent edema+:  improved edemaa 
probably with IV hydration and hypoalb Will give Iv albumin with IV lasix for 4 doses 3. Acute kidney injury on ?? CKD Renal functions remained elevated despite adequate hydration, suspect baseline chronic kidney disease Recommend to check a urine random protein creatinine ratio to assess for proteinuria 
  
3. Severe hypokalemia. improved Continue to monitor 4. Hypernatremia.  -From free water deficit.   
-Resolved now .   Off IV fluids 
 continue to monitor sodium levels 5. Acute respiratory failure, on ventilator Sepsis/UTI Pulmonary and ID following the patient Continue IV antibiotics as per ID 
  
6. Atrial fibrillation with rapid ventricular rate : Cardiology following the patient, on amiodarone and digoxin Signed By: Shyann Soto MD   
 November 24, 2020

## 2020-11-24 NOTE — PROGRESS NOTES
Progress Note Patient: Fidel Grimes MRN: [id number]  SSN: [id number] YOB: 1945  Age: 67 y.o. Sex: male Admit Date: 11/15/2020 LOS: 9 days Subjective:  
Patient followed for severe sepsis with altered mental status and suspected UTI. Blood cultures negative so far and urine culture has grown ESBL E. Coli. He has low grade temperatures with normal WBC and decreasing CRP and procalcitonin. Sputum culture grew normal aguilar and CXR today unchanged. He is currently on IV Meropenem alone. Patient remains intubated. Family member at the bedside. Objective:  
 
Vitals:  
 11/24/20 0700 11/24/20 0918 11/24/20 1100 11/24/20 1114 BP: 94/71  129/82 Pulse: 88 96 (!) 126 (!) 120 Resp:  12 29 22 Temp: (!) 96.4 °F (35.8 °C)  99.9 °F (37.7 °C) SpO2: 100% 100% 100% 100% Weight:      
Height:      
  
 
Intake and Output: 
Current Shift: No intake/output data recorded. Last three shifts: 11/22 1901 - 11/24 0700 In: 2427.7 [I.V.:2427.7] Out: 2600 [Urine:2560; Drains:40] Physical Exam:  
 Constitutional:   
   General: He is in acute distress. Appearance: He is ill-appearing. He is not diaphoretic. HENT:  
   Head: orotracheal tube Neck:  supple Cardiovascular:  
   Rate and Rhythm: Normal  
   Heart sounds: No murmur. Pulmonary:  
   Breath sounds: No wheezing, rhonchi or rales. Abdominal:  
   Palpations: Abdomen is soft. Tenderness: There is no abdominal tenderness. Genitourinary: 
   Comments: Mcmillan catheter Musculoskeletal:  
   Right lower leg: No edema. Left lower leg: No edema. Comments: 2x3 cm open wound right medial calf with dry base Skin: 
   Findings: No erythema or rash. Neurological:  
   Comments: Unable to assess Psychiatric:  
   Comments: Unable to assess Lab/Data Review: WBC 8,900 PLT 76,000 Lactic acid 2.7 Procalcitonin 1.09 <1.54 <12.30 CRP 3.61 <4.59  <35.90 Covid-19 Not detected Blood cultures (11/15) No growth FINAL Blood cultures (11/19) No growth at 5 days Urine culture (11/15) >100,000 cfu/ml ESBL E. Coli Sputum culture (11/20) Normal aguilar FINAL Body fluid culture (11/23) ? Pending CXR (11/24) Patchy basilar reticular markings unchanged. Cardiac silhouette enlargement. No pneumothorax Assessment:  
 
Principal Problem: 
  Sepsis ([facility] [address] (11/16/2020) Active Problems: 
  UTI (urinary tract infection) (11/15/2020) BRE (acute kidney injury) ([facility] [address] (11/15/2020) AMS (altered mental status) (11/15/2020) Hypoglycemia (11/16/2020) Encephalopathy acute (11/16/2020) 1. Severe sepsis with tachycardia, tachypnea, leukocytosis, elevated procalcitonin and CRP, resolving 2. UTI with marked pyuria and bacteriuria, secondary to ESBL E. Coli, Day #5 IV Meropenem. 3. Altered mental status, possibly secondary to above 4. Rule out GI bleed 5. Atrial fibrillation 6. Covid-19 negative 7. Acute hypoxic respiratory failure, intubated Comment:  WBC normal, CRP and procalcitonin decreasing. Plan: 1. Continue Meropenem 2. Follow-up pending blood cultures, ?body fluid culture 3. In am, repeat procalcitonin and CRP, done Signed By: Vic Awad MD   
 November 24, 2020

## 2020-11-24 NOTE — PROGRESS NOTES
Hospitalist Progress Note Daily Progress Note: 11/24/2020 
 
42-year-old male sent here from [facility] with reports from staff of altered mental status, lethargy, not eating and dehydration. He has a history of atrial fibrillation. UA suggestive uti, zosyn initiated 11/15. Beck, cr 2.18, us rp pending. Subjective:  
Patient seen and evaluated at bedside, overnight events noted, patient currently intubated responding to painful/verbal stimuli, discussed with RN at bedside. Patient is intubated and sedated. Problem List: 
Problem List as of 11/24/2020 Date Reviewed: 11/15/2020 Codes Class Noted - Resolved * (Principal) Sepsis ([facility] [address] ICD-10-CM: A41.9 ICD-9-CM: 038.9, 995.91  11/16/2020 - Present Hypoglycemia ICD-10-CM: E16.2 ICD-9-CM: 251.2  11/16/2020 - Present Encephalopathy acute ICD-10-CM: G93.40 ICD-9-CM: 348.30  11/16/2020 - Present UTI (urinary tract infection) ICD-10-CM: N39.0 ICD-9-CM: 599.0  11/15/2020 - Present BECK (acute kidney injury) ([facility] [address] ICD-10-CM: N17.9 ICD-9-CM: 584.9  11/15/2020 - Present AMS (altered mental status) ICD-10-CM: N74.30 
ICD-9-CM: 780.97  11/15/2020 - Present Chronic atrial fibrillation (HCC) ICD-10-CM: I48.20 ICD-9-CM: 427.31  8/12/2020 - Present HTN (hypertension), benign ICD-10-CM: I10 
ICD-9-CM: 401.1  8/12/2020 - Present COPD (chronic obstructive pulmonary disease) (HCC) ICD-10-CM: J44.9 ICD-9-CM: 692  8/12/2020 - Present Dyslipidemia ICD-10-CM: E78.5 ICD-9-CM: 272.4  8/12/2020 - Present Depression ICD-10-CM: F32.9 ICD-9-CM: 341  8/12/2020 - Present Cellulitis of left upper extremity ICD-10-CM: L03.114 
ICD-9-CM: 682.3  8/11/2020 - Present Medications reviewed Current Facility-Administered Medications Medication Dose Route Frequency  pantoprazole (PROTONIX) 40 mg in 0.9% sodium chloride 10 mL injection  40 mg IntraVENous BID  
 glycopyrrolate (ROBINUL) tablet 1 mg  1 mg Oral TID  furosemide (LASIX) injection 40 mg  40 mg IntraVENous Q12H  
 albumin human 25% (BUMINATE) solution 12.5 g  12.5 g IntraVENous Q12H  
 metoclopramide HCl (REGLAN) injection 10 mg  10 mg IntraVENous Q6H PRN  
 midazolam in normal saline (VERSED) 1 mg/mL infusion  0-10 mg/hr IntraVENous TITRATE  hydrocortisone Sod Succ (PF) (SOLU-CORTEF) injection 50 mg  50 mg IntraVENous Q8H  
 fentaNYL (PF) 1,500 mcg/30 mL (50 mcg/mL) infusion  0-200 mcg/hr IntraVENous TITRATE  vasopressin (VASOSTRICT) 40 Units in 0.9% sodium chloride 40 mL infusion  0.04 Units/min IntraVENous CONTINUOUS  
 meropenem (MERREM) 1 g in sterile water (preservative free) 20 mL IV syringe  1 g IntraVENous Q12H  
 morphine injection 2 mg  2 mg IntraVENous Q4H PRN  
 NOREPINephrine (LEVOPHED) 8 mg in 5% dextrose 250mL (32 mcg/mL) infusion  0.5-16 mcg/min IntraVENous TITRATE  acetaminophen (TYLENOL) suppository 650 mg  650 mg Rectal Q4H PRN  
 acetaminophen (TYLENOL) tablet 650 mg  650 mg Oral Q4H PRN  
 albuterol (PROVENTIL HFA, VENTOLIN HFA, PROAIR HFA) inhaler 2 Puff  2 Puff Inhalation Q4H PRN  
 docusate sodium (COLACE) capsule 100 mg  100 mg Oral BID  folic acid (FOLVITE) tablet 1 mg  1 mg Oral DAILY  pravastatin (PRAVACHOL) tablet 20 mg  20 mg Oral QHS  sertraline (ZOLOFT) tablet 50 mg  50 mg Oral DAILY  thiamine mononitrate (B-1) tablet 100 mg  100 mg Oral DAILY  ondansetron (ZOFRAN) injection 4 mg  4 mg IntraVENous Q6H PRN  
 hydrOXYzine pamoate (VISTARIL) capsule 25 mg  25 mg Oral TID PRN  chlorhexidine (PERIDEX) 0.12 % mouthwash 15 mL  15 mL Oral Q12H Review of Systems:  
Unobtainable 2/2 encephalopathic/acute illness Objective:  
Physical Exam:  
 
Visit Vitals BP 94/71 (BP 1 Location: Left arm, BP Patient Position: At rest) Pulse 96 Temp (!) 96.4 °F (35.8 °C) Resp 12 Ht 6' 2.02\" (1.88 m) Wt 101.6 kg (224 lb) SpO2 100% BMI 28.75 kg/m²  O2 Flow Rate (L/min): 0.5 l/min O2 Device: Ventilator Temp (24hrs), Av °F (36.1 °C), Min:95.4 °F (35.2 °C), Max:98.5 °F (36.9 °C) No intake/output data recorded.  190 -  0700 In: 2427.7 [I.V.:2427.7] Out: 2600 [Urine:2560; Drains:40] Constitutional: Currently intubated and sedated Head: Normocephalic and atraumatic. Mouth: oral mucosa dry, ET tube is in situ Neck: supple Cardiovascular:Irreg/irreg Lungs:decreased air entry bilaterally in bilateral lower lung zones Abdominal: Soft. non tender Neurological: obtunded, moves all ext, 
Skin: Right lower shin with ulcer, yellow drainage Sacrum reddened, no breakdown Left arm with skin tear Multiple ecchymotic, skin is friable/thin Data Review:  
   
Recent Days: 
Recent Labs  
  20 
0400 20 
0430 20 
0350 WBC 8.9 6.7 4.8 HGB 8.8* 10.3* 9.7* HCT 28.9* 33.0* 30.5* PLT 62* 76* 47* Recent Labs  
  20 
0345 20 
0430 20 
0350  141 145  
K 3.9 4.4 3.7 * 111* 114* CO2 25 22 24 * 128* 119* BUN 53* 42* 39* CREA 2.06* 1.94* 1.86* CA 6.1* 7.0* 7.4* PHOS 5.6* 3.7  --   
ALB 2.8* 2.8*  --   
INR  --  1.5*  --   
 
Recent Labs  
  20 
0435 20 
0445 20 
0340 PH 7.260* 7.354 7.447 PCO2 48* 33* 31* PO2 71* 56* 140* HCO3 20* 19* 23 FIO2 30.0 21.0 21.0  
 
 
24 Hour Results: 
Recent Results (from the past 24 hour(s)) GLUCOSE, POC Collection Time: 20  1:16 PM  
Result Value Ref Range Glucose (POC) 119 (H) 65 - 100 mg/dL Performed by Juni Harrison, FLUID Collection Time: 20  3:15 PM  
Result Value Ref Range FLUID TYPE(15) PLEURAL FLUID FLUID PH 7.0 GLUCOSE, POC Collection Time: 20  6:02 PM  
Result Value Ref Range Glucose (POC) 105 (H) 65 - 100 mg/dL Performed by [facility] GLUCOSE, POC Collection Time: 20  8:10 PM  
Result Value Ref Range Glucose (POC) 103 (H) 65 - 100 mg/dL Performed by [employer] GLUCOSE, POC Collection Time: 11/23/20 11:44 PM  
Result Value Ref Range Glucose (POC) 95 65 - 100 mg/dL Performed by [employer] PROCALCITONIN Collection Time: 11/24/20  3:45 AM  
Result Value Ref Range Procalcitonin 1.09 (H) 0 ng/mL C REACTIVE PROTEIN, QT Collection Time: 11/24/20  3:45 AM  
Result Value Ref Range C-Reactive protein 3.61 (H) 0.00 - 0.60 mg/dL DIGOXIN Collection Time: 11/24/20  3:45 AM  
Result Value Ref Range Digoxin level 2.4 (H) 0.90 - 2.0 ng/mL RENAL FUNCTION PANEL Collection Time: 11/24/20  3:45 AM  
Result Value Ref Range Sodium 143 136 - 145 mmol/L Potassium 3.9 3.5 - 5.1 mmol/L Chloride 112 (H) 97 - 108 mmol/L  
 CO2 25 21 - 32 mmol/L Anion gap 6 5 - 15 mmol/L Glucose 102 (H) 65 - 100 mg/dL BUN 53 (H) 6 - 20 mg/dL Creatinine 2.06 (H) 0.70 - 1.30 mg/dL BUN/Creatinine ratio 26 (H) 12 - 20 GFR est AA 38 (L) >60 ml/min/1.73m2 GFR est non-AA 32 (L) >60 ml/min/1.73m2 Calcium 6.1 (LL) 8.5 - 10.1 mg/dL Phosphorus 5.6 (H) 2.6 - 4.7 mg/dL Albumin 2.8 (L) 3.5 - 5.0 g/dL CBC W/O DIFF Collection Time: 11/24/20  4:00 AM  
Result Value Ref Range WBC 8.9 4.1 - 11.1 K/uL  
 RBC 3.39 (L) 4.10 - 5.70 M/uL HGB 8.8 (L) 12.1 - 17.0 g/dL HCT 28.9 (L) 36.6 - 50.3 % MCV 85.3 80.0 - 99.0 FL  
 MCH 26.0 26.0 - 34.0 PG  
 MCHC 30.4 30.0 - 36.5 g/dL  
 RDW 18.5 (H) 11.5 - 14.5 % PLATELET 62 (L) 134 - 400 K/uL GLUCOSE, POC Collection Time: 11/24/20  4:33 AM  
Result Value Ref Range Glucose (POC) 104 (H) 65 - 100 mg/dL Performed by [employer] BLOOD GAS, ARTERIAL Collection Time: 11/24/20  4:35 AM  
Result Value Ref Range pH 7.260 (L) 7.35 - 7.45    
 PCO2 48 (H) 35 - 45 mmHg PO2 71 (L) 75 - 100 mmHg O2 SAT 95 (L) >95 % BICARBONATE 20 (L) 22 - 26 mmol/L  
 BASE DEFICIT 5.7 (H) 0 - 2 mmol/L  
 O2 METHOD VENT    
 FIO2 30.0 % MODE SIMV  Tidal volume 500 PRESSURE SUPPORT 10.0 EPAP/CPAP/PEEP 5.0    
 SITE Right Radial    
 DAMON'S TEST PASS Critical value read back  [address] [place]   
GLUCOSE, POC Collection Time: 11/24/20  8:05 AM  
Result Value Ref Range Glucose (POC) 111 (H) 65 - 100 mg/dL Performed by Génesis Pfeiffer Assessment/  
 
Patient Active Problem List  
Diagnosis Code  Cellulitis of left upper extremity L03.114  
 Chronic atrial fibrillation (HCC) I48.20  
 HTN (hypertension), benign I10  
 COPD (chronic obstructive pulmonary disease) (HCC) J44.9  Dyslipidemia E78.5  Depression F32.9  
 UTI (urinary tract infection) N39.0  BRE (acute kidney injury) ([facility] [address] N17.9  AMS (altered mental status) R41.82  Sepsis ([facility] [address] A41.9  Hypoglycemia E16.2  
 Encephalopathy acute G93.40 Plan: 
 
Septic shockpatient presented with above-mentioned symptomatology was found to meet severe sepsis criteria secondary to combination of urinary tract infection as well as developing bilateral pneumonia, patient is COVID-19 negative, currently improving, patient been titrated off of levophed 
follow-up blood cultures Urine Culture consistent with ESBL E. Coli Follow-up inflammatory markers Continue meropenem for Abx coverage Discontinued IVF Infectious disease recommendations appreciated, will continue to follow Critical care recommendations appreciated will continue to follow Acute respiratory failure/pneumoniapatient was found to be in acute respiratory failure requiring emergent endotracheal intubation, likely secondary to developing bilateral pneumonia, patient currently off of pressors Follow-up sputum culture Follow-up blood cultures Continue Meropenem for Abx coverage Attempt weaning trial 
Pulmonology consult appreciated, will continue to follow Infectious disease recommendations appreciated, will continue to follow Hypoactive bowel soundsresolved Atrial fibrillation with RVRpatient presented with atrial fibrillation with RVR likely secondary to ongoing severe sepsis Continue digoxin at this time Transthoracic echo shows preserved ejection fraction Cardiology consult appreciated, will continue to follow Acute on chronic diastolic heart failurepatient was found to have significant bilateral lower extremity edema as well as radiographic evidence of volume overload consistent with acute on chronic diastolic heart failure Discontinued IV fluids Lasix 40 mg IV twice daily Continue to monitor intake and output Cardiology consult appreciated, will continue to follow Anemialikely multifactorial, however concern for GI bleed, s/p 2 uprbc 2 days back with appropriate response, currently hemoglobin hematocrit remained stable Continue protonix 40mg iv bid Continue to trend H/H Maintain active type and screen Follow-up gastroenterology recommendations Follow-up hematology recommendations Hypokalemiareplete K and recheck K Thrombocytopenialikely multifactorial, concern for possible early DIC, however platelet count remained stable Hematology recommendations appreciated Patient currently does not have any active bleeding- bjt noted coffe ground on NGT suctioning. Recommend platelet transfusion if platelet count is less than 20,000 with active bleeding Hyperlipidemiacontinue statin ProphylaxisSCDs FENcontinue tube feeding, replete potassium and magnesium DNR,  
surrogate decision-maker is patient's wife Care Plan discussed with: Patient/Family and Nurse Mai Lopez MD

## 2020-11-24 NOTE — PROGRESS NOTES
Comprehensive Nutrition Assessment Type and Reason for Visit: Reassess(interim) Nutrition Recommendations/Plan:  
Adjust TF via NG to Vital 1.2 at 20mL/hr Increase by 10mL q4hr to goal rate 60mL/hr, continuous Flush with 100mL free water q4hr Goal feeds provide 1728kcal, 108g protein, 1768mL fluid (meeting >97% est needs) Document TF rate, water flushes, and GRVs 
 
Continue prokinetic to promote GI motility Nutrition Assessment:  Worsening AMS, weakness pta. Dx sepsis, UTI, BRE. Tx ICU (11/16) 2/2 cardizem drip for a fib; cardiology now following. Noted possible GIB; s/p 1 unit PRBC, GI following- no interventions at this time. COVID negative. ECHO showing preserved LVEF. Worsening lethargy, hypoxia. Intubated 11/19, pressors initiated. Prior to intubation, SLP attempted eval, pt not appropriate for tx. Receiving protein-sparing kcal from D5. RD provided TF recs. TF initiated to goal rate 11/19 to goal rate however MD rec'd hold s/p intubation d/t high volume pressor requirements. TF reinitiated 11/20, achieved goal rate 11/21. Pt then with elevated residuals so clamped early 11/22. NG remained clamped until RN reinitiated TF to 20mL/hr today. Spoke with RN at bedside who reported high residuals after short period of TF infusion. Noted rubinol added today; discussed increasing dose vs adjusting to reglan pending pt tolerance to TF once reinitiated. Remains intubated, sedated on midazolam- pressors now d/c. Labs: H/H 8.8/28.9, BUN 53, Cr 2.06, , Phos 5.6. Meds: furosemide, midazolam, fentanyl, statin, morphine, thiamine. Malnutrition Assessment: 
Malnutrition Status: Moderate malnutrition Context:  Acute illness Findings of the 6 clinical characteristics of malnutrition:  
Energy Intake:  7 - 50% or less of est energy requirements for 5 or more days(No PO x2 days since admit, suspect poor PO at least 1x day pta) Weight Loss:  Unable to assess Body Fat Loss:  Unable to assess, Muscle Mass Loss:  Unable to assess, Fluid Accumulation:  1 - Mild, Generalized Estimated Daily Nutrient Needs: 
Energy (kcal): 1725kcal (PSU 2003b); Weight Used for Energy Requirements: Current Protein (g): 111g (1.2g/kg); Weight Used for Protein Requirements: Current Fluid (ml/day): 1725mL; Method Used for Fluid Requirements: 1 ml/kcal 
 
 
Nutrition Related Findings:  Unable to complete NFPE 2/2 precautions. Previously constipated, now with FMS in place- liquid OP documented today. No documented hx dysphagia, n/v, or c/d. 3+ generalized weeping edema continues. Noted recent admit to OSH (8/2020), documented 6%BW loss x 1.5 months Wounds:   
None Current Nutrition Therapies: DIET NPO Except Meds DIET TUBE FEEDING Osmolite 1.2 Current Tube Feeding (TF) Orders: · Feeding Route: Nasogastric · Formula: Osmolite 1.2 · Schedule:Continuous · Regimen: 80mL/hr · Water Flushes: 100mL q4hr · Current TF & Flush Orders Provides: 2304kcal, 107g protein, 2174mL fluid · Goal TF & Flush Orders Provides: Adjust TF to Vital 1.2 reducing goal rate to 60mL/hr continuous, Flush with 100mL free water q4hr; Providing 1728kcal, 108g protein, 1768mL fluid Anthropometric Measures: 
· Height:  6' 2\" (188 cm) · Current Body Wt:  92.5 kg (203 lb 14.8 oz)(11/18) · Ideal Body Wt:  190 lbs:  107.3 % · BMI Category: Overweight (BMI 25.0-29. 9) Nutrition Diagnosis:  
· Inadequate oral intake related to cognitive or neurological impairment as evidenced by NPO or clear liquid status due to medical condition(SLP rec'd too lethargic for tx/ PO) Nutrition Interventions:  
Food and/or Nutrient Delivery: Continue NPO, Start tube feeding Nutrition Education and Counseling: No recommendations at this time Coordination of Nutrition Care: Continue to monitor while inpatient Goals: 
Meet >75% EENs via TF vs PO in 7 days (not progressing) Maintain skin integrity (progressing) Lytes wnl (not progressing) Nutrition Monitoring and Evaluation:  
Behavioral-Environmental Outcomes: None identified Food/Nutrient Intake Outcomes: Food and nutrient intake Physical Signs/Symptoms Outcomes: Weight, Skin, Biochemical data Discharge Planning: Too soon to determine Electronically signed by Garrett Ruiz on 11/24/2020 at 3:14 PM 
 
Contact:

## 2020-11-24 NOTE — PROGRESS NOTES
Progress Note 
 
 
11/24/2020 12:30 PM 
NAME: Kenzie Nielson MRN:  [id number] Admit Diagnosis: UTI (urinary tract infection) [N39.0] AMS (altered mental status) [R41.82] BRE (acute kidney injury) ([facility] [address] [N17.9] Assessment/Plan:  
Atrial fibrillation with rapid ventricular response, Digoxin level pending Hypotension, was on IV Levophed, weaned off UTI/ESBL E Coli on IV Meropenem Respiratory failure, intubated Anemia, dropping hemoglobin, now stable Hypokalemia, corrected  
 
  
 []       High complexity decision making was performed in this patient at high risk for decompensation with multiple organ involvement. Subjective:  
 
Kenzie Nielson is intubated  
discussed with RN events overnight. Review of Systems: 
 
 
Could NOT obtain due to: Altered mental status Objective:  
  
Physical Exam: 
 
Last 24hrs VS reviewed since prior progress note. Most recent are: 
 
Visit Vitals /82 Pulse (!) 120 Temp 99.9 °F (37.7 °C) Resp 22 Ht 6' 2.02\" (1.88 m) Wt 101.6 kg (224 lb) SpO2 100% BMI 28.75 kg/m² Intake/Output Summary (Last 24 hours) at 11/24/2020 1206 Last data filed at 11/24/2020 2748 Gross per 24 hour Intake  Output 2540 ml Net -2540 ml General Appearance: Intubated Ears/Nose/Mouth/Throat: Intubated  
neck: Supple. Chest: Lungs coarse breath sounds Cardiovascular: iregular rate and rhythm, S1S2 normal, no murmur. Abdomen: Soft, non-tender, bowel sounds are active. Extremities: No edema bilaterally. Skin: Warm and dry. []         Post-cath site without hematoma, bruit, tenderness, or thrill. Distal pulses intact. PMH/SH reviewed - no change compared to H&P Data Review Telemetry:  
 
EKG:  
[]  No new EKG for review T brain without acute pathology CT chest with pleural effusions and compressive atelectasis, having thoracentesis Lab Data Personally Reviewed: 
 
Recent Labs  
  11/24/20 
0400 11/23/20 
0430 WBC 8.9 6.7 HGB 8.8* 10.3* HCT 28.9* 33.0*  
PLT 62* 76* Recent Labs  
  11/23/20 0430 INR 1.5* PTP 17.6* APTT 35.5 Recent Labs  
  11/24/20 0345 11/23/20 0430 11/22/20 
0350  141 145  
K 3.9 4.4 3.7 * 111* 114* CO2 25 22 24 BUN 53* 42* 39* CREA 2.06* 1.94* 1.86* * 128* 119* CA 6.1* 7.0* 7.4* No results for input(s): CPK, CKNDX, TROIQ in the last 72 hours. No lab exists for component: CPKMB No results found for: CHOL, CHOLX, CHLST, CHOLV, HDL, HDLP, LDL, LDLC, DLDLP, TGLX, TRIGL, TRIGP, CHHD, CHHDX Recent Labs  
  11/24/20 0345 11/23/20 0430 ALB 2.8* 2.8* Recent Labs  
  11/24/20 0435 11/23/20 0445 PH 7.260* 7.354 PCO2 48* 33* PO2 71* 56* Medications Personally Reviewed: 
 
Current Facility-Administered Medications Medication Dose Route Frequency  pantoprazole (PROTONIX) 40 mg in 0.9% sodium chloride 10 mL injection  40 mg IntraVENous BID  
 glycopyrrolate (ROBINUL) tablet 1 mg  1 mg Oral TID  metoclopramide HCl (REGLAN) injection 10 mg  10 mg IntraVENous Q6H  
 furosemide (LASIX) injection 40 mg  40 mg IntraVENous Q12H  
 albumin human 25% (BUMINATE) solution 12.5 g  12.5 g IntraVENous Q12H  
 midazolam in normal saline (VERSED) 1 mg/mL infusion  0-10 mg/hr IntraVENous TITRATE  hydrocortisone Sod Succ (PF) (SOLU-CORTEF) injection 50 mg  50 mg IntraVENous Q8H  
 fentaNYL (PF) 1,500 mcg/30 mL (50 mcg/mL) infusion  0-200 mcg/hr IntraVENous TITRATE  vasopressin (VASOSTRICT) 40 Units in 0.9% sodium chloride 40 mL infusion  0.04 Units/min IntraVENous CONTINUOUS  
 meropenem (MERREM) 1 g in sterile water (preservative free) 20 mL IV syringe  1 g IntraVENous Q12H  
 morphine injection 2 mg  2 mg IntraVENous Q4H PRN  
 NOREPINephrine (LEVOPHED) 8 mg in 5% dextrose 250mL (32 mcg/mL) infusion  0.5-16 mcg/min IntraVENous TITRATE  acetaminophen (TYLENOL) suppository 650 mg  650 mg Rectal Q4H PRN  
 acetaminophen (TYLENOL) tablet 650 mg  650 mg Oral Q4H PRN  
 albuterol (PROVENTIL HFA, VENTOLIN HFA, PROAIR HFA) inhaler 2 Puff  2 Puff Inhalation Q4H PRN  
 docusate sodium (COLACE) capsule 100 mg  100 mg Oral BID  folic acid (FOLVITE) tablet 1 mg  1 mg Oral DAILY  pravastatin (PRAVACHOL) tablet 20 mg  20 mg Oral QHS  sertraline (ZOLOFT) tablet 50 mg  50 mg Oral DAILY  thiamine mononitrate (B-1) tablet 100 mg  100 mg Oral DAILY  ondansetron (ZOFRAN) injection 4 mg  4 mg IntraVENous Q6H PRN  
 hydrOXYzine pamoate (VISTARIL) capsule 25 mg  25 mg Oral TID PRN  chlorhexidine (PERIDEX) 0.12 % mouthwash 15 mL  15 mL Oral Q12H Jeaneth Flores MD

## 2020-11-24 NOTE — PROGRESS NOTES
Hematology Oncology Progress Note Subjective: He remains unresponsive. Pt on ventilator. No active bleeding. Current Facility-Administered Medications Medication Dose Route Frequency  furosemide (LASIX) injection 40 mg  40 mg IntraVENous Q12H  
 albumin human 25% (BUMINATE) solution 12.5 g  12.5 g IntraVENous Q12H  
 metoclopramide HCl (REGLAN) injection 10 mg  10 mg IntraVENous Q6H PRN  
 midazolam in normal saline (VERSED) 1 mg/mL infusion  0-10 mg/hr IntraVENous TITRATE  hydrocortisone Sod Succ (PF) (SOLU-CORTEF) injection 50 mg  50 mg IntraVENous Q8H  
 pantoprazole (PROTONIX) granules for oral suspension 40 mg  40 mg Per NG tube ACB  fentaNYL (PF) 1,500 mcg/30 mL (50 mcg/mL) infusion  0-200 mcg/hr IntraVENous TITRATE  vasopressin (VASOSTRICT) 40 Units in 0.9% sodium chloride 40 mL infusion  0.04 Units/min IntraVENous CONTINUOUS  
 meropenem (MERREM) 1 g in sterile water (preservative free) 20 mL IV syringe  1 g IntraVENous Q12H  
 morphine injection 2 mg  2 mg IntraVENous Q4H PRN  
 NOREPINephrine (LEVOPHED) 8 mg in 5% dextrose 250mL (32 mcg/mL) infusion  0.5-16 mcg/min IntraVENous TITRATE  acetaminophen (TYLENOL) suppository 650 mg  650 mg Rectal Q4H PRN  
 acetaminophen (TYLENOL) tablet 650 mg  650 mg Oral Q4H PRN  
 albuterol (PROVENTIL HFA, VENTOLIN HFA, PROAIR HFA) inhaler 2 Puff  2 Puff Inhalation Q4H PRN  
 docusate sodium (COLACE) capsule 100 mg  100 mg Oral BID  folic acid (FOLVITE) tablet 1 mg  1 mg Oral DAILY  pravastatin (PRAVACHOL) tablet 20 mg  20 mg Oral QHS  sertraline (ZOLOFT) tablet 50 mg  50 mg Oral DAILY  thiamine mononitrate (B-1) tablet 100 mg  100 mg Oral DAILY  ondansetron (ZOFRAN) injection 4 mg  4 mg IntraVENous Q6H PRN  
 hydrOXYzine pamoate (VISTARIL) capsule 25 mg  25 mg Oral TID PRN  chlorhexidine (PERIDEX) 0.12 % mouthwash 15 mL  15 mL Oral Q12H Review of Systems: not obtainable due to AMS. Objective: Patient Vitals for the past 8 hrs: 
 BP Temp Pulse Resp SpO2  
20 1932 101/69 98.5 °F (36.9 °C) (!) 112 25 94 % 20 1800 125/74  (!) 120 24 98 % 20 1700 110/85  (!) 109 19 100 % 20 1647   (!) 106 17 100 % 20 1600 98/88  (!) 101 12 100 % 20 1500 114/80  (!) 109 16 100 % 20 1400 93/79  (!) 109 16 100 % 20 1316   (!) 128 14 98 % 20 1300 104/70  (!) 120 16 96 % Temp (24hrs), Av.4 °F (36.9 °C), Min:97.9 °F (36.6 °C), Max:98.8 °F (37.1 °C) Physical Exam: 
constitutional Elderly white male , with altered mental status, Well nourished. Well developed. Head Normocephalic; no scars Eyes Conjunctivae and sclerae are clear and without icterus. Pupils are reactive and equal.  
ENMT ET tube is present. .  
Neck Supple without masses or thyromegaly. No jugular venous distension. Hematologic/Lymphatic No petechiae or purpura. No tender or palpable lymph nodes in the cervical, supraclavicular, axillary or inguinal area. Respiratory Lungs are clear to auscultation without rhonchi or wheezing. Cardiovascular Regular rate and rhythm of heart without murmurs, gallops or rubs. Chest / Line Site Chest is symmetric with no chest wall deformities. Abdomen Non-tender, non-distended, no masses, ascites or hepatosplenomegaly. Good bowel sounds. No guarding or rebound tenderness. No pulsatile masses. Musculoskeletal No tenderness or swelling, normal range of motion without obvious weakness. Extremities No visible deformities, no cyanosis, clubbing or edema. Skin No rashes, scars, or lesions suggestive of malignancy. No petechiae, purpura, or ecchymoses. No excoriations. Neurologic Altered mental status. Othelia Kirill [facility] AMS>  
 
 
 
 
Lab/Data Review: 
Recent Labs  
  20 
0430 20 
0350 20 
0600 WBC 6.7 4.8 5.4 HGB 10.3* 9.7* 8.8* HCT 33.0* 30.5* 27.8* PLT 76* 47* 41* Recent Labs  
  20 
0430 11/22/20 
0350 11/21/20 
0600  145 148* K 4.4 3.7 3.0*  
* 114* 114* CO2 22 24 24 * 119* 120* BUN 42* 39* 36* CREA 1.94* 1.86* 2.05* CA 7.0* 7.4* 7.9*  
PHOS 3.7  --  1.8* ALB 2.8*  --  2.8*  2.9* TBILI  --   --  1.1* ALT  --   --  47 INR 1.5*  --   --   
 
Recent Labs  
  11/23/20 
0445 11/22/20 
0340 11/21/20 
1115 PH 7.354 7.447 7.482* PCO2 33* 31* 9*  
PO2 56* 140* 162* HCO3 19* 23 12* FIO2 21.0 21.0 21.0 Radiology: Xr Abd Flat/ Erect Result Date: 11/20/2020 IMPRESSION: No evidence of free air. No acute appearing findings. Ct Head Wo Cont Result Date: 11/23/2020 IMPRESSION: No interval change. No acute intracranial abnormality. Ct Head Wo Cont Result Date: 11/15/2020 IMPRESSION: Chronic findings as above. Ct Chest Wo Cont Result Date: 11/23/2020 IMPRESSION: Nonspecific reticulonodular markings throughout the lungs. Indistinct small nodules many with groundglass appearance. Differential considerations would include infectious or neoplastic causes. Moderate volume dependent pleural effusions with associated compressive atelectasis posterior lower lobe lungs. Elongated thoracic aorta with atherosclerotic change. Cardiomegaly. CAD. Us Retroperitoneum Comp Result Date: 11/17/2020 Impression: Normal exam  
 
Ir Us Guided Vascular Access Result Date: 11/17/2020 Impression: Successful placement of a triple-lumen central venous catheter. The catheter is ready for use. Xr Chest [facility] Result Date: 11/23/2020 IMPRESSION: Lines and tubes appear stable, as visualized. Stable mild enlargement of cardiopericardial silhouette. Central vascular prominence. Small right pleural effusion. Findings favored to represent CHF or volume overload; correlate clinically. Xr Chest [facility] Result Date: 11/22/2020 Impression: Little interval change. Xr Chest [facility] Result Date: 11/21/2020 Findings/impression: Stable support hardware. Slight interval increase of patchy right basilar airspace disease. Small pleural effusions. No evidence of pneumothorax. Cardiomediastinal contours are stable. Increasing central vascular congestion. Visualized osseous structures are unchanged. Xr Chest [facility] Result Date: 11/20/2020 IMPRESSION: Developing bilateral perihilar and infrahilar opacities, with differential diagnosis including pneumonia, atelectasis, and edema. Short-term radiographic follow-up recommended. Xr Chest [facility] Result Date: 11/17/2020 Impression: Successful placement of a triple-lumen central venous catheter. The catheter is ready for use. Xr Chest [facility] Result Date: 11/17/2020 Impression: The cardiomediastinal silhouette is appropriate for age, technique, and lung expansion. Pulmonary vasculature is not congested. The lungs are essentially clear. No effusion or pneumothorax is seen. Xr Chest [facility] Result Date: 11/15/2020 IMPRESSION:  No evidence of an acute cardiopulmonary process. Ir Thoracentesis Ndl Punc Asp W Image Result Date: 11/23/2020 Impression: Successful right thoracentesis. Ir Insert Non Tunl Cvc Over 5 Yrs Result Date: 11/17/2020 Impression: Successful placement of a triple-lumen central venous catheter. The catheter is ready for use. Assessment /Plan:  
 
 
1) 67 yr old male admitted with AMS. Change in mental status likely due to UTI, sepsis, hypercalcemia , hypernatremia and renal failure.  
-ct head did not show any acute pathology.  
-Being eval by neurology. Likely metabolic encephalapathy from sepsis. -now intubated and sedated.   
2) ESBL E.coli  UTI. On abx. Id seeing the patient.  
  
2) Severe anemia: Improving. Likely related to sepsis. -In the setting hypercalcemia and renal fialure need to r/o Multiple myeloma. -check labs for myeloma.  
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  
-check IRON studies, b12 and folate levels.  LDH ,r gonzales and haptoglobin- pending. Pt seen by GI for bleeding.  
  
S/p  2 more units of transfusion last night with good response. Hemoglobin improved . -transfuse for hb 7 gm/dl or symptomatic. 
-appears to be bleeding/? Occult   
2) Thrombocytopenia: S/p platelets transfusion. Platelet count improving. 
-Transfuse for platelet count <02O or for bleeding. REvewied peripheral blood smear shows neutrophils with increased granulation, bands suggestive of infection. RBC appear normal. NO evidence of increased schistocytes . Platelets decreased. Few promyelocytes seen. Check flow cytometry of peripheral blood.  
 
 4) Coagulapathy: Prolonged PT/PTT. Fibrinogen is normal.  
-No clear evidence of DIC. S/p 2  unit of FFP  
-vitamin K 10mg po daily. Coags are improving slightly INR down to 1.5. 
 
3) Hypercalcemia: etiology not very clear. High on admission close to 14 Improved with hydration and calcitonin. -check for myeloma.  
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 -psa is normal . 
-check cea and ca 19-9 -results are pending. CT chest small lung nodules. infectious versus neoplastic. Will discuss with pt family. 
 
Oumar Sosa MD 
11/23/2020

## 2020-11-24 NOTE — PROGRESS NOTES
Pulmonology and Critical Care Progress Note Subjective: Chief Complaint: Confusion Patient seen and examined in ICU Overnight events noted Wife at bedside Remains critically ill Intubated on mechanical ventilation Currently on SIMV rate of 10, tidal volume 500, pressure support 15, FiO2 30, PEEP of 5 ABGs show pH of 7.26, PCO2 48, PO2 71, bicarb 20, saturation 95% Chest x-ray reviewed and shows cardiomegaly with vascular congestion bilaterally and small right-sided pleural effusion suspicious for CHF 
 
CT chest 11/23 reviewed and showed: 
Bilateral moderate-sized pleural effusions Also showed bilateral interstitial infiltrates Also showed patchy groundglass nodular changes right more than left lung suspicious for inflammation versus malignancy Status post thoracentesis right side by interventional radiology 11/23 Results pending Current Facility-Administered Medications Medication Dose Route Frequency Provider Last Rate Last Dose  pantoprazole (PROTONIX) 40 mg in 0.9% sodium chloride 10 mL injection  40 mg IntraVENous BID Familia Anglin MD   40 mg at 11/24/20 1110  
 glycopyrrolate (ROBINUL) tablet 1 mg  1 mg Oral TID Eva Patel MD   1 mg at 11/24/20 1159  metoclopramide HCl (REGLAN) injection 10 mg  10 mg IntraVENous Q6H Sandor Maguire MD   10 mg at 11/24/20 1159  furosemide (LASIX) injection 40 mg  40 mg IntraVENous Q12H David Rocha MD   40 mg at 11/24/20 0824  
 albumin human 25% (BUMINATE) solution 12.5 g  12.5 g IntraVENous Q12H Rey Donald MD   12.5 g at 11/24/20 0824  
 midazolam in normal saline (VERSED) 1 mg/mL infusion  0-10 mg/hr IntraVENous TITRATE David Rocha MD 1 mL/hr at 11/24/20 1110 1 mg/hr at 11/24/20 1110  hydrocortisone Sod Succ (PF) (SOLU-CORTEF) injection 50 mg  50 mg IntraVENous Q8H Curry Warner DO   50 mg at 11/24/20 8218  fentaNYL (PF) 1,500 mcg/30 mL (50 mcg/mL) infusion  0-200 mcg/hr IntraVENous TITRATE Jayme Lute, DO 3 mL/hr at 11/24/20 0550 150 mcg/hr at 11/24/20 0550  
 vasopressin (VASOSTRICT) 40 Units in 0.9% sodium chloride 40 mL infusion  0.04 Units/min IntraVENous CONTINUOUS Curry Warner, DO 2.4 mL/hr at 11/19/20 2033 0.04 Units/min at 11/19/20 2033  meropenem (MERREM) 1 g in sterile water (preservative free) 20 mL IV syringe  1 g IntraVENous Q12H Yohana Gonzalez MD   1 g at 11/24/20 1156  morphine injection 2 mg  2 mg IntraVENous Q4H PRN Cole Hudson MD   2 mg at 11/23/20 0739  
 NOREPINephrine (LEVOPHED) 8 mg in 5% dextrose 250mL (32 mcg/mL) infusion  0.5-16 mcg/min IntraVENous TITRATE Cole Hudson MD   Stopped at 11/23/20 7465  acetaminophen (TYLENOL) suppository 650 mg  650 mg Rectal Q4H PRN Roselyn PONCE MD   650 mg at 11/16/20 1225  acetaminophen (TYLENOL) tablet 650 mg  650 mg Oral Q4H PRN Rom Herring NP   650 mg at 11/19/20 0027  
 albuterol (PROVENTIL HFA, VENTOLIN HFA, PROAIR HFA) inhaler 2 Puff  2 Puff Inhalation Q4H PRN Rom Herring NP      
 docusate sodium (COLACE) capsule 100 mg  100 mg Oral BID Rom Herring NP   Stopped at 61/68/09 9020  
 folic acid (FOLVITE) tablet 1 mg  1 mg Oral DAILY Rom Herring NP   Stopped at 11/24/20 0900  pravastatin (PRAVACHOL) tablet 20 mg  20 mg Oral QHS Rom Herring NP   20 mg at 11/23/20 2100  sertraline (ZOLOFT) tablet 50 mg  50 mg Oral DAILY Rom Herring NP   Stopped at 11/24/20 0900  thiamine mononitrate (B-1) tablet 100 mg  100 mg Oral DAILY Rom Herring NP   Stopped at 11/24/20 0900  
 ondansetron (ZOFRAN) injection 4 mg  4 mg IntraVENous Q6H PRN Rom Herring NP      
 hydrOXYzine pamoate (VISTARIL) capsule 25 mg  25 mg Oral TID PRN Rom Herring NP   25 mg at 11/22/20 1857  chlorhexidine (PERIDEX) 0.12 % mouthwash 15 mL  15 mL Oral Q12H oRm Herring NP   15 mL at 11/24/20 5220 Allergies Allergen Reactions  Precedex [Dexmedetomidine] Other (comments) Severe bradycardia  Codeine Rash Review of Systems: 
Review of systems not obtained due to patient factors. Objective:  
 
Blood pressure 129/82, pulse (!) 120, temperature 99.9 °F (37.7 °C), resp. rate 22, height 6' 2.02\" (1.88 m), weight 101.6 kg (224 lb), SpO2 100 %. Temp (24hrs), Av °F (36.1 °C), Min:95.4 °F (35.2 °C), Max:99.9 °F (37.7 °C) Intake and Output: 
Current Shift: No intake/output data recorded. Last 3 Shifts:  1901 -  0700 In: 2427.7 [I.V.:2427.7] Out: 2600 [Urine:2560; Drains:40] Physical Exam:  
General appearance: Elderly male who is sedated on the ventilator, on fentanyl, no distress, appears older than stated age, chronically ill-appearing Head: Normocephalic, without obvious abnormality, atraumatic Eyes: negative Neck: no obvious adenopathy, no carotid bruit; neck is stiff Lungs: He has few scattered rhonchi. Equal breath sounds bilaterally. Small secretions per RN. Heart: irregularly irregular rhythm, bradycardic, no rub, no obvious murmur Abdomen: soft, non-tender. Bowel sounds normal. No masses,  no organomegaly; NG tube in place. He also has diarrhea and has a rectal tube in place. He is not tolerating his tube feeds. Pulses: 2+ and symmetric Skin: Dry, intact, with bruising throughout the arms and legs. +3 pitting edema in extremities bilaterally. Lymph nodes: Cervical, supraclavicular, and axillary nodes normal. 
Neurologic: Sedated on the ventilator, not following any commands. Additional comments:None Lab/Data Review: All lab results for the last 24 hours reviewed. Recent Results (from the past 24 hour(s)) GLUCOSE, POC Collection Time: 20  1:16 PM  
Result Value Ref Range Glucose (POC) 119 (H) 65 - 100 mg/dL Performed by Keron Bunch, FLUID Collection Time: 20  3:15 PM  
Result Value Ref Range FLUID TYPE(15) PLEURAL FLUID FLUID PH 7.0 GLUCOSE, POC Collection Time: 11/23/20  6:02 PM  
Result Value Ref Range Glucose (POC) 105 (H) 65 - 100 mg/dL Performed by Bert Ochoa GLUCOSE, POC Collection Time: 11/23/20  8:10 PM  
Result Value Ref Range Glucose (POC) 103 (H) 65 - 100 mg/dL Performed by Parag Riggs GLUCOSE, POC Collection Time: 11/23/20 11:44 PM  
Result Value Ref Range Glucose (POC) 95 65 - 100 mg/dL Performed by Parag Riggs PROCALCITONIN Collection Time: 11/24/20  3:45 AM  
Result Value Ref Range Procalcitonin 1.09 (H) 0 ng/mL C REACTIVE PROTEIN, QT Collection Time: 11/24/20  3:45 AM  
Result Value Ref Range C-Reactive protein 3.61 (H) 0.00 - 0.60 mg/dL DIGOXIN Collection Time: 11/24/20  3:45 AM  
Result Value Ref Range Digoxin level 2.4 (H) 0.90 - 2.0 ng/mL RENAL FUNCTION PANEL Collection Time: 11/24/20  3:45 AM  
Result Value Ref Range Sodium 143 136 - 145 mmol/L Potassium 3.9 3.5 - 5.1 mmol/L Chloride 112 (H) 97 - 108 mmol/L  
 CO2 25 21 - 32 mmol/L Anion gap 6 5 - 15 mmol/L Glucose 102 (H) 65 - 100 mg/dL BUN 53 (H) 6 - 20 mg/dL Creatinine 2.06 (H) 0.70 - 1.30 mg/dL BUN/Creatinine ratio 26 (H) 12 - 20 GFR est AA 38 (L) >60 ml/min/1.73m2 GFR est non-AA 32 (L) >60 ml/min/1.73m2 Calcium 6.1 (LL) 8.5 - 10.1 mg/dL Phosphorus 5.6 (H) 2.6 - 4.7 mg/dL Albumin 2.8 (L) 3.5 - 5.0 g/dL CBC W/O DIFF Collection Time: 11/24/20  4:00 AM  
Result Value Ref Range WBC 8.9 4.1 - 11.1 K/uL  
 RBC 3.39 (L) 4.10 - 5.70 M/uL HGB 8.8 (L) 12.1 - 17.0 g/dL HCT 28.9 (L) 36.6 - 50.3 % MCV 85.3 80.0 - 99.0 FL  
 MCH 26.0 26.0 - 34.0 PG  
 MCHC 30.4 30.0 - 36.5 g/dL  
 RDW 18.5 (H) 11.5 - 14.5 % PLATELET 62 (L) 328 - 400 K/uL GLUCOSE, POC Collection Time: 11/24/20  4:33 AM  
Result Value Ref Range Glucose (POC) 104 (H) 65 - 100 mg/dL Performed by Parag Riggs BLOOD GAS, ARTERIAL  Collection Time: 11/24/20  4:35 AM  
Result Value Ref Range pH 7.260 (L) 7.35 - 7.45    
 PCO2 48 (H) 35 - 45 mmHg PO2 71 (L) 75 - 100 mmHg O2 SAT 95 (L) >95 % BICARBONATE 20 (L) 22 - 26 mmol/L  
 BASE DEFICIT 5.7 (H) 0 - 2 mmol/L  
 O2 METHOD VENT    
 FIO2 30.0 % MODE SIMV Tidal volume 500 PRESSURE SUPPORT 10.0 EPAP/CPAP/PEEP 5.0    
 SITE Right Radial    
 DAMON'S TEST PASS Critical value read back  [address] [place]   
GLUCOSE, POC Collection Time: 11/24/20  8:05 AM  
Result Value Ref Range Glucose (POC) 111 (H) 65 - 100 mg/dL Performed by Maritza Krishnan Chest X-Ray:  
XR CHEST PORT Final Result Stable right neck central venous catheter. Now present, there is an ET tube 4-5 
cm above the samuel. NG tube projects below the left hemidiaphragm. Improved 
aeration through the right lung; clearing patchy reticular markings. No 
interstitial or alveolar pulmonary edema. No pneumothorax or sizable pleural 
effusion. XR CHEST PORT Final Result Impression:   
Successful placement of a triple-lumen central venous catheter. The catheter is  
ready for use. IR INSERT NON TUNL CVC OVER 5 YRS Final Result Impression:   
Successful placement of a triple-lumen central venous catheter. The catheter is  
ready for use. [facility] Final Result Impression:   
Successful placement of a triple-lumen central venous catheter. The catheter is  
ready for use. XR CHEST PORT Final Result Impression: The cardiomediastinal silhouette is appropriate for age, technique,  
and lung expansion. Pulmonary vasculature is not congested. The lungs are  
essentially clear. No effusion or pneumothorax is seen. US RETROPERITONEUM COMP Final Result Impression: Normal exam  
  
  
XR CHEST PORT Final Result IMPRESSION:  No evidence of an acute cardiopulmonary process. CT HEAD WO CONT Final Result IMPRESSION: Chronic findings as above.   
  
CT CHEST WO CONT (Results Pending) CT ABD PELV WO CONT    (Results Pending) XR CHEST PORT    (Results Pending) CT imaging: CT Results  (Last 48 hours)  
          
 11/23/20 1053  CT HEAD WO CONT Final result Impression:  IMPRESSION: No interval change. No acute intracranial abnormality. Narrative:  CT SCAN OF THE BRAIN WITHOUT CONTRAST:  
   
   
   
CLINICAL HISTORY: Altered mental status. Thin axial and coronal and sagittal reconstructions were obtained. All CT scans at this facility are performed using dose reduction optimization  
techniques as appropriate to a performed exam including the following: Automated  
exposure control, adjustments of the mA and/or kV according to patient size, or  
use of iterative reconstruction technique. Comparison is made with a previous CT brain examination of November 15. There  
has been no interval change. Mild central and cortical atrophy is again noted  
and probably consistent with age. Mild microvascular ischemic changes are noted. No intra-axial or extra-axial hemorrhage or acute infarction in a major arterial  
distribution is demonstrated. Pituitary gland is of normal size. Scans through the posterior fossa show no major infarction or hemorrhage. Mild  
cerebellar atrophy is again noted. The cerebellar tonsils are above the foramen magnum in a satisfactory position. Visualized paranasal sinuses are clear. Mastoid air cells are clear. Both orbits have a normal CT appearance. Calcified Drusen bodies in the optic  
disks are again noted. 11/23/20 1052  CT CHEST WO CONT Final result Impression:  IMPRESSION: Nonspecific reticulonodular markings throughout the lungs. Indistinct small nodules many with groundglass appearance. Differential  
considerations would include infectious or neoplastic causes.   
   
Moderate volume dependent pleural effusions with associated compressive atelectasis posterior lower lobe lungs. Elongated thoracic aorta with atherosclerotic change. Cardiomegaly. CAD. Narrative:  CT chest without IV contrast  
   
Axial images are reviewed along with reformatted sagittal/coronal/MIP images. No  
IV contrast administered. Dose reduction: All CT scans at this facility are performed using dose reduction  
optimization techniques as appropriate to a performed exam including the  
following-  
automated exposure control, adjustments of mA and/or Kv according to patient  
size, or use of iterative reconstructive technique. Rambo Solis ET tube, NG tube, and right neck central venous catheter appropriately  
positioned. Nonspecific reticulonodular markings. Many indistinct nodules appear  
to have a groundglass appearance. Most measure 1 cm or less. Moderate volume  
dependent pleural effusions with associated posterior dependent lower lobe lung  
compressive atelectasis. Nonenhanced images reveal atherosclerotic change elongated thoracic aorta. Mildly prominent pulmonary arterial trunk at 3.3 cm. Tram track calcifications  
present along the course of coronary arteries, evidence for coronary artery  
disease. Cardiomegaly. Few small mediastinal lymph nodes. Imaged content upper abdomen appears unremarkable. PFTs: PFT Results  (Last 3 results in the past 10 years) None Assessment:  
 
Patient is a 53-year-old  male with a history of atrial fibrillation, osteoarthritis, hyperlipidemia, reported COPD, and hypertension who presented to [facility] on 11/15/2020 from his nursing home for altered mental status. He has been admitted to the ICU and is currently in septic shock on vasopressors and has been intubated on 11/19/2020 for failure to protect his airway due to altered mental status. Plan:  
 
1.)  Acute respiratory failure Likely due to CHF, altered mental status, to protect airway Currently intubated on mechanical ventilation Intubated on 11/19/2020 ABGs and chest x-ray reviewed as outlined above Also appears quite volume overloaded clinically Continue diuresis Currently on SIMV and pressure support as outlined above Will wean gently but not ready for aggressive weaning at this time CT chest showed bilateral effusions with patchy groundglass nodular infiltrates Status post right thoracentesis yesterday We will follow results If unrevealing, may consider bronchoscopy for sampling 
 
2.)  Septic shock ESBL E. coli On parenteral antibiotic coverage Further changes in antibiotics based on clinical response and culture results Remains on Levophed Covid negative Appears quite volume overloaded clinically 3.)  Acute encephalopathy Likely multifactorial from sepsis, urinary tract infection, and multiple electrolyte abnormalities including hypernatremia, hypercalcemia, and hypokalemia. Continue on current antibiotic regimen as above; neurology/nephrology/hematology following closely. Ammonia level normal, TSH mildly elevated, free T4 pending. 4.)  Acute kidney injury Creatinine seem to plateau around 1.62 and had very steadily been decreasing with improved calcium level and fluid administration. Likely prerenal from volume depletion and sepsis. Worsened slightly to 2.09, possible ATN. Nephrology following closely, appreciate their assistance. 5.)  Atrial fibrillation with RVR/diastolic congestive heart failure Presented with A. fib RVR Stable Likely secondary to sepsis Echo shows preserved ejection fraction Cardiology input appreciated Continue digoxin Quite volume overloaded Continue diuresis 6) Hypercalcemia Corrected calcium 14.6 on admission, this has steadily improved every day with calcitonin fluids. Nephrology following closely Myeloma work-up pending 
 
7.)  Acute blood loss anemia Multifactorial 
Status post 2 units PRBCs Monitor clinically Continue Protonix Appreciate GI pathology input 
 
8.)  Nonanion gap metabolic acidosis Unclear etiology, but likely combination of sepsis and BRE. Overall improved. Canceled CT of the chest abdomen pelvis. 9.)  Thrombocytopenia 
hematology is following closely. INR 2.0, PT 22.6, PTT 65.2 but fibrinogen is normal, which is not consistent with DIC. Patient received FFP and vitamin K. 
likely due to overwhelming sepsis. CODE STATUS: DNR Lines/Tubes: ET tube, right IJ central line, Mcmillan catheter, NG tube Prophylaxis: 
Stress Ulcer Protocol Active: Yes, Protonix 40 mg IV twice daily Deep Vein Thrombosis Protocol Active: Yes, SCD's (with bleeding his pharmacologic DVT prophylaxis has been stopped) Nutrition: Tube feeds. Activity: Bedrest given clinical instability. Disposition: DNR status is now in effect. Total critical care time spent with patient: 50 minutes with direct visualization of the patient's ventilator and management along with respiratory therapist, long discussion with the respiratory therapist and the bedside nurse regarding the plan of care. Cassy Guo MD 
[facility] 11/24/2020 
10:57 AM

## 2020-11-24 NOTE — PROGRESS NOTES
Called on call hospitalist about critical Calcium value of 6.1. Reported the calcium level from yesterday. No new orders were received.

## 2020-11-25 NOTE — PROGRESS NOTES
Hospitalist Progress Note Daily Progress Note: 11/25/2020 
 
76-year-old male sent here from [facility] with reports from staff of altered mental status, lethargy, not eating and dehydration. He has a history of atrial fibrillation. UA suggestive uti, zosyn initiated. Subjective:  
Patient seen and evaluated at bedside, overnight events noted, patient currently intubated responding to painful/verbal stimuli, discussed with RN at bedside. Patient is intubated and sedated. Problem List: 
Problem List as of 11/25/2020 Date Reviewed: 11/15/2020 Codes Class Noted - Resolved * (Principal) Seps[facility] ([facility] ICD-10-CM: A41.9 ICD-9-CM: 038.9, 995.91  11/16/2020 - Present Hypoglycemia ICD-10-CM: E16.2 ICD-9-CM: 251.2  11/16/2020 - Present Encephalopathy acute ICD-10-CM: G93.40 ICD-9-CM: 348.30  11/16/2020 - Present UTI (urinary tract infection) ICD-10-CM: N39.0 ICD-9-CM: 599.0  11/15/2020 - Present BRE (acute kidney injury) ([facility] ICD-10-CM: N17.9 ICD-9-CM: 584.9  11/15/2020 - Present AMS (altered mental status) ICD-10-CM: W99.54 
ICD-9-CM: 780.97  11/15/2020 - Present Chronic atrial fibrillation (HCC) ICD-10-CM: I48.20 ICD-9-CM: 427.31  8/12/2020 - Present HTN (hypertension), benign ICD-10-CM: I10 
ICD-9-CM: 401.1  8/12/2020 - Present COPD (chronic obstructive pulmonary disease) (HCC) ICD-10-CM: J44.9 ICD-9-CM: 347  8/12/2020 - Present Dyslipidemia ICD-10-CM: E78.5 ICD-9-CM: 272.4  8/12/2020 - Present Depression ICD-10-CM: F32.9 ICD-9-CM: 036  8/12/2020 - Present Cellulitis of left upper extremity ICD-10-CM: L03.114 
ICD-9-CM: 682.3  8/11/2020 - Present Medications reviewed Current Facility-Administered Medications Medication Dose Route Frequency  pantoprazole (PROTONIX) 40 mg in 0.9% sodium chloride 10 mL injection  40 mg IntraVENous BID  
 glycopyrrolate (ROBINUL) tablet 1 mg  1 mg Oral TID  metoclopramide HCl (REGLAN) injection 10 mg  10 mg IntraVENous Q6H  
 furosemide (LASIX) injection 40 mg  40 mg IntraVENous Q12H  
 midazolam in normal saline (VERSED) 1 mg/mL infusion  0-10 mg/hr IntraVENous TITRATE  hydrocortisone Sod Succ (PF) (SOLU-CORTEF) injection 50 mg  50 mg IntraVENous Q8H  
 fentaNYL (PF) 1,500 mcg/30 mL (50 mcg/mL) infusion  0-200 mcg/hr IntraVENous TITRATE  vasopressin (VASOSTRICT) 40 Units in 0.9% sodium chloride 40 mL infusion  0.04 Units/min IntraVENous CONTINUOUS  
 meropenem (MERREM) 1 g in sterile water (preservative free) 20 mL IV syringe  1 g IntraVENous Q12H  
 morphine injection 2 mg  2 mg IntraVENous Q4H PRN  
 NOREPINephrine (LEVOPHED) 8 mg in 5% dextrose 250mL (32 mcg/mL) infusion  0.5-16 mcg/min IntraVENous TITRATE  acetaminophen (TYLENOL) suppository 650 mg  650 mg Rectal Q4H PRN  
 acetaminophen (TYLENOL) tablet 650 mg  650 mg Oral Q4H PRN  
 albuterol (PROVENTIL HFA, VENTOLIN HFA, PROAIR HFA) inhaler 2 Puff  2 Puff Inhalation Q4H PRN  
 docusate sodium (COLACE) capsule 100 mg  100 mg Oral BID  folic acid (FOLVITE) tablet 1 mg  1 mg Oral DAILY  pravastatin (PRAVACHOL) tablet 20 mg  20 mg Oral QHS  sertraline (ZOLOFT) tablet 50 mg  50 mg Oral DAILY  thiamine mononitrate (B-1) tablet 100 mg  100 mg Oral DAILY  ondansetron (ZOFRAN) injection 4 mg  4 mg IntraVENous Q6H PRN  
 hydrOXYzine pamoate (VISTARIL) capsule 25 mg  25 mg Oral TID PRN  chlorhexidine (PERIDEX) 0.12 % mouthwash 15 mL  15 mL Oral Q12H Review of Systems:  
Unobtainable 2/2 encephalopathic/acute illness Objective:  
Physical Exam:  
 
Visit Vitals /64 (BP 1 Location: Left arm, BP Patient Position: At rest) Pulse 82 Temp 97.9 °F (36.6 °C) Resp 12 Ht 6' 2\" (1.88 m) Wt 101.6 kg (224 lb) SpO2 100% BMI 28.76 kg/m² O2 Flow Rate (L/min): 0.5 l/min O2 Device: Ventilator Temp (24hrs), Av.7 °F (37.1 °C), Min:97.9 °F (36.6 °C), Max:99.9 °F (37.7 °C) No intake/output data recorded. 11/23 1901 - 11/25 0700 In: -  
Out: 5975 [Urine:2800; Drains:40] Constitutional: Currently intubated and sedated Head: Normocephalic and atraumatic. Mouth: oral mucosa dry, ET tube is in situ Neck: supple Cardiovascular:Irreg/irreg Lungs:decreased air entry bilaterally in bilateral lower lung zones Abdominal: Soft. non tender Neurological: obtunded, moves all ext, 
Skin: Right lower shin with ulcer, yellow drainage Sacrum reddened, no breakdown Left arm with skin tear Multiple ecchymotic, skin is friable/thin Data Review:  
   
Recent Days: 
Recent Labs  
  11/25/20 
0430 11/24/20 
0400 11/23/20 
0430 WBC 11.4* 8.9 6.7 HGB 8.8* 8.8* 10.3* HCT 29.5* 28.9* 33.0*  
PLT 71* 62* 76* Recent Labs  
  11/25/20 
0430 11/24/20 
0345 11/23/20 
0430 * 143 141  
K 3.2* 3.9 4.4  
* 112* 111* CO2 26 25 22 * 102* 128* BUN 58* 53* 42* CREA 1.96* 2.06* 1.94* CA 6.1* 6.1* 7.0*  
PHOS 5.4* 5.6* 3.7 ALB 2.9* 2.8* 2.8* INR  --   --  1.5* Recent Labs  
  11/25/20 
0430 11/24/20 
0435 11/23/20 
0445 PH 7.347* 7.260* 7.354 PCO2 42 48* 33* PO2 76 71* 56* HCO3 22 20* 19* FIO2 30.0 30.0 21.0  
 
 
24 Hour Results: 
Recent Results (from the past 24 hour(s)) GLUCOSE, POC Collection Time: 11/24/20 12:23 PM  
Result Value Ref Range Glucose (POC) 100 65 - 100 mg/dL Performed by Bright Trevino   
PROTEIN/CREATININE RATIO, URINE Collection Time: 11/24/20  2:15 PM  
Result Value Ref Range Protein, urine random 70 (H) 0.0 - 11.9 mg/dL Creatinine, urine 35.00 mg/dL Protein/Creat. urine Ratio 2.0 GLUCOSE, POC Collection Time: 11/24/20  4:37 PM  
Result Value Ref Range Glucose (POC) 87 65 - 100 mg/dL Performed by [address] [place] [address] Dr Hernandez, POC Collection Time: 11/24/20  8:22 PM  
Result Value Ref Range Glucose (POC) 95 65 - 100 mg/dL Performed by Afsaneh Henry GLUCOSE, POC  
 Collection Time: 11/25/20 12:10 AM  
Result Value Ref Range Glucose (POC) 107 (H) 65 - 100 mg/dL Performed by Christina Camacho CBC W/O DIFF Collection Time: 11/25/20  4:30 AM  
Result Value Ref Range WBC 11.4 (H) 4.1 - 11.1 K/uL  
 RBC 3.42 (L) 4.10 - 5.70 M/uL HGB 8.8 (L) 12.1 - 17.0 g/dL HCT 29.5 (L) 36.6 - 50.3 % MCV 86.3 80.0 - 99.0 FL  
 MCH 25.7 (L) 26.0 - 34.0 PG  
 MCHC 29.8 (L) 30.0 - 36.5 g/dL  
 RDW 18.9 (H) 11.5 - 14.5 % PLATELET 71 (L) 412 - 400 K/uL NRBC 6.9 (H) 0  WBC ABSOLUTE NRBC 0.78 (H) 0.00 - 0.01 K/uL  
C REACTIVE PROTEIN, QT Collection Time: 11/25/20  4:30 AM  
Result Value Ref Range C-Reactive protein 3.13 (H) 0.00 - 0.60 mg/dL PROCALCITONIN Collection Time: 11/25/20  4:30 AM  
Result Value Ref Range Procalcitonin 0.62 (H) 0 ng/mL BLOOD GAS, ARTERIAL Collection Time: 11/25/20  4:30 AM  
Result Value Ref Range pH 7.347 (L) 7.35 - 7.45    
 PCO2 42 35 - 45 mmHg PO2 76 75 - 100 mmHg O2 SAT 97 >95 % BICARBONATE 22 22 - 26 mmol/L  
 BASE DEFICIT 2.5 (H) 0 - 2 mmol/L  
 O2 METHOD VENT    
 FIO2 30.0 % MODE SIMV Tidal volume 500 PRESSURE SUPPORT 15.0 EPAP/CPAP/PEEP 5.0    
 SITE Right Radial    
 [facility]'S TEST PASS RENAL FUNCTION PANEL Collection Time: 11/25/20  4:30 AM  
Result Value Ref Range Sodium 148 (H) 136 - 145 mmol/L Potassium 3.2 (L) 3.5 - 5.1 mmol/L Chloride 113 (H) 97 - 108 mmol/L  
 CO2 26 21 - 32 mmol/L Anion gap 9 5 - 15 mmol/L Glucose 109 (H) 65 - 100 mg/dL BUN 58 (H) 6 - 20 mg/dL Creatinine 1.96 (H) 0.70 - 1.30 mg/dL BUN/Creatinine ratio 30 (H) 12 - 20 GFR est AA 41 (L) >60 ml/min/1.73m2 GFR est non-AA 33 (L) >60 ml/min/1.73m2 Calcium 6.1 (LL) 8.5 - 10.1 mg/dL Phosphorus 5.4 (H) 2.6 - 4.7 mg/dL Albumin 2.9 (L) 3.5 - 5.0 g/dL GLUCOSE, POC Collection Time: 11/25/20  4:40 AM  
Result Value Ref Range Glucose (POC) 112 (H) 65 - 100 mg/dL Performed by Yessy Weaver GLUCOSE, POC Collection Time: 11/25/20  7:57 AM  
Result Value Ref Range Glucose (POC) 114 (H) 65 - 100 mg/dL Performed by Ashtyn Hilliard Assessment/  
 
Patient Active Problem List  
Diagnosis Code  Cellulitis of left upper extremity L03.114  
 Chronic atrial fibrillation (HCC) I48.20  
 HTN (hypertension), benign I10  
 COPD (chronic obstructive pulmonary disease) ([facility] J44.9  Dyslipidemia E78.5  Depression F32.9  
 UTI (urinary tract infection) N39.0  BRE (acute kidney injury) ([facility] N17.9  AMS (altered mental status) R41.82  Sepsis ([facility] A41.9  Hypoglycemia E16.2  
 Encephalopathy acute G93.40 Plan: 
 
Septic shockpatient presented with above-mentioned symptomatology was found to meet severe sepsis criteria secondary to combination of urinary tract infection as well as developing bilateral pneumonia, patient is COVID-19 negative, currently improving, patient been titrated off of levophed 
follow-up blood cultures Urine Culture consistent with ESBL E. Coli Follow-up inflammatory markers Continue meropenem for Abx coverage Discontinued IVF Infectious disease recommendations appreciated, will continue to follow Critical care recommendations appreciated will continue to follow Acute respiratory failure/pneumoniapatient was found to be in acute respiratory failure requiring emergent endotracheal intubation, likely secondary to developing bilateral pneumonia, patient currently off of pressors Follow-up sputum culture Follow-up blood cultures Continue Meropenem for Abx coverage Attempt weaning trial 
Pulmonology consult appreciated, will continue to follow Infectious disease recommendations appreciated, will continue to follow Hypoactive bowel soundsresolved Atrial fibrillation with RVRpatient presented with atrial fibrillation with RVR likely secondary to ongoing severe sepsis Dig level was 2.4, repeat in AM Patient received one dose of Amiodarone yesterday. Transthoracic echo shows preserved ejection fraction Cardiology consult appreciated, will continue to follow Acute on chronic diastolic heart failurepatient was found to have significant bilateral lower extremity edema as well as radiographic evidence of volume overload consistent with acute on chronic diastolic heart failure Discontinued IV fluids Lasix 40 mg IV twice daily Continue to monitor intake and output Cardiology consult appreciated, will continue to follow Anemialikely multifactorial, however concern for GI bleed, s/p 2 uprbc 2 days back with appropriate response, currently hemoglobin hematocrit remained stable Continue protonix 40mg iv bid Continue to trend H/H Maintain active type and screen Follow-up gastroenterology recommendations Follow-up hematology recommendations Hypokalemiareplete K and recheck K Thrombocytopenialikely multifactorial, concern for possible early DIC, however platelet count remained stable Hematology recommendations appreciated Patient currently does not have any active bleeding- bjt noted coffe ground on NGT suctioning. Recommend platelet transfusion if platelet count is less than 20,000 with active bleeding Hyperlipidemiacontinue statin ProphylaxisSCDs FENcontinue tube feeding, replete potassium and magnesium DNR,  
surrogate decision-maker is patient's wife Care Plan discussed with: Patient/Family and Nurse Gayle Bryant MD

## 2020-11-25 NOTE — PROGRESS NOTES
Renal Progress Note Patient: aCity Cantu MRN: [id number]  SSN: [id number] YOB: 1945  Age: 67 y.o. Sex: male Admit Date: 11/15/2020 LOS: 10 days Subjective:  
Patient seen in ICU, on ventilator, sedated, Hypercalcemia resolved, hypocalcemic today with calcium 6.1 Creatinine 1.96 today, On IV lasix with albumin, good output+ Current Facility-Administered Medications Medication Dose Route Frequency  [START ON 11/26/2020] furosemide (LASIX) injection 40 mg  40 mg IntraVENous DAILY  calcium gluconate 1 gram in sodium chloride (ISO-OSM) 50 mL infusion  1 g IntraVENous Q8H  
 calcium carbonate (TUMS) chewable tablet 400 mg [elemental]  400 mg Oral BID  potassium chloride (KLOR-CON) packet for solution 40 mEq  40 mEq Per G Tube NOW  pantoprazole (PROTONIX) 40 mg in 0.9% sodium chloride 10 mL injection  40 mg IntraVENous BID  
 glycopyrrolate (ROBINUL) tablet 1 mg  1 mg Oral TID  metoclopramide HCl (REGLAN) injection 10 mg  10 mg IntraVENous Q6H  
 midazolam in normal saline (VERSED) 1 mg/mL infusion  0-10 mg/hr IntraVENous TITRATE  hydrocortisone Sod Succ (PF) (SOLU-CORTEF) injection 50 mg  50 mg IntraVENous Q8H  
 fentaNYL (PF) 1,500 mcg/30 mL (50 mcg/mL) infusion  0-200 mcg/hr IntraVENous TITRATE  vasopressin (VASOSTRICT) 40 Units in 0.9% sodium chloride 40 mL infusion  0.04 Units/min IntraVENous CONTINUOUS  
 meropenem (MERREM) 1 g in sterile water (preservative free) 20 mL IV syringe  1 g IntraVENous Q12H  
 morphine injection 2 mg  2 mg IntraVENous Q4H PRN  
 NOREPINephrine (LEVOPHED) 8 mg in 5% dextrose 250mL (32 mcg/mL) infusion  0.5-16 mcg/min IntraVENous TITRATE  acetaminophen (TYLENOL) suppository 650 mg  650 mg Rectal Q4H PRN  
 acetaminophen (TYLENOL) tablet 650 mg  650 mg Oral Q4H PRN  
 albuterol (PROVENTIL HFA, VENTOLIN HFA, PROAIR HFA) inhaler 2 Puff  2 Puff Inhalation Q4H PRN  
 docusate sodium (COLACE) capsule 100 mg  100 mg Oral BID  folic acid (FOLVITE) tablet 1 mg  1 mg Oral DAILY  pravastatin (PRAVACHOL) tablet 20 mg  20 mg Oral QHS  sertraline (ZOLOFT) tablet 50 mg  50 mg Oral DAILY  thiamine mononitrate (B-1) tablet 100 mg  100 mg Oral DAILY  ondansetron (ZOFRAN) injection 4 mg  4 mg IntraVENous Q6H PRN  
 hydrOXYzine pamoate (VISTARIL) capsule 25 mg  25 mg Oral TID PRN  chlorhexidine (PERIDEX) 0.12 % mouthwash 15 mL  15 mL Oral Q12H Vitals:  
 11/25/20 1100 11/25/20 1132 11/25/20 1158 11/25/20 1219 BP:   130/77 Pulse:  87 77 Resp:  13 12 Temp: 97.5 °F (36.4 °C)  97.9 °F (36.6 °C) SpO2:  96% 97% Weight:      
Height:    6' 2\" (1.88 m) Objective:  
General: On ventilator, sedated, no acute distress. HEENT: no Icterus, no Pallor, ET tube in place  
neck: Neck is supple, No JVD Lungs: Decreased breath sounds at the bases, no rhonchi, few scattered rales+, CVS: heart sounds normal, no murmurs, no rubs. GI: soft, nontender, normal BS. Extremeties: no cyanosis, 1+ dependent edema+ Neuro: On ventilator, sedated Skin: normal skin turgor, no skin rashes. Intake and Output: 
Current Shift: No intake/output data recorded. Last three shifts: 11/23 1901 - 11/25 0700 In: -  
Out: 2760 [Urine:2800; Drains:40] Lab/Data Review: 
Recent Labs  
  11/25/20 
0430 11/24/20 
0400 11/23/20 
0430 WBC 11.4* 8.9 6.7 HGB 8.8* 8.8* 10.3* HCT 29.5* 28.9* 33.0*  
PLT 71* 62* 76* Recent Labs  
  11/25/20 
0430 11/24/20 
0345 11/23/20 
0430 * 143 141  
K 3.2* 3.9 4.4  
* 112* 111* CO2 26 25 22 * 102* 128* BUN 58* 53* 42* CREA 1.96* 2.06* 1.94* CA 6.1* 6.1* 7.0*  
PHOS 5.4* 5.6* 3.7 ALB 2.9* 2.8* 2.8* INR  --   --  1.5* Recent Labs  
  11/25/20 
0430 11/24/20 
0435 11/23/20 
0445 PH 7.347* 7.260* 7.354 PCO2 42 48* 33* PO2 76 71* 56* HCO3 22 20* 19* FIO2 30.0 30.0 21.0 Recent Results (from the past 24 hour(s)) PROTEIN/CREATININE RATIO, URINE Collection Time: 11/24/20  2:15 PM  
Result Value Ref Range Protein, urine random 70 (H) 0.0 - 11.9 mg/dL Creatinine, urine 35.00 mg/dL Protein/Creat. urine Ratio 2.0 GLUCOSE, POC Collection Time: 11/24/20  4:37 PM  
Result Value Ref Range Glucose (POC) 87 65 - 100 mg/dL Performed by Antonina Yeboah, POC Collection Time: 11/24/20  8:22 PM  
Result Value Ref Range Glucose (POC) 95 65 - 100 mg/dL Performed by Isaac Villatoro GLUCOSE, POC Collection Time: 11/25/20 12:10 AM  
Result Value Ref Range Glucose (POC) 107 (H) 65 - 100 mg/dL Performed by Isaac Villatoro CBC W/O DIFF Collection Time: 11/25/20  4:30 AM  
Result Value Ref Range WBC 11.4 (H) 4.1 - 11.1 K/uL  
 RBC 3.42 (L) 4.10 - 5.70 M/uL HGB 8.8 (L) 12.1 - 17.0 g/dL HCT 29.5 (L) 36.6 - 50.3 % MCV 86.3 80.0 - 99.0 FL  
 MCH 25.7 (L) 26.0 - 34.0 PG  
 MCHC 29.8 (L) 30.0 - 36.5 g/dL  
 RDW 18.9 (H) 11.5 - 14.5 % PLATELET 71 (L) 573 - 400 K/uL NRBC 6.9 (H) 0  WBC ABSOLUTE NRBC 0.78 (H) 0.00 - 0.01 K/uL  
C REACTIVE PROTEIN, QT Collection Time: 11/25/20  4:30 AM  
Result Value Ref Range C-Reactive protein 3.13 (H) 0.00 - 0.60 mg/dL PROCALCITONIN Collection Time: 11/25/20  4:30 AM  
Result Value Ref Range Procalcitonin 0.62 (H) 0 ng/mL BLOOD GAS, ARTERIAL Collection Time: 11/25/20  4:30 AM  
Result Value Ref Range pH 7.347 (L) 7.35 - 7.45    
 PCO2 42 35 - 45 mmHg PO2 76 75 - 100 mmHg O2 SAT 97 >95 % BICARBONATE 22 22 - 26 mmol/L  
 BASE DEFICIT 2.5 (H) 0 - 2 mmol/L  
 O2 METHOD VENT    
 FIO2 30.0 % MODE SIMV Tidal volume 500 PRESSURE SUPPORT 15.0 EPAP/CPAP/PEEP 5.0    
 SITE Right Radial    
 [facility]'S TEST PASS RENAL FUNCTION PANEL Collection Time: 11/25/20  4:30 AM  
Result Value Ref Range Sodium 148 (H) 136 - 145 mmol/L Potassium 3.2 (L) 3.5 - 5.1 mmol/L  Chloride 113 (H) 97 - 108 mmol/L  
 CO2 26 21 - 32 mmol/L Anion gap 9 5 - 15 mmol/L Glucose 109 (H) 65 - 100 mg/dL BUN 58 (H) 6 - 20 mg/dL Creatinine 1.96 (H) 0.70 - 1.30 mg/dL BUN/Creatinine ratio 30 (H) 12 - 20 GFR est AA 41 (L) >60 ml/min/1.73m2 GFR est non-AA 33 (L) >60 ml/min/1.73m2 Calcium 6.1 (LL) 8.5 - 10.1 mg/dL Phosphorus 5.4 (H) 2.6 - 4.7 mg/dL Albumin 2.9 (L) 3.5 - 5.0 g/dL GLUCOSE, POC Collection Time: 11/25/20  4:40 AM  
Result Value Ref Range Glucose (POC) 112 (H) 65 - 100 mg/dL Performed by Mahad Askew GLUCOSE, POC Collection Time: 11/25/20  7:57 AM  
Result Value Ref Range Glucose (POC) 114 (H) 65 - 100 mg/dL Performed by Finn Brown GLUCOSE, POC Collection Time: 11/25/20 10:38 AM  
Result Value Ref Range Glucose (POC) 130 (H) 65 - 100 mg/dL Performed by Finn Brown Assessment and Plan: #1 severe hypercalcemia.  -On admission corrected calcium was 14.6. Etiology unclear, hypercalcemia resolved now 
immunoelectrophoresis studies pending  
low PTH compatible with hypercalcemia, borderline low vitamin D level ACE level pending 
urine random calcium and creatinine to assess for fractional excretion of calcium pending Hypercalcemia resolved and repeat calcium level today is 6.1 Will give IV calcium gluconate 1gm q8hrs x3 doses today 
add calcium carbonate po through NG tube Off IVF now, will continue to monitor  calcium levels 2. Fluid overload/dependent edema+:  improved edemaa 
probably with IV hydration and hypoalb Will hold lasix for today 3. Acute kidney injury on ?? CKD Renal functions remained elevated despite adequate hydration, suspect baseline chronic kidney disease 
urine random protein creatinine ratio is 2.0 
  
3.  hypokalemia.  will give po KCL supplements Continue to monitor 4. Hypernatremia.  -From free water deficit.   
-will dc lasix for now and  continue to monitor sodium levels 5.   Acute respiratory failure, on ventilator Sepsis/UTI Pulmonary and ID following the patient Continue IV antibiotics as per ID 
  
6. Atrial fibrillation with rapid ventricular rate : Cardiology following the patient, on amiodarone and digoxin Signed By: Leland Caruso MD   
 November 25, 2020

## 2020-11-25 NOTE — PROGRESS NOTES
Progress Note Patient: Iris Arias MRN: [id number]  SSN: [id number] YOB: 1945  Age: 67 y.o. Sex: male Admit Date: 11/15/2020 LOS: 10 days Subjective:  
Patient followed for severe sepsis with altered mental status and suspected UTI. Blood cultures negative so far and urine culture has grown ESBL E. Coli. He has low grade temperatures with normal WBC and decreasing CRP and procalcitonin. Sputum culture grew normal aguilar and CXR today unchanged. He is currently on IV Meropenem alone. Patient remains intubated. He is off the floor at this time for bronchoscopy. Objective:  
 
Vitals:  
 11/25/20 0300 11/25/20 0310 11/25/20 0700 11/25/20 8288 BP: 112/64 Pulse:  82  84 Resp: 11 12 11 Temp: 98.1 °F (36.7 °C)  97.9 °F (36.6 °C) SpO2: 100% 100%  100% Weight:      
Height:      
  
 
Intake and Output: 
Current Shift: No intake/output data recorded. Last three shifts: 11/23 1901 - 11/25 0700 In: -  
Out: 0824 [Urine:2800; Drains:40] Physical Exam:  
 Patient unavailable for re-examination Constitutional:   
   General: He is in acute distress. Appearance: He is ill-appearing. He is not diaphoretic. HENT:  
   Head: orotracheal tube Neck:  supple Cardiovascular:  
   Rate and Rhythm: Normal  
   Heart sounds: No murmur. Pulmonary:  
   Breath sounds: No wheezing, rhonchi or rales. Abdominal:  
   Palpations: Abdomen is soft. Tenderness: There is no abdominal tenderness. Genitourinary: 
   Comments: Mcmillan catheter Musculoskeletal:  
   Right lower leg: No edema. Left lower leg: No edema. Comments: 2x3 cm open wound right medial calf with dry base Skin: 
   Findings: No erythema or rash. Neurological:  
   Comments: Unable to assess Psychiatric:  
   Comments: Unable to assess Lab/Data Review: WBC 11,400 PLT 71,000 Lactic acid 2.7 Procalcitonin 0.62 <1.09 <1.54 <12.30 CRP 3.13 <3.61 <4.59  <35.90 Covid-19 Not detected Blood cultures (11/15) No growth FINAL Blood cultures (11/19) No growth at 5 days Urine culture (11/15) >100,000 cfu/ml ESBL E. Coli Sputum culture (11/20) Normal aguilar FINAL Body fluid Pleural culture (11/23) Pending CXR (11/25)  Patchy lower lobe lung reticular markings persist. Probable small dependent 
pleural effusions. No pneumothorax Assessment:  
 
Principal Problem: 
  Sepsis ([facility] (11/16/2020) Active Problems: 
  UTI (urinary tract infection) (11/15/2020) BRE (acute kidney injury) ([facility] (11/15/2020) AMS (altered mental status) (11/15/2020) Hypoglycemia (11/16/2020) Encephalopathy acute (11/16/2020) 1. Severe sepsis with tachycardia, tachypnea, leukocytosis, elevated procalcitonin and CRP, resolving 2. UTI with marked pyuria and bacteriuria, secondary to ESBL E. Coli, Day #6 IV Meropenem. 3. Altered mental status, possibly secondary to above 4. 6. Covid-19 negative 7. Acute hypoxic respiratory failure, intubated Comment:  WBC now increasing but CRP and procalcitonin still decreasing. Significance of RLL infiltrates unclear. Plan: 1. Continue Meropenem 2. Follow-up pending blood cultures, pleural fluid culture 3. In am, repeat procalcitonin and CRP, done 4. Follow-up post-bronchoscopy Signed By: Jessica Dickinson MD   
 November 25, 2020

## 2020-11-25 NOTE — PROCEDURES
Diagnostic bronchoscopy: 
 
Indication: Bilateral patchy nodular infiltrates with respiratory failure Consent: Risks benefits and alternatives discussed with the patient's wife and she agrees to proceed. Consent signed Sedation: Patient on Versed and fentanyl drips for sedation Airway: Patient already intubated mechanical ventilation Procedure: 
 
Timeout was called Patient was in correct position Lidocaine was used for topical analgesia Bronchoscope was inserted without difficulty via the endotracheal tube into the patient's airway Extensive bilateral pleural secretions were noted These were suctioned to clear Main trachea, and right and left airways were inspected No significant endobronchial lesions were found Bronchoscope was inserted into the right middle lobe and washing was performed with saline Bronchial brushing x2 was performed in the right middle lobe as well Excellent hemostasis was noted Bronchoscope was withdrawn from the airway Patient will remain on mechanical ventilation Samples were sent to laboratory for testing Procedure and his findings were discussed with the patient's wife

## 2020-11-25 NOTE — PROGRESS NOTES
Progress Note 
 
 
11/25/2020 12:30 PM 
NAME: Anahy Boo MRN:  [id number] Admit Diagnosis: UTI (urinary tract infection) [N39.0] AMS (altered mental status) [R41.82] BRE (acute kidney injury) (Luann Abbott) [N17.9] Assessment/Plan:  
Atrial fibrillation : 
Patient was noted with rapid ventricular response this morning, now rate is well controlled. Digoxin Level today is 1.3. Can give more digoxin if rate increases. Will consider amiodarone drip if does not respond to digoxin. Not anticoagulated. Discussed with wife at bedside. Hypotension, was on IV Levophed, weaned off UTI/ESBL E Coli on IV Meropenem Respiratory failure, intubated, status post bronchoscopy Anemia, dropping hemoglobin, now stable Hypokalemia, being supplemented Acute kidney injury []       High complexity decision making was performed in this patient at high risk for decompensation with multiple organ involvement. Subjective:  
 
Anahy Boo is intubated  
discussed with RN events overnight. Review of Systems: 
 
 
Could NOT obtain due to: Altered mental status Objective:  
  
Physical Exam: 
 
Last 24hrs VS reviewed since prior progress note. Most recent are: 
 
Visit Vitals /69 Pulse 85 Temp 97.9 °F (36.6 °C) Resp 12 Ht 6' 2\" (1.88 m) Wt 101.6 kg (224 lb) SpO2 100% BMI 28.76 kg/m² Intake/Output Summary (Last 24 hours) at 11/25/2020 1522 Last data filed at 11/25/2020 1413 Gross per 24 hour Intake  Output 1750 ml Net -1750 ml General Appearance: Intubated Ears/Nose/Mouth/Throat: Intubated  
neck: Supple. Chest: Lungs coarse breath sounds Cardiovascular: iregular rate and rhythm, S1S2 normal, no murmur. Abdomen: Soft, non-tender, bowel sounds are active. Extremities: No edema bilaterally. Skin: Warm and dry. []         Post-cath site without hematoma, bruit, tenderness, or thrill. Distal pulses intact.  
 
PMH/SH reviewed - no change compared to H&P Data Review Telemetry:  
 
EKG:  
[]  No new EKG for review T brain without acute pathology CT chest with pleural effusions and compressive atelectasis, having thoracentesis Lab Data Personally Reviewed: 
 
Recent Labs  
  11/25/20 0430 11/24/20 
0400 WBC 11.4* 8.9 HGB 8.8* 8.8* HCT 29.5* 28.9*  
PLT 71* 62* Recent Labs  
  11/23/20 0430 INR 1.5* PTP 17.6* APTT 35.5 Recent Labs  
  11/25/20 0430 11/24/20 0345 11/23/20 0430 * 143 141  
K 3.2* 3.9 4.4  
* 112* 111* CO2 26 25 22 BUN 58* 53* 42* CREA 1.96* 2.06* 1.94* * 102* 128* CA 6.1* 6.1* 7.0* No results for input(s): CPK, CKNDX, TROIQ in the last 72 hours. No lab exists for component: CPKMB No results found for: CHOL, CHOLX, CHLST, CHOLV, HDL, HDLP, LDL, LDLC, DLDLP, TGLX, TRIGL, TRIGP, CHHD, CHHDX Recent Labs  
  11/25/20 0430 11/24/20 0345 11/23/20 0430 ALB 2.9* 2.8* 2.8* Recent Labs  
  11/25/20 0430 11/24/20 0435 PH 7.347* 7.260* PCO2 42 48* PO2 76 71* Medications Personally Reviewed: 
 
Current Facility-Administered Medications Medication Dose Route Frequency  calcium gluconate 1 gram in sodium chloride (ISO-OSM) 50 mL infusion  1 g IntraVENous Q8H  
 calcium carbonate (TUMS) chewable tablet 400 mg [elemental]  400 mg Oral BID  lidocaine (XYLOCAINE) 10 mg/mL (1 %) injection    PRN  mineral oil (topical)    PRN  
 furosemide (LASIX) injection 40 mg  40 mg IntraVENous BID  pantoprazole (PROTONIX) 40 mg in 0.9% sodium chloride 10 mL injection  40 mg IntraVENous BID  
 glycopyrrolate (ROBINUL) tablet 1 mg  1 mg Oral TID  metoclopramide HCl (REGLAN) injection 10 mg  10 mg IntraVENous Q6H  
 midazolam in normal saline (VERSED) 1 mg/mL infusion  0-10 mg/hr IntraVENous TITRATE  hydrocortisone Sod Succ (PF) (SOLU-CORTEF) injection 50 mg  50 mg IntraVENous Q8H  
 fentaNYL (PF) 1,500 mcg/30 mL (50 mcg/mL) infusion  0-200 mcg/hr IntraVENous TITRATE  vasopressin (VASOSTRICT) 40 Units in 0.9% sodium chloride 40 mL infusion  0.04 Units/min IntraVENous CONTINUOUS  
 meropenem (MERREM) 1 g in sterile water (preservative free) 20 mL IV syringe  1 g IntraVENous Q12H  
 morphine injection 2 mg  2 mg IntraVENous Q4H PRN  
 NOREPINephrine (LEVOPHED) 8 mg in 5% dextrose 250mL (32 mcg/mL) infusion  0.5-16 mcg/min IntraVENous TITRATE  acetaminophen (TYLENOL) suppository 650 mg  650 mg Rectal Q4H PRN  
 acetaminophen (TYLENOL) tablet 650 mg  650 mg Oral Q4H PRN  
 albuterol (PROVENTIL HFA, VENTOLIN HFA, PROAIR HFA) inhaler 2 Puff  2 Puff Inhalation Q4H PRN  
 docusate sodium (COLACE) capsule 100 mg  100 mg Oral BID  folic acid (FOLVITE) tablet 1 mg  1 mg Oral DAILY  pravastatin (PRAVACHOL) tablet 20 mg  20 mg Oral QHS  sertraline (ZOLOFT) tablet 50 mg  50 mg Oral DAILY  thiamine mononitrate (B-1) tablet 100 mg  100 mg Oral DAILY  ondansetron (ZOFRAN) injection 4 mg  4 mg IntraVENous Q6H PRN  
 hydrOXYzine pamoate (VISTARIL) capsule 25 mg  25 mg Oral TID PRN  chlorhexidine (PERIDEX) 0.12 % mouthwash 15 mL  15 mL Oral Q12H Sarah Rosario MD

## 2020-11-25 NOTE — PROGRESS NOTES
Comprehensive Nutrition Assessment Type and Reason for Visit: Reassess(goal) Nutrition Recommendations/Plan: S/p bronch, reinitiate TF via NG of Vital 1.2 at 20mL/hr Increase by 10mL q4hr to goal rate 65mL/hr, continuous Flush with 100mL free water q4hr Goal feeds provide 1872kcal, 117g protein, 1865mL fluid (>= 98% est needs) Document TF rate, water flushes, and GRVs 
 
Replete lytes prn 
 
Continue prokinetic to promote GI motility Nutrition Assessment:  Worsening AMS, weakness pta. Dx sepsis, UTI, BRE. Tx ICU (11/16) 2/2 cardizem drip for a fib; cardiology now following. Noted possible GIB; s/p 1 unit PRBC, GI following- no interventions at this time. COVID negative. ECHO showing preserved LVEF. Worsening lethargy, hypoxia. Intubated 11/19, pressors initiated. Prior to intubation, SLP attempted eval, pt not appropriate for tx. Receiving protein-sparing kcal from D5. RD provided TF recs. TF initiated to goal rate 11/19 to goal rate however MD rec'd hold s/p intubation d/t high volume pressor requirements. TF reinitiated 11/20, achieved goal rate 11/21. Pt then with elevated residuals so clamped early 11/22. NG remained clamped until RN reinitiated TF to 20mL/hr 11/24; spoke with RN same day, who reported high residuals after short period of TF infusion- NG clamped again. Discussed addition of reglan with MD; added yesterday. Also adjusted TF to semi-elemental formula to improve tolerance. NG currently to suction. Remains intubated, sedated on midazolam- pressors now d/c. Pt to bronch today. Plans to reinitiate TF s/p procedure. Labs: H/H 8.8/29.5, BUN 58, Cr 1.96, , Na 148, K 3.2, Phos 5.4. Meds: furosemide, midazolam, fentanyl, statin, morphine, thiamine. Malnutrition Assessment: 
Malnutrition Status: Moderate malnutrition Context:  Acute illness Findings of the 6 clinical characteristics of malnutrition:  
Energy Intake:  7 - 50% or less of est energy requirements for 5 or more days 
Weight Loss:  1.0 - 5% over one month(6.5% BW loss x1.5 months) Body Fat Loss:  Unable to assess, Muscle Mass Loss:  Unable to assess, Fluid Accumulation:  1 - Mild, Generalized Estimated Daily Nutrient Needs: 
Energy (kcal): 1900kcal (PSU 2003b); Weight Used for Energy Requirements: Current Protein (g): 111g (1.2g/kg); Weight Used for Protein Requirements: Current Fluid (ml/day): 1900mL; Method Used for Fluid Requirements: 1 ml/kcal 
 
 
Nutrition Related Findings:  Unable to complete NFPE 2/2 precautions. Previously constipated, now with FMS in place- liquid OP today. No documented hx dysphagia, n/v, or c/d. 3+ generalized weeping edema continues. Noted recent admit to OSH (8/2020), documented 6%BW loss x 1.5 months Wounds:   
None Current Nutrition Therapies: DIET NPO Except Meds DIET TUBE FEEDING Vital 1.2 Current Tube Feeding (TF) Orders: · Feeding Route: Nasogastric · Formula: Vital 1.2 · Schedule:Continuous · Regimen: 60mL/hr · Water Flushes: 100mL q4hr · Current TF & Flush Orders Provides: 1728kcal, 108g protein, 1768mL fluid Anthropometric Measures: 
· Height:  6' 2\" (188 cm) · Current Body Wt:  92.5 kg (203 lb 14.8 oz)(11/18) · Ideal Body Wt:  190 lbs:  107.3 % · BMI Category: Overweight (BMI 25.0-29. 9) Nutrition Diagnosis:  
· Inadequate oral intake related to cognitive or neurological impairment as evidenced by NPO or clear liquid status due to medical condition, intubation Nutrition Interventions:  
Food and/or Nutrient Delivery: Continue NPO, Start tube feeding Nutrition Education and Counseling: No recommendations at this time Coordination of Nutrition Care: Continue to monitor while inpatient Goals: 
Meet >75% EENs via TF vs PO in 7 days (not met) Maintain skin integrity (met) Lytes wnl (not met) Nutrition Monitoring and Evaluation:  
Behavioral-Environmental Outcomes: None identified Food/Nutrient Intake Outcomes: Food and nutrient intake Physical Signs/Symptoms Outcomes: Weight, Skin, Biochemical data Discharge Planning: Too soon to determine Electronically signed by Shannan Davis on 11/25/2020 at 12:28 PM 
 
Contact:

## 2020-11-25 NOTE — PROGRESS NOTES
Pulmonology and Critical Care Progress Note Subjective: Chief Complaint: Confusion Patient seen and examined in ICU Overnight events noted Wife at bedside Remains critically ill Intubated on mechanical ventilation On assist control rate of 10, tidal volume 500, FiO2 30%, PEEP of 5 ABGs and chest x-ray reviewed Chest x-ray reviewed and shows cardiomegaly with vascular congestion bilaterally and small right-sided pleural effusion suspicious for CHF 
 
CT chest 11/23 reviewed and showed: 
Bilateral moderate-sized pleural effusions Also showed bilateral interstitial infiltrates Also showed patchy groundglass nodular changes right more than left lung suspicious for inflammation versus malignancy Status post thoracentesis right side by interventional radiology 11/23 Patient results consistent with transudate Cytology pending plan We will proceed with bronchoscopy today for washing and brushing Current Facility-Administered Medications Medication Dose Route Frequency Provider Last Rate Last Dose  calcium gluconate 1 gram in sodium chloride (ISO-OSM) 50 mL infusion  1 g IntraVENous Q8H Rey Donald MD   1,000 mg at 11/25/20 1316  calcium carbonate (TUMS) chewable tablet 400 mg [elemental]  400 mg Oral BID Rey Donald MD   400 mg at 11/25/20 1316  lidocaine (XYLOCAINE) 10 mg/mL (1 %) injection    PRN Audra PONCE MD   30 mL at 11/25/20 1215  
 mineral oil (topical)    PRN Maryann Salmon MD   5 mL at 11/25/20 1211  pantoprazole (PROTONIX) 40 mg in 0.9% sodium chloride 10 mL injection  40 mg IntraVENous BID Mercy Can MD   40 mg at 11/25/20 0856  
 glycopyrrolate (ROBINUL) tablet 1 mg  1 mg Oral TID Maryann Salmon MD   Stopped at 11/25/20 0900  
 metoclopramide HCl (REGLAN) injection 10 mg  10 mg IntraVENous Q6H Roberto Littlejohn MD   10 mg at 11/25/20 1141  
 midazolam in normal saline (VERSED) 1 mg/mL infusion  0-10 mg/hr IntraVENous TITRATE Chayo Okeefe Ximena Britton MD 3 mL/hr at 11/25/20 0645 3 mg/hr at 11/25/20 2897  hydrocortisone Sod Succ (PF) (SOLU-CORTEF) injection 50 mg  50 mg IntraVENous Q8H Curry Warner DO   50 mg at 11/25/20 0745  
 fentaNYL (PF) 1,500 mcg/30 mL (50 mcg/mL) infusion  0-200 mcg/hr IntraVENous TITRATE Abdulaziz Ny DO 3 mL/hr at 11/25/20 0744 150 mcg/hr at 11/25/20 0744  
 vasopressin (VASOSTRICT) 40 Units in 0.9% sodium chloride 40 mL infusion  0.04 Units/min IntraVENous CONTINUOUS Curry Warner DO 2.4 mL/hr at 11/19/20 2033 0.04 Units/min at 11/19/20 2033  meropenem (MERREM) 1 g in sterile water (preservative free) 20 mL IV syringe  1 g IntraVENous Q12H Danae Robledo MD   1 g at 11/25/20 1141  morphine injection 2 mg  2 mg IntraVENous Q4H PRN Brian Restrepo MD   2 mg at 11/23/20 0739  
 NOREPINephrine (LEVOPHED) 8 mg in 5% dextrose 250mL (32 mcg/mL) infusion  0.5-16 mcg/min IntraVENous TITRATE Brian Restrepo MD   Stopped at 11/23/20 6991  acetaminophen (TYLENOL) suppository 650 mg  650 mg Rectal Q4H PRN Clara PONCE MD   650 mg at 11/16/20 1225  acetaminophen (TYLENOL) tablet 650 mg  650 mg Oral Q4H PRN Rom Herring NP   650 mg at 11/19/20 0027  
 albuterol (PROVENTIL HFA, VENTOLIN HFA, PROAIR HFA) inhaler 2 Puff  2 Puff Inhalation Q4H PRN Rom Herring NP      
 docusate sodium (COLACE) capsule 100 mg  100 mg Oral BID Rom Herring NP   Stopped at 05/00/26 2997  
 folic acid (FOLVITE) tablet 1 mg  1 mg Oral DAILY Rom Herring NP   Stopped at 11/24/20 0900  pravastatin (PRAVACHOL) tablet 20 mg  20 mg Oral QHS Rom Herring NP   Stopped at 11/24/20 2200  sertraline (ZOLOFT) tablet 50 mg  50 mg Oral DAILY Rom Herring NP   Stopped at 11/24/20 0900  thiamine mononitrate (B-1) tablet 100 mg  100 mg Oral DAILY Rom Herring NP   Stopped at 11/24/20 0900  
 ondansetron (ZOFRAN) injection 4 mg  4 mg IntraVENous Q6H PRN Donita Herring NP Humberto Gondola hydrOXYzine pamoate (VISTARIL) capsule 25 mg  25 mg Oral TID PRN Rom Herring NP   25 mg at 20 1857  chlorhexidine (PERIDEX) 0.12 % mouthwash 15 mL  15 mL Oral Q12H Rom Herring NP   15 mL at 20 0771 Allergies Allergen Reactions  Precedex [Dexmedetomidine] Other (comments) Severe bradycardia  Codeine Rash Review of Systems: 
Review of systems not obtained due to patient factors. Objective:  
 
Blood pressure 115/69, pulse 85, temperature 97.9 °F (36.6 °C), resp. rate 12, height 6' 2\" (1.88 m), weight 101.6 kg (224 lb), SpO2 100 %. Temp (24hrs), Av.3 °F (36.8 °C), Min:97.5 °F (36.4 °C), Max:99.5 °F (37.5 °C) Intake and Output: 
Current Shift: 701 - 1900 In: -  
Out: 1100 [Urine:1100] Last 3 Shifts: 1901 - 700 In: -  
Out: 4780 [Urine:2800; Drains:40] Physical Exam:  
General appearance: Elderly male who is sedated on the ventilator, on fentanyl, no distress, appears older than stated age, chronically ill-appearing Head: Normocephalic, without obvious abnormality, atraumatic Eyes: negative Neck: no obvious adenopathy, no carotid bruit; neck is stiff Lungs: He has few scattered rhonchi. Equal breath sounds bilaterally. Small secretions per RN. Heart: irregularly irregular rhythm, bradycardic, no rub, no obvious murmur Abdomen: soft, non-tender. Bowel sounds normal. No masses,  no organomegaly; NG tube in place. He also has diarrhea and has a rectal tube in place. He is not tolerating his tube feeds. Pulses: 2+ and symmetric Skin: Dry, intact, with bruising throughout the arms and legs. +3 pitting edema in extremities bilaterally. Lymph nodes: Cervical, supraclavicular, and axillary nodes normal. 
Neurologic: Sedated on the ventilator, not following any commands. Additional comments:None Lab/Data Review: All lab results for the last 24 hours reviewed. Recent Results (from the past 24 hour(s)) GLUCOSE, POC  
 Collection Time: 11/24/20  4:37 PM  
Result Value Ref Range Glucose (POC) 87 65 - 100 mg/dL Performed by Marquis Hunt, POC Collection Time: 11/24/20  8:22 PM  
Result Value Ref Range Glucose (POC) 95 65 - 100 mg/dL Performed by Aspen Franco GLUCOSE, POC Collection Time: 11/25/20 12:10 AM  
Result Value Ref Range Glucose (POC) 107 (H) 65 - 100 mg/dL Performed by Aspen Franco CBC W/O DIFF Collection Time: 11/25/20  4:30 AM  
Result Value Ref Range WBC 11.4 (H) 4.1 - 11.1 K/uL  
 RBC 3.42 (L) 4.10 - 5.70 M/uL HGB 8.8 (L) 12.1 - 17.0 g/dL HCT 29.5 (L) 36.6 - 50.3 % MCV 86.3 80.0 - 99.0 FL  
 MCH 25.7 (L) 26.0 - 34.0 PG  
 MCHC 29.8 (L) 30.0 - 36.5 g/dL  
 RDW 18.9 (H) 11.5 - 14.5 % PLATELET 71 (L) 248 - 400 K/uL NRBC 6.9 (H) 0  WBC ABSOLUTE NRBC 0.78 (H) 0.00 - 0.01 K/uL  
C REACTIVE PROTEIN, QT Collection Time: 11/25/20  4:30 AM  
Result Value Ref Range C-Reactive protein 3.13 (H) 0.00 - 0.60 mg/dL PROCALCITONIN Collection Time: 11/25/20  4:30 AM  
Result Value Ref Range Procalcitonin 0.62 (H) 0 ng/mL BLOOD GAS, ARTERIAL Collection Time: 11/25/20  4:30 AM  
Result Value Ref Range pH 7.347 (L) 7.35 - 7.45    
 PCO2 42 35 - 45 mmHg PO2 76 75 - 100 mmHg O2 SAT 97 >95 % BICARBONATE 22 22 - 26 mmol/L  
 BASE DEFICIT 2.5 (H) 0 - 2 mmol/L  
 O2 METHOD VENT    
 FIO2 30.0 % MODE SIMV Tidal volume 500 PRESSURE SUPPORT 15.0 EPAP/CPAP/PEEP 5.0    
 SITE Right Radial    
 [facility]S TEST PASS RENAL FUNCTION PANEL Collection Time: 11/25/20  4:30 AM  
Result Value Ref Range Sodium 148 (H) 136 - 145 mmol/L Potassium 3.2 (L) 3.5 - 5.1 mmol/L Chloride 113 (H) 97 - 108 mmol/L  
 CO2 26 21 - 32 mmol/L Anion gap 9 5 - 15 mmol/L Glucose 109 (H) 65 - 100 mg/dL BUN 58 (H) 6 - 20 mg/dL Creatinine 1.96 (H) 0.70 - 1.30 mg/dL BUN/Creatinine ratio 30 (H) 12 - 20  GFR est AA 41 (L) >60 ml/min/1.73m2 GFR est non-AA 33 (L) >60 ml/min/1.73m2 Calcium 6.1 (LL) 8.5 - 10.1 mg/dL Phosphorus 5.4 (H) 2.6 - 4.7 mg/dL Albumin 2.9 (L) 3.5 - 5.0 g/dL GLUCOSE, POC Collection Time: 11/25/20  4:40 AM  
Result Value Ref Range Glucose (POC) 112 (H) 65 - 100 mg/dL Performed by Uriel Rob GLUCOSE, POC Collection Time: 11/25/20  7:57 AM  
Result Value Ref Range Glucose (POC) 114 (H) 65 - 100 mg/dL Performed by Gissell Lai GLUCOSE, POC Collection Time: 11/25/20 10:38 AM  
Result Value Ref Range Glucose (POC) 130 (H) 65 - 100 mg/dL Performed by Gissell Lai DIGOXIN Collection Time: 11/25/20 11:45 AM  
Result Value Ref Range Digoxin level 1.3 0.90 - 2.0 ng/mL Reported dose date Not provided Reported dose: Not provided Units GLUCOSE, POC Collection Time: 11/25/20  2:52 PM  
Result Value Ref Range Glucose (POC) 115 (H) 65 - 100 mg/dL Performed by Gissell Lai Chest X-Ray:  
XR CHEST PORT Final Result Stable right neck central venous catheter. Now present, there is an ET tube 4-5 
cm above the samuel. NG tube projects below the left hemidiaphragm. Improved 
aeration through the right lung; clearing patchy reticular markings. No 
interstitial or alveolar pulmonary edema. No pneumothorax or sizable pleural 
effusion. XR CHEST PORT Final Result Impression:   
Successful placement of a triple-lumen central venous catheter. The catheter is  
ready for use. IR INSERT NON TUNL CVC OVER 5 YRS Final Result Impression:   
Successful placement of a triple-lumen central venous catheter. The catheter is  
ready for use. IR [facility] Final Result Impression:   
Successful placement of a triple-lumen central venous catheter. The catheter is  
ready for use. XR CHEST PORT Final Result Impression: The cardiomediastinal silhouette is appropriate for age, technique,  
and lung expansion.  Pulmonary vasculature is not congested. The lungs are  
essentially clear. No effusion or pneumothorax is seen. US RETROPERITONEUM COMP Final Result Impression: Normal exam  
  
  
XR CHEST PORT Final Result IMPRESSION:  No evidence of an acute cardiopulmonary process. CT HEAD WO CONT Final Result IMPRESSION: Chronic findings as above. CT CHEST WO CONT    (Results Pending) CT ABD PELV WO CONT    (Results Pending) XR CHEST PORT    (Results Pending) CT imaging: CT Results  (Last 48 hours) None PFTs: PFT Results  (Last 3 results in the past 10 years) None Assessment:  
 
Patient is a 58-year-old  male with a history of atrial fibrillation, osteoarthritis, hyperlipidemia, reported COPD, and hypertension who presented to [facility] on 11/15/2020 from his nursing home for altered mental status. He has been admitted to the ICU and is currently in septic shock on vasopressors and has been intubated on 11/19/2020 for failure to protect his airway due to altered mental status. Plan:  
 
1.)  Acute respiratory failure Likely due to CHF, altered mental status, to protect airway Currently intubated on mechanical ventilation Intubated on 11/19/2020 ABGs and chest x-ray reviewed as outlined above Also appears quite volume overloaded clinically Continue diuresis On assist control for bronchoscopy today We will proceed with SIMV and pressure support weaning after bronchoscopy CT chest showed bilateral effusions with patchy groundglass nodular infiltrates Status post right thoracentesis 11/23 Initial results consistent with transudate Cytology pending We will proceed with bronchoscopy later today 
 
2.)  Septic shock ESBL E. coli On parenteral antibiotic coverage Further changes in antibiotics based on clinical response and culture results Covid negative Appears quite volume overloaded clinically 3.)  Acute encephalopathy Likely multifactorial from sepsis, urinary tract infection, and multiple electrolyte abnormalities including hypernatremia, hypercalcemia, and hypokalemia. Continue on current antibiotic regimen as above; neurology/nephrology/hematology following closely. Ammonia level normal, TSH mildly elevated, free T4 pending. 4.)  Acute kidney injury Creatinine seem to plateau around 7.69 and had very steadily been decreasing with improved calcium level and fluid administration. Likely prerenal from volume depletion and sepsis. Worsened slightly to 2.09, possible ATN. Nephrology following closely, appreciate their assistance. 5.)  Atrial fibrillation with RVR/diastolic congestive heart failure Presented with A. fib RVR Stable Likely secondary to sepsis Echo shows preserved ejection fraction Cardiology input appreciated Continue digoxin Quite volume overloaded Continue diuresis 6) Hypercalcemia Corrected calcium 14.6 on admission, this has steadily improved every day with calcitonin fluids. Nephrology following closely Myeloma work-up pending 
 
7.)  Acute blood loss anemia Multifactorial 
Status post 2 units PRBCs Monitor clinically Continue Protonix Appreciate GI pathology input 
 
8.)  Nonanion gap metabolic acidosis Unclear etiology, but likely combination of sepsis and BRE. Overall improved. Canceled CT of the chest abdomen pelvis. 9.)  Thrombocytopenia 
hematology is following closely. INR 2.0, PT 22.6, PTT 65.2 but fibrinogen is normal, which is not consistent with DIC. Patient received FFP and vitamin K. 
likely due to overwhelming sepsis. CODE STATUS: DNR Lines/Tubes: ET tube, right IJ central line, Mcmillan catheter, NG tube Prophylaxis: 
Stress Ulcer Protocol Active: Yes, Protonix 40 mg IV twice daily Deep Vein Thrombosis Protocol Active: Yes, SCD's (with bleeding his pharmacologic DVT prophylaxis has been stopped) Nutrition: Tube feeds.  
Activity: Bedrest given clinical instability. Disposition: DNR status is now in effect. Total critical care time spent with patient: 50 minutes with direct visualization of the patient's ventilator and management along with respiratory therapist, long discussion with the respiratory therapist and the bedside nurse regarding the plan of care. Vane Pinedo MD 
[facility] 11/25/2020 
10:57 AM

## 2020-11-26 NOTE — PROGRESS NOTES
Pulmonology and Critical Care Progress Note Subjective: Chief Complaint: Confusion Patient seen and examined in ICU Overnight events noted Wife at bedside Remains critically ill Intubated on mechanical ventilation Currently on SIMV rate of 10, tidal volume 500, FiO2 30%, pressure support of 15, PEEP of 5 ABG as outlined below remained stable Post bronchoscopy 11/25 Chest x-ray shows improvement in bilateral infiltrates diuresis Intake output -1700 cc overnight CT chest 11/23 reviewed and showed: 
Bilateral moderate-sized pleural effusions Also showed bilateral interstitial infiltrates Also showed patchy groundglass nodular changes right more than left lung suspicious for inflammation versus malignancy Status post thoracentesis right side by interventional radiology 11/23 Patient results consistent with transudate Cytology negative Current Facility-Administered Medications Medication Dose Route Frequency Provider Last Rate Last Dose  calcium gluconate 1 gram in sodium chloride (ISO-OSM) 50 mL infusion  1 g IntraVENous Q8H Rey Donald MD   1,000 mg at 11/25/20 1316  calcium carbonate (TUMS) chewable tablet 400 mg [elemental]  400 mg Oral BID Tommy Cancino MD   400 mg at 11/25/20 2158  lidocaine (XYLOCAINE) 10 mg/mL (1 %) injection    PRN Rima PONCE MD   30 mL at 11/25/20 1215  
 mineral oil (topical)    PRN Samantha Dickson MD   5 mL at 11/25/20 1211  furosemide (LASIX) injection 40 mg  40 mg IntraVENous BID Samantha Dickson MD   40 mg at 11/25/20 2159  pantoprazole (PROTONIX) 40 mg in 0.9% sodium chloride 10 mL injection  40 mg IntraVENous BID Jenn Verma MD   40 mg at 11/25/20 2158  glycopyrrolate (ROBINUL) tablet 1 mg  1 mg Oral TID Samantha Dickson MD   Stopped at 11/25/20 2200  
 metoclopramide HCl (REGLAN) injection 10 mg  10 mg IntraVENous Q6H Pablo Koehler MD   10 mg at 11/25/20 1737  
 midazolam in normal saline (VERSED) 1 mg/mL infusion 0-10 mg/hr IntraVENous TITRATE Raine Khan MD 3 mL/hr at 11/26/20 0120 3 mg/hr at 11/26/20 0120  hydrocortisone Sod Succ (PF) (SOLU-CORTEF) injection 50 mg  50 mg IntraVENous Q8H Curry Warner DO   50 mg at 11/26/20 0641  
 fentaNYL (PF) 1,500 mcg/30 mL (50 mcg/mL) infusion  0-200 mcg/hr IntraVENous TITRATE Prieto Fish, DO 2.5 mL/hr at 11/26/20 0317 125 mcg/hr at 11/26/20 0317  
 vasopressin (VASOSTRICT) 40 Units in 0.9% sodium chloride 40 mL infusion  0.04 Units/min IntraVENous CONTINUOUS Curry Warner DO 2.4 mL/hr at 11/19/20 2033 0.04 Units/min at 11/19/20 2033  meropenem (MERREM) 1 g in sterile water (preservative free) 20 mL IV syringe  1 g IntraVENous Q12H Oliver Cobb MD   1 g at 11/26/20 8500  morphine injection 2 mg  2 mg IntraVENous Q4H PRN Raine Khan MD   2 mg at 11/23/20 0739  
 NOREPINephrine (LEVOPHED) 8 mg in 5% dextrose 250mL (32 mcg/mL) infusion  0.5-16 mcg/min IntraVENous TITRATE Raine Khan MD   Stopped at 11/23/20 2515  acetaminophen (TYLENOL) suppository 650 mg  650 mg Rectal Q4H PRN Dieudonne PONCE MD   650 mg at 11/16/20 1225  acetaminophen (TYLENOL) tablet 650 mg  650 mg Oral Q4H PRN Rom Herring NP   650 mg at 11/19/20 0027  
 albuterol (PROVENTIL HFA, VENTOLIN HFA, PROAIR HFA) inhaler 2 Puff  2 Puff Inhalation Q4H PRN Rom Herring NP      
 docusate sodium (COLACE) capsule 100 mg  100 mg Oral BID Rom Herring NP   Stopped at 45/32/38 0599  
 folic acid (FOLVITE) tablet 1 mg  1 mg Oral DAILY Rom Herring NP   Stopped at 11/24/20 0900  pravastatin (PRAVACHOL) tablet 20 mg  20 mg Oral QHS Rom Herring NP   20 mg at 11/25/20 2158  sertraline (ZOLOFT) tablet 50 mg  50 mg Oral DAILY Rom Herring NP   Stopped at 11/24/20 0900  thiamine mononitrate (B-1) tablet 100 mg  100 mg Oral DAILY Rom Herring NP   Stopped at 11/24/20 0900  
 ondansetron (ZOFRAN) injection 4 mg  4 mg IntraVENous Q6H PRN Rom Herring, NP      
 hydrOXYzine pamoate (VISTARIL) capsule 25 mg  25 mg Oral TID PRN Rom Herring NP   25 mg at 20  chlorhexidine (PERIDEX) 0.12 % mouthwash 15 mL  15 mL Oral Q12H Rom Herring, NP   15 mL at 20 Allergies Allergen Reactions  Precedex [Dexmedetomidine] Other (comments) Severe bradycardia  Codeine Rash Review of Systems: 
Review of systems not obtained due to patient factors. Objective:  
 
Blood pressure 126/89, pulse 90, temperature 98.6 °F (37 °C), resp. rate 20, height 6' 2\" (1.88 m), weight 101.6 kg (224 lb), SpO2 100 %. Temp (24hrs), Av.1 °F (36.7 °C), Min:97.5 °F (36.4 °C), Max:98.6 °F (37 °C) Intake and Output: 
Current Shift: No intake/output data recorded. Last 3 Shifts: 1901 -  0700 In: -  
Out:  [[id number]:[id number]] Physical Exam:  
General appearance: Elderly male who is sedated on the ventilator, on fentanyl, no distress, appears older than stated age, chronically ill-appearing Head: Normocephalic, without obvious abnormality, atraumatic Eyes: negative Neck: no obvious adenopathy, no carotid bruit; neck is stiff Lungs: He has few scattered rhonchi. Equal breath sounds bilaterally. Small secretions per RN. Heart: irregularly irregular rhythm, bradycardic, no rub, no obvious murmur Abdomen: soft, non-tender. Bowel sounds normal. No masses,  no organomegaly; NG tube in place. He also has diarrhea and has a rectal tube in place. He is not tolerating his tube feeds. Pulses: 2+ and symmetric Skin: Dry, intact, with bruising throughout the arms and legs. +3 pitting edema in extremities bilaterally. Lymph nodes: Cervical, supraclavicular, and axillary nodes normal. 
Neurologic: Sedated on the ventilator, not following any commands. Additional comments:None Lab/Data Review: All lab results for the last 24 hours reviewed. Recent Results (from the past 24 hour(s)) GLUCOSE, POC Collection Time: 11/25/20 10:38 AM  
Result Value Ref Range Glucose (POC) 130 (H) 65 - 100 mg/dL Performed by Damaris Marcelo DIGOXIN Collection Time: 11/25/20 11:45 AM  
Result Value Ref Range Digoxin level 1.3 0.90 - 2.0 ng/mL Reported dose date Not provided Reported dose: Not provided Units GLUCOSE, POC Collection Time: 11/25/20  2:52 PM  
Result Value Ref Range Glucose (POC) 115 (H) 65 - 100 mg/dL Performed by Damaris Marcelo CBC WITH AUTOMATED DIFF Collection Time: 11/26/20  4:30 AM  
Result Value Ref Range WBC 16.0 (H) 4.1 - 11.1 K/uL  
 RBC 3.65 (L) 4.10 - 5.70 M/uL HGB 9.5 (L) 12.1 - 17.0 g/dL HCT 31.8 (L) 36.6 - 50.3 % MCV 87.1 80.0 - 99.0 FL  
 MCH 26.0 26.0 - 34.0 PG  
 MCHC 29.9 (L) 30.0 - 36.5 g/dL  
 RDW 19.1 (H) 11.5 - 14.5 % PLATELET 473 (L) 918 - 400 K/uL NEUTROPHILS PENDING % LYMPHOCYTES PENDING % MONOCYTES PENDING % EOSINOPHILS PENDING % BASOPHILS PENDING % IMMATURE GRANULOCYTES PENDING %  
 ABS. NEUTROPHILS PENDING K/UL  
 ABS. LYMPHOCYTES PENDING K/UL  
 ABS. MONOCYTES PENDING K/UL  
 ABS. EOSINOPHILS PENDING K/UL  
 ABS. BASOPHILS PENDING K/UL  
 ABS. IMM. GRANS. PENDING K/UL  
 DF PENDING   
C REACTIVE PROTEIN, QT Collection Time: 11/26/20  4:30 AM  
Result Value Ref Range C-Reactive protein 2.87 (H) 0.00 - 0.60 mg/dL PROCALCITONIN Collection Time: 11/26/20  4:30 AM  
Result Value Ref Range Procalcitonin 0.33 (H) 0 ng/mL RENAL FUNCTION PANEL Collection Time: 11/26/20  4:30 AM  
Result Value Ref Range Sodium 151 (H) 136 - 145 mmol/L Potassium 2.8 (L) 3.5 - 5.1 mmol/L Chloride 112 (H) 97 - 108 mmol/L  
 CO2 26 21 - 32 mmol/L Anion gap 13 5 - 15 mmol/L Glucose 122 (H) 65 - 100 mg/dL BUN 59 (H) 6 - 20 mg/dL Creatinine 1.74 (H) 0.70 - 1.30 mg/dL BUN/Creatinine ratio 34 (H) 12 - 20 GFR est AA 46 (L) >60 ml/min/1.73m2 GFR est non-AA 38 (L) >60 ml/min/1.73m2  Calcium 6.2 (LL) 8.5 - 10.1 mg/dL Phosphorus 5.0 (H) 2.6 - 4.7 mg/dL Albumin 3.2 (L) 3.5 - 5.0 g/dL MAGNESIUM Collection Time: 11/26/20  4:30 AM  
Result Value Ref Range Magnesium 2.1 1.6 - 2.4 mg/dL BLOOD GAS, ARTERIAL Collection Time: 11/26/20  4:30 AM  
Result Value Ref Range pH 7.390 7.35 - 7.45    
 PCO2 38 35 - 45 mmHg PO2 88 75 - 100 mmHg O2 SAT 98 >95 % BICARBONATE 23 22 - 26 mmol/L  
 BASE DEFICIT 2.0 0 - 2 mmol/L  
 O2 METHOD VENT    
 FIO2 30.0 % MODE SIMV Tidal volume 500 PRESSURE SUPPORT 15.0 EPAP/CPAP/PEEP 5.0    
 SITE Right Radial    
 [facility]'S TEST PASS Chest X-Ray:  
XR CHEST PORT Final Result Stable right neck central venous catheter. Now present, there is an ET tube 4-5 
cm above the samuel. NG tube projects below the left hemidiaphragm. Improved 
aeration through the right lung; clearing patchy reticular markings. No 
interstitial or alveolar pulmonary edema. No pneumothorax or sizable pleural 
effusion. XR CHEST PORT Final Result Impression:   
Successful placement of a triple-lumen central venous catheter. The catheter is  
ready for use. IR INSERT NON TUNL CVC OVER 5 YRS Final Result Impression:   
Successful placement of a triple-lumen central venous catheter. The catheter is  
ready for use. IR [facility] Final Result Impression:   
Successful placement of a triple-lumen central venous catheter. The catheter is  
ready for use. XR CHEST PORT Final Result Impression: The cardiomediastinal silhouette is appropriate for age, technique,  
and lung expansion. Pulmonary vasculature is not congested. The lungs are  
essentially clear. No effusion or pneumothorax is seen. US RETROPERITONEUM COMP Final Result Impression: Normal exam  
  
  
XR CHEST PORT Final Result IMPRESSION:  No evidence of an acute cardiopulmonary process. CT HEAD WO CONT Final Result IMPRESSION: Chronic findings as above.  
  
CT CHEST WO CONT    (Results Pending) CT ABD PELV WO CONT    (Results Pending) XR CHEST PORT    (Results Pending) CT imaging: CT Results  (Last 48 hours) None PFTs: PFT Results  (Last 3 results in the past 10 years) None Assessment:  
 
Patient is a 26-year-old  male with a history of atrial fibrillation, osteoarthritis, hyperlipidemia, reported COPD, and hypertension who presented to [facility] on 11/15/[facility] from his nursing home for altered mental status. He has been admitted to the ICU and is currently in septic shock on vasopressors and has been intubated on 11/19/2020 for failure to protect his airway due to altered mental status. Plan:  
 
1.)  Acute respiratory failure Likely due to CHF, altered mental status, to protect airway Currently intubated on mechanical ventilation Intubated on 11/19/2020 ABGs and chest x-ray reviewed as outlined above Pleural fluid cytology negative Status post bronchoscopy yesterday Results pending Chest x-ray significantly improved with diuresis Renal Function improved Intake output -1700 cc overnight Would continue Lasix twice daily for now Decrease sedation Will wean aggressively over the next 24 hours and consider extubation as tolerated 2.)  Septic shock ESBL E. coli On parenteral antibiotic coverage Further changes in antibiotics based on clinical response and culture results Covid negative Appears quite volume overloaded clinically 3.)  Acute encephalopathy Likely multifactorial from sepsis, urinary tract infection, and multiple electrolyte abnormalities including hypernatremia, hypercalcemia, and hypokalemia. Continue on current antibiotic regimen as above; neurology/nephrology/hematology following closely. Ammonia level normal, TSH mildly elevated, free T4 pending. 4.)  Acute kidney injury Creatinine seem to plateau around 9.03 and had very steadily been decreasing with improved calcium level and fluid administration. Likely prerenal from volume depletion and sepsis. Worsened slightly to 2.09, possible ATN. Nephrology following closely, appreciate their assistance. 5.)  Atrial fibrillation with RVR/diastolic congestive heart failure Presented with A. fib RVR Stable Likely secondary to sepsis Echo shows preserved ejection fraction Cardiology input appreciated Continue digoxin Quite volume overloaded Continue diuresis 6) Hypercalcemia Corrected calcium 14.6 on admission, this has steadily improved every day with calcitonin fluids. Nephrology following closely Myeloma work-up pending 
 
7.)  Acute blood loss anemia Multifactorial 
Status post 2 units PRBCs Monitor clinically Continue Protonix Appreciate GI pathology input 
 
8.)  Nonanion gap metabolic acidosis Unclear etiology, but likely combination of sepsis and BRE. Overall improved. Canceled CT of the chest abdomen pelvis. 9.)  Thrombocytopenia 
hematology is following closely. INR 2.0, PT 22.6, PTT 65.2 but fibrinogen is normal, which is not consistent with DIC. Patient received FFP and vitamin K. 
likely due to overwhelming sepsis. CODE STATUS: DNR Lines/Tubes: ET tube, right IJ central line, Mcmillan catheter, NG tube Prophylaxis: 
Stress Ulcer Protocol Active: Yes, Protonix 40 mg IV twice daily Deep Vein Thrombosis Protocol Active: Yes, SCD's Disposition: DNR status is now in effect. Total critical care time spent with patient: 50 minutes with direct visualization of the patient's ventilator and management along with respiratory therapist, long discussion with the respiratory therapist and the bedside nurse regarding the plan of care. Savita Pruitt MD 
[facility] 11/26/2020 
10:57 AM

## 2020-11-26 NOTE — PROGRESS NOTES
Renal Progress Note Patient: Caity Cantu MRN: [id number]  SSN: [id number] YOB: 1945  Age: 67 y.o. Sex: male Admit Date: 11/15/2020 LOS: 11 days Subjective:  
Patient seen in ICU, on ventilator, sedated,  
Creatinine 1.74 today, On IV lasix with albumin, good output+ Current Facility-Administered Medications Medication Dose Route Frequency  potassium chloride 10 mEq in 100 ml IVPB  10 mEq IntraVENous Q1H  
 digoxin (LANOXIN) injection 125 mcg  125 mcg IntraVENous DAILY  calcium carbonate (TUMS) chewable tablet 400 mg [elemental]  400 mg Oral BID  lidocaine (XYLOCAINE) 10 mg/mL (1 %) injection    PRN  mineral oil (topical)    PRN  
 furosemide (LASIX) injection 40 mg  40 mg IntraVENous BID  pantoprazole (PROTONIX) 40 mg in 0.9% sodium chloride 10 mL injection  40 mg IntraVENous BID  
 glycopyrrolate (ROBINUL) tablet 1 mg  1 mg Oral TID  metoclopramide HCl (REGLAN) injection 10 mg  10 mg IntraVENous Q6H  
 midazolam in normal saline (VERSED) 1 mg/mL infusion  0-10 mg/hr IntraVENous TITRATE  hydrocortisone Sod Succ (PF) (SOLU-CORTEF) injection 50 mg  50 mg IntraVENous Q8H  
 fentaNYL (PF) 1,500 mcg/30 mL (50 mcg/mL) infusion  0-200 mcg/hr IntraVENous TITRATE  vasopressin (VASOSTRICT) 40 Units in 0.9% sodium chloride 40 mL infusion  0.04 Units/min IntraVENous CONTINUOUS  
 meropenem (MERREM) 1 g in sterile water (preservative free) 20 mL IV syringe  1 g IntraVENous Q12H  
 morphine injection 2 mg  2 mg IntraVENous Q4H PRN  
 NOREPINephrine (LEVOPHED) 8 mg in 5% dextrose 250mL (32 mcg/mL) infusion  0.5-16 mcg/min IntraVENous TITRATE  acetaminophen (TYLENOL) suppository 650 mg  650 mg Rectal Q4H PRN  
 acetaminophen (TYLENOL) tablet 650 mg  650 mg Oral Q4H PRN  
 albuterol (PROVENTIL HFA, VENTOLIN HFA, PROAIR HFA) inhaler 2 Puff  2 Puff Inhalation Q4H PRN  
 docusate sodium (COLACE) capsule 100 mg  100 mg Oral BID  folic acid (FOLVITE) tablet 1 mg  1 mg Oral DAILY  pravastatin (PRAVACHOL) tablet 20 mg  20 mg Oral QHS  sertraline (ZOLOFT) tablet 50 mg  50 mg Oral DAILY  thiamine mononitrate (B-1) tablet 100 mg  100 mg Oral DAILY  ondansetron (ZOFRAN) injection 4 mg  4 mg IntraVENous Q6H PRN  
 hydrOXYzine pamoate (VISTARIL) capsule 25 mg  25 mg Oral TID PRN  chlorhexidine (PERIDEX) 0.12 % mouthwash 15 mL  15 mL Oral Q12H Vitals:  
 11/26/20 1000 11/26/20 1100 11/26/20 1200 11/26/20 1513 BP: 138/83 (!) 139/96 119/74 Pulse: 82 83 85 (!) 109 Resp: 14 13 14 24 Temp:  98.4 °F (36.9 °C) SpO2: 100% 100% 100% 100% Weight:      
Height:      
 
Objective:  
General: On ventilator, sedated, no acute distress. HEENT: no Icterus, no Pallor, ET tube in place  
neck: Neck is supple, No JVD Lungs: Decreased breath sounds at the bases, no rhonchi, few scattered rales+, CVS: heart sounds normal, no murmurs, no rubs. GI: soft, nontender, normal BS. Extremeties: no cyanosis, 1+ dependent edema+ Neuro: On ventilator, sedated Skin: normal skin turgor, no skin rashes. Intake and Output: 
Current Shift: No intake/output data recorded. Last three shifts: 11/24 1901 - 11/26 0700 In: -  
Out: 1950 [[facility]:[id number]] Lab/Data Review: 
Recent Labs  
  11/26/20 
0430 11/25/20 
0430 11/24/20 
0400 WBC 16.0* 11.4* 8.9 HGB 9.5* 8.8* 8.8* HCT 31.8* 29.5* 28.9*  
* 71* 62* Recent Labs  
  11/26/20 
0430 11/25/20 
0430 11/24/20 
0345 * 148* 143  
K 2.8* 3.2* 3.9 * 113* 112* CO2 26 26 25 * 109* 102* BUN 59* 58* 53* CREA 1.74* 1.96* 2.06* CA 6.2* 6.1*  6.1* 6.1*  
MG 2.1  --   --   
PHOS 5.0* 5.4* 5.6* ALB 3.2* 2.9* 2.8* Recent Labs  
  11/26/20 
0430 11/25/20 
0430 11/24/20 0435 PH 7.390 7.347* 7.260* PCO2 38 42 48* PO2 88 76 71* HCO3 23 22 20* FIO2 30.0 30.0 30.0 Recent Results (from the past 24 hour(s)) CBC WITH AUTOMATED DIFF  Collection Time: 11/26/20  4:30 AM  
Result Value Ref Range WBC 16.0 (H) 4.1 - 11.1 K/uL  
 RBC 3.65 (L) 4.10 - 5.70 M/uL HGB 9.5 (L) 12.1 - 17.0 g/dL HCT 31.8 (L) 36.6 - 50.3 % MCV 87.1 80.0 - 99.0 FL  
 MCH 26.0 26.0 - 34.0 PG  
 MCHC 29.9 (L) 30.0 - 36.5 g/dL  
 RDW 19.1 (H) 11.5 - 14.5 % PLATELET 216 (L) 483 - 400 K/uL DF PENDING   
 NEUTROPHILS 34 32 - 75 % BAND NEUTROPHILS 11 (H) 0 - 6 % LYMPHOCYTES 9 (L) 12 - 49 % MONOCYTES 2 (L) 5 - 13 % EOSINOPHILS 0 0 - 7 % BASOPHILS 0 0 - 1 % METAMYELOCYTES 16 (H) 0 % MYELOCYTES 24 (H) 0 % PROMYELOCYTES 4 (H) 0 % IMMATURE GRANULOCYTES 0 %  
 ABS. NEUTROPHILS 7.2 1.8 - 8.0 K/UL  
 ABS. LYMPHOCYTES 1.4 0.8 - 3.5 K/UL  
 ABS. MONOCYTES 0.3 0.0 - 1.0 K/UL  
 ABS. EOSINOPHILS 0.0 0.0 - 0.4 K/UL  
 ABS. BASOPHILS 0.0 0.0 - 0.1 K/UL  
 ABS. IMM. GRANS. 0.0 K/UL  
 RBC COMMENTS Anisocytosis 2+ RBC COMMENTS Poikilocytosis 2+ RBC COMMENTS Stomatocytes 2+ RBC COMMENTS Polychromasia 1+ RBC COMMENTS Basophilic Stippling 1+ RBC COMMENTS Microcytosis 1+ C REACTIVE PROTEIN, QT Collection Time: 11/26/20  4:30 AM  
Result Value Ref Range C-Reactive protein 2.87 (H) 0.00 - 0.60 mg/dL PROCALCITONIN Collection Time: 11/26/20  4:30 AM  
Result Value Ref Range Procalcitonin 0.33 (H) 0 ng/mL RENAL FUNCTION PANEL Collection Time: 11/26/20  4:30 AM  
Result Value Ref Range Sodium 151 (H) 136 - 145 mmol/L Potassium 2.8 (L) 3.5 - 5.1 mmol/L Chloride 112 (H) 97 - 108 mmol/L  
 CO2 26 21 - 32 mmol/L Anion gap 13 5 - 15 mmol/L Glucose 122 (H) 65 - 100 mg/dL BUN 59 (H) 6 - 20 mg/dL Creatinine 1.74 (H) 0.70 - 1.30 mg/dL BUN/Creatinine ratio 34 (H) 12 - 20 GFR est AA 46 (L) >60 ml/min/1.73m2 GFR est non-AA 38 (L) >60 ml/min/1.73m2 Calcium 6.2 (LL) 8.5 - 10.1 mg/dL Phosphorus 5.0 (H) 2.6 - 4.7 mg/dL Albumin 3.2 (L) 3.5 - 5.0 g/dL MAGNESIUM  Collection Time: 11/26/20 4:30 AM  
Result Value Ref Range Magnesium 2.1 1.6 - 2.4 mg/dL BLOOD GAS, ARTERIAL Collection Time: 11/26/20  4:30 AM  
Result Value Ref Range pH 7.390 7.35 - 7.45    
 PCO2 38 35 - 45 mmHg PO2 88 75 - 100 mmHg O2 SAT 98 >95 % BICARBONATE 23 22 - 26 mmol/L  
 BASE DEFICIT 2.0 0 - 2 mmol/L  
 O2 METHOD VENT    
 FIO2 30.0 % MODE SIMV Tidal volume 500 PRESSURE SUPPORT 15.0 EPAP/CPAP/PEEP 5.0    
 SITE Right Radial    
 [facility]ON'S TEST PASS    
GLUCOSE, POC Collection Time: 11/26/20 10:47 AM  
Result Value Ref Range Glucose (POC) 129 (H) 65 - 100 mg/dL Performed by Nichelle Sanz, POC Collection Time: 11/26/20  4:30 PM  
Result Value Ref Range Glucose (POC) 124 (H) 65 - 100 mg/dL Performed by Phu Mejia Assessment and Plan: #1 severe hypercalcemia.  -On admission corrected calcium was 14.6. Etiology unclear, hypercalcemia resolved now, suspect multiple myeloma 
 
immunoelectrophoresis studies showed IgG monoclonal protein  
low PTH compatible with hypercalcemia, borderline low vitamin D level 
urine random calcium and creatinine to assess for fractional excretion of calcium pending Hypercalcemia resolved and repeat calcium level today is 6.2 today Will give IV calcium gluconate 1gm q8hrs x3 doses today Continue calcium carbonate po through NG tube 2. Fluid overload/dependent edema+:  improved Karen Calamity Will decrease Lasix to 40 mg once daily and monitor electrolytes 3. Acute kidney injury on ?? CKD Renal functions remained elevated despite adequate hydration, suspect baseline chronic kidney disease 
urine random protein creatinine ratio is 2.0 
  
3.  hypokalemia. Patient receiving IV KCL supplements Continue to monitor and supplement as needed 4. Hypernatremia.  -From free water deficit. Decrease the dose of Lasix once daily continue to monitor sodium levels 5. Acute respiratory failure, on ventilator Sepsis/UTI Pulmonary and ID following the patient Continue IV antibiotics as per ID 
  
6. Atrial fibrillation with rapid ventricular rate : Cardiology following the patient, on amiodarone and digoxin Signed By: Phoenix Samuel MD   
 November 26, 2020

## 2020-11-26 NOTE — PROGRESS NOTES
Hospitalist Progress Note Daily Progress Note: 11/26/2020 
 
58-year-old male sent here from [facility] with reports from staff of altered mental status, lethargy, not eating and dehydration. He has a history of atrial fibrillation. UA suggestive uti, zosyn initiated. Subjective:  
Patient seen and evaluated at bedside, overnight events noted, patient currently intubated responding to painful/verbal stimuli, discussed with RN at bedside. Patient is intubated and sedated. Problem List: 
Problem List as of 11/26/2020 Date Reviewed: 11/15/2020 Codes Class Noted - Resolved * (Principal) [facility]eps[facility] ([facility] ICD-10-CM: A41.9 ICD-9-CM: 038.9, 995.91  11/16/2020 - Present Hypoglycemia ICD-10-CM: E16.2 ICD-9-CM: 251.2  11/16/2020 - Present Encephalopathy acute ICD-10-CM: G93.40 ICD-9-CM: 348.30  11/16/2020 - Present UTI (urinary tract infection) ICD-10-CM: N39.0 ICD-9-CM: 599.0  11/15/2020 - Present BRE (acute kidney injury) ([facility] ICD-10-CM: N17.9 ICD-9-CM: 584.9  11/15/2020 - Present AMS (altered mental status) ICD-10-CM: E80.09 
ICD-9-CM: 780.97  11/15/2020 - Present Chronic atrial fibrillation (HCC) ICD-10-CM: I48.20 ICD-9-CM: 427.31  8/12/2020 - Present HTN (hypertension), benign ICD-10-CM: I10 
ICD-9-CM: 401.1  8/12/2020 - Present COPD (chronic obstructive pulmonary disease) (HCC) ICD-10-CM: J44.9 ICD-9-CM: 642  8/12/2020 - Present Dyslipidemia ICD-10-CM: E78.5 ICD-9-CM: 272.4  8/12/2020 - Present Depression ICD-10-CM: F32.9 ICD-9-CM: 967  8/12/2020 - Present Cellulitis of left upper extremity ICD-10-CM: L03.114 
ICD-9-CM: 682.3  8/11/2020 - Present Medications reviewed Current Facility-Administered Medications Medication Dose Route Frequency  calcium gluconate 1 gram in sodium chloride (ISO-OSM) 50 mL infusion  1 g IntraVENous Q8H  
 calcium carbonate (TUMS) chewable tablet 400 mg [elemental]  400 mg Oral BID  lidocaine (XYLOCAINE) 10 mg/mL (1 %) injection    PRN  mineral oil (topical)    PRN  
 furosemide (LASIX) injection 40 mg  40 mg IntraVENous BID  pantoprazole (PROTONIX) 40 mg in 0.9% sodium chloride 10 mL injection  40 mg IntraVENous BID  
 glycopyrrolate (ROBINUL) tablet 1 mg  1 mg Oral TID  metoclopramide HCl (REGLAN) injection 10 mg  10 mg IntraVENous Q6H  
 midazolam in normal saline (VERSED) 1 mg/mL infusion  0-10 mg/hr IntraVENous TITRATE  hydrocortisone Sod Succ (PF) (SOLU-CORTEF) injection 50 mg  50 mg IntraVENous Q8H  
 fentaNYL (PF) 1,500 mcg/30 mL (50 mcg/mL) infusion  0-200 mcg/hr IntraVENous TITRATE  vasopressin (VASOSTRICT) 40 Units in 0.9% sodium chloride 40 mL infusion  0.04 Units/min IntraVENous CONTINUOUS  
 meropenem (MERREM) 1 g in sterile water (preservative free) 20 mL IV syringe  1 g IntraVENous Q12H  
 morphine injection 2 mg  2 mg IntraVENous Q4H PRN  
 NOREPINephrine (LEVOPHED) 8 mg in 5% dextrose 250mL (32 mcg/mL) infusion  0.5-16 mcg/min IntraVENous TITRATE  acetaminophen (TYLENOL) suppository 650 mg  650 mg Rectal Q4H PRN  
 acetaminophen (TYLENOL) tablet 650 mg  650 mg Oral Q4H PRN  
 albuterol (PROVENTIL HFA, VENTOLIN HFA, PROAIR HFA) inhaler 2 Puff  2 Puff Inhalation Q4H PRN  
 docusate sodium (COLACE) capsule 100 mg  100 mg Oral BID  folic acid (FOLVITE) tablet 1 mg  1 mg Oral DAILY  pravastatin (PRAVACHOL) tablet 20 mg  20 mg Oral QHS  sertraline (ZOLOFT) tablet 50 mg  50 mg Oral DAILY  thiamine mononitrate (B-1) tablet 100 mg  100 mg Oral DAILY  ondansetron (ZOFRAN) injection 4 mg  4 mg IntraVENous Q6H PRN  
 hydrOXYzine pamoate (VISTARIL) capsule 25 mg  25 mg Oral TID PRN  chlorhexidine (PERIDEX) 0.12 % mouthwash 15 mL  15 mL Oral Q12H Review of Systems:  
Unobtainable 2/2 encephalopathic/acute illness Objective:  
Physical Exam:  
 
Visit Vitals /89 (BP 1 Location: Left arm, BP Patient Position: At rest) Pulse 78 Temp 98.6 °F (37 °C) Resp 16 Ht 6' 2\" (1.88 m) Wt 101.6 kg (224 lb) SpO2 100% BMI 28.76 kg/m² O2 Flow Rate (L/min): 0.5 l/min O2 Device: Ventilator Temp (24hrs), Av.1 °F (36.7 °C), Min:97.5 °F (36.4 °C), Max:98.6 °F (37 °C) No intake/output data recorded. 1901 - 700 In: -  
[facility]:  [id number] Constitutional: Currently intubated and sedated Head: Normocephalic and atraumatic. Mouth: oral mucosa dry, ET tube is in situ Neck: supple Cardiovascular:Irreg/irreg Lungs:decreased air entry bilaterally in bilateral lower lung zones Abdominal: Soft. non tender Neurological: obtunded, moves all ext, 
Skin: Right lower shin with ulcer, yellow drainage Sacrum reddened, no breakdown Left arm with skin tear Multiple ecchymotic, skin is friable/thin Data Review:  
   
Recent Days: 
Recent Labs  
  20 
040 WBC 16.0* 11.4* 8.9 HGB 9.5* 8.8* 8.8* HCT 31.8* 29.5* 28.9*  
* 71* 62* Recent Labs  
  200 20 
034 * 148* 143  
K 2.8* 3.2* 3.9 * 113* 112* CO2  * 109* 102* BUN 59* 58* 53* CREA 1.74* 1.96* 2.06* CA 6.2* 6.1* 6.1*  
MG 2.1  --   --   
PHOS 5.0* 5.4* 5.6* ALB 3.2* 2.9* 2.8* Recent Labs  
  200 200 20 
043 PH 7.390 7.347* 7.260* PCO2 38 42 48* PO2 88 76 71* HCO3 23 22 20* FIO2 30.0 30.0 30.0  
 
 
24 Hour Results: 
Recent Results (from the past 24 hour(s)) GLUCOSE, POC Collection Time: 20 10:38 AM  
Result Value Ref Range Glucose (POC) 130 (H) 65 - 100 mg/dL Performed by Danyelle Sanchez DIGOXIN Collection Time: 20 11:45 AM  
Result Value Ref Range Digoxin level 1.3 0.90 - 2.0 ng/mL Reported dose date Not provided Reported dose: Not provided Units GLUCOSE, POC Collection Time: 20  2:52 PM  
Result Value Ref Range Glucose (POC) 115 (H) 65 - 100 mg/dL Performed by [facility] [employer] CBC WITH AUTOMATED DIFF Collection Time: 11/26/20  4:30 AM  
Result Value Ref Range WBC 16.0 (H) 4.1 - 11.1 K/uL  
 RBC 3.65 (L) 4.10 - 5.70 M/uL HGB 9.5 (L) 12.1 - 17.0 g/dL HCT 31.8 (L) 36.6 - 50.3 % MCV 87.1 80.0 - 99.0 FL  
 MCH 26.0 26.0 - 34.0 PG  
 MCHC 29.9 (L) 30.0 - 36.5 g/dL  
 RDW 19.1 (H) 11.5 - 14.5 % PLATELET 362 (L) 591 - 400 K/uL NEUTROPHILS PENDING % LYMPHOCYTES PENDING % MONOCYTES PENDING % EOSINOPHILS PENDING % BASOPHILS PENDING % IMMATURE GRANULOCYTES PENDING %  
 ABS. NEUTROPHILS PENDING K/UL  
 ABS. LYMPHOCYTES PENDING K/UL  
 ABS. MONOCYTES PENDING K/UL  
 ABS. EOSINOPHILS PENDING K/UL  
 ABS. BASOPHILS PENDING K/UL  
 ABS. IMM. GRANS. PENDING K/UL  
 DF PENDING   
C REACTIVE PROTEIN, QT Collection Time: 11/26/20  4:30 AM  
Result Value Ref Range C-Reactive protein 2.87 (H) 0.00 - 0.60 mg/dL PROCALCITONIN Collection Time: 11/26/20  4:30 AM  
Result Value Ref Range Procalcitonin 0.33 (H) 0 ng/mL RENAL FUNCTION PANEL Collection Time: 11/26/20  4:30 AM  
Result Value Ref Range Sodium 151 (H) 136 - 145 mmol/L Potassium 2.8 (L) 3.5 - 5.1 mmol/L Chloride 112 (H) 97 - 108 mmol/L  
 CO2 26 21 - 32 mmol/L Anion gap 13 5 - 15 mmol/L Glucose 122 (H) 65 - 100 mg/dL BUN 59 (H) 6 - 20 mg/dL Creatinine 1.74 (H) 0.70 - 1.30 mg/dL BUN/Creatinine ratio 34 (H) 12 - 20 GFR est AA 46 (L) >60 ml/min/1.73m2 GFR est non-AA 38 (L) >60 ml/min/1.73m2 Calcium 6.2 (LL) 8.5 - 10.1 mg/dL Phosphorus 5.0 (H) 2.6 - 4.7 mg/dL Albumin 3.2 (L) 3.5 - 5.0 g/dL MAGNESIUM Collection Time: 11/26/20  4:30 AM  
Result Value Ref Range Magnesium 2.1 1.6 - 2.4 mg/dL BLOOD GAS, ARTERIAL Collection Time: 11/26/20  4:30 AM  
Result Value Ref Range pH 7.390 7.35 - 7.45    
 PCO2 38 35 - 45 mmHg PO2 88 75 - 100 mmHg O2 SAT 98 >95 %  BICARBONATE 23 22 - 26 mmol/L  
 BASE DEFICIT 2.0 0 - 2 mmol/L  
 O2 METHOD VENT    
 FIO2 30.0 % MODE SIMV Tidal volume 500 PRESSURE SUPPORT 15.0 EPAP/CPAP/PEEP 5.0    
 SITE Right Radial    
 DAMON'S TEST PASS Assessment/  
 
Patient Active Problem List  
Diagnosis Code  Cellulitis of left upper extremity L03.114  
 Chronic atrial fibrillation (HCC) I48.20  
 HTN (hypertension), benign I10  
 COPD (chronic obstructive pulmonary disease) (HCC) J44.9  Dyslipidemia E78.5  Depression F32.9  
 UTI (urinary tract infection) N39.0  BRE (acute kidney injury) ([facility] N17.9  AMS (altered mental status) R41.82  Sepsis ([facility] A41.9  Hypoglycemia E16.2  
 Encephalopathy acute G93.40 Plan: 
 
Septic shockPatient presented with above-mentioned symptomatology was found to meet severe sepsis criteria secondary to combination of urinary tract infection as well as developing bilateral pneumonia, patient is COVID-19 negative, currently improving, patient been titrated off of levophed 
follow-up blood cultures Urine Culture consistent with ESBL E. Coli Continue meropenem for Abx coverage Discontinued IVF Infectious disease recommendations appreciated, will continue to follow Critical care recommendations appreciated will continue to follow Acute respiratory failure/pneumoniapatient was found to be in acute respiratory failure requiring emergent endotracheal intubation, likely secondary to developing bilateral pneumonia, patient currently off of pressors Follow-up sputum culture Follow-up blood cultures Continue Meropenem for Abx coverage Attempt weaning trial 
Pulmonology consult appreciated, will continue to follow Infectious disease recommendations appreciated, will continue to follow Hypoactive bowel soundsresolved Atrial fibrillation with RVRpatient presented with atrial fibrillation with RVR likely secondary to ongoing severe sepsis Dig level was 1.3 Transthoracic echo shows preserved ejection fraction Cardiology consult appreciated, will continue to follow Acute on chronic diastolic heart failurepatient was found to have significant bilateral lower extremity edema as well as radiographic evidence of volume overload consistent with acute on chronic diastolic heart failure Discontinued IV fluids Lasix 40 mg IV twice daily Continue to monitor intake and output Cardiology consult appreciated, will continue to follow Anemialikely multifactorial, however concern for GI bleed, s/p 2 units prbc 2 days back with appropriate response, currently hemoglobin hematocrit remained stable Continue protonix 40mg iv bid Continue to trend H/H Maintain active type and screen Follow-up gastroenterology recommendations Follow-up hematology recommendations Hypokalemiareplete K and recheck K Thrombocytopenialikely multifactorial, concern for possible early DIC, however platelet count remained stable. Hematology recommendations appreciated Patient currently does not have any active bleeding- bjt noted coffe ground on NGT suctioning. Recommend platelet transfusion if platelet count is less than 20,000 with active bleeding Hyperlipidemiacontinue statin ProphylaxisSCDs FENcontinue tube feeding, replete potassium and magnesium DNR,  
surrogate decision-maker is patient's wife Care Plan discussed with: Patient/Family and Nurse Audra Hickey MD

## 2020-11-26 NOTE — PROGRESS NOTES
Progress Note 
 
 
11/26/2020 12:30 PM 
NAME: Vandana Mederos MRN:  [id number] Admit Diagnosis: UTI (urinary tract infection) [N39.0] AMS (altered mental status) [R41.82] BRE (acute kidney injury) ([facility] [N17.9] Assessment/Plan: 1. Atrial fibrillation with rapid ventricular response. Patient's current heart rate currently in low 100s:  Digoxin Level yesterday was 1.3. We will start patient on low-dose digoxin and consider using amiodarone as needed. 2.Hypotension, improved. using IV Levophed as needed. 3. UTI/ESBL E Coli on IV Meropenem 4. Respiratory failure, intubated, status post bronchoscopy, followed by pulmonary 5. Acute kidney injury, being monitored and followed by nephrology []       High complexity decision making was performed in this patient at high risk for decompensation with multiple organ involvement. Subjective:  
 
Vandana Mederos is intubated Review of Systems: 
 
 
Could NOT obtain due to: Altered mental status Objective:  
  
Physical Exam: 
 
Last 24hrs VS reviewed since prior progress note. Most recent are: 
 
Visit Vitals /89 (BP 1 Location: Left arm, BP Patient Position: At rest) Pulse 90 Temp 98.6 °F (37 °C) Resp 14 Ht 6' 2\" (1.88 m) Wt 101.6 kg (224 lb) SpO2 100% BMI 28.76 kg/m² Intake/Output Summary (Last 24 hours) at 11/26/2020 1207 Last data filed at 11/25/2020 9956 Gross per 24 hour Intake  Output 1700 ml Net -1700 ml General Appearance: Intubated Ears/Nose/Mouth/Throat: Intubated  
neck: Supple. Chest: Lungs coarse breath sounds Cardiovascular: iregular rate and rhythm, S1S2 normal, no murmur. Abdomen: Soft, non-tender, bowel sounds are active. Extremities: No edema bilaterally. Skin: Warm and dry. []         Post-cath site without hematoma, bruit, tenderness, or thrill. Distal pulses intact. PMH/SH reviewed - no change compared to H&P Data Review Telemetry:  
 
EKG:  
[] No new EKG for review T brain without acute pathology CT chest with pleural effusions and compressive atelectasis, having thoracentesis Lab Data Personally Reviewed: 
 
Recent Labs  
  11/26/20 0430 11/25/20 0430 WBC 16.0* 11.4* HGB 9.5* 8.8* HCT 31.8* 29.5*  
* 71* No results for input(s): INR, PTP, APTT, INREXT, INREXT in the last 72 hours. Recent Labs  
  11/26/20 0430 11/25/20 0430 11/24/20 0345 * 148* 143  
K 2.8* 3.2* 3.9 * 113* 112* CO2 26 26 25 BUN 59* 58* 53* CREA 1.74* 1.96* 2.06* * 109* 102* CA 6.2* 6.1* 6.1*  
MG 2.1  --   -- No results for input(s): CPK, CKNDX, TROIQ in the last 72 hours. No lab exists for component: CPKMB No results found for: CHOL, CHOLX, CHLST, CHOLV, HDL, HDLP, LDL, LDLC, DLDLP, TGLX, TRIGL, TRIGP, CHHD, CHHDX Recent Labs  
  11/26/20 0430 11/25/20 0430 11/24/20 0345 ALB 3.2* 2.9* 2.8* Recent Labs  
  11/26/20 0430 11/25/20 0430 PH 7.390 7.347* PCO2 38 42 PO2 88 76 Medications Personally Reviewed: 
 
Current Facility-Administered Medications Medication Dose Route Frequency  potassium chloride 10 mEq in 100 ml IVPB  10 mEq IntraVENous Q1H  
 calcium gluconate 2 g/100 mL sodium chloride (ISO-OSM)  2 g IntraVENous ONCE  
 calcium carbonate (TUMS) chewable tablet 400 mg [elemental]  400 mg Oral BID  lidocaine (XYLOCAINE) 10 mg/mL (1 %) injection    PRN  mineral oil (topical)    PRN  
 furosemide (LASIX) injection 40 mg  40 mg IntraVENous BID  pantoprazole (PROTONIX) 40 mg in 0.9% sodium chloride 10 mL injection  40 mg IntraVENous BID  
 glycopyrrolate (ROBINUL) tablet 1 mg  1 mg Oral TID  metoclopramide HCl (REGLAN) injection 10 mg  10 mg IntraVENous Q6H  
 midazolam in normal saline (VERSED) 1 mg/mL infusion  0-10 mg/hr IntraVENous TITRATE  hydrocortisone Sod Succ (PF) (SOLU-CORTEF) injection 50 mg  50 mg IntraVENous Q8H  
 fentaNYL (PF) 1,500 mcg/30 mL (50 mcg/mL) infusion  0-200 mcg/hr IntraVENous TITRATE  vasopressin (VASOSTRICT) 40 Units in 0.9% sodium chloride 40 mL infusion  0.04 Units/min IntraVENous CONTINUOUS  
 meropenem (MERREM) 1 g in sterile water (preservative free) 20 mL IV syringe  1 g IntraVENous Q12H  
 morphine injection 2 mg  2 mg IntraVENous Q4H PRN  
 NOREPINephrine (LEVOPHED) 8 mg in 5% dextrose 250mL (32 mcg/mL) infusion  0.5-16 mcg/min IntraVENous TITRATE  acetaminophen (TYLENOL) suppository 650 mg  650 mg Rectal Q4H PRN  
 acetaminophen (TYLENOL) tablet 650 mg  650 mg Oral Q4H PRN  
 albuterol (PROVENTIL HFA, VENTOLIN HFA, PROAIR HFA) inhaler 2 Puff  2 Puff Inhalation Q4H PRN  
 docusate sodium (COLACE) capsule 100 mg  100 mg Oral BID  folic acid (FOLVITE) tablet 1 mg  1 mg Oral DAILY  pravastatin (PRAVACHOL) tablet 20 mg  20 mg Oral QHS  sertraline (ZOLOFT) tablet 50 mg  50 mg Oral DAILY  thiamine mononitrate (B-1) tablet 100 mg  100 mg Oral DAILY  ondansetron (ZOFRAN) injection 4 mg  4 mg IntraVENous Q6H PRN  
 hydrOXYzine pamoate (VISTARIL) capsule 25 mg  25 mg Oral TID PRN  chlorhexidine (PERIDEX) 0.12 % mouthwash 15 mL  15 mL Oral Q12H Kindra Tapia MD

## 2020-11-26 NOTE — PROGRESS NOTES
Hematology Oncology Progress Note Subjective: He remains unresponsive. Pt on ventilator. No active bleeding. Pt wife in room. Current Facility-Administered Medications Medication Dose Route Frequency  calcium gluconate 1 gram in sodium chloride (ISO-OSM) 50 mL infusion  1 g IntraVENous Q8H  
 calcium carbonate (TUMS) chewable tablet 400 mg [elemental]  400 mg Oral BID  lidocaine (XYLOCAINE) 10 mg/mL (1 %) injection    PRN  mineral oil (topical)    PRN  
 furosemide (LASIX) injection 40 mg  40 mg IntraVENous BID  pantoprazole (PROTONIX) 40 mg in 0.9% sodium chloride 10 mL injection  40 mg IntraVENous BID  
 glycopyrrolate (ROBINUL) tablet 1 mg  1 mg Oral TID  metoclopramide HCl (REGLAN) injection 10 mg  10 mg IntraVENous Q6H  
 midazolam in normal saline (VERSED) 1 mg/mL infusion  0-10 mg/hr IntraVENous TITRATE  hydrocortisone Sod Succ (PF) (SOLU-CORTEF) injection 50 mg  50 mg IntraVENous Q8H  
 fentaNYL (PF) 1,500 mcg/30 mL (50 mcg/mL) infusion  0-200 mcg/hr IntraVENous TITRATE  vasopressin (VASOSTRICT) 40 Units in 0.9% sodium chloride 40 mL infusion  0.04 Units/min IntraVENous CONTINUOUS  
 meropenem (MERREM) 1 g in sterile water (preservative free) 20 mL IV syringe  1 g IntraVENous Q12H  
 morphine injection 2 mg  2 mg IntraVENous Q4H PRN  
 NOREPINephrine (LEVOPHED) 8 mg in 5% dextrose 250mL (32 mcg/mL) infusion  0.5-16 mcg/min IntraVENous TITRATE  acetaminophen (TYLENOL) suppository 650 mg  650 mg Rectal Q4H PRN  
 acetaminophen (TYLENOL) tablet 650 mg  650 mg Oral Q4H PRN  
 albuterol (PROVENTIL HFA, VENTOLIN HFA, PROAIR HFA) inhaler 2 Puff  2 Puff Inhalation Q4H PRN  
 docusate sodium (COLACE) capsule 100 mg  100 mg Oral BID  folic acid (FOLVITE) tablet 1 mg  1 mg Oral DAILY  pravastatin (PRAVACHOL) tablet 20 mg  20 mg Oral QHS  sertraline (ZOLOFT) tablet 50 mg  50 mg Oral DAILY  thiamine mononitrate (B-1) tablet 100 mg  100 mg Oral DAILY  ondansetron (ZOFRAN) injection 4 mg  4 mg IntraVENous Q6H PRN  
 hydrOXYzine pamoate (VISTARIL) capsule 25 mg  25 mg Oral TID PRN  chlorhexidine (PERIDEX) 0.12 % mouthwash 15 mL  15 mL Oral Q12H Review of Systems: not obtainable due to AMS. Objective:  
 
Patient Vitals for the past 8 hrs: 
 BP Pulse Resp SpO2  
20 1952    100 % 20 1800 119/66 78 11 100 % 20 1700 120/70 81 11 100 % 20 1600 119/80 81 12 100 % Temp (24hrs), Av.8 °F (36.6 °C), Min:97.5 °F (36.4 °C), Max:98.1 °F (36.7 °C) Physical Exam: 
constitutional Elderly white male , with altered mental status, Well nourished. Well developed. Head Normocephalic; no scars Eyes Conjunctivae and sclerae are clear and without icterus. Pupils are reactive and equal.  
ENMT ET tube is present. .  
Neck Supple without masses or thyromegaly. No jugular venous distension. Hematologic/Lymphatic No petechiae or purpura. No tender or palpable lymph nodes in the cervical, supraclavicular, axillary or inguinal area. Respiratory Lungs are clear to auscultation without rhonchi or wheezing. Cardiovascular Regular rate and rhythm of heart without murmurs, gallops or rubs. Chest / Line Site Chest is symmetric with no chest wall deformities. Abdomen Non-tender, non-distended, no masses, ascites or hepatosplenomegaly. Good bowel sounds. No guarding or rebound tenderness. No pulsatile masses. Musculoskeletal No tenderness or swelling, normal range of motion without obvious weakness. Extremities No visible deformities, no cyanosis, clubbing or edema. Skin No rashes, scars, or lesions suggestive of malignancy. No petechiae, purpura, or ecchymoses. No excoriations. Neurologic Altered mental status. [facility] AMS>  
 
 
 
 
Lab/Data Review: 
Recent Labs  
  20 
0430 20 
0400 20 
0430 WBC 11.4* 8.9 6.7 HGB 8.8* 8.8* 10.3* HCT 29.5* 28.9* 33.0*  
PLT 71* 62* 76* Recent Labs  
  11/25/20 
0430 11/24/20 
0345 11/23/20 
0430 * 143 141  
K 3.2* 3.9 4.4  
* 112* 111* CO2 26 25 22 * 102* 128* BUN 58* 53* 42* CREA 1.96* 2.06* 1.94* CA 6.1* 6.1* 7.0*  
PHOS 5.4* 5.6* 3.7 ALB 2.9* 2.8* 2.8* INR  --   --  1.5* Recent Labs  
  11/25/20 
0430 11/24/20 
0435 11/23/20 
0445 PH 7.347* 7.260* 7.354 PCO2 42 48* 33* PO2 76 71* 56* HCO3 22 20* 19* FIO2 30.0 30.0 21.0 Radiology: Xr Abd Flat/ Erect Result Date: 11/20/2020 IMPRESSION: No evidence of free air. No acute appearing findings. Ct Head Wo Cont Result Date: 11/23/2020 IMPRESSION: No interval change. No acute intracranial abnormality. Ct Head Wo Cont Result Date: 11/15/2020 IMPRESSION: Chronic findings as above. Ct Chest Wo Cont Result Date: 11/23/2020 IMPRESSION: Nonspecific reticulonodular markings throughout the lungs. Indistinct small nodules many with groundglass appearance. Differential considerations would include infectious or neoplastic causes. Moderate volume dependent pleural effusions with associated compressive atelectasis posterior lower lobe lungs. Elongated thoracic aorta with atherosclerotic change. Cardiomegaly. CAD. Us Retroperitoneum Comp Result Date: 11/17/2020 Impression: Normal exam  
 
Ir Us Guided Vascular Access Result Date: 11/17/2020 Impression: Successful placement of a triple-lumen central venous catheter. The catheter is ready for use. Xr Chest [facility] Result Date: 11/23/2020 IMPRESSION: Lines and tubes appear stable, as visualized. Stable mild enlargement of cardiopericardial silhouette. Central vascular prominence. Small right pleural effusion. Findings favored to represent CHF or volume overload; correlate clinically. Xr Chest [facility] Result Date: 11/22/2020 Impression: Little interval change. Xr Chest [facility] Result Date: 11/21/2020 Findings/impression: Stable support hardware.  Slight interval increase of patchy right basilar airspace disease. Small pleural effusions. No evidence of pneumothorax. Cardiomediastinal contours are stable. Increasing central vascular congestion. Visualized osseous structures are unchanged. Xr Chest [facility] Result Date: 11/20/2020 IMPRESSION: Developing bilateral perihilar and infrahilar opacities, with differential diagnosis including pneumonia, atelectasis, and edema. Short-term radiographic follow-up recommended. Xr Chest [facility] Result Date: 11/17/2020 Impression: Successful placement of a triple-lumen central venous catheter. The catheter is ready for use. Xr Chest [facility] Result Date: 11/17/2020 Impression: The cardiomediastinal silhouette is appropriate for age, technique, and lung expansion. Pulmonary vasculature is not congested. The lungs are essentially clear. No effusion or pneumothorax is seen. Xr Chest [facility] Result Date: 11/15/2020 IMPRESSION:  No evidence of an acute cardiopulmonary process. Ir Thoracentesis Ndl Punc Asp W Image Result Date: 11/23/2020 Impression: Successful right thoracentesis. Ir Insert Non Tunl Cvc Over 5 Yrs Result Date: 11/17/2020 Impression: Successful placement of a triple-lumen central venous catheter. The catheter is ready for use. Assessment /Plan:  
 
 
1) 67 yr old male admitted with AMS. Change in mental status likely due to UTI, sepsis, hypercalcemia , hypernatremia and renal failure.  
-ct head did not show any acute pathology.  
-Being eval by neurology. Likely metabolic encephalapathy from sepsis. -now intubated and sedated.   
2) ESBL E.coli  UTI. On abx. ID seeing the patient.  
  
2) Anemia: Stable Likely related to sepsis. -In the setting hypercalcemia and renal failure. 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 Serum light chains pending. 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 Pt seen by GI for bleeding.   
  
2) Thrombocytopenia: S/p platelets transfusion. Platelet count improving. 
-Transfuse for platelet count <91E or for bleeding. REvewied peripheral blood smear shows neutrophils with increased granulation, bands suggestive of infection. RBC appear normal. NO evidence of increased schistocytes . Platelets decreased. Few promyelocytes and few blasts seen. flow cytometry of peripheral blood pending.  
 
 4) Coagulapathy: Prolonged PT/PTT. Fibrinogen is normal.  
-No clear evidence of DIC. S/p 2  unit of FFP  
-vitamin K 10mg po daily. Coags are improving slightly INR down to 1.5. 
 
3) Hypercalcemia: etiology not very clear. High on admission close to 14 Improved with hydration and calcitonin. - 
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 -psa is normal . Pt calcium low now. Pt on calcium. CT chest small lung nodules. infectious versus neoplastic. S/p thoracentesis. Pleural fluid cytology no malignancy. Bronchoscopy no endobronchial lesions. D/w pt wife. 
 
Bryan Beckett MD 
11/25/2020

## 2020-11-26 NOTE — PROGRESS NOTES
Progress Note Patient: Hu Gomes MRN: [id number]  SSN: [id number] YOB: 1945  Age: 67 y.o. Sex: male Admit Date: 11/15/2020 LOS: 11 days Subjective:  
Patient followed for severe sepsis with altered mental status and suspected UTI. Blood cultures negative so far and urine culture has grown ESBL E. Coli. He is afebrile but WBC is increasing and not on steroids. CRP and procalcitonin decreasing. Pleural fluid and bronchial cultures are pending. CXR today unimpressive. He is currently on IV Meropenem alone. Patient remains intubated. Objective:  
 
Vitals:  
 11/26/20 0500 11/26/20 0600 11/26/20 0909 11/26/20 1118 BP: 127/77 126/89 Pulse:   90 90 Resp: 15 16 20 14 Temp: 98.6 °F (37 °C) SpO2: 100% 100% 100% 100% Weight:      
Height:      
  
 
Intake and Output: 
Current Shift: No intake/output data recorded. Last three shifts: 11/24 1901 - 11/26 0700 In: -  
Out: 1950 [[id number]] Physical Exam:  
Constitutional:   
   General: He is in acute distress. Appearance: He is ill-appearing. He is not diaphoretic. HENT:  
   Head: orotracheal tube Neck:  supple Cardiovascular:  
   Rate and Rhythm: Normal  
   Heart sounds: No murmur. Pulmonary:  
   Breath sounds: No wheezing, rhonchi or rales. Abdominal:  
   Palpations: Abdomen is soft. Tenderness: There is no abdominal tenderness. Genitourinary: 
   Comments: Mcmillan catheter Musculoskeletal:  
   Right lower leg: No edema. Left lower leg: No edema. Comments: 2x3 cm open wound right medial calf with dry base Skin: 
   Findings: No erythema or rash. Neurological:  
   Comments: Unable to assess Psychiatric:  
   Comments: Unable to assess Lab/Data Review: WBC 16,000 ,000 Lactic acid 2.7 Procalcitonin 0.33 < 0.62 <1.09 <1.54 <12.30 CRP 2.87 <3.13 <3.61 <4.59  <35.90 Covid-19 Not detected Blood cultures (11/15) No growth FINAL Blood cultures (11/19) No growth FINAL Urine culture (11/15) >100,000 cfu/ml ESBL E. Coli Sputum culture (11/20) Normal aguilar FINAL Body fluid Pleural culture (11/23) No growth at 1 day Bronchial washing (11/25) Pending Bronchial washing fungus (11/25) Pending CXR (11/26)   Residual right basilar airspace disease. Assessment:  
 
Principal Problem: 
  Sepsis ([facility] [address] (11/16/2020) Active Problems: 
  UTI (urinary tract infection) (11/15/2020) BRE (acute kidney injury) ([facility] [address] (11/15/2020) AMS (altered mental status) (11/15/2020) Hypoglycemia (11/16/2020) Encephalopathy acute (11/16/2020) 1. Severe sepsis with tachycardia, tachypnea, leukocytosis, elevated procalcitonin and CRP, resolving 2. UTI with marked pyuria and bacteriuria, secondary to ESBL E. Coli, Day #7 IV Meropenem. 3. Altered mental status, possibly secondary to above 4. 6. Covid-19 negative 7. Acute hypoxic respiratory failure, intubated Comment:  WBC still increasing but CRP and procalcitonin still decreasing. Significance of RLL infiltrates unclear. Plan: 1. Continue Meropenem 2. Follow-up pending blood cultures, pleural fluid and bronchial washing cultures 3. In am, repeat CBC, procalcitonin and CRP, done 4. Repeat urinalysis Signed By: Zenobia Thompson MD   
 November 26, 2020

## 2020-11-27 NOTE — PROGRESS NOTES
Pulmonology and Critical Care Progress Note Subjective: Chief Complaint: Confusion Patient seen and examined in ICU Overnight events noted Wife at bedside Remains critically ill Intubated on mechanical ventilation Currently on SIMV rate of 4, tidal volume 500, FiO2 30%, pressure support of 8, PEEP of 5 ABG as outlined below remained stable Post bronchoscopy 11/25 Chest x-ray shows improvement in bilateral infiltrates diuresis Intake output -1700 cc overnight CT chest 11/23 reviewed and showed: 
Bilateral moderate-sized pleural effusions Also showed bilateral interstitial infiltrates Also showed patchy groundglass nodular changes right more than left lung suspicious for inflammation versus malignancy Status post thoracentesis right side by interventional radiology 11/23 Patient results consistent with transudate Cytology negative Current Facility-Administered Medications Medication Dose Route Frequency Provider Last Rate Last Dose  potassium chloride 10 mEq in 100 ml IVPB  10 mEq IntraVENous Q1H Rey Donald  mL/hr at 11/27/20 0959 10 mEq at 11/27/20 8163  calcium gluconate 2 g/100 mL sodium chloride (ISO-OSM)  2 g IntraVENous EuRey Castaneda MD   2 g at 11/27/20 6069  digoxin (LANOXIN) injection 125 mcg  125 mcg IntraVENous DAILY Vipin Gross MD   125 mcg at 11/27/20 0023  furosemide (LASIX) injection 40 mg  40 mg IntraVENous DAILY Rey Donald MD   40 mg at 11/27/20 4631  calcium carbonate (TUMS) chewable tablet 400 mg [elemental]  400 mg Oral BID Julio Matute MD   400 mg at 11/27/20 3497  lidocaine (XYLOCAINE) 10 mg/mL (1 %) injection    PRN Silvia PONCE MD   30 mL at 11/25/20 1215  
 mineral oil (topical)    PRN Juli Murrell MD   5 mL at 11/25/20 1211  pantoprazole (PROTONIX) 40 mg in 0.9% sodium chloride 10 mL injection  40 mg IntraVENous BID Graeme Arias MD   40 mg at 11/27/20 0949  
 glycopyrrolate (ROBINUL) tablet 1 mg  1 mg Oral TID Ines PONCE MD   1 mg at 11/27/20 0949  
 metoclopramide HCl (REGLAN) injection 10 mg  10 mg IntraVENous Q6H Alvarez Hilliard MD   10 mg at 11/27/20 0501  
 midazolam in normal saline (VERSED) 1 mg/mL infusion  0-10 mg/hr IntraVENous TITRATE Xiomara Magallon MD 4 mL/hr at 11/26/20 1933 4 mg/hr at 11/26/20 1933  hydrocortisone Sod Succ (PF) (SOLU-CORTEF) injection 50 mg  50 mg IntraVENous Q8H Curry Warner DO   50 mg at 11/27/20 0501  fentaNYL (PF) 1,500 mcg/30 mL (50 mcg/mL) infusion  0-200 mcg/hr IntraVENous TITRATE Kim Abreu DO 2 mL/hr at 11/27/20 0443 100 mcg/hr at 11/27/20 0443  
 vasopressin (VASOSTRICT) 40 Units in 0.9% sodium chloride 40 mL infusion  0.04 Units/min IntraVENous CONTINUOUS Curry Warner DO 2.4 mL/hr at 11/19/20 2033 0.04 Units/min at 11/19/20 2033  meropenem (MERREM) 1 g in sterile water (preservative free) 20 mL IV syringe  1 g IntraVENous Q12H Kena Fontaine MD   1 g at 11/27/20 2827  morphine injection 2 mg  2 mg IntraVENous Q4H PRN Xiomara Magallon MD   2 mg at 11/23/20 0739  
 NOREPINephrine (LEVOPHED) 8 mg in 5% dextrose 250mL (32 mcg/mL) infusion  0.5-16 mcg/min IntraVENous TITRATE Xiomara Magallon MD   Stopped at 11/23/20 8267  acetaminophen (TYLENOL) suppository 650 mg  650 mg Rectal Q4H PRN Lambert PONCE MD   650 mg at 11/16/20 1225  acetaminophen (TYLENOL) tablet 650 mg  650 mg Oral Q4H PRN Rom Herring NP   650 mg at 11/19/20 0027  
 albuterol (PROVENTIL HFA, VENTOLIN HFA, PROAIR HFA) inhaler 2 Puff  2 Puff Inhalation Q4H PRN Zarefoss, Rom A, NP      
 docusate sodium (COLACE) capsule 100 mg  100 mg Oral BID Rom Herring NP   Stopped at 95/18/29 7308  
 folic acid (FOLVITE) tablet 1 mg  1 mg Oral DAILY Rom Herring NP   1 mg at 11/27/20 0949  
 pravastatin (PRAVACHOL) tablet 20 mg  20 mg Oral QHS Rom Herring NP   20 mg at 11/26/20 2155  sertraline (ZOLOFT) tablet 50 mg  50 mg Oral DAILY Rom Herring, NP   50 mg at 20  thiamine mononitrate (B-1) tablet 100 mg  100 mg Oral DAILY Rom Herring, NP   100 mg at 20 0949  
 ondansetron (ZOFRAN) injection 4 mg  4 mg IntraVENous Q6H PRN Rom Herring, NP      
 hydrOXYzine pamoate (VISTARIL) capsule 25 mg  25 mg Oral TID PRN Rom Herring, NP   25 mg at 20 1857  chlorhexidine (PERIDEX) 0.12 % mouthwash 15 mL  15 mL Oral Q12H Rom Herring, NP   15 mL at 20 6874 Allergies Allergen Reactions  Precedex [Dexmedetomidine] Other (comments) Severe bradycardia  Codeine Rash Review of Systems: 
Review of systems not obtained due to patient factors. Objective:  
 
Blood pressure (!) 140/80, pulse (!) 108, temperature 99.7 °F (37.6 °C), resp. rate 30, height 6' 2\" (1.88 m), weight 101.6 kg (223 lb 15.8 oz), SpO2 100 %. Temp (24hrs), Av.8 °F (37.1 °C), Min:98.1 °F (36.7 °C), Max:99.7 °F (37.6 °C) Intake and Output: 
Current Shift: No intake/output data recorded. Last 3 Shifts:  1901 -  0700 In: -  
Out: Pretty  Physical Exam:  
General appearance: Elderly male who is sedated on the ventilator, on fentanyl, no distress, appears older than stated age, chronically ill-appearing Head: Normocephalic, without obvious abnormality, atraumatic Eyes: negative Neck: no obvious adenopathy, no carotid bruit; neck is stiff Lungs: He has few scattered rhonchi. Equal breath sounds bilaterally. Small secretions per RN. Heart: irregularly irregular rhythm, bradycardic, no rub, no obvious murmur Abdomen: soft, non-tender. Bowel sounds normal. No masses,  no organomegaly; NG tube in place. He also has diarrhea and has a rectal tube in place. He is not tolerating his tube feeds. Pulses: 2+ and symmetric Skin: Dry, intact, with bruising throughout the arms and legs. +3 pitting edema in extremities bilaterally.  
Lymph nodes: Cervical, supraclavicular, and axillary nodes normal. 
Neurologic: Sedated on the ventilator, not following any commands. Additional comments:None Lab/Data Review: All lab results for the last 24 hours reviewed. Recent Results (from the past 24 hour(s)) GLUCOSE, POC Collection Time: 11/26/20 10:47 AM  
Result Value Ref Range Glucose (POC) 129 (H) 65 - 100 mg/dL Performed by Сергей Delvalle, POC Collection Time: 11/26/20  4:30 PM  
Result Value Ref Range Glucose (POC) 124 (H) 65 - 100 mg/dL Performed by Jennifer Yañez W/MICROSCOPIC Collection Time: 11/27/20  1:00 AM  
Result Value Ref Range Color CIT Group Appearance Turbid (A) Clear Specific gravity 1.015 1.003 - 1.030    
 pH (UA) 5.0 5.0 - 8.0 Protein 100 (A) Negative mg/dL Glucose 50 (A) Negative mg/dL Ketone 5 (A) Negative mg/dL Bilirubin Negative Negative Blood Small (A) Negative Urobilinogen 2.0 (H) 0.1 - 1.0 EU/dL Nitrites Negative Negative Leukocyte Esterase Trace (A) Negative WBC 10-20 0 - 4 /hpf  
 RBC 0-5 0 - 5 /hpf Bacteria Negative Negative /hpf Hyaline cast >20 (H) 0 - 5 /lpf Mucus 2+ /lpf METABOLIC PANEL, BASIC Collection Time: 11/27/20  1:00 AM  
Result Value Ref Range Sodium 149 (H) 136 - 145 mmol/L Potassium 3.1 (L) 3.5 - 5.1 mmol/L Chloride 114 (H) 97 - 108 mmol/L  
 CO2 25 21 - 32 mmol/L Anion gap 10 5 - 15 mmol/L Glucose 124 (H) 65 - 100 mg/dL BUN 61 (H) 6 - 20 mg/dL Creatinine 1.59 (H) 0.70 - 1.30 mg/dL BUN/Creatinine ratio 38 (H) 12 - 20 GFR est AA 52 (L) >60 ml/min/1.73m2 GFR est non-AA 43 (L) >60 ml/min/1.73m2 Calcium 6.6 (L) 8.5 - 10.1 mg/dL GLUCOSE, POC Collection Time: 11/27/20  1:06 AM  
Result Value Ref Range Glucose (POC) 131 (H) 65 - 100 mg/dL Performed by LINDSEY Ruiz Collection Time: 11/27/20  4:52 AM  
Result Value Ref Range Glucose (POC) 144 (H) 65 - 100 mg/dL  Performed by KATHERIN BISHOP   
CBC WITH AUTOMATED DIFF Collection Time: 11/27/20  5:00 AM  
Result Value Ref Range WBC 14.2 (H) 4.1 - 11.1 K/uL  
 RBC 3.63 (L) 4.10 - 5.70 M/uL HGB 9.6 (L) 12.1 - 17.0 g/dL HCT 31.6 (L) 36.6 - 50.3 % MCV 87.1 80.0 - 99.0 FL  
 MCH 26.4 26.0 - 34.0 PG  
 MCHC 30.4 30.0 - 36.5 g/dL  
 RDW 19.4 (H) 11.5 - 14.5 % PLATELET 320 (L) 241 - 400 K/uL NRBC 4.0 (H) 0  WBC ABSOLUTE NRBC 0.60 (H) 0.00 - 0.01 K/uL LYMPHOCYTES 27 12 - 49 % MONOCYTES 5 5 - 13 % EOSINOPHILS 0 0 - 7 %  
 ABS. LYMPHOCYTES 3.8 (H) 0.8 - 3.5 K/UL  
 ABS. MONOCYTES 0.7 0.0 - 1.0 K/UL  
 ABS. EOSINOPHILS 0.0 0.0 - 0.4 K/UL  
 DF AUTOMATED NEUTROPHILS 55 32 - 75 % BAND NEUTROPHILS 1 0 - 6 % BASOPHILS 0 0 - 1 % METAMYELOCYTES 1 (H) 0 % MYELOCYTES 11 (H) 0 % NRBC 5.0  WBC IMMATURE GRANULOCYTES 0 %  
 ABS. NEUTROPHILS 7.9 1.8 - 8.0 K/UL  
 ABS. BASOPHILS 0.0 0.0 - 0.1 K/UL  
 ABSOLUTE NRBC 0.71 K/uL  
 ABS. IMM. GRANS. 0.0 K/UL  
 RBC COMMENTS Anisocytosis 1+ RBC COMMENTS Polychromasia 1+ RBC COMMENTS Hypochromia 1+ C REACTIVE PROTEIN, QT Collection Time: 11/27/20  5:00 AM  
Result Value Ref Range C-Reactive protein 2.84 (H) 0.00 - 0.60 mg/dL PROCALCITONIN Collection Time: 11/27/20  5:00 AM  
Result Value Ref Range Procalcitonin 0.22 (H) 0 ng/mL RENAL FUNCTION PANEL Collection Time: 11/27/20  5:00 AM  
Result Value Ref Range Sodium 149 (H) 136 - 145 mmol/L Potassium 2.9 (L) 3.5 - 5.1 mmol/L Chloride 112 (H) 97 - 108 mmol/L  
 CO2 27 21 - 32 mmol/L Anion gap 10 5 - 15 mmol/L Glucose 140 (H) 65 - 100 mg/dL BUN 58 (H) 6 - 20 mg/dL Creatinine 1.67 (H) 0.70 - 1.30 mg/dL BUN/Creatinine ratio 35 (H) 12 - 20 GFR est AA 49 (L) >60 ml/min/1.73m2 GFR est non-AA 40 (L) >60 ml/min/1.73m2 Calcium 6.4 (LL) 8.5 - 10.1 mg/dL Phosphorus 4.2 2.6 - 4.7 mg/dL Albumin 3.1 (L) 3.5 - 5.0 g/dL MAGNESIUM  
 Collection Time: 11/27/20  5:00 AM  
Result Value Ref Range Magnesium 1.8 1.6 - 2.4 mg/dL BLOOD GAS, ARTERIAL Collection Time: 11/27/20  5:44 AM  
Result Value Ref Range pH 7.39 7.35 - 7.45    
 PCO2 42 35 - 45 mmHg PO2 88 75 - 100 mmHg O2 SAT 97 >95 % BICARBONATE 24 22 - 26 mmol/L  
 BASE DEFICIT 0.3 0 - 2 mmol/L  
 O2 METHOD VENT    
 FIO2 30 % MODE SIMV Tidal volume 500 PRESSURE SUPPORT 8    
 EPAP/CPAP/PEEP 5 Sample source Arterial    
 SITE Right Radial    
 DAMON'S TEST YES    
GLUCOSE, POC Collection Time: 11/27/20  8:07 AM  
Result Value Ref Range Glucose (POC) 163 (H) 65 - 100 mg/dL Performed by Daquan Geller Chest X-Ray:  
XR CHEST PORT Final Result Stable right neck central venous catheter. Now present, there is an ET tube 4-5 
cm above the samuel. NG tube projects below the left hemidiaphragm. Improved 
aeration through the right lung; clearing patchy reticular markings. No 
interstitial or alveolar pulmonary edema. No pneumothorax or sizable pleural 
effusion. XR CHEST PORT Final Result Impression:   
Successful placement of a triple-lumen central venous catheter. The catheter is  
ready for use. IR INSERT NON TUNL CVC OVER 5 YRS Final Result Impression:   
Successful placement of a triple-lumen central venous catheter. The catheter is  
ready for use. IR [facility] Final Result Impression:   
Successful placement of a triple-lumen central venous catheter. The catheter is  
ready for use. XR CHEST PORT Final Result Impression: The cardiomediastinal silhouette is appropriate for age, technique,  
and lung expansion. Pulmonary vasculature is not congested. The lungs are  
essentially clear. No effusion or pneumothorax is seen. US RETROPERITONEUM COMP Final Result Impression: Normal exam  
  
  
XR CHEST PORT Final Result IMPRESSION:  No evidence of an acute cardiopulmonary process. CT HEAD WO CONT Final Result IMPRESSION: Chronic findings as above. CT CHEST WO CONT    (Results Pending) CT ABD PELV WO CONT    (Results Pending) XR CHEST PORT    (Results Pending) CT imaging: CT Results  (Last 48 hours) None PFTs: PFT Results  (Last 3 results in the past 10 years) None Assessment:  
 
Patient is a 69-year-old  male with a history of atrial fibrillation, osteoarthritis, hyperlipidemia, reported COPD, and hypertension who presented to [facility] on 11/15/2020 from his nursing home for altered mental status. He has been admitted to the ICU and is currently in septic shock on vasopressors and has been intubated on 11/19/2020 for failure to protect his airway due to altered mental status. Plan:  
 
1.)  Acute respiratory failure Likely due to CHF, altered mental status, to protect airway Currently intubated on mechanical ventilation Intubated on 11/19/2020 ABGs and chest x-ray reviewed as outlined above Pleural fluid cytology negative Status post bronchoscopy 11/25 Results pending Chest x-ray significantly improved with diuresis Renal Function improved Would continue Lasix twice daily for now Decrease sedation Will wean aggressively today and consider extubation as tolerated 2.)  Septic shock ESBL E. coli On parenteral antibiotic coverage Further changes in antibiotics based on clinical response and culture results Covid negative Appears quite volume overloaded clinically 3.)  Acute encephalopathy Likely multifactorial from sepsis, urinary tract infection, and multiple electrolyte abnormalities including hypernatremia, hypercalcemia, and hypokalemia. Continue on current antibiotic regimen as above; neurology/nephrology/hematology following closely. Ammonia level normal, TSH mildly elevated, free T4 pending. 4.)  Acute kidney injury Creatinine seem to plateau around 6.02 and had very steadily been decreasing with improved calcium level and fluid administration. Likely prerenal from volume depletion and sepsis. Worsened slightly to 2.09, possible ATN. Nephrology following closely, appreciate their assistance. 5.)  Atrial fibrillation with RVR/diastolic congestive heart failure Presented with A. fib RVR Stable Likely secondary to sepsis Echo shows preserved ejection fraction Cardiology input appreciated Continue digoxin Quite volume overloaded Continue diuresis 6) Hypercalcemia Corrected calcium 14.6 on admission, this has steadily improved every day with calcitonin fluids. Nephrology following closely Myeloma work-up pending 
 
7.)  Acute blood loss anemia Multifactorial 
Status post 2 units PRBCs Monitor clinically Continue Protonix Appreciate GI pathology input 
 
8.)  Nonanion gap metabolic acidosis Unclear etiology, but likely combination of sepsis and BRE. Overall improved. Canceled CT of the chest abdomen pelvis. 9.)  Thrombocytopenia 
hematology is following closely. INR 2.0, PT 22.6, PTT 65.2 but fibrinogen is normal, which is not consistent with DIC. Patient received FFP and vitamin K. 
likely due to overwhelming sepsis. CODE STATUS: DNR Lines/Tubes: ET tube, right IJ central line, Mcmillan catheter, NG tube Prophylaxis: 
Stress Ulcer Protocol Active: Yes, Protonix 40 mg IV twice daily Deep Vein Thrombosis Protocol Active: Yes, SCD's Disposition: DNR status is now in effect. Total critical care time spent with patient: 50 minutes with direct visualization of the patient's ventilator and management along with respiratory therapist, long discussion with the respiratory therapist and the bedside nurse regarding the plan of care. Bright Boyer MD 
[facility] 11/27/2020 
10:57 AM

## 2020-11-27 NOTE — ANESTHESIA PROCEDURE NOTES
Emergent Intubation Performed by: Liberty Red MD 
Authorized by: Liberty Red MD  
 
Emergent Intubation: Location:  ICU Date/Time:  11/19/2020 8:04 AM 
Indications:  Respiratory failure Airway Documentation: Airway:  ETT - Cuffed Advanced Technique:  Glide scope Insertion Site:  Oral 
ETT size (mm):  8.0 ETT Line Michael:  Lips ETT Insertion depth (cm):  24 Placement verified by: auscultation, EtCO2 and BBS Attempts:  1 Difficult airway: No   
ICU CALLED FOR EMERGENCY INTUBATION, PT ON O2 VIA NC 2-3 L /MIN, + pre o2 via ambu with 100% O2, LUKASZ WITH GLIDESCOPE, LOTTS OF OLD CLOTTS IN POSTERIOR PHARYNX. EASY INTUBATION, SECURED BY RT.

## 2020-11-27 NOTE — PROGRESS NOTES
Hematology Oncology Progress Note Subjective: He remains unresponsive. Pt on ventilator. No active bleeding clinically. Current Facility-Administered Medications Medication Dose Route Frequency  digoxin (LANOXIN) injection 125 mcg  125 mcg IntraVENous DAILY  furosemide (LASIX) injection 40 mg  40 mg IntraVENous DAILY  calcium carbonate (TUMS) chewable tablet 400 mg [elemental]  400 mg Oral BID  lidocaine (XYLOCAINE) 10 mg/mL (1 %) injection    PRN  mineral oil (topical)    PRN  pantoprazole (PROTONIX) 40 mg in 0.9% sodium chloride 10 mL injection  40 mg IntraVENous BID  
 glycopyrrolate (ROBINUL) tablet 1 mg  1 mg Oral TID  metoclopramide HCl (REGLAN) injection 10 mg  10 mg IntraVENous Q6H  
 midazolam in normal saline (VERSED) 1 mg/mL infusion  0-10 mg/hr IntraVENous TITRATE  hydrocortisone Sod Succ (PF) (SOLU-CORTEF) injection 50 mg  50 mg IntraVENous Q8H  
 fentaNYL (PF) 1,500 mcg/30 mL (50 mcg/mL) infusion  0-200 mcg/hr IntraVENous TITRATE  vasopressin (VASOSTRICT) 40 Units in 0.9% sodium chloride 40 mL infusion  0.04 Units/min IntraVENous CONTINUOUS  
 meropenem (MERREM) 1 g in sterile water (preservative free) 20 mL IV syringe  1 g IntraVENous Q12H  
 morphine injection 2 mg  2 mg IntraVENous Q4H PRN  
 NOREPINephrine (LEVOPHED) 8 mg in 5% dextrose 250mL (32 mcg/mL) infusion  0.5-16 mcg/min IntraVENous TITRATE  acetaminophen (TYLENOL) suppository 650 mg  650 mg Rectal Q4H PRN  
 acetaminophen (TYLENOL) tablet 650 mg  650 mg Oral Q4H PRN  
 albuterol (PROVENTIL HFA, VENTOLIN HFA, PROAIR HFA) inhaler 2 Puff  2 Puff Inhalation Q4H PRN  
 docusate sodium (COLACE) capsule 100 mg  100 mg Oral BID  folic acid (FOLVITE) tablet 1 mg  1 mg Oral DAILY  pravastatin (PRAVACHOL) tablet 20 mg  20 mg Oral QHS  sertraline (ZOLOFT) tablet 50 mg  50 mg Oral DAILY  thiamine mononitrate (B-1) tablet 100 mg  100 mg Oral DAILY  ondansetron (ZOFRAN) injection 4 mg  4 mg IntraVENous Q6H PRN  
 hydrOXYzine pamoate (VISTARIL) capsule 25 mg  25 mg Oral TID PRN  chlorhexidine (PERIDEX) 0.12 % mouthwash 15 mL  15 mL Oral Q12H Review of Systems: not obtainable due to AMS. Objective:  
 
Patient Vitals for the past 8 hrs: 
 BP Temp Pulse Resp SpO2  
20 1558   (!) 101 (!) 38 99 % 20 1400 (!) 148/81  97 29 100 % 20 1342  (!) 102 °F (38.9 °C)     
20 1300 (!) 160/92  98 28 100 % 20 1216   100 29 100 % 20 1200 (!) 155/86  100 27 100 % 20 1153  (!) 101.3 °F (38.5 °C)     
20 1100 (!) 156/86  (!) 106 24 100 % 20 1000 (!) 142/100  (!) 118 27 100 % 20 0940   (!) 108 30 100 % 20 0900 (!) 149/91  (!) 101 23 100 % Temp (24hrs), Av.5 °F (38.1 °C), Min:99.1 °F (37.3 °C), Max:102 °F (38.9 °C) Physical Exam: 
constitutional Elderly white male , with altered mental status, Well nourished. Well developed. Head Normocephalic; no scars Eyes Conjunctivae and sclerae are clear and without icterus. Pupils are reactive and equal.  
ENMT ET tube is present. .  
Neck Supple without masses or thyromegaly. No jugular venous distension. Hematologic/Lymphatic No petechiae or purpura. No tender or palpable lymph nodes in the cervical, supraclavicular, axillary or inguinal area. Respiratory Lungs are clear to auscultation without rhonchi or wheezing. Cardiovascular Regular rate and rhythm of heart without murmurs, gallops or rubs. Chest / Line Site Chest is symmetric with no chest wall deformities. Abdomen Non-tender, non-distended, no masses, ascites or hepatosplenomegaly. Good bowel sounds. No guarding or rebound tenderness. No pulsatile masses. Musculoskeletal No tenderness or swelling, normal range of motion without obvious weakness. Extremities No visible deformities, no cyanosis, clubbing or edema. Skin No rashes, scars, or lesions suggestive of malignancy. No petechiae, purpura, or ecchymoses. No excoriations. Neurologic Altered mental status. [facility] AMS>  
 
 
 
 
Lab/Data Review: 
Recent Labs  
  11/27/20 
0500 11/26/20 0430 11/25/20 0430 WBC 14.2* 16.0* 11.4* HGB 9.6* 9.5* 8.8* HCT 31.6* 31.8* 29.5*  
* 100* 71* Recent Labs  
  11/27/20 
0500 11/27/20 
0100 11/26/20 0430 11/25/20 0430 * 149* 151* 148*  
K 2.9* 3.1* 2.8* 3.2*  
* 114* 112* 113* CO2 27 25 26 26 * 124* 122* 109* BUN 58* 61* 59* 58* CREA 1.67* 1.59* 1.74* 1.96* CA 6.4* 6.6* 6.2* 6.1*  6.1*  
MG 1.8  --  2.1  --   
PHOS 4.2  --  5.0* 5.4* ALB 3.1*  --  3.2* 2.9* Recent Labs  
  11/27/20 
0544 11/26/20 0430 11/25/20 0430 PH 7.39 7.390 7.347* PCO2 42 38 42 PO2 88 88 76 HCO3 24 23 22 FIO2 30 30.0 30.0 Radiology: Xr Abd Flat/ Erect Result Date: 11/20/2020 IMPRESSION: No evidence of free air. No acute appearing findings. Ct Head Wo Cont Result Date: 11/23/2020 IMPRESSION: No interval change. No acute intracranial abnormality. Ct Head Wo Cont Result Date: 11/15/2020 IMPRESSION: Chronic findings as above. Ct Chest Wo Cont Result Date: 11/23/2020 IMPRESSION: Nonspecific reticulonodular markings throughout the lungs. Indistinct small nodules many with groundglass appearance. Differential considerations would include infectious or neoplastic causes. Moderate volume dependent pleural effusions with associated compressive atelectasis posterior lower lobe lungs. Elongated thoracic aorta with atherosclerotic change. Cardiomegaly. CAD. Us Retroperitoneum Comp Result Date: 11/17/2020 Impression: Normal exam  
 
Ir Us Guided Vascular Access Result Date: 11/17/2020 Impression: Successful placement of a triple-lumen central venous catheter. The catheter is ready for use. Xr Chest [facility] [place] Result Date: 11/26/2020 FINDINGS/IMPRESSION: Support lines and tubes are appropriate in radiographic position. Residual right basilar airspace disease. Cardiac silhouette stable in size. Hemodynamic status stable. [facility] Result Date: 11/23/2020 IMPRESSION: Lines and tubes appear stable, as visualized. Stable mild enlargement of cardiopericardial silhouette. Central vascular prominence. Small right pleural effusion. Findings favored to represent CHF or volume overload; correlate clinically. Xr [facility] Result Date: 11/22/2020 Impression: Little interval change. [facility] Result Date: 11/21/2020 Findings/impression: Stable support hardware. Slight interval increase of patchy right basilar airspace disease. Small pleural effusions. No evidence of pneumothorax. Cardiomediastinal contours are stable. Increasing central vascular congestion. Visualized osseous structures are unchanged. [facility] Result Date: 11/20/2020 IMPRESSION: Developing bilateral perihilar and infrahilar opacities, with differential diagnosis including pneumonia, atelectasis, and edema. Short-term radiographic follow-up recommended. [facility] Result Date: 11/17/2020 Impression: Successful placement of a triple-lumen central venous catheter. The catheter is ready for use. [facility] Result Date: 11/17/2020 Impression: The cardiomediastinal silhouette is appropriate for age, technique, and lung expansion. Pulmonary vasculature is not congested. The lungs are essentially clear. No effusion or pneumothorax is seen. Xr [facility] Result Date: 11/15/2020 IMPRESSION:  No evidence of an acute cardiopulmonary process. Ir Thoracentesis Ndl Punc Asp W Image Result Date: 11/23/2020 Impression: Successful right thoracentesis. Ir Insert Non Tunl Cvc Over 5 Yrs Result Date: 11/17/2020 Impression: Successful placement of a triple-lumen central venous catheter. The catheter is ready for use. Assessment /Plan:  
 
 
1) 67 yr old male admitted with AMS.  Change in mental status likely due to UTI, sepsis, hypercalcemia , hypernatremia and renal failure.  
-ct head did not show any acute pathology.  
-Being eval by neurology. Likely metabolic encephalapathy from sepsis. -now intubated and sedated.   
2) ESBL E.coli  UTI. On abx. ID seeing the patient.   
3) Anemia: Stable. Likely related to sepsis. -In the setting hypercalcemia and renal failure. 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 Serum light chains pending. 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 Pt seen by GI for bleeding.   
4) Thrombocytopenia: S/p platelets transfusion. Platelet count improving. 
-Transfuse for platelet count <72X or for bleeding. REvewied peripheral blood smear shows neutrophils with increased granulation, bands suggestive of infection. RBC appear normal. NO evidence of increased schistocytes . Platelets decreased. Few promyelocytes and few blasts seen. flow cytometry of peripheral blood pending.  
 
 5) Coagulapathy: Prolonged PT/PTT. Fibrinogen is normal.  
-No clear evidence of DIC. S/p 2  unit of FFP  
-vitamin K 10mg po daily. Coags are improving slightly INR down to 1.5. 
 
6) Hypercalcemia: etiology not very clear. High on admission close to 14 Improved with hydration and calcitonin. - 
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 -psa is normal . Pt calcium low now. Pt on calcium supplementation. CT chest small lung nodules. infectious versus neoplastic. S/p thoracentesis. Pleural fluid cytology no malignancy. Bronchoscopy no endobronchial lesions. D/w pt wife. Kip Iglesias MD 
11/27/2020

## 2020-11-27 NOTE — PROGRESS NOTES
Progress Note 
 
 
11/27/2020 12:30 PM 
NAME: Ariel Trejo MRN:  [id number] Admit Diagnosis: UTI (urinary tract infection) [N39.0] AMS (altered mental status) [R41.82] BRE (acute kidney injury) ([facility] [address] [N17.9] Assessment/Plan: 1. Atrial fibrillation with rapid ventricular response. Patient's current heart rate currently in low 100s: Patient on low-dose digoxin and will consider using amiodarone as needed. 2.Hypotension, improved, using IV Levophed as needed. 3. UTI/ESBL E Coli on IV Meropenem 4. Respiratory failure, intubated, status post bronchoscopy, followed by pulmonary 5. Acute kidney injury, being monitored and followed by nephrology []       High complexity decision making was performed in this patient at high risk for decompensation with multiple organ involvement. Subjective:  
 
Ariel Trejo is intubated Review of Systems: 
 
 
Could NOT obtain due to: Altered mental status and intubation. Objective:  
  
Physical Exam: 
 
Last 24hrs VS reviewed since prior progress note. Most recent are: 
 
Visit Vitals BP (!) 148/81 Pulse 97 Temp (!) 102 °F (38.9 °C) Resp 29 Ht 6' 2\" (1.88 m) Wt 101.6 kg (223 lb 15.8 oz) SpO2 100% BMI 28.76 kg/m² Intake/Output Summary (Last 24 hours) at 11/27/2020 1443 Last data filed at 11/27/2020 0500 Gross per 24 hour Intake  Output 625 ml Net -625 ml General Appearance: Intubated Ears/Nose/Mouth/Throat: Intubated  
neck: Supple. Chest: Lungs coarse breath sounds Cardiovascular: iregular rate and rhythm, S1S2 normal, no murmur. Abdomen: Soft, non-tender, bowel sounds are active. Extremities: No edema bilaterally. Skin: Warm and dry. []         Post-cath site without hematoma, bruit, tenderness, or thrill. Distal pulses intact. PMH/SH reviewed - no change compared to H&P Data Review Telemetry:  
 
EKG:  
[]  No new EKG for review T brain without acute pathology CT chest with pleural effusions and compressive atelectasis, having thoracentesis Lab Data Personally Reviewed: 
 
Recent Labs  
  11/27/20 
0500 11/26/20 0430 WBC 14.2* 16.0*  
HGB 9.6* 9.5* HCT 31.6* 31.8*  
* 100* No results for input(s): INR, PTP, APTT, INREXT, INREXT in the last 72 hours. Recent Labs  
  11/27/20 
0500 11/27/20 
0100 11/26/20 
0430 * 149* 151*  
K 2.9* 3.1* 2.8*  
* 114* 112* CO2 27 25 26 BUN 58* 61* 59* CREA 1.67* 1.59* 1.74* * 124* 122* CA 6.4* 6.6* 6.2*  
MG 1.8  --  2.1 No results for input(s): CPK, CKNDX, TROIQ in the last 72 hours. No lab exists for component: CPKMB No results found for: CHOL, CHOLX, CHLST, CHOLV, HDL, HDLP, LDL, LDLC, DLDLP, TGLX, TRIGL, TRIGP, CHHD, CHHDX Recent Labs  
  11/27/20 
0500 11/26/20 
0430 11/25/20 
0430 ALB 3.1* 3.2* 2.9* Recent Labs  
  11/27/20 
0544 11/26/20 0430 PH 7.39 7.390 PCO2 42 38 PO2 88 88 Medications Personally Reviewed: 
 
Current Facility-Administered Medications Medication Dose Route Frequency  digoxin (LANOXIN) injection 125 mcg  125 mcg IntraVENous DAILY  furosemide (LASIX) injection 40 mg  40 mg IntraVENous DAILY  calcium carbonate (TUMS) chewable tablet 400 mg [elemental]  400 mg Oral BID  lidocaine (XYLOCAINE) 10 mg/mL (1 %) injection    PRN  mineral oil (topical)    PRN  pantoprazole (PROTONIX) 40 mg in 0.9% sodium chloride 10 mL injection  40 mg IntraVENous BID  
 glycopyrrolate (ROBINUL) tablet 1 mg  1 mg Oral TID  metoclopramide HCl (REGLAN) injection 10 mg  10 mg IntraVENous Q6H  
 midazolam in normal saline (VERSED) 1 mg/mL infusion  0-10 mg/hr IntraVENous TITRATE  hydrocortisone Sod Succ (PF) (SOLU-CORTEF) injection 50 mg  50 mg IntraVENous Q8H  
 fentaNYL (PF) 1,500 mcg/30 mL (50 mcg/mL) infusion  0-200 mcg/hr IntraVENous TITRATE  vasopressin (VASOSTRICT) 40 Units in 0.9% sodium chloride 40 mL infusion  0.04 Units/min IntraVENous CONTINUOUS  
 meropenem (MERREM) 1 g in sterile water (preservative free) 20 mL IV syringe  1 g IntraVENous Q12H  
 morphine injection 2 mg  2 mg IntraVENous Q4H PRN  
 NOREPINephrine (LEVOPHED) 8 mg in 5% dextrose 250mL (32 mcg/mL) infusion  0.5-16 mcg/min IntraVENous TITRATE  acetaminophen (TYLENOL) suppository 650 mg  650 mg Rectal Q4H PRN  
 acetaminophen (TYLENOL) tablet 650 mg  650 mg Oral Q4H PRN  
 albuterol (PROVENTIL HFA, VENTOLIN HFA, PROAIR HFA) inhaler 2 Puff  2 Puff Inhalation Q4H PRN  
 docusate sodium (COLACE) capsule 100 mg  100 mg Oral BID  folic acid (FOLVITE) tablet 1 mg  1 mg Oral DAILY  pravastatin (PRAVACHOL) tablet 20 mg  20 mg Oral QHS  sertraline (ZOLOFT) tablet 50 mg  50 mg Oral DAILY  thiamine mononitrate (B-1) tablet 100 mg  100 mg Oral DAILY  ondansetron (ZOFRAN) injection 4 mg  4 mg IntraVENous Q6H PRN  
 hydrOXYzine pamoate (VISTARIL) capsule 25 mg  25 mg Oral TID PRN  chlorhexidine (PERIDEX) 0.12 % mouthwash 15 mL  15 mL Oral Q12H Jaimie Oliver MD

## 2020-11-27 NOTE — PROGRESS NOTES
Progress Note Patient: Annia Garcia MRN: [id number]  SSN: [id number] YOB: 1945  Age: 67 y.o. Sex: male Admit Date: 11/15/2020 LOS: 12 days Subjective:  
Patient followed for severe sepsis with altered mental status and suspected UTI. Blood cultures negative so far and urine culture has grown ESBL E. Coli. He is afebrile but WBC is increasing and not on steroids. CRP and procalcitonin decreasing. Pleural fluid and bronchial cultures are pending. CXR today unimpressive. He is currently on IV Meropenem alone. Patient remains intubated. Objective:  
 
Vitals:  
 11/27/20 0500 11/27/20 0600 11/27/20 0814 11/27/20 0940 BP: 119/71 (!) 140/80 Pulse: (!) 116 (!) 104  (!) 108 Resp: 21 23  30 Temp:   99.7 °F (37.6 °C) SpO2: 100% 100%  100% Weight:      
Height:      
  
 
Intake and Output: 
Current Shift: No intake/output data recorded. Last three shifts: 11/25 1901 - 11/27 0700 In: -  
Out: [place] 1960 Physical Exam:  
Constitutional:   
   General: He is in acute distress. Appearance: He is ill-appearing. He is not diaphoretic. HENT:  
   Head: orotracheal tube; left NG tube Neck:  supple Cardiovascular:  
   Rate and Rhythm: Normal  
   Heart sounds: No murmur. Pulmonary:  
   Breath sounds: No wheezing, rhonchi or rales. Abdominal:  
   Palpations: Abdomen is soft. Tenderness: There is no abdominal tenderness. Genitourinary: 
   Comments: Mcmillan catheter Musculoskeletal:  
   Right lower leg: No edema. Left lower leg: No edema. Comments: 2x3 cm open wound right medial calf with resolving Skin: 
   Findings: No erythema or rash. Neurological:  
   Comments: Unable to assess Psychiatric:  
   Comments: Unable to assess Lab/Data Review: WBC 14,200 ,000 Lactic acid 2.7 Procalcitonin 0.22 < 0.33 < 0.62 <1.09 <1.54 <12.30 CRP 2.84 <2.87 <3.13 <3.61 <4.59  <35.90 Covid-19 Not detected Blood cultures (11/15) No growth FINAL Blood cultures (11/19) No growth FINAL Urine culture (11/15) >100,000 cfu/ml ESBL E. Coli Sputum culture (11/20) Normal aguilar FINAL Body fluid Pleural culture (11/23) No growth at 3 days Bronchial washing (11/25) Pending Bronchial washing fungus (11/25) Pending CXR (11/26)    Unchanged atelectasis at right base with lungs otherwise clear. No effusion or 
pneumothorax. Normal heart and no congestion Assessment:  
 
Principal Problem: 
  Sepsis ([facility] [address] (11/16/2020) Active Problems: 
  UTI (urinary tract infection) (11/15/2020) BRE (acute kidney injury) ([facility] [address] (11/15/2020) AMS (altered mental status) (11/15/2020) Hypoglycemia (11/16/2020) Encephalopathy acute (11/16/2020) 1. Severe sepsis with tachycardia, tachypnea, leukocytosis, elevated procalcitonin and CRP, resolving 2. UTI with marked pyuria and bacteriuria, secondary to ESBL E. Coli, Day #8 IV Meropenem, resolving 3. Altered mental status, possibly secondary to above 4. 6. Covid-19 negative 7. Acute hypoxic respiratory failure, intubated Comment:  WBC decreasing today along with CRP and procalcitonin. Pyuria resolving and bacteriuria resolved. Plan: 1. Continue Meropenem 2. Follow-up pending pleural fluid and bronchial washing cultures 3. In am, repeat CBC, procalcitonin and CRP, done Signed By: Kaur Phelps MD   
 November 27, 2020

## 2020-11-27 NOTE — PROGRESS NOTES
Hospitalist Progress Note Daily Progress Note: 11/27/2020 
 
50-year-old male sent here from [facility] with reports from staff of altered mental status, lethargy, not eating and dehydration. He has a history of atrial fibrillation. UA suggestive uti, zosyn initiated. Subjective:  
Patient seen and evaluated at bedside, overnight events noted, patient currently intubated responding to painful/verbal stimuli, discussed with RN at bedside. Patient is intubated and sedated. Problem List: 
Problem List as of 11/27/2020 Date Reviewed: 11/15/2020 Codes Class Noted - Resolved * (Principal) Sepsis ([facility] 75.) ICD-10-CM: A41.9 ICD-9-CM: 038.9, 995.91  11/16/2020 - Present Hypoglycemia ICD-10-CM: E16.2 ICD-9-CM: 251.2  11/16/2020 - Present Encephalopathy acute ICD-10-CM: G93.40 ICD-9-CM: 348.30  11/16/2020 - Present UTI (urinary tract infection) ICD-10-CM: N39.0 ICD-9-CM: 599.0  11/15/2020 - Present BRE (acute kidney injury) ([facility] ICD-10-CM: N17.9 ICD-9-CM: 584.9  11/15/2020 - Present AMS (altered mental status) ICD-10-CM: O09.65 
ICD-9-CM: 780.97  11/15/2020 - Present Chronic atrial fibrillation (HCC) ICD-10-CM: I48.20 ICD-9-CM: 427.31  8/12/2020 - Present HTN (hypertension), benign ICD-10-CM: I10 
ICD-9-CM: 401.1  8/12/2020 - Present COPD (chronic obstructive pulmonary disease) (HCC) ICD-10-CM: J44.9 ICD-9-CM: 695  8/12/2020 - Present Dyslipidemia ICD-10-CM: E78.5 ICD-9-CM: 272.4  8/12/2020 - Present Depression ICD-10-CM: F32.9 ICD-9-CM: 600  8/12/2020 - Present Cellulitis of left upper extremity ICD-10-CM: L03.114 
ICD-9-CM: 682.3  8/11/2020 - Present Medications reviewed Current Facility-Administered Medications Medication Dose Route Frequency  potassium chloride 10 mEq in 100 ml IVPB  10 mEq IntraVENous Q1H  
 calcium gluconate 2 g/100 mL sodium chloride (ISO-OSM)  2 g IntraVENous ONCE  
 digoxin (LANOXIN) injection 125 mcg  125 mcg IntraVENous DAILY  furosemide (LASIX) injection 40 mg  40 mg IntraVENous DAILY  calcium carbonate (TUMS) chewable tablet 400 mg [elemental]  400 mg Oral BID  lidocaine (XYLOCAINE) 10 mg/mL (1 %) injection    PRN  mineral oil (topical)    PRN  pantoprazole (PROTONIX) 40 mg in 0.9% sodium chloride 10 mL injection  40 mg IntraVENous BID  
 glycopyrrolate (ROBINUL) tablet 1 mg  1 mg Oral TID  metoclopramide HCl (REGLAN) injection 10 mg  10 mg IntraVENous Q6H  
 midazolam in normal saline (VERSED) 1 mg/mL infusion  0-10 mg/hr IntraVENous TITRATE  hydrocortisone Sod Succ (PF) (SOLU-CORTEF) injection 50 mg  50 mg IntraVENous Q8H  
 fentaNYL (PF) 1,500 mcg/30 mL (50 mcg/mL) infusion  0-200 mcg/hr IntraVENous TITRATE  vasopressin (VASOSTRICT) 40 Units in 0.9% sodium chloride 40 mL infusion  0.04 Units/min IntraVENous CONTINUOUS  
 meropenem (MERREM) 1 g in sterile water (preservative free) 20 mL IV syringe  1 g IntraVENous Q12H  
 morphine injection 2 mg  2 mg IntraVENous Q4H PRN  
 NOREPINephrine (LEVOPHED) 8 mg in 5% dextrose 250mL (32 mcg/mL) infusion  0.5-16 mcg/min IntraVENous TITRATE  acetaminophen (TYLENOL) suppository 650 mg  650 mg Rectal Q4H PRN  
 acetaminophen (TYLENOL) tablet 650 mg  650 mg Oral Q4H PRN  
 albuterol (PROVENTIL HFA, VENTOLIN HFA, PROAIR HFA) inhaler 2 Puff  2 Puff Inhalation Q4H PRN  
 docusate sodium (COLACE) capsule 100 mg  100 mg Oral BID  folic acid (FOLVITE) tablet 1 mg  1 mg Oral DAILY  pravastatin (PRAVACHOL) tablet 20 mg  20 mg Oral QHS  sertraline (ZOLOFT) tablet 50 mg  50 mg Oral DAILY  thiamine mononitrate (B-1) tablet 100 mg  100 mg Oral DAILY  ondansetron (ZOFRAN) injection 4 mg  4 mg IntraVENous Q6H PRN  
 hydrOXYzine pamoate (VISTARIL) capsule 25 mg  25 mg Oral TID PRN  chlorhexidine (PERIDEX) 0.12 % mouthwash 15 mL  15 mL Oral Q12H Review of Systems:  
Unobtainable 2/2 encephalopathic/acute illness Objective:  
Physical Exam:  
 
Visit Vitals BP (!) 140/80 Pulse (!) 104 Temp 99.7 °F (37.6 °C) Resp 23 Ht 6' 2\" (1.88 m) Wt 101.6 kg (223 lb 15.8 oz) SpO2 100% BMI 28.76 kg/m² O2 Flow Rate (L/min): 0.5 l/min O2 Device: Ventilator Temp (24hrs), Av.8 °F (37.1 °C), Min:98.1 °F (36.7 °C), Max:99.7 °F (37.6 °C) No intake/output data recorded. 1901 -  07 In: -  
Out: Pretty  Constitutional: Currently intubated and sedated Head: Normocephalic and atraumatic. Mouth: oral mucosa dry, ET tube is in situ Neck: supple Cardiovascular:Irreg/irreg Lungs:decreased air entry bilaterally in bilateral lower lung zones Abdominal: Soft. non tender Neurological: obtunded, moves all ext, 
Skin: Right lower shin with ulcer, yellow drainage Left arm with skin tear Multiple ecchymotic, skin is friable/thin Data Review:  
   
Recent Days: 
Recent Labs  
  20 
0500 20 
043 WBC 14.2* 16.0* 11.4* HGB 9.6* 9.5* 8.8* HCT 31.6* 31.8* 29.5*  
* 100* 71* Recent Labs  
  20 
0500 20 
0100 20 
0430 20 
0430 * 149* 151* 148*  
K 2.9* 3.1* 2.8* 3.2*  
* 114* 112* 113* CO2  * 124* 122* 109* BUN 58* 61* 59* 58* CREA 1.67* 1.59* 1.74* 1.96* CA 6.4* 6.6* 6.2* 6.1*  6.1*  
MG 1.8  --  2.1  --   
PHOS 4.2  --  5.0* 5.4* ALB 3.1*  --  3.2* 2.9* Recent Labs  
  20 
0544 20 
0430 20 
0430 PH 7.39 7.390 7.347* PCO2 42 38 42 PO2 88 88 76 HCO3 24 23 22 FIO2 30 30.0 30.0  
 
 
24 Hour Results: 
Recent Results (from the past 24 hour(s)) GLUCOSE, POC Collection Time: 20 10:47 AM  
Result Value Ref Range Glucose (POC) 129 (H) 65 - 100 mg/dL Performed by Shari Madden, POC Collection Time: 20  4:30 PM  
Result Value Ref Range Glucose (POC) 124 (H) 65 - 100 mg/dL  Performed by Lindon Hammans URINALYSIS W/MICROSCOPIC Collection Time: 11/27/20  1:00 AM  
Result Value Ref Range Color CIT Group Appearance Turbid (A) Clear Specific gravity 1.015 1.003 - 1.030    
 pH (UA) 5.0 5.0 - 8.0 Protein 100 (A) Negative mg/dL Glucose 50 (A) Negative mg/dL Ketone 5 (A) Negative mg/dL Bilirubin Negative Negative Blood Small (A) Negative Urobilinogen 2.0 (H) 0.1 - 1.0 EU/dL Nitrites Negative Negative Leukocyte Esterase Trace (A) Negative WBC 10-20 0 - 4 /hpf  
 RBC 0-5 0 - 5 /hpf Bacteria Negative Negative /hpf Hyaline cast >20 (H) 0 - 5 /lpf Mucus 2+ /lpf METABOLIC PANEL, BASIC Collection Time: 11/27/20  1:00 AM  
Result Value Ref Range Sodium 149 (H) 136 - 145 mmol/L Potassium 3.1 (L) 3.5 - 5.1 mmol/L Chloride 114 (H) 97 - 108 mmol/L  
 CO2 25 21 - 32 mmol/L Anion gap 10 5 - 15 mmol/L Glucose 124 (H) 65 - 100 mg/dL BUN 61 (H) 6 - 20 mg/dL Creatinine 1.59 (H) 0.70 - 1.30 mg/dL BUN/Creatinine ratio 38 (H) 12 - 20 GFR est AA 52 (L) >60 ml/min/1.73m2 GFR est non-AA 43 (L) >60 ml/min/1.73m2 Calcium 6.6 (L) 8.5 - 10.1 mg/dL GLUCOSE, POC Collection Time: 11/27/20  1:06 AM  
Result Value Ref Range Glucose (POC) 131 (H) 65 - 100 mg/dL Performed by Dilia Helms, POC Collection Time: 11/27/20  4:52 AM  
Result Value Ref Range Glucose (POC) 144 (H) 65 - 100 mg/dL Performed by Rivas Francisco   
CBC WITH AUTOMATED DIFF Collection Time: 11/27/20  5:00 AM  
Result Value Ref Range WBC 14.2 (H) 4.1 - 11.1 K/uL  
 RBC 3.63 (L) 4.10 - 5.70 M/uL HGB 9.6 (L) 12.1 - 17.0 g/dL HCT 31.6 (L) 36.6 - 50.3 % MCV 87.1 80.0 - 99.0 FL  
 MCH 26.4 26.0 - 34.0 PG  
 MCHC 30.4 30.0 - 36.5 g/dL  
 RDW 19.4 (H) 11.5 - 14.5 % PLATELET 888 (L) 149 - 400 K/uL NRBC 4.0 (H) 0  WBC ABSOLUTE NRBC 0.60 (H) 0.00 - 0.01 K/uL LYMPHOCYTES 27 12 - 49 % MONOCYTES 5 5 - 13 %  EOSINOPHILS 0 0 - 7 % ABS. LYMPHOCYTES 3.8 (H) 0.8 - 3.5 K/UL  
 ABS. MONOCYTES 0.7 0.0 - 1.0 K/UL  
 ABS. EOSINOPHILS 0.0 0.0 - 0.4 K/UL  
 DF AUTOMATED NEUTROPHILS 55 32 - 75 % BAND NEUTROPHILS 1 0 - 6 % BASOPHILS 0 0 - 1 % METAMYELOCYTES 1 (H) 0 % MYELOCYTES 11 (H) 0 % NRBC 5.0  WBC IMMATURE GRANULOCYTES 0 %  
 ABS. NEUTROPHILS 7.9 1.8 - 8.0 K/UL  
 ABS. BASOPHILS 0.0 0.0 - 0.1 K/UL  
 ABSOLUTE NRBC 0.71 K/uL  
 ABS. IMM. GRANS. 0.0 K/UL  
 RBC COMMENTS Anisocytosis 1+ RBC COMMENTS Polychromasia 1+ RBC COMMENTS Hypochromia 1+ C REACTIVE PROTEIN, QT Collection Time: 11/27/20  5:00 AM  
Result Value Ref Range C-Reactive protein 2.84 (H) 0.00 - 0.60 mg/dL PROCALCITONIN Collection Time: 11/27/20  5:00 AM  
Result Value Ref Range Procalcitonin 0.22 (H) 0 ng/mL RENAL FUNCTION PANEL Collection Time: 11/27/20  5:00 AM  
Result Value Ref Range Sodium 149 (H) 136 - 145 mmol/L Potassium 2.9 (L) 3.5 - 5.1 mmol/L Chloride 112 (H) 97 - 108 mmol/L  
 CO2 27 21 - 32 mmol/L Anion gap 10 5 - 15 mmol/L Glucose 140 (H) 65 - 100 mg/dL BUN 58 (H) 6 - 20 mg/dL Creatinine 1.67 (H) 0.70 - 1.30 mg/dL BUN/Creatinine ratio 35 (H) 12 - 20 GFR est AA 49 (L) >60 ml/min/1.73m2 GFR est non-AA 40 (L) >60 ml/min/1.73m2 Calcium 6.4 (LL) 8.5 - 10.1 mg/dL Phosphorus 4.2 2.6 - 4.7 mg/dL Albumin 3.1 (L) 3.5 - 5.0 g/dL MAGNESIUM Collection Time: 11/27/20  5:00 AM  
Result Value Ref Range Magnesium 1.8 1.6 - 2.4 mg/dL BLOOD GAS, ARTERIAL Collection Time: 11/27/20  5:44 AM  
Result Value Ref Range pH 7.39 7.35 - 7.45    
 PCO2 42 35 - 45 mmHg PO2 88 75 - 100 mmHg O2 SAT 97 >95 % BICARBONATE 24 22 - 26 mmol/L  
 BASE DEFICIT 0.3 0 - 2 mmol/L  
 O2 METHOD VENT    
 FIO2 30 % MODE SIMV Tidal volume 500 PRESSURE SUPPORT 8    
 EPAP/CPAP/PEEP 5  Sample source Arterial    
 SITE Right Radial    
 DAMON'S TEST YES    
GLUCOSE, POC  
 Collection Time: 11/27/20  8:07 AM  
Result Value Ref Range Glucose (POC) 163 (H) 65 - 100 mg/dL Performed by Gabriel Garber Assessment/  
 
Patient Active Problem List  
Diagnosis Code  Cellulitis of left upper extremity L03.114  
 Chronic atrial fibrillation (HCC) I48.20  
 HTN (hypertension), benign I10  
 COPD (chronic obstructive pulmonary disease) ([facility]) J44.9  Dyslipidemia E78.5  Depression F32.9  
 UTI (urinary tract infection) N39.0  BRE (acute kidney injury) ([facility] N17.9  AMS (altered mental status) R41.82  Sepsis ([facility] A41.9  Hypoglycemia E16.2  
 Encephalopathy acute G93.40 Plan: 
 
Septic shockPatient presented with above-mentioned symptomatology was found to meet severe sepsis criteria secondary to combination of urinary tract infection as well as developing bilateral pneumonia, patient is COVID-19 negative, currently improving, patient been titrated off of levophed 
follow-up blood cultures Urine Culture consistent with ESBL E. Coli Continue meropenem for Abx coverage Discontinued IVF Infectious disease recommendations appreciated, will continue to follow Critical care recommendations appreciated will continue to follow Acute respiratory failure/pneumoniapatient was found to be in acute respiratory failure requiring emergent endotracheal intubation, likely secondary to developing bilateral pneumonia, patient currently off of pressors Follow-up sputum culture Follow-up blood cultures Continue Meropenem for Abx coverage Attempt weaning trial 
Pulmonology consult appreciated, will continue to follow Infectious disease recommendations appreciated, will continue to follow Hypoactive bowel soundsresolved Atrial fibrillation with RVRpatient presented with atrial fibrillation with RVR likely secondary to ongoing severe sepsis Dig level was 1.3 Transthoracic echo shows preserved ejection fraction Cardiology consult appreciated, will continue to follow Acute on chronic diastolic heart failurepatient was found to have significant bilateral lower extremity edema as well as radiographic evidence of volume overload consistent with acute on chronic diastolic heart failure Discontinued IV fluids Lasix 40 mg IV twice daily Continue to monitor intake and output Cardiology consult appreciated, will continue to follow Anemialikely multifactorial, however concern for GI bleed, s/p 2 units prbc 2 days back with appropriate response, currently hemoglobin hematocrit remained stable Continue protonix 40mg iv bid Continue to trend H/H Maintain active type and screen Follow-up gastroenterology recommendations Follow-up hematology recommendations Hypokalemiareplete K and recheck K Thrombocytopenialikely multifactorial, concern for possible early DIC, however platelet count remained stable. Hematology recommendations appreciated Patient currently does not have any active bleeding- bjt noted coffe ground on NGT suctioning. Recommend platelet transfusion if platelet count is less than 20,000 with active bleeding Hyperlipidemiacontinue statin ProphylaxisSCDs FENcontinue tube feeding, replete potassium and magnesium DNR,  
surrogate decision-maker is patient's wife Care Plan discussed with: Patient/Family and Nurse Renita Zuniga MD

## 2020-11-27 NOTE — PROGRESS NOTES
Renal Progress Note Patient: Dedra Delong MRN: [id number]  SSN: [id number] YOB: 1945  Age: 67 y.o. Sex: male Admit Date: 11/15/2020 LOS: 12 days Subjective:  
Patient seen in ICU, on ventilator, sedated,  
Creatinine 1.67 today, On IV lasix, good output+ Hypokalemia+ Current Facility-Administered Medications Medication Dose Route Frequency  digoxin (LANOXIN) injection 125 mcg  125 mcg IntraVENous DAILY  furosemide (LASIX) injection 40 mg  40 mg IntraVENous DAILY  calcium carbonate (TUMS) chewable tablet 400 mg [elemental]  400 mg Oral BID  lidocaine (XYLOCAINE) 10 mg/mL (1 %) injection    PRN  mineral oil (topical)    PRN  pantoprazole (PROTONIX) 40 mg in 0.9% sodium chloride 10 mL injection  40 mg IntraVENous BID  
 glycopyrrolate (ROBINUL) tablet 1 mg  1 mg Oral TID  metoclopramide HCl (REGLAN) injection 10 mg  10 mg IntraVENous Q6H  
 midazolam in normal saline (VERSED) 1 mg/mL infusion  0-10 mg/hr IntraVENous TITRATE  hydrocortisone Sod Succ (PF) (SOLU-CORTEF) injection 50 mg  50 mg IntraVENous Q8H  
 fentaNYL (PF) 1,500 mcg/30 mL (50 mcg/mL) infusion  0-200 mcg/hr IntraVENous TITRATE  vasopressin (VASOSTRICT) 40 Units in 0.9% sodium chloride 40 mL infusion  0.04 Units/min IntraVENous CONTINUOUS  
 meropenem (MERREM) 1 g in sterile water (preservative free) 20 mL IV syringe  1 g IntraVENous Q12H  
 morphine injection 2 mg  2 mg IntraVENous Q4H PRN  
 NOREPINephrine (LEVOPHED) 8 mg in 5% dextrose 250mL (32 mcg/mL) infusion  0.5-16 mcg/min IntraVENous TITRATE  acetaminophen (TYLENOL) suppository 650 mg  650 mg Rectal Q4H PRN  
 acetaminophen (TYLENOL) tablet 650 mg  650 mg Oral Q4H PRN  
 albuterol (PROVENTIL HFA, VENTOLIN HFA, PROAIR HFA) inhaler 2 Puff  2 Puff Inhalation Q4H PRN  
 docusate sodium (COLACE) capsule 100 mg  100 mg Oral BID  folic acid (FOLVITE) tablet 1 mg  1 mg Oral DAILY  pravastatin (PRAVACHOL) tablet 20 mg 20 mg Oral QHS  sertraline (ZOLOFT) tablet 50 mg  50 mg Oral DAILY  thiamine mononitrate (B-1) tablet 100 mg  100 mg Oral DAILY  ondansetron (ZOFRAN) injection 4 mg  4 mg IntraVENous Q6H PRN  
 hydrOXYzine pamoate (VISTARIL) capsule 25 mg  25 mg Oral TID PRN  chlorhexidine (PERIDEX) 0.12 % mouthwash 15 mL  15 mL Oral Q12H Vitals:  
 11/27/20 1216 11/27/20 1300 11/27/20 1342 11/27/20 1400 BP:  (!) 160/92  (!) 148/81 Pulse: 100 98  97 Resp: 29 28  29 Temp:   (!) 102 °F (38.9 °C) SpO2: 100% 100%  100% Weight:      
Height:      
 
Objective:  
General: On ventilator, sedated, no acute distress. HEENT: no Icterus, no Pallor, ET tube in place  
neck: Neck is supple, No JVD Lungs: Decreased breath sounds at the bases, no rhonchi, few scattered rales+, CVS: heart sounds normal, no murmurs, no rubs. GI: soft, nontender, normal BS. Extremeties: no cyanosis, 1+ dependent edema+ Neuro: On ventilator, sedated Skin: normal skin turgor, no skin rashes. Intake and Output: 
Current Shift: No intake/output data recorded. Last three shifts: 11/25 1901 - 11/27 0700 In: -  
Out: Pretty 1960 Lab/Data Review: 
Recent Labs  
  11/27/20 
0500 11/26/20 0430 11/25/20 
0430 WBC 14.2* 16.0* 11.4* HGB 9.6* 9.5* 8.8* HCT 31.6* 31.8* 29.5*  
* 100* 71* Recent Labs  
  11/27/20 
0500 11/27/20 
0100 11/26/20 
0430 11/25/20 
0430 * 149* 151* 148*  
K 2.9* 3.1* 2.8* 3.2*  
* 114* 112* 113* CO2 27 25 26 26 * 124* 122* 109* BUN 58* 61* 59* 58* CREA 1.67* 1.59* 1.74* 1.96* CA 6.4* 6.6* 6.2* 6.1*  6.1*  
MG 1.8  --  2.1  --   
PHOS 4.2  --  5.0* 5.4* ALB 3.1*  --  3.2* 2.9* Recent Labs  
  11/27/20 
0544 11/26/20 
0430 11/25/20 
0430 PH 7.39 7.390 7.347* PCO2 42 38 42 PO2 88 88 76 HCO3 24 23 22 FIO2 30 30.0 30.0 Recent Results (from the past 24 hour(s)) GLUCOSE, POC  Collection Time: 11/26/20  4:30 PM  
Result Value Ref Range Glucose (POC) 124 (H) 65 - 100 mg/dL Performed by Liz Birch W/MICROSCOPIC Collection Time: 11/27/20  1:00 AM  
Result Value Ref Range Color CIT Group Appearance Turbid (A) Clear Specific gravity 1.015 1.003 - 1.030    
 pH (UA) 5.0 5.0 - 8.0 Protein 100 (A) Negative mg/dL Glucose 50 (A) Negative mg/dL Ketone 5 (A) Negative mg/dL Bilirubin Negative Negative Blood Small (A) Negative Urobilinogen 2.0 (H) 0.1 - 1.0 EU/dL Nitrites Negative Negative Leukocyte Esterase Trace (A) Negative WBC 10-20 0 - 4 /hpf  
 RBC 0-5 0 - 5 /hpf Bacteria Negative Negative /hpf Hyaline cast >20 (H) 0 - 5 /lpf Mucus 2+ /lpf METABOLIC PANEL, BASIC Collection Time: 11/27/20  1:00 AM  
Result Value Ref Range Sodium 149 (H) 136 - 145 mmol/L Potassium 3.1 (L) 3.5 - 5.1 mmol/L Chloride 114 (H) 97 - 108 mmol/L  
 CO2 25 21 - 32 mmol/L Anion gap 10 5 - 15 mmol/L Glucose 124 (H) 65 - 100 mg/dL BUN 61 (H) 6 - 20 mg/dL Creatinine 1.59 (H) 0.70 - 1.30 mg/dL BUN/Creatinine ratio 38 (H) 12 - 20 GFR est AA 52 (L) >60 ml/min/1.73m2 GFR est non-AA 43 (L) >60 ml/min/1.73m2 Calcium 6.6 (L) 8.5 - 10.1 mg/dL GLUCOSE, POC Collection Time: 11/27/20  1:06 AM  
Result Value Ref Range Glucose (POC) 131 (H) 65 - 100 mg/dL Performed by Aaron Ayala, POC Collection Time: 11/27/20  4:52 AM  
Result Value Ref Range Glucose (POC) 144 (H) 65 - 100 mg/dL Performed by Tory Worley   
CBC WITH AUTOMATED DIFF Collection Time: 11/27/20  5:00 AM  
Result Value Ref Range WBC 14.2 (H) 4.1 - 11.1 K/uL  
 RBC 3.63 (L) 4.10 - 5.70 M/uL HGB 9.6 (L) 12.1 - 17.0 g/dL HCT 31.6 (L) 36.6 - 50.3 % MCV 87.1 80.0 - 99.0 FL  
 MCH 26.4 26.0 - 34.0 PG  
 MCHC 30.4 30.0 - 36.5 g/dL  
 RDW 19.4 (H) 11.5 - 14.5 % PLATELET 834 (L) 264 - 400 K/uL NRBC 4.0 (H) 0  WBC  ABSOLUTE NRBC 0.60 (H) 0.00 - 0.01 K/uL LYMPHOCYTES 27 12 - 49 % MONOCYTES 5 5 - 13 % EOSINOPHILS 0 0 - 7 %  
 ABS. LYMPHOCYTES 3.8 (H) 0.8 - 3.5 K/UL  
 ABS. MONOCYTES 0.7 0.0 - 1.0 K/UL  
 ABS. EOSINOPHILS 0.0 0.0 - 0.4 K/UL  
 DF AUTOMATED NEUTROPHILS 55 32 - 75 % BAND NEUTROPHILS 1 0 - 6 % BASOPHILS 0 0 - 1 % METAMYELOCYTES 1 (H) 0 % MYELOCYTES 11 (H) 0 % NRBC 5.0  WBC IMMATURE GRANULOCYTES 0 %  
 ABS. NEUTROPHILS 7.9 1.8 - 8.0 K/UL  
 ABS. BASOPHILS 0.0 0.0 - 0.1 K/UL  
 ABSOLUTE NRBC 0.71 K/uL  
 ABS. IMM. GRANS. 0.0 K/UL  
 RBC COMMENTS Anisocytosis 1+ RBC COMMENTS Polychromasia 1+ RBC COMMENTS Hypochromia 1+ C REACTIVE PROTEIN, QT Collection Time: 11/27/20  5:00 AM  
Result Value Ref Range C-Reactive protein 2.84 (H) 0.00 - 0.60 mg/dL PROCALCITONIN Collection Time: 11/27/20  5:00 AM  
Result Value Ref Range Procalcitonin 0.22 (H) 0 ng/mL RENAL FUNCTION PANEL Collection Time: 11/27/20  5:00 AM  
Result Value Ref Range Sodium 149 (H) 136 - 145 mmol/L Potassium 2.9 (L) 3.5 - 5.1 mmol/L Chloride 112 (H) 97 - 108 mmol/L  
 CO2 27 21 - 32 mmol/L Anion gap 10 5 - 15 mmol/L Glucose 140 (H) 65 - 100 mg/dL BUN 58 (H) 6 - 20 mg/dL Creatinine 1.67 (H) 0.70 - 1.30 mg/dL BUN/Creatinine ratio 35 (H) 12 - 20 GFR est AA 49 (L) >60 ml/min/1.73m2 GFR est non-AA 40 (L) >60 ml/min/1.73m2 Calcium 6.4 (LL) 8.5 - 10.1 mg/dL Phosphorus 4.2 2.6 - 4.7 mg/dL Albumin 3.1 (L) 3.5 - 5.0 g/dL MAGNESIUM Collection Time: 11/27/20  5:00 AM  
Result Value Ref Range Magnesium 1.8 1.6 - 2.4 mg/dL BLOOD GAS, ARTERIAL Collection Time: 11/27/20  5:44 AM  
Result Value Ref Range pH 7.39 7.35 - 7.45    
 PCO2 42 35 - 45 mmHg PO2 88 75 - 100 mmHg O2 SAT 97 >95 % BICARBONATE 24 22 - 26 mmol/L  
 BASE DEFICIT 0.3 0 - 2 mmol/L  
 O2 METHOD VENT    
 FIO2 30 % MODE SIMV Tidal volume 500 PRESSURE SUPPORT 8    
 EPAP/CPAP/PEEP 5  Sample source Arterial    
 SITE Right Radial    
 DAMON'S TEST YES    
GLUCOSE, POC Collection Time: 11/27/20  8:07 AM  
Result Value Ref Range Glucose (POC) 163 (H) 65 - 100 mg/dL Performed by Wilber Yang, POC Collection Time: 11/27/20 11:52 AM  
Result Value Ref Range Glucose (POC) 163 (H) 65 - 100 mg/dL Performed by Bull Mayers Assessment and Plan: #1 severe hypercalcemia.  -On admission corrected calcium was 14.6. Etiology unclear, hypercalcemia resolved now, 
suspect multiple myeloma, immunoelectrophoresis studies showed IgG monoclonal protein  
low PTH compatible with hypercalcemia, borderline low vitamin D level 
urine random calcium and creatinine to assess for fractional excretion of calcium pending Hypercalcemia resolved and repeat calcium level today is 6.4 today Will give IV calcium gluconate 2gm q8hrs x2 doses today Continue calcium carbonate po through NG tube 2. Fluid overload/dependent edema+:  improved edema Pleural effusion+ 
continue Lasix to 40 mg once daily and monitor electrolytes 3. Acute kidney injury on ?? CKD Renal functions remained elevated despite adequate hydration, suspect baseline chronic kidney disease 
urine random protein creatinine ratio is 2.0 
  
3.  hypokalemia. Persistent Will give 10meq kcl x 5 runs today and repeat K Continue to monitor and supplement as needed 4. Hypernatremia.  -From free water deficit. Decrease the dose of Lasix once daily continue to monitor sodium levels 5. Acute respiratory failure, on ventilator Sepsis/UTI Pulmonary and ID following the patient Continue IV antibiotics as per ID 
  
6. Atrial fibrillation with rapid ventricular rate : Cardiology following the patient, on amiodarone and digoxin Signed By: Collette Sage, MD   
 November 27, 2020

## 2020-11-28 NOTE — PROGRESS NOTES
Spiritual Care Assessment/Progress Note [facility] 
 
 
NAME: Alida Ronquillo      MRN: [id number] AGE: 67 y.o. SEX: male Voodoo Affiliation: No preference Language: English  
 
11/28/2020     Total Time (in minutes): 30 Spiritual Assessment begun in [facility] 2 CCU through conversation with: 
  
    [x]Patient        [x] Family    [] Friend(s) Reason for Consult: Initial/Spiritual assessment, critical care Spiritual beliefs: (Please include comment if needed) [x] Identifies with a lenny tradition:     
   [] Supported by a lenny community:        
   [] Claims no spiritual orientation:       
   [] Seeking spiritual identity:            
   [] Adheres to an individual form of spirituality:       
   [] Not able to assess:                   
 
    
Identified resources for coping:  
   [x] Prayer                           
   [] Music                  [] Guided Imagery [x] Family/friends                 [] Pet visits [] Devotional reading                         [] Unknown 
   [] Other:                                          
 
 
Interventions offered during this visit: (See comments for more details) Family/Friend(s): Affirmation of lenny, Affirmation of emotions/emotional suffering, Catharsis/review of pertinent events in supportive environment, Coping skills reviewed/reinforced, End of life issues discussed, Decision support (comment), Guided imagery, Iconic (affirming the presence of God/Higher Power), Guidance concerning next steps/process to be expected, Issues around forgiveness/closure, Integration of medical assessment with existing values and beliefs, Life review/legacy, Normalization of emotional/spiritual concerns, Prayer (actual), Voodoo beliefs/image of God discussed Plan of Care: 
 
 [] Support spiritual and/or cultural needs  
 [] Support AMD and/or advance care planning process    
 [] Support grieving process 
 [] Coordinate Rites and/or Rituals  
 [] Coordination with community clergy [] No spiritual needs identified at this time 
 [] Detailed Plan of Care below (See Comments)  [] Make referral to Music Therapy 
[] Make referral to Pet Therapy    
[] Make referral to Addiction services 
[] Make referral to [facility] 
[] Make referral to Spiritual Care Partner 
[] No future visits requested       
[x] Follow up upon further referrals Comments: The purpose of the visit was to do a spiritual assessment on the patient in response to a family meeting. The patient was unable to share since he was intubated. His wife shared that it bothers her to see him suffer. She mentioned that she and her  had discussed in times past, and that she knew what his wishes would be. She shared tearfully that her lenny strengthens her and that she did not want feel guilty about her decision towards his care. She mentioned that she was appreciative for the ministry of spiritual care. She asked for prayer concerning God's will versus her will and that she would be strong enough to follow it. The  provided the ministry of presence. The  provided prayer for the patient and his wife. [address] [facility] [address].  can be reached by calling the  at [facility] 
[phone]

## 2020-11-28 NOTE — PROGRESS NOTES
Renal Progress Note Patient: Yunior Ahumada MRN: [id number]  SSN: [id number] YOB: 1945  Age: 67 y.o. Sex: male Admit Date: 11/15/2020 LOS: 13 days Subjective:  
Patient seen in ICU, on ventilator, sedated, Hypokalemia+. K 2.3 today, received IV kcl 10x5 runs Current Facility-Administered Medications Medication Dose Route Frequency  spironolactone (ALDACTONE) tablet 25 mg  25 mg Oral BID  potassium chloride (KLOR-CON) packet for solution 40 mEq  40 mEq Per NG tube Q8H  
 0.9% sodium chloride 500 mL with potassium chloride 50 mEq infusion   IntraVENous CONTINUOUS  propofol (DIPRIVAN) 10 mg/mL infusion  0-50 mcg/kg/min IntraVENous TITRATE  digoxin (LANOXIN) injection 125 mcg  125 mcg IntraVENous DAILY  furosemide (LASIX) injection 40 mg  40 mg IntraVENous DAILY  calcium carbonate (TUMS) chewable tablet 400 mg [elemental]  400 mg Oral BID  lidocaine (XYLOCAINE) 10 mg/mL (1 %) injection    PRN  mineral oil (topical)    PRN  pantoprazole (PROTONIX) 40 mg in 0.9% sodium chloride 10 mL injection  40 mg IntraVENous BID  
 glycopyrrolate (ROBINUL) tablet 1 mg  1 mg Oral TID  metoclopramide HCl (REGLAN) injection 10 mg  10 mg IntraVENous Q6H  
 hydrocortisone Sod Succ (PF) (SOLU-CORTEF) injection 50 mg  50 mg IntraVENous Q8H  
 fentaNYL (PF) 1,500 mcg/30 mL (50 mcg/mL) infusion  0-200 mcg/hr IntraVENous TITRATE  vasopressin (VASOSTRICT) 40 Units in 0.9% sodium chloride 40 mL infusion  0.04 Units/min IntraVENous CONTINUOUS  
 meropenem (MERREM) 1 g in sterile water (preservative free) 20 mL IV syringe  1 g IntraVENous Q12H  
 morphine injection 2 mg  2 mg IntraVENous Q4H PRN  
 NOREPINephrine (LEVOPHED) 8 mg in 5% dextrose 250mL (32 mcg/mL) infusion  0.5-16 mcg/min IntraVENous TITRATE  acetaminophen (TYLENOL) suppository 650 mg  650 mg Rectal Q4H PRN  
 acetaminophen (TYLENOL) tablet 650 mg  650 mg Oral Q4H PRN  
 albuterol (PROVENTIL HFA, VENTOLIN HFA, PROAIR HFA) inhaler 2 Puff  2 Puff Inhalation Q4H PRN  
 docusate sodium (COLACE) capsule 100 mg  100 mg Oral BID  folic acid (FOLVITE) tablet 1 mg  1 mg Oral DAILY  pravastatin (PRAVACHOL) tablet 20 mg  20 mg Oral QHS  sertraline (ZOLOFT) tablet 50 mg  50 mg Oral DAILY  thiamine mononitrate (B-1) tablet 100 mg  100 mg Oral DAILY  ondansetron (ZOFRAN) injection 4 mg  4 mg IntraVENous Q6H PRN  
 hydrOXYzine pamoate (VISTARIL) capsule 25 mg  25 mg Oral TID PRN  chlorhexidine (PERIDEX) 0.12 % mouthwash 15 mL  15 mL Oral Q12H Vitals:  
 11/28/20 1100 11/28/20 1121 11/28/20 1444 11/28/20 1520 BP:      
Pulse:  86 85 85 Resp:  26 30 Temp: 100 °F (37.8 °C) SpO2:  98% 99% 98% Weight:      
Height:      
 
Objective:  
General: On ventilator, sedated, no acute distress. HEENT: no Icterus, no Pallor, ET tube in place  
neck: Neck is supple, No JVD Lungs: Decreased breath sounds at the bases, no rhonchi, few scattered rales+, CVS: heart sounds normal, no murmurs, no rubs. GI: soft, nontender, normal BS. Extremeties: no cyanosis, 1+ dependent edema+ Neuro: On ventilator, sedated Skin: normal skin turgor, no skin rashes. Intake and Output: 
Current Shift: 11/28 0701 - 11/28 1900 In: 240 Out: - Last three shifts: 11/26 1901 - 11/28 0700 In: 300 Out: 2749 [Urine:2225] Lab/Data Review: 
Recent Labs  
  11/28/20 
0625 11/27/20 
0500 11/26/20 
0430 WBC  --  14.2* 16.0* HGB 8.8* 9.6* 9.5* HCT 28.9* 31.6* 31.8* PLT  --  105* 100* Recent Labs  
  11/28/20 
0625 11/27/20 
1815 11/27/20 
0500  11/26/20 
0430 * 150* 149*   < > 151*  
K 2.3* 2.6* 2.9*   < > 2.8*  
* 113* 112*   < > 112* CO2 27 26 27   < > 26 * 147* 140*   < > 122* BUN 54* 60* 58*   < > 59* CREA 1.43* 1.40* 1.67*   < > 1.74* CA 6.4* 6.6* 6.4*   < > 6.2*  
MG  --   --  1.8  --  2.1 PHOS 1.8*  --  4.2  --  5.0* ALB 2.8*  --  3.1*  --  3.2*  
 < > = values in this interval not displayed. Recent Labs  
  11/28/20 
0430 11/27/20 
0544 11/26/20 
0430 PH 7.524* 7.39 7.390 PCO2 30* 42 38 PO2 61* 88 88 HCO3 26 24 23 FIO2 21.0 30 30.0 Recent Results (from the past 24 hour(s)) METABOLIC PANEL, BASIC Collection Time: 11/27/20  6:15 PM  
Result Value Ref Range Sodium 150 (H) 136 - 145 mmol/L Potassium 2.6 (LL) 3.5 - 5.1 mmol/L Chloride 113 (H) 97 - 108 mmol/L  
 CO2 26 21 - 32 mmol/L Anion gap 11 5 - 15 mmol/L Glucose 147 (H) 65 - 100 mg/dL BUN 60 (H) 6 - 20 mg/dL Creatinine 1.40 (H) 0.70 - 1.30 mg/dL BUN/Creatinine ratio 43 (H) 12 - 20 GFR est AA 60 (L) >60 ml/min/1.73m2 GFR est non-AA 49 (L) >60 ml/min/1.73m2 Calcium 6.6 (L) 8.5 - 10.1 mg/dL GLUCOSE, POC Collection Time: 11/27/20  9:28 PM  
Result Value Ref Range Glucose (POC) 179 (H) 65 - 100 mg/dL Performed by [employer]   
BLOOD GAS, ARTERIAL Collection Time: 11/28/20  4:30 AM  
Result Value Ref Range pH 7.524 (H) 7.35 - 7.45    
 PCO2 30 (L) 35 - 45 mmHg PO2 61 (L) 75 - 100 mmHg O2 SAT 93 (L) >95 % BICARBONATE 26 22 - 26 mmol/L  
 BASE EXCESS 1.8 0 - 2 mmol/L  
 O2 METHOD VENT    
 FIO2 21.0 % MODE SIMV Tidal volume 500 PRESSURE SUPPORT 10.0 EPAP/CPAP/PEEP 5.0    
 SITE Right Brachial    
 DAMON'S TEST PASS Critical value read back PENDING   
HGB & HCT Collection Time: 11/28/20  6:25 AM  
Result Value Ref Range HGB 8.8 (L) 12.1 - 17.0 g/dL HCT 28.9 (L) 36.6 - 50.3 % RENAL FUNCTION PANEL Collection Time: 11/28/20  6:25 AM  
Result Value Ref Range Sodium 152 (H) 136 - 145 mmol/L Potassium 2.3 (LL) 3.5 - 5.1 mmol/L Chloride 114 (H) 97 - 108 mmol/L  
 CO2 27 21 - 32 mmol/L Anion gap 11 5 - 15 mmol/L Glucose 132 (H) 65 - 100 mg/dL BUN 54 (H) 6 - 20 mg/dL Creatinine 1.43 (H) 0.70 - 1.30 mg/dL BUN/Creatinine ratio 38 (H) 12 - 20 GFR est AA 58 (L) >60 ml/min/1.73m2 GFR est non-AA 48 (L) >60 ml/min/1.73m2 Calcium 6.4 (LL) 8.5 - 10.1 mg/dL Phosphorus 1.8 (L) 2.6 - 4.7 mg/dL Albumin 2.8 (L) 3.5 - 5.0 g/dL Assessment and Plan: #1 severe hypercalcemia.  -On admission corrected calcium was 14.6. Etiology unclear, hypercalcemia resolved now, 
suspect multiple myeloma, immunoelectrophoresis studies showed IgG monoclonal protein  
low PTH compatible with hypercalcemia, borderline low vitamin D level Hypercalcemia resolved and repeat calcium level today is 6.4 today Will give IV calcium gluconate 2gm x 2 doses today Continue calcium carbonate po through NG tube 2. Fluid overload/dependent edema+:  improved edema Pleural effusion+ Hold lasix until hypokalemia is corrected 
 monitor electrolytes 3. Acute kidney injury on ?? CKD Renal functions showed gradual improvement suspect baseline chronic kidney disease 
urine random protein creatinine ratio is 2.0 
  
3.  hypokalemia. Persistent Will give 10meq kcl x 5 runs today and repeat K Continue to monitor and supplement as needed 4. Hypernatremia.  -From free water deficit. dc Lasix  And continue to monitor sodium levels 5. Acute respiratory failure, on ventilator Sepsis/UTI Pulmonary and ID following the patient Continue IV antibiotics as per ID 
  
6. Atrial fibrillation with rapid ventricular rate : Cardiology following the patient, on amiodarone and digoxin Signed By: Shahrzad Guadalupe MD   
 November 28, 2020

## 2020-11-28 NOTE — PROGRESS NOTES
Pulmonology and Critical Care Progress Note Subjective:  
 
Patient seen and examined in his room this evening, I discussed the case in detail with the bedside nurse, the respiratory therapist, and his wife. I had a goals of care discussion with his wife as well I let her know that he has been intubated for more than 10 days and he really has not shown much improvement in his mental status. When he comes off of the sedation, he is sometimes agitated but is not following commands and not really doing anything purposeful. I did have a discussion about being aggressive versus not being aggressive, where the aggressive case would include tracheostomy. She states that she does not feel that he would have ever wanted a tracheostomy. Remains critically ill Intubated on mechanical ventilation Ventilator has been changed to AC//18/40%/5 ABG today 7.5/30/61 however this was during a period of hypoxia earlier today, he required going up on FiO2 to 60%. Now his hypoxia significantly improved. Possible mucous plugging. ABG as outlined below remained stable Post bronchoscopy 11/25, cultures no growth to date. Chest x-ray shows improvement in bilateral infiltrates diuresis Lasix being held for persistent hypokalemia. Status post thoracentesis right side by interventional radiology 11/23 Patient results consistent with transudate Cytology negative Current Facility-Administered Medications Medication Dose Route Frequency Provider Last Rate Last Dose  spironolactone (ALDACTONE) tablet 25 mg  25 mg Oral BID Rey Donald MD   25 mg at 11/28/20 1512  
 0.9% sodium chloride 500 mL with potassium chloride 50 mEq infusion   IntraVENous CONTINUOUS Rey Donald MD      
 potassium chloride (KLOR-CON) packet for solution 20 mEq  20 mEq Per NG tube Q8H Rey Donald MD      
 calcium gluconate 2 g/100 mL sodium chloride (ISO-OSM)  2 g IntraVENous Q8H Rey Donald MD      
 0.45% sodium chloride 500 mL with potassium chloride 50 mEq infusion   IntraVENous CONTINUOUS Rey Donald MD      
 propofol (DIPRIVAN) 10 mg/mL infusion  0-50 mcg/kg/min IntraVENous TITRATE Cristo Warner DO 12.2 mL/hr at 11/28/20 0921 20 mcg/kg/min at 11/28/20 1400  digoxin (LANOXIN) injection 125 mcg  125 mcg IntraVENous DAILY Hiren Woods MD   125 mcg at 11/28/20 8892  calcium carbonate (TUMS) chewable tablet 400 mg [elemental]  400 mg Oral BID Francy Mariee MD   400 mg at 11/28/20 3632  lidocaine (XYLOCAINE) 10 mg/mL (1 %) injection    PRN Bartolome PONCE MD   30 mL at 11/25/20 1215  
 mineral oil (topical)    PRN Ganesh Daniels MD   5 mL at 11/25/20 1211  pantoprazole (PROTONIX) 40 mg in 0.9% sodium chloride 10 mL injection  40 mg IntraVENous BID Gio Torrez MD   40 mg at 11/28/20 0921  
 glycopyrrolate (ROBINUL) tablet 1 mg  1 mg Oral TID Ganesh Daniels MD   1 mg at 11/28/20 1512  metoclopramide HCl (REGLAN) injection 10 mg  10 mg IntraVENous Q6H Danny Trevino MD   10 mg at 11/28/20 1511  hydrocortisone Sod Succ (PF) (SOLU-CORTEF) injection 50 mg  50 mg IntraVENous Q8H Curry Warner DO   50 mg at 11/28/20 1511  
 fentaNYL (PF) 1,500 mcg/30 mL (50 mcg/mL) infusion  0-200 mcg/hr IntraVENous TITRATE Kaylee Romero DO   Stopped at 11/27/20 1341  
 vasopressin (VASOSTRICT) 40 Units in 0.9% sodium chloride 40 mL infusion  0.04 Units/min IntraVENous CONTINUOUS Curry Warner DO 2.4 mL/hr at 11/19/20 2033 0.04 Units/min at 11/19/20 2033  meropenem (MERREM) 1 g in sterile water (preservative free) 20 mL IV syringe  1 g IntraVENous Q12H Shonda Ramirez MD   1 g at 11/28/20 3738  morphine injection 2 mg  2 mg IntraVENous Q4H PRN Licha Noble MD   2 mg at 11/23/20 0739  
 NOREPINephrine (LEVOPHED) 8 mg in 5% dextrose 250mL (32 mcg/mL) infusion  0.5-16 mcg/min IntraVENous TITRATE Licha Noble MD   Stopped at 11/23/20 1770  acetaminophen (TYLENOL) suppository 650 mg  650 mg Rectal Q4H PRN Martínez PONCE MD   650 mg at 20 1225  acetaminophen (TYLENOL) tablet 650 mg  650 mg Oral Q4H PRN [facility] Rom , NP   650 mg at 20 1403  albuterol (PROVENTIL HFA, VENTOLIN HFA, PROAIR HFA) inhaler 2 Puff  2 Puff Inhalation Q4H PRN [facility] Rom ROSARIO, NP      
 docusate sodium (COLACE) capsule 100 mg  100 mg Oral BID [facility]  , NP   100 mg at 20 8042  folic acid (FOLVITE) tablet 1 mg  1 mg Oral DAILY [facility]  , NP   1 mg at 20 8292  pravastatin (PRAVACHOL) tablet 20 mg  20 mg Oral QHS [facility]  , NP   20 mg at 20 2155  sertraline (ZOLOFT) tablet 50 mg  50 mg Oral DAILY [facility]  , NP   50 mg at 20 5745  thiamine mononitrate (B-1) tablet 100 mg  100 mg Oral DAILY [facility]  , NP   100 mg at 20 3899  ondansetron (ZOFRAN) injection 4 mg  4 mg IntraVENous Q6H PRN [facility] Rom ROSARIO, NP      
 hydrOXYzine pamoate (VISTARIL) capsule 25 mg  25 mg Oral TID PRN [facility] Rom , NP   25 mg at 20 1857  chlorhexidine (PERIDEX) 0.12 % mouthwash 15 mL  15 mL Oral Q12H [facility] Rom , NP   15 mL at 20 8489 Allergies Allergen Reactions  Precedex [Dexmedetomidine] Other (comments) Severe bradycardia  Codeine Rash Review of Systems: 
Review of systems not obtained due to patient factors. Objective:  
 
Blood pressure 123/70, pulse 85, temperature 100 °F (37.8 °C), resp. rate 30, height 6' 2\" (1.88 m), weight 101.6 kg (223 lb 15.8 oz), SpO2 98 %. Temp (24hrs), Av °F (37.8 °C), Min:99.9 °F (37.7 °C), Max:100.2 °F (37.9 °C) Intake and Output: 
Current Shift: 701 - 1900 In: 240 Out: - Last 3 Shifts: 1901 - 700 In: 300 Out: 6887 [Urine:2225] Physical Exam:  
General appearance: Elderly male who is sedated on the ventilator, no distress, appears older than stated age, chronically ill-appearing Head: Normocephalic, without obvious abnormality, atraumatic Eyes: negative Neck: no obvious adenopathy, no carotid bruit; neck is stiff Lungs: He has few scattered rhonchi. Equal breath sounds bilaterally. Small secretions per RN. Heart: irregularly irregular rhythm, bradycardic, no rub, no obvious murmur Abdomen: soft, non-tender. Bowel sounds normal. No masses,  no organomegaly; NG tube in place. He also has diarrhea and has a rectal tube in place. He is not tolerating his tube feeds. Pulses: 2+ and symmetric Skin: Dry, intact, with bruising throughout the arms and legs. +3 pitting edema in extremities bilaterally. Lymph nodes: Cervical, supraclavicular, and axillary nodes normal. 
Neurologic: Sedated on the ventilator, not following any commands. Additional comments:None Lab/Data Review: All lab results for the last 24 hours reviewed. Recent Results (from the past 24 hour(s)) METABOLIC PANEL, BASIC Collection Time: 11/27/20  6:15 PM  
Result Value Ref Range Sodium 150 (H) 136 - 145 mmol/L Potassium 2.6 (LL) 3.5 - 5.1 mmol/L Chloride 113 (H) 97 - 108 mmol/L  
 CO2 26 21 - 32 mmol/L Anion gap 11 5 - 15 mmol/L Glucose 147 (H) 65 - 100 mg/dL BUN 60 (H) 6 - 20 mg/dL Creatinine 1.40 (H) 0.70 - 1.30 mg/dL BUN/Creatinine ratio 43 (H) 12 - 20 GFR est AA 60 (L) >60 ml/min/1.73m2 GFR est non-AA 49 (L) >60 ml/min/1.73m2 Calcium 6.6 (L) 8.5 - 10.1 mg/dL GLUCOSE, POC Collection Time: 11/27/20  9:28 PM  
Result Value Ref Range Glucose (POC) 179 (H) 65 - 100 mg/dL Performed by Wing Rodriguez   
BLOOD GAS, ARTERIAL Collection Time: 11/28/20  4:30 AM  
Result Value Ref Range pH 7.524 (H) 7.35 - 7.45    
 PCO2 30 (L) 35 - 45 mmHg PO2 61 (L) 75 - 100 mmHg O2 SAT 93 (L) >95 % BICARBONATE 26 22 - 26 mmol/L  
 BASE EXCESS 1.8 0 - 2 mmol/L  
 O2 METHOD VENT    
 FIO2 21.0 % MODE SIMV Tidal volume 500 PRESSURE SUPPORT 10.0  EPAP/CPAP/PEEP 5.0    
 SITE Right Brachial    
 DAMON'S TEST PASS Critical value read back PENDING   
HGB & HCT Collection Time: 11/28/20  6:25 AM  
Result Value Ref Range HGB 8.8 (L) 12.1 - 17.0 g/dL HCT 28.9 (L) 36.6 - 50.3 % RENAL FUNCTION PANEL Collection Time: 11/28/20  6:25 AM  
Result Value Ref Range Sodium 152 (H) 136 - 145 mmol/L Potassium 2.3 (LL) 3.5 - 5.1 mmol/L Chloride 114 (H) 97 - 108 mmol/L  
 CO2 27 21 - 32 mmol/L Anion gap 11 5 - 15 mmol/L Glucose 132 (H) 65 - 100 mg/dL BUN 54 (H) 6 - 20 mg/dL Creatinine 1.43 (H) 0.70 - 1.30 mg/dL BUN/Creatinine ratio 38 (H) 12 - 20 GFR est AA 58 (L) >60 ml/min/1.73m2 GFR est non-AA 48 (L) >60 ml/min/1.73m2 Calcium 6.4 (LL) 8.5 - 10.1 mg/dL Phosphorus 1.8 (L) 2.6 - 4.7 mg/dL Albumin 2.8 (L) 3.5 - 5.0 g/dL METABOLIC PANEL, BASIC Collection Time: 11/28/20  3:50 PM  
Result Value Ref Range Sodium 151 (H) 136 - 145 mmol/L Potassium 2.5 (LL) 3.5 - 5.1 mmol/L Chloride 115 (H) 97 - 108 mmol/L  
 CO2 27 21 - 32 mmol/L Anion gap 9 5 - 15 mmol/L Glucose 122 (H) 65 - 100 mg/dL BUN 53 (H) 6 - 20 mg/dL Creatinine 1.41 (H) 0.70 - 1.30 mg/dL BUN/Creatinine ratio 38 (H) 12 - 20 GFR est AA 59 (L) >60 ml/min/1.73m2 GFR est non-AA 49 (L) >60 ml/min/1.73m2 Calcium 6.1 (LL) 8.5 - 10.1 mg/dL Chest X-Ray:  
XR CHEST PORT Final Result Stable right neck central venous catheter. Now present, there is an ET tube 4-5 
cm above the samuel. NG tube projects below the left hemidiaphragm. Improved 
aeration through the right lung; clearing patchy reticular markings. No 
interstitial or alveolar pulmonary edema. No pneumothorax or sizable pleural 
effusion. XR CHEST PORT Final Result Impression:   
Successful placement of a triple-lumen central venous catheter. The catheter is  
ready for use. IR INSERT NON TUNL CVC OVER 5 YRS Final Result Impression:   
Successful placement of a triple-lumen central venous catheter. The catheter is  
ready for use. IR [facility] Final Result Impression:   
Successful placement of a triple-lumen central venous catheter. The catheter is  
ready for use. XR CHEST PORT Final Result Impression: The cardiomediastinal silhouette is appropriate for age, technique,  
and lung expansion. Pulmonary vasculature is not congested. The lungs are  
essentially clear. No effusion or pneumothorax is seen. US RETROPERITONEUM COMP Final Result Impression: Normal exam  
  
  
XR CHEST PORT Final Result IMPRESSION:  No evidence of an acute cardiopulmonary process. CT HEAD WO CONT Final Result IMPRESSION: Chronic findings as above. CT CHEST WO CONT    (Results Pending) CT ABD PELV WO CONT    (Results Pending) XR CHEST PORT    (Results Pending) CT imaging: CT Results  (Last 48 hours) None PFTs: PFT Results  (Last 3 results in the past 10 years) None Assessment:  
 
Patient is a 79-year-old  male with a history of atrial fibrillation, osteoarthritis, hyperlipidemia, reported COPD, and hypertension who [facility] from his nursing home for altered mental status. He has been admitted to the ICU and is currently in septic shock on vasopressors and has been intubated on 11/19/2020 for failure to protect his airway due to altered mental status. Plan:  
 
1.)  Acute respiratory failure Likely due to CHF, altered mental status, failure to protect airway Currently intubated on mechanical ventilation. I have a low suspicion that we w be able to adequately extubate this patient given persistent encephalopathy. Intubated on 11/19/2020 ABGs and chest x-ray reviewed as outlined above Pleural fluid cytology negative Status post bronchoscopy 11/25 Results no growth to date.  
 
Chest x-ray significantly improved with diuresis, but his problem is mental status. Renal Function stable; patient is severely hypokalemic. He is getting aggressive repletion today. Holding Lasix. Continue the patient on propofol, will add fentanyl given that he is already on propofol at 40. I had a long discussion with the patient's wife today regarding my thoughts that it would be unlikely that he would be extubated below and that if we wanted to be aggressive he would need a tracheostomy. I do not feel that this medical condition is recoverable and I would push for comfort care with the family if agreed upon by the rest of the consultants. 2.)  Septic shock ESBL E. Coli Now off vasopressors On parenteral antibiotic coverage Further changes in antibiotics based on clinical response and culture results Covid negative Appears quite volume overloaded clinically 3.)  Acute encephalopathy Likely multifactorial from sepsis, urinary tract infection, and multiple electrolyte abnormalities including hypernatremia, hypercalcemia, and hypokalemia. Continue on current antibiotic regimen as above; neurology/nephrology/hematology following closely. Ammonia level normal, TSH mildly elevated. 4.)  Acute kidney injury Creatinine seem to plateau around 5.98 and had very steadily been decreasing with improved calcium level and fluid administration. Likely prerenal from volume depletion and sepsis. Creatinine 1.4 today Nephrology following closely, appreciate their assistance. 5.)  Atrial fibrillation with RVR/diastolic congestive heart failure Presented with A. fib RVR Stable Likely secondary to sepsis Echo shows preserved ejection fraction Cardiology input appreciated Continue digoxin Quite volume overloaded Continue diuresis 6) Hypercalcemia Corrected calcium 14.6 on admission, this has steadily improved every day with calcitonin fluids. Actually getting calcium repletion today. Nephrology following closely Possible myeloma CODE STATUS: DNR 
 Lines/Tubes: ET tube, right IJ central line, Mcmillan catheter, NG tube Prophylaxis: 
Stress Ulcer Protocol Active: Yes, Protonix 40 mg IV twice daily Deep Vein Thrombosis Protocol Active: Yes, SCD's Disposition: DNR status is now in effect. Total critical care time spent with patient: 50 minutes with direct visualization of the patient's ventilator and management along with respiratory therapist, long discussion with the respiratory therapist and the bedside nurse regarding the plan of care. Mayda Romeo DO 
[facility] 11/28/2020 
10:57 AM

## 2020-11-28 NOTE — PROGRESS NOTES
Hospitalist Progress Note Daily Progress Note: 11/28/2020 
 
48-year-old male sent here from [facility] with reports from staff of altered mental status, lethargy, not eating and dehydration. He has a history of atrial fibrillation. UA suggestive uti, zosyn initiated. Subjective:  
Patient seen and evaluated at bedside, overnight events noted, patient currently intubated responding to painful/verbal stimuli, discussed with RN at bedside. Patient is intubated and sedated. Problem List: 
Problem List as of 11/28/2020 Date Reviewed: 11/15/2020 Codes Class Noted - Resolved * (Principal) Seps[facility] ([facility] ICD-10-CM: A41.9 ICD-9-CM: 038.9, 995.91  11/16/2020 - Present Hypoglycemia ICD-10-CM: E16.2 ICD-9-CM: 251.2  11/16/2020 - Present Encephalopathy acute ICD-10-CM: G93.40 ICD-9-CM: 348.30  11/16/2020 - Present UTI (urinary tract infection) ICD-10-CM: N39.0 ICD-9-CM: 599.0  11/15/2020 - Present BRE (acute kidney injury) ([facility] ICD-10-CM: N17.9 ICD-9-CM: 584.9  11/15/2020 - Present AMS (altered mental status) ICD-10-CM: C62.15 
ICD-9-CM: 780.97  11/15/2020 - Present Chronic atrial fibrillation (HCC) ICD-10-CM: I48.20 ICD-9-CM: 427.31  8/12/2020 - Present HTN (hypertension), benign ICD-10-CM: I10 
ICD-9-CM: 401.1  8/12/2020 - Present COPD (chronic obstructive pulmonary disease) (HCC) ICD-10-CM: J44.9 ICD-9-CM: 283  8/12/2020 - Present Dyslipidemia ICD-10-CM: E78.5 ICD-9-CM: 272.4  8/12/2020 - Present Depression ICD-10-CM: F32.9 ICD-9-CM: 714  8/12/2020 - Present Cellulitis of left upper extremity ICD-10-CM: L03.114 
ICD-9-CM: 682.3  8/11/2020 - Present Medications reviewed Current Facility-Administered Medications Medication Dose Route Frequency  potassium chloride 10 mEq in 100 ml IVPB  10 mEq IntraVENous Q1H  
 propofol (DIPRIVAN) 10 mg/mL infusion  0-50 mcg/kg/min IntraVENous TITRATE  digoxin (LANOXIN) injection 125 mcg  125 mcg IntraVENous DAILY  furosemide (LASIX) injection 40 mg  40 mg IntraVENous DAILY  calcium carbonate (TUMS) chewable tablet 400 mg [elemental]  400 mg Oral BID  lidocaine (XYLOCAINE) 10 mg/mL (1 %) injection    PRN  mineral oil (topical)    PRN  pantoprazole (PROTONIX) 40 mg in 0.9% sodium chloride 10 mL injection  40 mg IntraVENous BID  
 glycopyrrolate (ROBINUL) tablet 1 mg  1 mg Oral TID  metoclopramide HCl (REGLAN) injection 10 mg  10 mg IntraVENous Q6H  
 hydrocortisone Sod Succ (PF) (SOLU-CORTEF) injection 50 mg  50 mg IntraVENous Q8H  
 fentaNYL (PF) 1,500 mcg/30 mL (50 mcg/mL) infusion  0-200 mcg/hr IntraVENous TITRATE  vasopressin (VASOSTRICT) 40 Units in 0.9% sodium chloride 40 mL infusion  0.04 Units/min IntraVENous CONTINUOUS  
 meropenem (MERREM) 1 g in sterile water (preservative free) 20 mL IV syringe  1 g IntraVENous Q12H  
 morphine injection 2 mg  2 mg IntraVENous Q4H PRN  
 NOREPINephrine (LEVOPHED) 8 mg in 5% dextrose 250mL (32 mcg/mL) infusion  0.5-16 mcg/min IntraVENous TITRATE  acetaminophen (TYLENOL) suppository 650 mg  650 mg Rectal Q4H PRN  
 acetaminophen (TYLENOL) tablet 650 mg  650 mg Oral Q4H PRN  
 albuterol (PROVENTIL HFA, VENTOLIN HFA, PROAIR HFA) inhaler 2 Puff  2 Puff Inhalation Q4H PRN  
 docusate sodium (COLACE) capsule 100 mg  100 mg Oral BID  folic acid (FOLVITE) tablet 1 mg  1 mg Oral DAILY  pravastatin (PRAVACHOL) tablet 20 mg  20 mg Oral QHS  sertraline (ZOLOFT) tablet 50 mg  50 mg Oral DAILY  thiamine mononitrate (B-1) tablet 100 mg  100 mg Oral DAILY  ondansetron (ZOFRAN) injection 4 mg  4 mg IntraVENous Q6H PRN  
 hydrOXYzine pamoate (VISTARIL) capsule 25 mg  25 mg Oral TID PRN  chlorhexidine (PERIDEX) 0.12 % mouthwash 15 mL  15 mL Oral Q12H Review of Systems:  
Unobtainable 2/2 encephalopathic/acute illness Objective:  
Physical Exam:  
 
Visit Vitals /87 Pulse 75  
Temp 100.2 °F (37.9 °C) Resp 24 Ht 6' 2\" (1.88 m) Wt 101.6 kg (223 lb 15.8 oz) SpO2 100% BMI 28.76 kg/m² O2 Flow Rate (L/min): 0.5 l/min O2 Device: Ventilator Temp (24hrs), Av.9 °F (38.3 °C), Min:99.9 °F (37.7 °C), Max:102 °F (38.9 °C) No intake/output data recorded.  190 -  0700 In: 300 Out: 6673 [Urine:2225] Constitutional: Currently intubated and sedated Head: Normocephalic and atraumatic. Mouth: oral mucosa dry, ET tube is in situ Neck: supple Cardiovascular:Irreg/irreg Lungs:decreased air entry bilaterally in bilateral lower lung zones Abdominal: Soft. non tender Neurological: obtunded, moves all ext, 
Skin: Right lower shin with ulcer, yellow drainage Left arm with skin tear. +ve edema Multiple ecchymotic, skin is friable/thin Data Review:  
   
Recent Days: 
Recent Labs  
  20 
0625 20 
0500 20 
0430 WBC  --  14.2* 16.0* HGB 8.8* 9.6* 9.5* HCT 28.9* 31.6* 31.8* PLT  --  105* 100* Recent Labs  
  20 
0625 20 
1815 20 
0500  20 
0430 * 150* 149*   < > 151*  
K 2.3* 2.6* 2.9*   < > 2.8*  
* 113* 112*   < > 112* CO2 27 26 27   < > 26 * 147* 140*   < > 122* BUN 54* 60* 58*   < > 59* CREA 1.43* 1.40* 1.67*   < > 1.74* CA 6.4* 6.6* 6.4*   < > 6.2*  
MG  --   --  1.8  --  2.1 PHOS 1.8*  --  4.2  --  5.0* ALB 2.8*  --  3.1*  --  3.2*  
 < > = values in this interval not displayed. Recent Labs  
  20 
0430 20 
0544 20 
0430 PH 7.524* 7.39 7.390 PCO2 30* 42 38 PO2 61* 88 88 HCO3 26 24 23 FIO2 21.0 30 30.0  
 
 
24 Hour Results: 
Recent Results (from the past 24 hour(s)) GLUCOSE, POC Collection Time: 20 11:52 AM  
Result Value Ref Range Glucose (POC) 163 (H) 65 - 100 mg/dL Performed by Pedro Pablo Britton METABOLIC PANEL, BASIC Collection Time: 20  6:15 PM  
Result Value Ref Range  Sodium 150 (H) 136 - 145 mmol/L  
 Potassium 2.6 (LL) 3.5 - 5.1 mmol/L Chloride 113 (H) 97 - 108 mmol/L  
 CO2 26 21 - 32 mmol/L Anion gap 11 5 - 15 mmol/L Glucose 147 (H) 65 - 100 mg/dL BUN 60 (H) 6 - 20 mg/dL Creatinine 1.40 (H) 0.70 - 1.30 mg/dL BUN/Creatinine ratio 43 (H) 12 - 20 GFR est AA 60 (L) >60 ml/min/1.73m2 GFR est non-AA 49 (L) >60 ml/min/1.73m2 Calcium 6.6 (L) 8.5 - 10.1 mg/dL GLUCOSE, POC Collection Time: 11/27/20  9:28 PM  
Result Value Ref Range Glucose (POC) 179 (H) 65 - 100 mg/dL Performed by David Coronado   
BLOOD GAS, ARTERIAL Collection Time: 11/28/20  4:30 AM  
Result Value Ref Range pH 7.524 (H) 7.35 - 7.45    
 PCO2 30 (L) 35 - 45 mmHg PO2 61 (L) 75 - 100 mmHg O2 SAT 93 (L) >95 % BICARBONATE 26 22 - 26 mmol/L  
 BASE EXCESS 1.8 0 - 2 mmol/L  
 O2 METHOD VENT    
 FIO2 21.0 % MODE SIMV Tidal volume 500 PRESSURE SUPPORT 10.0 EPAP/CPAP/PEEP 5.0    
 SITE Right Brachial    
 DAMON'S TEST PASS Critical value read back PENDING   
HGB & HCT Collection Time: 11/28/20  6:25 AM  
Result Value Ref Range HGB 8.8 (L) 12.1 - 17.0 g/dL HCT 28.9 (L) 36.6 - 50.3 % RENAL FUNCTION PANEL Collection Time: 11/28/20  6:25 AM  
Result Value Ref Range Sodium 152 (H) 136 - 145 mmol/L Potassium 2.3 (LL) 3.5 - 5.1 mmol/L Chloride 114 (H) 97 - 108 mmol/L  
 CO2 27 21 - 32 mmol/L Anion gap 11 5 - 15 mmol/L Glucose 132 (H) 65 - 100 mg/dL BUN 54 (H) 6 - 20 mg/dL Creatinine 1.43 (H) 0.70 - 1.30 mg/dL BUN/Creatinine ratio 38 (H) 12 - 20 GFR est AA 58 (L) >60 ml/min/1.73m2 GFR est non-AA 48 (L) >60 ml/min/1.73m2 Calcium 6.4 (LL) 8.5 - 10.1 mg/dL Phosphorus 1.8 (L) 2.6 - 4.7 mg/dL Albumin 2.8 (L) 3.5 - 5.0 g/dL Assessment/  
 
Patient Active Problem List  
Diagnosis Code  Cellulitis of left upper extremity L03.114  
 Chronic atrial fibrillation (HCC) I48.20  
 HTN (hypertension), benign I10  
 COPD [facility]ch[facility] obstructive [facility] disease[facility] ([facility] J44.9  Dyslipidemia E78.5  Depression F32.9  
 UTI (urinary tract infection) N39.0  BRE (acute kidney injury) ([facility] N17.9  AMS (altered mental status) R41.82  Sepsis ([facility] A41.9  Hypoglycemia E16.2  
 Encephalopathy acute G93.40 Plan: 
 
Septic shockPatient presented with above-mentioned symptomatology was found to meet severe sepsis criteria secondary to combination of urinary tract infection as well as developing bilateral pneumonia, patient is COVID-19 negative, currently improving, patient been titrated off of levophed 
follow-up blood cultures Urine Culture consistent with ESBL E. Coli Continue meropenem for Abx coverage Discontinued IVF Infectious disease recommendations appreciated, will continue to follow Critical care recommendations appreciated will continue to follow Acute respiratory failure/pneumoniapatient was found to be in acute respiratory failure requiring emergent endotracheal intubation, likely secondary to developing bilateral pneumonia, patient currently off of pressors Follow-up sputum culture Follow-up blood cultures Continue Meropenem for Abx coverage Attempt weaning trial 
Pulmonology consult appreciated, will continue to follow Infectious disease recommendations appreciated, will continue to follow Hypoactive bowel soundsresolved Atrial fibrillation with RVRpatient presented with atrial fibrillation with RVR likely secondary to ongoing severe sepsis Dig level was 1.3 Transthoracic echo shows preserved ejection fraction Cardiology consult appreciated, will continue to follow Acute on chronic diastolic heart failurepatient was found to have significant bilateral lower extremity edema as well as radiographic evidence of volume overload consistent with acute on chronic diastolic heart failure Discontinued IV fluids Lasix 40 mg IV twice daily Continue to monitor intake and output Cardiology consult appreciated, will continue to follow Anemialikely multifactorial, however concern for GI bleed, s/p 2 units prbc 2 days back with appropriate response, currently hemoglobin hematocrit remained stable Continue protonix 40mg iv bid Continue to trend H/H Maintain active type and screen Follow-up gastroenterology recommendations Follow-up hematology recommendations Hypokalemiareplete K and recheck K Thrombocytopenialikely multifactorial, concern for possible early DIC, however platelet count remained stable. Hematology recommendations appreciated Patient currently does not have any active bleeding-Recommend platelet transfusion if platelet count is less than 20,000 with active bleeding Hyperlipidemiacontinue statin ProphylaxisSCDs FENcontinue tube feeding, replete potassium and magnesium DNR,  
surrogate decision-maker is patient's wife Care Plan discussed with: Patient/Family and Nurse Claire Ross MD

## 2020-11-28 NOTE — PROGRESS NOTES
All of patients wound care completed including bilateral arms, and right lower leg. New mepilex placed on the patient sacrum and bilateral ankle/heels.

## 2020-11-29 NOTE — PROGRESS NOTES
When doing morning assessment, patient had stool leaking around his flexiseal. On assessment, flexiseal not flushing and leaking stool. I pulled the flexisel out and it was being blocked by a small amount of formed stool, flexiseal left out at this time due to solid stool still noted. Will continue to monitor.

## 2020-11-29 NOTE — PROGRESS NOTES
Progress Note 
 
 
11/29/2020 12:30 PM 
NAME: Kirt Ruiz MRN:  [id number] Admit Diagnosis: UTI (urinary tract infection) [N39.0] AMS (altered mental status) [R41.82] BRE (acute kidney injury) ([facility] [N17.9] Assessment/Plan: 1. Atrial fibrillation with rapid ventricular response. Patient's  heart rate currently in 50s to 90s: Continue low-dose digoxin. Will check digoxin levels in the morning 2. Hypotension, improved, using IV Levophed as needed. 3. UTI/ESBL E Coli on IV Meropenem 4. Respiratory failure, intubated on ventilatory support, status post bronchoscopy, followed by pulmonary 5. Acute kidney injury, being monitored and followed by nephrology []       High complexity decision making was performed in this patient at high risk for decompensation with multiple organ involvement. Subjective:  
 
Kirt Ruiz is intubated Review of Systems: 
 
 
Could NOT obtain due to: Altered mental status and intubation. Objective:  
  
Physical Exam: 
 
Last 24hrs VS reviewed since prior progress note. Most recent are: 
 
Visit Vitals BP (!) 93/54 (BP 1 Location: Right arm, BP Patient Position: At rest) Pulse 61 Temp 98.1 °F (36.7 °C) Resp 21 Ht 6' 2\" (1.88 m) Wt 96.9 kg (213 lb 10 oz) SpO2 98% BMI 27.43 kg/m² Intake/Output Summary (Last 24 hours) at 11/29/2020 1257 Last data filed at 11/29/2020 1100 Gross per 24 hour Intake 750 ml Output 600 ml Net 150 ml General Appearance: Intubated Ears/Nose/Mouth/Throat: Intubated  
neck: Supple. Chest: Lungs coarse breath sounds Cardiovascular: iregular rate and rhythm, S1S2 normal, no murmur. Abdomen: Soft, non-tender, bowel sounds are active. Extremities: No edema bilaterally. Skin: Warm and dry. []         Post-cath site without hematoma, bruit, tenderness, or thrill. Distal pulses intact. PMH/SH reviewed - no change compared to H&P Data Review Telemetry:  
 
EKG:  
[]  No new EKG for review T brain without acute pathology CT chest with pleural effusions and compressive atelectasis, having thoracentesis Lab Data Personally Reviewed: 
 
Recent Labs  
  11/29/20 
0500 11/28/20 0625 11/27/20 
0500 WBC 12.5*  --  14.2* HGB 8.9* 8.8* 9.6* HCT 29.6* 28.9* 31.6*  
*  --  105* No results for input(s): INR, PTP, APTT, INREXT, INREXT in the last 72 hours. Recent Labs  
  11/29/20 
0500 11/28/20 
1550 11/28/20 
0625 11/27/20 
0500 * 151* 152*   < > 149*  
K 3.3* 2.5* 2.3*   < > 2.9*  
* 115* 114*   < > 112* CO2 30 27 27   < > 27 BUN 60* 53* 54*   < > 58* CREA 1.29 1.41* 1.43*   < > 1.67* * 122* 132*   < > 140* CA 6.6* 6.1* 6.4*   < > 6.4* MG 1.8  --   --   --  1.8  
 < > = values in this interval not displayed. No results for input(s): CPK, CKNDX, TROIQ in the last 72 hours. No lab exists for component: CPKMB No results found for: CHOL, CHOLX, CHLST, CHOLV, HDL, HDLP, LDL, LDLC, DLDLP, TGLX, TRIGL, TRIGP, CHHD, CHHDX Recent Labs  
  11/29/20 
0500 11/28/20 
0625 11/27/20 
0500 ALB 2.5* 2.8* 3.1* Recent Labs  
  11/28/20 
0430 11/27/20 
0544 PH 7.524* 7.39  
PCO2 30* 42 PO2 61* 88 Medications Personally Reviewed: 
 
Current Facility-Administered Medications Medication Dose Route Frequency  spironolactone (ALDACTONE) tablet 25 mg  25 mg Oral BID  potassium chloride (KLOR-CON) packet for solution 20 mEq  20 mEq Per NG tube Q8H  propofol (DIPRIVAN) 10 mg/mL infusion  0-50 mcg/kg/min IntraVENous TITRATE  digoxin (LANOXIN) injection 125 mcg  125 mcg IntraVENous DAILY  calcium carbonate (TUMS) chewable tablet 400 mg [elemental]  400 mg Oral BID  lidocaine (XYLOCAINE) 10 mg/mL (1 %) injection    PRN  mineral oil (topical)    PRN  pantoprazole (PROTONIX) 40 mg in 0.9% sodium chloride 10 mL injection  40 mg IntraVENous BID  metoclopramide HCl (REGLAN) injection 10 mg  10 mg IntraVENous Q6H  hydrocortisone Sod Succ (PF) (SOLU-CORTEF) injection 50 mg  50 mg IntraVENous Q8H  
 fentaNYL (PF) 1,500 mcg/30 mL (50 mcg/mL) infusion  0-200 mcg/hr IntraVENous TITRATE  vasopressin (VASOSTRICT) 40 Units in 0.9% sodium chloride 40 mL infusion  0.04 Units/min IntraVENous CONTINUOUS  
 meropenem (MERREM) 1 g in sterile water (preservative free) 20 mL IV syringe  1 g IntraVENous Q12H  
 morphine injection 2 mg  2 mg IntraVENous Q4H PRN  
 NOREPINephrine (LEVOPHED) 8 mg in 5% dextrose 250mL (32 mcg/mL) infusion  0.5-16 mcg/min IntraVENous TITRATE  acetaminophen (TYLENOL) suppository 650 mg  650 mg Rectal Q4H PRN  
 acetaminophen (TYLENOL) tablet 650 mg  650 mg Oral Q4H PRN  
 albuterol (PROVENTIL HFA, VENTOLIN HFA, PROAIR HFA) inhaler 2 Puff  2 Puff Inhalation Q4H PRN  
 docusate sodium (COLACE) capsule 100 mg  100 mg Oral BID  folic acid (FOLVITE) tablet 1 mg  1 mg Oral DAILY  pravastatin (PRAVACHOL) tablet 20 mg  20 mg Oral QHS  sertraline (ZOLOFT) tablet 50 mg  50 mg Oral DAILY  thiamine mononitrate (B-1) tablet 100 mg  100 mg Oral DAILY  ondansetron (ZOFRAN) injection 4 mg  4 mg IntraVENous Q6H PRN  
 hydrOXYzine pamoate (VISTARIL) capsule 25 mg  25 mg Oral TID PRN  chlorhexidine (PERIDEX) 0.12 % mouthwash 15 mL  15 mL Oral Q12H Critical care time 30 minutes Ken Melendrez MD

## 2020-11-29 NOTE — PROGRESS NOTES
Pulmonology and Critical Care Progress Note Subjective:  
 
Patient seen and examined in his room this evening, I discussed the case in detail with the bedside nurse, the respiratory therapist, and his wife. I had another goals of care discussion with his wife this evening and I let her know that at this point I would be recommending less aggressive care. I explained to her about comfort care/hospice and how that would involve removing the ET tube. She states that she is probably leaning in this direction and that she would like her son, who lives in [place], to come and see his father prior to moving towards comfort care. Remains critically ill Intubated on mechanical ventilation Ventilator has been changed to AC//18/40%/5 No ABG or CXR today as they likely are moving toward comfort care. Post bronchoscopy 11/25, cultures no growth to date and cytology from brushings/washings are negative for malignancy. Lasix being held for persistent hypokalemia. Status post thoracentesis right side by interventional radiology 11/23 Patient results consistent with transudate Cytology negative Current Facility-Administered Medications Medication Dose Route Frequency Provider Last Rate Last Dose  spironolactone (ALDACTONE) tablet 25 mg  25 mg Oral BID Charu See MD   25 mg at 11/29/20 3085  potassium chloride (KLOR-CON) packet for solution 20 mEq  20 mEq Per NG tube Q8H Rey Donald MD   20 mEq at 11/29/20 1344  propofol (DIPRIVAN) 10 mg/mL infusion  0-50 mcg/kg/min IntraVENous TITRATE Curry Warner, DO 24.4 mL/hr at 11/29/20 1659 40 mcg/kg/min at 11/29/20 1659  digoxin (LANOXIN) injection 125 mcg  125 mcg IntraVENous DAILY Brea Mathew MD   125 mcg at 11/29/20 6404  calcium carbonate (TUMS) chewable tablet 400 mg [elemental]  400 mg Oral BID Charu See MD   400 mg at 11/29/20 6008  lidocaine (XYLOCAINE) 10 mg/mL (1 %) injection    CHRISTOPHER Ellis MD 30 mL at 11/25/20 1215  
 mineral oil (topical)    PRN Juan Carlos Madrigal MD   5 mL at 11/25/20 1211  pantoprazole (PROTONIX) 40 mg in 0.9% sodium chloride 10 mL injection  40 mg IntraVENous BID Ilir Moody MD   40 mg at 11/29/20 0813  
 metoclopramide HCl (REGLAN) injection 10 mg  10 mg IntraVENous Q6H Vazquez Stewart MD   10 mg at 11/29/20 1700  hydrocortisone Sod Succ (PF) (SOLU-CORTEF) injection 50 mg  50 mg IntraVENous Q8H Curry Warner DO   50 mg at 11/29/20 1344  fentaNYL (PF) 1,500 mcg/30 mL (50 mcg/mL) infusion  0-200 mcg/hr IntraVENous TITRATE Ailyn Yoon DO 1 mL/hr at 11/29/20 0812 50 mcg/hr at 11/29/20 0812  
 vasopressin (VASOSTRICT) 40 Units in 0.9% sodium chloride 40 mL infusion  0.04 Units/min IntraVENous CONTINUOUS Curry Warner DO 2.4 mL/hr at 11/19/20 2033 0.04 Units/min at 11/19/20 2033  meropenem (MERREM) 1 g in sterile water (preservative free) 20 mL IV syringe  1 g IntraVENous Q12H Tevin Wood MD   1 g at 11/29/20 1700  morphine injection 2 mg  2 mg IntraVENous Q4H PRN Ovi Hua MD   2 mg at 11/23/20 0739  
 NOREPINephrine (LEVOPHED) 8 mg in 5% dextrose 250mL (32 mcg/mL) infusion  0.5-16 mcg/min IntraVENous TITRATE Ovi Hua MD   Stopped at 11/23/20 3920  acetaminophen (TYLENOL) suppository 650 mg  650 mg Rectal Q4H PRN Yandel PONCE MD   650 mg at 11/16/20 1225  acetaminophen (TYLENOL) tablet 650 mg  650 mg Oral Q4H PRN Rom Herring NP   650 mg at 11/27/20 1403  albuterol (PROVENTIL HFA, VENTOLIN HFA, PROAIR HFA) inhaler 2 Puff  2 Puff Inhalation Q4H PRN Rom Herring NP      
 docusate sodium (COLACE) capsule 100 mg  100 mg Oral BID Rom Herring NP   100 mg at 11/29/20 6247  folic acid (FOLVITE) tablet 1 mg  1 mg Oral DAILY Rom Herring NP   1 mg at 11/29/20 0893  pravastatin (PRAVACHOL) tablet 20 mg  20 mg Oral QHS Rom Herring NP   20 mg at 11/28/20 2134  sertraline (ZOLOFT) tablet 50 mg 50 mg Oral DAILY Rom Herring, NP   50 mg at 20 4006  thiamine mononitrate (B-1) tablet 100 mg  100 mg Oral DAILY Rom Herring, NP   100 mg at 20 0589  ondansetron (ZOFRAN) injection 4 mg  4 mg IntraVENous Q6H PRN Rom Herring A, NP      
 hydrOXYzine pamoate (VISTARIL) capsule 25 mg  25 mg Oral TID PRN Rom Herring, NP   25 mg at 20 1857  chlorhexidine (PERIDEX) 0.12 % mouthwash 15 mL  15 mL Oral Q12H Rom Herring, NP   15 mL at 20 8302 Allergies Allergen Reactions  Precedex [Dexmedetomidine] Other (comments) Severe bradycardia  Codeine Rash Review of Systems: 
Review of systems not obtained due to patient factors. Objective:  
 
Blood pressure 113/67, pulse 71, temperature 98.1 °F (36.7 °C), resp. rate 21, height 6' 2\" (1.88 m), weight 96.9 kg (213 lb 10 oz), SpO2 98 %. Temp (24hrs), Av.3 °F (36.8 °C), Min:97.8 °F (36.6 °C), Max:99.3 °F (37.4 °C) Intake and Output: 
Current Shift: 701 - 1900 In: 450 Out: - Last 3 Shifts: 1901 - 700 In: 640 Out: 600 [Urine:600] Physical Exam:  
General appearance: Elderly male who is sedated on the ventilator, no distress, appears older than stated age, chronically ill-appearing Head: Normocephalic, without obvious abnormality, atraumatic Eyes: negative Neck: no obvious adenopathy, no carotid bruit; neck is stiff Lungs: He has few scattered rhonchi. Equal breath sounds bilaterally. Small secretions per RN. Heart: irregularly irregular rhythm, bradycardic, no rub, no obvious murmur Abdomen: soft, non-tender. Bowel sounds normal. No masses,  no organomegaly; NG tube in place. He also has diarrhea and has a rectal tube in place. He is not tolerating his tube feeds. Pulses: 2+ and symmetric Skin: Dry, intact, with bruising throughout the arms and legs. +3 pitting edema in extremities bilaterally.  
Lymph nodes: Cervical, supraclavicular, and axillary nodes normal. 
Neurologic: Sedated on the ventilator, not following any commands. Additional comments:None Lab/Data Review: All lab results for the last 24 hours reviewed. Recent Results (from the past 24 hour(s)) DIGOXIN Collection Time: 11/29/20  5:00 AM  
Result Value Ref Range Digoxin level 1.9 0.90 - 2.0 ng/mL Reported dose date 0 Reported dose: 0 Units CBC WITH AUTOMATED DIFF Collection Time: 11/29/20  5:00 AM  
Result Value Ref Range WBC 12.5 (H) 4.1 - 11.1 K/uL  
 RBC 3.36 (L) 4.10 - 5.70 M/uL HGB 8.9 (L) 12.1 - 17.0 g/dL HCT 29.6 (L) 36.6 - 50.3 % MCV 88.1 80.0 - 99.0 FL  
 MCH 26.5 26.0 - 34.0 PG  
 MCHC 30.1 30.0 - 36.5 g/dL RDW 21.4 (H) 11.5 - 14.5 % PLATELET 688 (L) 256 - 400 K/uL NEUTROPHILS 87 (H) 32 - 75 % LYMPHOCYTES 9 (L) 12 - 49 % MONOCYTES 4 (L) 5 - 13 % EOSINOPHILS 0 0 - 7 % BASOPHILS 1 0 - 1 % IMMATURE GRANULOCYTES 16 (H) 0.0 - 0.5 % ABS. NEUTROPHILS 10.8 (H) 1.8 - 8.0 K/UL  
 ABS. LYMPHOCYTES 1.1 0.8 - 3.5 K/UL  
 ABS. MONOCYTES 0.5 0.0 - 1.0 K/UL  
 ABS. EOSINOPHILS 0.0 0.0 - 0.4 K/UL  
 ABS. BASOPHILS 0.1 0.0 - 0.1 K/UL  
 ABS. IMM. GRANS. 2.0 (H) 0.00 - 0.04 K/UL  
 DF INACTIVE CODE. NEVER ACTIVATE/PH    
PHOSPHORUS Collection Time: 11/29/20  5:00 AM  
Result Value Ref Range Phosphorus 2.4 (L) 2.6 - 4.7 mg/dL RENAL FUNCTION PANEL Collection Time: 11/29/20  5:00 AM  
Result Value Ref Range Sodium 151 (H) 136 - 145 mmol/L Potassium 3.3 (L) 3.5 - 5.1 mmol/L Chloride 116 (H) 97 - 108 mmol/L  
 CO2 30 21 - 32 mmol/L Anion gap 5 5 - 15 mmol/L Glucose 168 (H) 65 - 100 mg/dL BUN 60 (H) 6 - 20 mg/dL Creatinine 1.29 0.70 - 1.30 mg/dL BUN/Creatinine ratio 47 (H) 12 - 20 GFR est AA >60 >60 ml/min/1.73m2 GFR est non-AA 54 (L) >60 ml/min/1.73m2 Calcium 6.6 (L) 8.5 - 10.1 mg/dL Phosphorus 2.4 (L) 2.6 - 4.7 mg/dL Albumin 2.5 (L) 3.5 - 5.0 g/dL MAGNESIUM  Collection Time: 11/29/20 5:00 AM  
Result Value Ref Range Magnesium 1.8 1.6 - 2.4 mg/dL GLUCOSE, POC Collection Time: 11/29/20 11:37 AM  
Result Value Ref Range Glucose (POC) 194 (H) 65 - 100 mg/dL Performed by GUERA Edmond Collection Time: 11/29/20  4:30 PM  
Result Value Ref Range Sodium 151 (H) 136 - 145 mmol/L Potassium 3.9 3.5 - 5.1 mmol/L Chloride 116 (H) 97 - 108 mmol/L  
 CO2 26 21 - 32 mmol/L Anion gap 9 5 - 15 mmol/L Glucose 157 (H) 65 - 100 mg/dL BUN 51 (H) 6 - 20 mg/dL Creatinine 1.32 (H) 0.70 - 1.30 mg/dL BUN/Creatinine ratio 39 (H) 12 - 20 GFR est AA >60 >60 ml/min/1.73m2 GFR est non-AA 53 (L) >60 ml/min/1.73m2 Calcium 6.5 (L) 8.5 - 10.1 mg/dL Chest X-Ray:  
XR CHEST PORT Final Result Stable right neck central venous catheter. Now present, there is an ET tube 4-5 
cm above the samuel. NG tube projects below the left hemidiaphragm. Improved 
aeration through the right lung; clearing patchy reticular markings. No 
interstitial or alveolar pulmonary edema. No pneumothorax or sizable pleural 
effusion. XR CHEST PORT Final Result Impression:   
Successful placement of a triple-lumen central venous catheter. The catheter is  
ready for use. IR INSERT NON TUNL CVC OVER 5 YRS Final Result Impression:   
Successful placement of a triple-lumen central venous catheter. The catheter is  
ready for use. IR [facility] Final Result Impression:   
Successful placement of a triple-lumen central venous catheter. The catheter is  
ready for use. XR CHEST PORT Final Result Impression: The cardiomediastinal silhouette is appropriate for age, technique,  
and lung expansion. Pulmonary vasculature is not congested. The lungs are  
essentially clear. No effusion or pneumothorax is seen. US RETROPERITONEUM COMP Final Result Impression: Normal exam  
  
  
XR CHEST PORT Final Result IMPRESSION:  No evidence of an acute cardiopulmonary process. CT HEAD WO CONT Final Result IMPRESSION: Chronic findings as above. CT CHEST WO CONT    (Results Pending) CT ABD PELV WO CONT    (Results Pending) XR CHEST PORT    (Results Pending) CT imaging: CT Results  (Last 48 hours) None PFTs: PFT Results  (Last 3 results in the past 10 years) None Assessment:  
 
Patient is a 76-year-old  male with a history of atrial fibrillation, osteoarthritis, hyperlipidemia, reported COPD, and hypertension who presented to [facility] from his nursing home for altered mental status. He has been admitted to the ICU and is currently in septic shock on vasopressors and has been intubated on 11/19/2020 for failure to protect his airway due to altered mental status. Plan:  
 
1.)  Acute respiratory failure Likely due to CHF, altered mental status, failure to protect airway Currently intubated on mechanical ventilation. I have a low suspicion that we w be able to adequately extubate this patient given persistent encephalopathy. Intubated on 11/19/2020 ABGs and chest x-ray reviewed as outlined above Pleural fluid cytology negative Status post bronchoscopy 11/25 Results no growth to date, no malignancy noted on cytology brushing or bronchial washings. Chest x-ray improved with diuresis, but his problem is mental status. Renal Function stable; patient is severely hypokalemic. He is getting aggressive repletion today. Holding Lasix. Potassium is mildly improved to 3.3 today. Rechecking potassium now per nephrology. Continue the patient on propofol/fentanyl for comfort for now, however when he is weaned down he does nothing purposeful and does not follow commands.   I had a long discussion with the patient's wife again today regarding my thoughts that it would be unlikely that he would be extubatable and that if we wanted to be aggressive he would need a tracheostomy. She knows that he would never want a tracheostomy and she is leaning towards comfort care. I do not feel that this medical condition is recoverable and I would push for comfort care with the family if agreed upon by the rest of the consultants. Waiting on patient's son, who lives in [place], to come and see him. Case management consulted. 2.)  Septic shock ESBL E. Coli Now off vasopressors On parenteral antibiotic coverage Further changes in antibiotics based on clinical response and culture results Covid negative Appears quite volume overloaded clinically 3.)  Acute encephalopathy Likely multifactorial from sepsis, urinary tract infection, and multiple electrolyte abnormalities including hypernatremia, hypercalcemia, and hypokalemia. Continue on current antibiotic regimen as above; neurology/nephrology/hematology following closely. Ammonia level normal, TSH mildly elevated. 4.)  Acute kidney injury Creatinine seem to plateau around 8.05 and had very steadily been decreasing with improved calcium level and fluid administration. Likely prerenal from volume depletion and sepsis. Creatinine 1.4 today Nephrology following closely, appreciate their assistance. 5.)  Atrial fibrillation with RVR/diastolic congestive heart failure Presented with A. fib RVR Stable Likely secondary to sepsis Echo shows preserved ejection fraction Cardiology input appreciated Continue digoxin Quite volume overloaded Continue diuresis 6) Hypercalcemia Corrected calcium 14.6 on admission, this has steadily improved every day with calcitonin fluids. Actually getting calcium repletion today. Nephrology following closely Possible myeloma CODE STATUS: DNR Lines/Tubes: ET tube, right IJ central line, Mcmillan catheter, NG tube Prophylaxis: 
Stress Ulcer Protocol Active: Yes, Protonix 40 mg IV twice daily Deep Vein Thrombosis Protocol Active: Yes, SCD's  
 Disposition: DNR status is now in effect. Total critical care time spent with patient: 50 minutes with direct visualization of the patient's ventilator and management along with respiratory therapist, long discussion with the respiratory therapist and the bedside nurse regarding the plan of care. Messi Boothe DO 
[facility] 11/29/2020 
10:57 AM

## 2020-11-29 NOTE — PROGRESS NOTES
Hospitalist Progress Note Daily Progress Note: 11/29/2020 
 
76-year-old male sent here from [facility] with reports from staff of altered mental status, lethargy, not eating and dehydration. He has a history of atrial fibrillation. UA suggestive uti, zosyn initiated. Subjective:  
Patient seen and evaluated at bedside, overnight events noted, patient currently intubated responding to painful/verbal stimuli, discussed with RN at bedside. Patient is intubated and sedated. Problem List: 
Problem List as of 11/29/2020 Date Reviewed: 11/15/2020 Codes Class Noted - Resolved * (Principal) Sepsis ([facility] ICD-10-CM: A41.9 ICD-9-CM: 038.9, 995.91  11/16/2020 - Present Hypoglycemia ICD-10-CM: E16.2 ICD-9-CM: 251.2  11/16/2020 - Present Encephalopathy acute ICD-10-CM: G93.40 ICD-9-CM: 348.30  11/16/2020 - Present UTI (urinary tract infection) ICD-10-CM: N39.0 ICD-9-CM: 599.0  11/15/2020 - Present BRE (acute kidney injury) ([facility] ICD-10-CM: N17.9 ICD-9-CM: 584.9  11/15/2020 - Present AMS (altered mental status) ICD-10-CM: B76.64 
ICD-9-CM: 780.97  11/15/2020 - Present Chronic atrial fibrillation (HCC) ICD-10-CM: I48.20 ICD-9-CM: 427.31  8/12/2020 - Present HTN (hypertension), benign ICD-10-CM: I10 
ICD-9-CM: 401.1  8/12/2020 - Present COPD (chronic obstructive pulmonary disease) (HCC) ICD-10-CM: J44.9 ICD-9-CM: 948  8/12/2020 - Present Dyslipidemia ICD-10-CM: E78.5 ICD-9-CM: 272.4  8/12/2020 - Present Depression ICD-10-CM: F32.9 ICD-9-CM: 120  8/12/2020 - Present Cellulitis of left upper extremity ICD-10-CM: L03.114 
ICD-9-CM: 682.3  8/11/2020 - Present Medications reviewed Current Facility-Administered Medications Medication Dose Route Frequency  spironolactone (ALDACTONE) tablet 25 mg  25 mg Oral BID  potassium chloride (KLOR-CON) packet for solution 20 mEq  20 mEq Per NG tube Q8H  
 propofol (DIPRIVAN) 10 mg/mL infusion  0-50 mcg/kg/min IntraVENous TITRATE  digoxin (LANOXIN) injection 125 mcg  125 mcg IntraVENous DAILY  calcium carbonate (TUMS) chewable tablet 400 mg [elemental]  400 mg Oral BID  lidocaine (XYLOCAINE) 10 mg/mL (1 %) injection    PRN  mineral oil (topical)    PRN  pantoprazole (PROTONIX) 40 mg in 0.9% sodium chloride 10 mL injection  40 mg IntraVENous BID  metoclopramide HCl (REGLAN) injection 10 mg  10 mg IntraVENous Q6H  
 hydrocortisone Sod Succ (PF) (SOLU-CORTEF) injection 50 mg  50 mg IntraVENous Q8H  
 fentaNYL (PF) 1,500 mcg/30 mL (50 mcg/mL) infusion  0-200 mcg/hr IntraVENous TITRATE  vasopressin (VASOSTRICT) 40 Units in 0.9% sodium chloride 40 mL infusion  0.04 Units/min IntraVENous CONTINUOUS  
 meropenem (MERREM) 1 g in sterile water (preservative free) 20 mL IV syringe  1 g IntraVENous Q12H  
 morphine injection 2 mg  2 mg IntraVENous Q4H PRN  
 NOREPINephrine (LEVOPHED) 8 mg in 5% dextrose 250mL (32 mcg/mL) infusion  0.5-16 mcg/min IntraVENous TITRATE  acetaminophen (TYLENOL) suppository 650 mg  650 mg Rectal Q4H PRN  
 acetaminophen (TYLENOL) tablet 650 mg  650 mg Oral Q4H PRN  
 albuterol (PROVENTIL HFA, VENTOLIN HFA, PROAIR HFA) inhaler 2 Puff  2 Puff Inhalation Q4H PRN  
 docusate sodium (COLACE) capsule 100 mg  100 mg Oral BID  folic acid (FOLVITE) tablet 1 mg  1 mg Oral DAILY  pravastatin (PRAVACHOL) tablet 20 mg  20 mg Oral QHS  sertraline (ZOLOFT) tablet 50 mg  50 mg Oral DAILY  thiamine mononitrate (B-1) tablet 100 mg  100 mg Oral DAILY  ondansetron (ZOFRAN) injection 4 mg  4 mg IntraVENous Q6H PRN  
 hydrOXYzine pamoate (VISTARIL) capsule 25 mg  25 mg Oral TID PRN  chlorhexidine (PERIDEX) 0.12 % mouthwash 15 mL  15 mL Oral Q12H Review of Systems:  
Unobtainable 2/2 encephalopathic/acute illness Objective:  
Physical Exam:  
 
Visit Vitals /72 (BP 1 Location: Right arm, BP Patient Position: At rest) Pulse 72 Temp 97.8 °F (36.6 °C) Resp 26 Ht 6' 2\" (1.88 m) Wt 96.9 kg (213 lb 10 oz) SpO2 99% BMI 27.43 kg/m² O2 Flow Rate (L/min): 0.5 l/min O2 Device: Ventimask Temp (24hrs), Av °F (37.2 °C), Min:97.8 °F (36.6 °C), Max:100 °F (37.8 °C) No intake/output data recorded.  190 -  0700 In: 640 Out: 600 [Urine:600] Constitutional: Currently intubated and sedated Head: Normocephalic and atraumatic. Mouth: oral mucosa dry, ET tube is in situ Neck: supple Cardiovascular:Irreg/irreg Lungs:Decreased air entry bilaterally in bilateral lower lung zones Abdominal: Soft. non tender Neurological: obtunded, moves all ext, 
Skin: Right lower shin with ulcer, yellow drainage Left arm with skin tear. +ve edema Multiple ecchymotic, skin is friable/thin Data Review:  
   
Recent Days: 
Recent Labs  
  20 
0500 20 
0625 20 
0500 WBC 12.5*  --  14.2* HGB 8.9* 8.8* 9.6* HCT 29.6* 28.9* 31.6*  
*  --  105* Recent Labs  
  20 
0500 20 
1550 20 
0625  20 
0500 * 151* 152*   < > 149*  
K 3.3* 2.5* 2.3*   < > 2.9*  
* 115* 114*   < > 112* CO2 30 27 27   < > 27 * 122* 132*   < > 140* BUN 60* 53* 54*   < > 58* CREA 1.29 1.41* 1.43*   < > 1.67* CA 6.6* 6.1* 6.4*   < > 6.4* MG 1.8  --   --   --  1.8 PHOS 2.4*  2.4*  --  1.8*  --  4.2 ALB 2.5*  --  2.8*  --  3.1*  
 < > = values in this interval not displayed. Recent Labs  
  20 
0430 20 
0544 PH 7.524* 7.39  
PCO2 30* 42 PO2 61* 88 HCO3 26 24 FIO2 21.0 30  
 
 
24 Hour Results: 
Recent Results (from the past 24 hour(s)) METABOLIC PANEL, BASIC Collection Time: 20  3:50 PM  
Result Value Ref Range Sodium 151 (H) 136 - 145 mmol/L Potassium 2.5 (LL) 3.5 - 5.1 mmol/L Chloride 115 (H) 97 - 108 mmol/L  
 CO2 27 21 - 32 mmol/L Anion gap 9 5 - 15 mmol/L Glucose 122 (H) 65 - 100 mg/dL  BUN 53 (H) 6 - 20 mg/dL Creatinine 1.41 (H) 0.70 - 1.30 mg/dL BUN/Creatinine ratio 38 (H) 12 - 20 GFR est AA 59 (L) >60 ml/min/1.73m2 GFR est non-AA 49 (L) >60 ml/min/1.73m2 Calcium 6.1 (LL) 8.5 - 10.1 mg/dL DIGOXIN Collection Time: 11/29/20  5:00 AM  
Result Value Ref Range Digoxin level 1.9 0.90 - 2.0 ng/mL Reported dose date 0 Reported dose: 0 Units CBC WITH AUTOMATED DIFF Collection Time: 11/29/20  5:00 AM  
Result Value Ref Range WBC 12.5 (H) 4.1 - 11.1 K/uL  
 RBC 3.36 (L) 4.10 - 5.70 M/uL HGB 8.9 (L) 12.1 - 17.0 g/dL HCT 29.6 (L) 36.6 - 50.3 % MCV 88.1 80.0 - 99.0 FL  
 MCH 26.5 26.0 - 34.0 PG  
 MCHC 30.1 30.0 - 36.5 g/dL RDW 21.4 (H) 11.5 - 14.5 % PLATELET 035 (L) 585 - 400 K/uL NEUTROPHILS 87 (H) 32 - 75 % LYMPHOCYTES 9 (L) 12 - 49 % MONOCYTES 4 (L) 5 - 13 % EOSINOPHILS 0 0 - 7 % BASOPHILS 1 0 - 1 % IMMATURE GRANULOCYTES 16 (H) 0.0 - 0.5 % ABS. NEUTROPHILS 10.8 (H) 1.8 - 8.0 K/UL  
 ABS. LYMPHOCYTES 1.1 0.8 - 3.5 K/UL  
 ABS. MONOCYTES 0.5 0.0 - 1.0 K/UL  
 ABS. EOSINOPHILS 0.0 0.0 - 0.4 K/UL  
 ABS. BASOPHILS 0.1 0.0 - 0.1 K/UL  
 ABS. IMM. GRANS. 2.0 (H) 0.00 - 0.04 K/UL  
 DF INACTIVE CODE. NEVER ACTIVATE/PH    
PHOSPHORUS Collection Time: 11/29/20  5:00 AM  
Result Value Ref Range Phosphorus 2.4 (L) 2.6 - 4.7 mg/dL RENAL FUNCTION PANEL Collection Time: 11/29/20  5:00 AM  
Result Value Ref Range Sodium 151 (H) 136 - 145 mmol/L Potassium 3.3 (L) 3.5 - 5.1 mmol/L Chloride 116 (H) 97 - 108 mmol/L  
 CO2 30 21 - 32 mmol/L Anion gap 5 5 - 15 mmol/L Glucose 168 (H) 65 - 100 mg/dL BUN 60 (H) 6 - 20 mg/dL Creatinine 1.29 0.70 - 1.30 mg/dL BUN/Creatinine ratio 47 (H) 12 - 20 GFR est AA >60 >60 ml/min/1.73m2 GFR est non-AA 54 (L) >60 ml/min/1.73m2 Calcium 6.6 (L) 8.5 - 10.1 mg/dL Phosphorus 2.4 (L) 2.6 - 4.7 mg/dL Albumin 2.5 (L) 3.5 - 5.0 g/dL MAGNESIUM  Collection Time: 11/29/20  5:00 AM Result Value Ref Range Magnesium 1.8 1.6 - 2.4 mg/dL Assessment/  
 
Patient Active Problem List  
Diagnosis Code  Cellulitis of left upper extremity L03.114  
 Chronic atrial fibrillation (HCC) I48.20  
 HTN (hypertension), benign I10  
 COPD (chronic obstructive pulmonary disease) ([facility]) J44.9  Dyslipidemia E78.5  Depression F32.9  
 UTI (urinary tract infection) N39.0  BRE (acute kidney injury) ([facility] N17.9  AMS (altered mental status) R41.82  Sepsis ([facility] A41.9  Hypoglycemia E16.2  
 Encephalopathy acute G93.40 Plan: 
 
Septic shockPatient presented with above-mentioned symptomatology was found to meet severe sepsis criteria secondary to combination of urinary tract infection as well as developing bilateral pneumonia, patient is COVID-19 negative, currently improving, patient been titrated off of levophed 
follow-up blood cultures Urine Culture consistent with ESBL E. Coli Continue meropenem for Abx coverage Discontinued IVF Infectious disease recommendations appreciated, will continue to follow Critical care recommendations appreciated will continue to follow Acute respiratory failure/pneumoniapatient was found to be in acute respiratory failure requiring emergent endotracheal intubation, likely secondary to developing bilateral pneumonia, patient currently off of pressors Follow-up sputum culture Follow-up blood cultures Continue Meropenem for Abx coverage Attempt weaning trial 
Pulmonology consult appreciated, will continue to follow Infectious disease recommendations appreciated, will continue to follow Hypoactive bowel soundsresolved Atrial fibrillation with RVRpatient presented with atrial fibrillation with RVR likely secondary to ongoing severe sepsis Dig level was 1.3 Transthoracic echo shows preserved ejection fraction Cardiology consult appreciated, will continue to follow Acute on chronic diastolic heart failurepatient was found to have significant bilateral lower extremity edema as well as radiographic evidence of volume overload consistent with acute on chronic diastolic heart failure Discontinued IV fluids Lasix 40 mg IV twice daily Continue to monitor intake and output Cardiology consult appreciated, will continue to follow Anemialikely multifactorial, however concern for GI bleed, s/p 2 units prbc 2 days back with appropriate response, currently hemoglobin hematocrit remained stable Continue protonix 40mg iv bid Continue to trend H/H Maintain active type and screen Follow-up gastroenterology recommendations Follow-up hematology recommendations Hypokalemiareplete K and recheck K Thrombocytopenialikely multifactorial, concern for possible early DIC, however platelet count remained stable. Hematology recommendations appreciated Patient currently does not have any active bleeding-Recommend platelet transfusion if platelet count is less than 20,000 with active bleeding Hyperlipidemiacontinue statin ProphylaxisSCDs FENcontinue tube feeding, replete potassium and magnesium DNR,  
surrogate decision-maker is patient's wife Care Plan discussed with: Patient/Family and Nurse Ira Castanon MD

## 2020-11-30 NOTE — PROGRESS NOTES
Patient has been made a DNR. SW notified awaiting patient's son to arrive to assist w/decision making re: comfort care. SW to continue to monitor re: discharge disposition. DEVIN Kamara

## 2020-11-30 NOTE — PROGRESS NOTES
Comprehensive Nutrition Assessment Type and Reason for Visit: Reassess(interim) Nutrition Recommendations/Plan:  
Adjust TF via OG to Promote at 50mL/hr, continuous Add 2 pkt Prosource daily (240kcal, 30g protein) Flush with 150mL free water q4hr Providing 1320kcal, 105g protein, 1907mL fluid (69%kcal, 95%pro, 100%fluid needs) Propofol providing 660kcal (104%kcal needs with TF) Document TF rate, protein modular provision, water flushes, and GRVs in EMR Nutrition Assessment:  Worsening AMS, weakness pta. Dx sepsis, UTI, BRE. Tx ICU (11/16) 2/2 cardizem drip for a fib; cardiology now following. Possible GIB; s/p 1 unit PRBC- resolved. COVID negative. ECHO showing preserved LVEF. Worsening lethargy, hypoxia. Intubated 11/19, pressors initiated. Remains intubated, sedated on Propofol. Pressors now d/c. S/p thoracentesis (11/23)- fluid negative, Bronch (11/25)- fluid negative today, noted improvement in pulm edema. TF initiated to goal rate 11/19 to goal rate however MD rec'd hold s/p intubation d/t high volume pressor requirements. TF reinitiated 11/20, achieved goal rate 11/21. Pt then with elevated residuals so clamped early 11/22. NG remained clamped until RN reinitiated TF to 20mL/hr 11/24; spoke with RN same day, who reported high residuals after short period of TF infusion- NG clamped again. [place] added and TF adjusted to semi-elemental formula to improve tolerance. TF reinitiated 11/27 (unable to determine why NG remained clamped so long) as Vital 1.2 to 40mL/hr. Per RN today, pt tolerating well without residuals. Unsure why TF not yet advanced to goal as pt tolerating- reduced rate with addition of Propofol adequate to meet 95%kcal, 65% protein needs. Pt likely will tolerate nonfiber containing formula (Promote or Osmolite 1.2) at this time. Noted pt now requiring high volume Propofol (45mcg/kg/min); RD to reassess needs and adjust TF as appropriate.  Labs: H/H 8.9/29.7, Na 151, BUN 52, Cr 1.32, , Phos 2.5. Meds: Propofol at 45mcg/kg/mon, merrem, furosemide, statin, morphine, thiamine, folic acid, KCl, PPI. Malnutrition Assessment: 
Malnutrition Status: Moderate malnutrition Context:  Acute illness Estimated Daily Nutrient Needs: 
Energy (kcal): 1900kcal (PSU 2003b); Weight Used for Energy Requirements: Current Protein (g): 111g (1.2g/kg); Weight Used for Protein Requirements: Current Fluid (ml/day): 1900mL; Method Used for Fluid Requirements: 1 ml/kcal 
 
 
Nutrition Related Findings:  Unable to complete NFPE 2/2 precautions. Previously constipated, then with FMS in place. FMS d/c 11/29, pt with loose stool today. No documented hx dysphagia, n/v, or c/d. 3+ generalized weeping edema continues. Noted recent admit to OSH (8/2020), documented 6%BW loss x 1.5 months Wounds:   
None Current Nutrition Therapies: DIET NPO Except Meds DIET TUBE FEEDING Vital 1.2 Current Tube Feeding (TF) Orders: · Feeding Route: Nasogastric · Formula: Vital 1.2 · Schedule:Continuous · Regimen: 65mL/hr · Water Flushes: 100 q4hr · Current TF & Flush Orders Provides: 1872kcal, 117g protein, 1865mL fluid; exceeding kcal needs with Propofol. Anthropometric Measures: 
· Height:  6' 2\" (188 cm) · Current Body Wt:  92.5 kg (203 lb 14.8 oz)(11/18) · Ideal Body Wt:  190 lbs:  107.3 % · BMI Category: Overweight (BMI 25.0-29. 9) Nutrition Diagnosis:  
· Inadequate oral intake related to cognitive or neurological impairment as evidenced by NPO or clear liquid status due to medical condition, intubation Nutrition Interventions:  
Food and/or Nutrient Delivery: Continue NPO, Modify tube feeding Nutrition Education and Counseling: No recommendations at this time Coordination of Nutrition Care: Continue to monitor while inpatient Goals: 
Meet >75% EENs via TF vs PO in 7 days, Maintain skin integrity, Lytes wnl Nutrition Monitoring and Evaluation: Behavioral-Environmental Outcomes: None identified Food/Nutrient Intake Outcomes: Food and nutrient intake Physical Signs/Symptoms Outcomes: Weight, Skin, Biochemical data Discharge Planning: Too soon to determine Electronically signed by Garrett Ruiz on 11/30/2020 at 3:15 PM 
 
Contact:

## 2020-11-30 NOTE — PROGRESS NOTES
Hematology Oncology Progress Note Subjective: He remains unresponsive. Pt on ventilator. No active bleeding clinically. Current Facility-Administered Medications Medication Dose Route Frequency  [START ON 11/30/2020] docusate (COLACE) 50 mg/5 mL oral liquid 100 mg  100 mg Per NG tube DAILY  spironolactone (ALDACTONE) tablet 25 mg  25 mg Oral BID  potassium chloride (KLOR-CON) packet for solution 20 mEq  20 mEq Per NG tube Q8H  propofol (DIPRIVAN) 10 mg/mL infusion  0-50 mcg/kg/min IntraVENous TITRATE  digoxin (LANOXIN) injection 125 mcg  125 mcg IntraVENous DAILY  calcium carbonate (TUMS) chewable tablet 400 mg [elemental]  400 mg Oral BID  lidocaine (XYLOCAINE) 10 mg/mL (1 %) injection    PRN  mineral oil (topical)    PRN  pantoprazole (PROTONIX) 40 mg in 0.9% sodium chloride 10 mL injection  40 mg IntraVENous BID  metoclopramide HCl (REGLAN) injection 10 mg  10 mg IntraVENous Q6H  
 hydrocortisone Sod Succ (PF) (SOLU-CORTEF) injection 50 mg  50 mg IntraVENous Q8H  
 fentaNYL (PF) 1,500 mcg/30 mL (50 mcg/mL) infusion  0-200 mcg/hr IntraVENous TITRATE  vasopressin (VASOSTRICT) 40 Units in 0.9% sodium chloride 40 mL infusion  0.04 Units/min IntraVENous CONTINUOUS  
 meropenem (MERREM) 1 g in sterile water (preservative free) 20 mL IV syringe  1 g IntraVENous Q12H  
 morphine injection 2 mg  2 mg IntraVENous Q4H PRN  
 NOREPINephrine (LEVOPHED) 8 mg in 5% dextrose 250mL (32 mcg/mL) infusion  0.5-16 mcg/min IntraVENous TITRATE  acetaminophen (TYLENOL) suppository 650 mg  650 mg Rectal Q4H PRN  
 acetaminophen (TYLENOL) tablet 650 mg  650 mg Oral Q4H PRN  
 albuterol (PROVENTIL HFA, VENTOLIN HFA, PROAIR HFA) inhaler 2 Puff  2 Puff Inhalation O7W PRN  
 folic acid (FOLVITE) tablet 1 mg  1 mg Oral DAILY  pravastatin (PRAVACHOL) tablet 20 mg  20 mg Oral QHS  sertraline (ZOLOFT) tablet 50 mg  50 mg Oral DAILY  thiamine mononitrate (B-1) tablet 100 mg  100 mg Oral DAILY  ondansetron (ZOFRAN) injection 4 mg  4 mg IntraVENous Q6H PRN  
 hydrOXYzine pamoate (VISTARIL) capsule 25 mg  25 mg Oral TID PRN  chlorhexidine (PERIDEX) 0.12 % mouthwash 15 mL  15 mL Oral Q12H Review of Systems: not obtainable due to AMS. Objective:  
 
Patient Vitals for the past 8 hrs: 
 BP Temp Pulse Resp SpO2  
20 2100 (!) 102/57  72 23 100 % 20 2000 111/61  71 24 99 % 20 1901 (!) 91/53 98 °F (36.7 °C) 72 21 98 % 20 1800 (!) 99/52  95 (!) 32 95 % 20 1700 95/60  88 30 97 % 20 1608   71  98 % 20 1600 113/67  73 21 98 % 20 1527   63 20 98 % 20 1500 94/75 98.1 °F (36.7 °C) 64 19 97 % Temp (24hrs), Av °F (36.7 °C), Min:97.8 °F (36.6 °C), Max:98.1 °F (36.7 °C) Physical Exam: 
constitutional Elderly white male , with altered mental status, Well nourished. Well developed. Head Normocephalic; no scars Eyes Conjunctivae and sclerae are clear and without icterus. Pupils are reactive and equal.  
ENMT ET tube is present. .  
Neck Supple without masses or thyromegaly. No jugular venous distension. Hematologic/Lymphatic No petechiae or purpura. No tender or palpable lymph nodes in the cervical, supraclavicular, axillary or inguinal area. Respiratory Lungs are clear to auscultation without rhonchi or wheezing. Cardiovascular Regular rate and rhythm of heart without murmurs, gallops or rubs. Chest / Line Site Chest is symmetric with no chest wall deformities. Abdomen Non-tender, non-distended, no masses, ascites or hepatosplenomegaly. Good bowel sounds. No guarding or rebound tenderness. No pulsatile masses. Musculoskeletal No tenderness or swelling, normal range of motion without obvious weakness. Extremities No visible deformities, no cyanosis, clubbing or edema. Skin No rashes, scars, or lesions suggestive of malignancy. No petechiae, purpura, or ecchymoses. No excoriations. Neurologic Altered mental status. [facility]>  
 
 
 
 
Lab/Data Review: 
Recent Labs  
  11/29/20 
0500 11/28/20 
0625 11/27/20 
0500 WBC 12.5*  --  14.2* HGB 8.9* 8.8* 9.6* HCT 29.6* 28.9* 31.6*  
*  --  105* Recent Labs  
  11/29/20 
1630 11/29/20 
0500 11/28/20 
1550 11/28/20 
0625  11/27/20 
0500 * 151* 151* 152*   < > 149*  
K 3.9 3.3* 2.5* 2.3*   < > 2.9*  
* 116* 115* 114*   < > 112* CO2 26 30 27 27   < > 27 * 168* 122* 132*   < > 140* BUN 51* 60* 53* 54*   < > 58* CREA 1.32* 1.29 1.41* 1.43*   < > 1.67* CA 6.5* 6.6* 6.1* 6.4*   < > 6.4* MG  --  1.8  --   --   --  1.8 PHOS  --  2.4*  2.4*  --  1.8*  --  4.2 ALB  --  2.5*  --  2.8*  --  3.1*  
 < > = values in this interval not displayed. Recent Labs  
  11/28/20 
0430 11/27/20 
0544 PH 7.524* 7.39  
PCO2 30* 42 PO2 61* 88 HCO3 26 24 FIO2 21.0 30 Radiology: Xr Abd Flat/ Erect Result Date: 11/20/2020 IMPRESSION: No evidence of free air. No acute appearing findings. Ct Head Wo Cont Result Date: 11/23/2020 IMPRESSION: No interval change. No acute intracranial abnormality. Ct Head Wo Cont Result Date: 11/15/2020 IMPRESSION: Chronic findings as above. Ct Chest Wo Cont Result Date: 11/23/2020 IMPRESSION: Nonspecific reticulonodular markings throughout the lungs. Indistinct small nodules many with groundglass appearance. Differential considerations would include infectious or neoplastic causes. Moderate volume dependent pleural effusions with associated compressive atelectasis posterior lower lobe lungs. Elongated thoracic aorta with atherosclerotic change. Cardiomegaly. CAD. Us Retroperitoneum Comp Result Date: 11/17/2020 Impression: Normal exam  
 
Ir Us Guided Vascular Access Result Date: 11/17/2020 Impression: Successful placement of a triple-lumen central venous catheter. The catheter is ready for use. Xr Chest [facility] Result Date: 11/28/2020 IMPRESSION: Persistent right basilar atelectasis or airspace disease with new probable small left effusion and adjacent atelectasis. Xr [facility] Result Date: 11/26/2020 FINDINGS/IMPRESSION: Support lines and tubes are appropriate in radiographic position. Residual right basilar airspace disease. Cardiac silhouette stable in size. Hemodynamic status stable. Xr [facility] Result Date: 11/23/2020 IMPRESSION: Lines and tubes appear stable, as visualized. Stable mild enlargement of cardiopericardial silhouette. Central vascular prominence. Small right pleural effusion. Findings favored to represent CHF or volume overload; correlate clinically. Xr [facility] Result Date: 11/22/2020 Impression: Little interval change. Xr [facility] Result Date: 11/21/2020 Findings/impression: Stable support hardware. Slight interval increase of patchy right basilar airspace disease. Small pleural effusions. No evidence of pneumothorax. Cardiomediastinal contours are stable. Increasing central vascular congestion. Visualized osseous structures are unchanged. Xr [facility] Result Date: 11/20/2020 IMPRESSION: Developing bilateral perihilar and infrahilar opacities, with differential diagnosis including pneumonia, atelectasis, and edema. Short-term radiographic follow-up recommended. [facility] Result Date: 11/17/2020 Impression: Successful placement of a triple-lumen central venous catheter. The catheter is ready for use. Xr [facility] Result Date: 11/17/2020 Impression: The cardiomediastinal silhouette is appropriate for age, technique, and lung expansion. Pulmonary vasculature is not congested. The lungs are essentially clear. No effusion or pneumothorax is seen. Xr [facility] Result Date: 11/15/2020 IMPRESSION:  No evidence of an acute cardiopulmonary process. Ir Thoracentesis Ndl Punc Asp W Image Result Date: 11/23/2020 Impression: Successful right thoracentesis. Ir Insert Non Tunl Cvc Over 5 Yrs Result Date: 11/17/2020 Impression: Successful placement of a triple-lumen central venous catheter. The catheter is ready for use. Assessment /Plan:  
 
 
1) 67 yr old male admitted with AMS. Change in mental status likely due to UTI, sepsis, hypercalcemia , hypernatremia and renal failure.  
-ct head did not show any acute pathology.  
-Being eval by neurology. Likely metabolic encephalapathy from sepsis. -now intubated and sedated.   
2) ESBL E.coli  UTI. On abx. ID seeing the patient.   
3) Anemia: Stable. Likely related to sepsis. -In the setting hypercalcemia and renal failure. 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 Less likely multiple myeloma. Serum light chains pending. 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 No multiple myeloma at that time. Pt seen by GI for bleeding. Small Left renal mass noted on CT scan done at [facility] R/o possible malignancy.   
4) Thrombocytopenia: S/p platelets transfusion. Platelet count improving. 
-Transfuse for platelet count <82R or for bleeding. REvewied peripheral blood smear shows neutrophils with increased granulation, bands suggestive of infection. RBC appear normal. NO evidence of increased schistocytes . Platelets decreased. Few promyelocytes and few blasts seen. flow cytometry of peripheral blood pending.  
 
 5) Coagulapathy: Prolonged PT/PTT. Fibrinogen is normal.  
-No clear evidence of DIC. S/p 2  unit of FFP  
-S/p vitamin K 10mg po daily. Coags are improving slightly INR down to 1.5. 
 
6) Hypercalcemia: etiology not very clear. High on admission close to 14 Improved with hydration and calcitonin. - 
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 -psa is normal . Pt calcium low now. Pt on calcium supplementation. CT chest small lung nodules. infectious versus neoplastic. S/p thoracentesis. Pleural fluid cytology no malignancy. Bronchoscopy no endobronchial lesions. D/w pt wife. Leukocytosis:improving. Overall pt prognosis poor. Recommend palliative care. Pt wife leaning towards palliative care. Will sign off. If any questions please call us. 
 
  
 
 
Blaine Merino MD 
11/29/2020

## 2020-11-30 NOTE — PROGRESS NOTES
Hospitalist Progress Note Daily Progress Note: 11/30/2020 
 
58-year-old male sent here from [facility] with reports from staff of altered mental status, lethargy, not eating and dehydration. He has a history of atrial fibrillation. UA suggestive uti, zosyn initiated. Subjective:  
Patient seen and evaluated at bedside, overnight events noted, patient currently intubated responding to painful/verbal stimuli, discussed with RN at bedside. Patient is intubated and sedated. Problem List: 
Problem List as of 11/30/2020 Date Reviewed: 11/15/2020 Codes Class Noted - Resolved * (Principal) Sepsis ([facility] ICD-10-CM: A41.9 ICD-9-CM: 038.9, 995.91  11/16/2020 - Present Hypoglycemia ICD-10-CM: E16.2 ICD-9-CM: 251.2  11/16/2020 - Present Encephalopathy acute ICD-10-CM: G93.40 ICD-9-CM: 348.30  11/16/2020 - Present UTI (urinary tract infection) ICD-10-CM: N39.0 ICD-9-CM: 599.0  11/15/2020 - Present BRE (acute kidney injury) ([facility] ICD-10-CM: N17.9 ICD-9-CM: 584.9  11/15/2020 - Present AMS (altered mental status) ICD-10-CM: C62.02 
ICD-9-CM: 780.97  11/15/2020 - Present Chronic atrial fibrillation (HCC) ICD-10-CM: I48.20 ICD-9-CM: 427.31  8/12/2020 - Present HTN (hypertension), benign ICD-10-CM: I10 
ICD-9-CM: 401.1  8/12/2020 - Present COPD (chronic obstructive pulmonary disease) (HCC) ICD-10-CM: J44.9 ICD-9-CM: 274  8/12/2020 - Present Dyslipidemia ICD-10-CM: E78.5 ICD-9-CM: 272.4  8/12/2020 - Present Depression ICD-10-CM: F32.9 ICD-9-CM: 086  8/12/2020 - Present Cellulitis of left upper extremity ICD-10-CM: L03.114 
ICD-9-CM: 682.3  8/11/2020 - Present Medications reviewed Current Facility-Administered Medications Medication Dose Route Frequency  calcium gluconate 1 gram in sodium chloride (ISO-OSM) 50 mL infusion  1 g IntraVENous Q6H  
 docusate (COLACE) 50 mg/5 mL oral liquid 100 mg  100 mg Per NG tube DAILY  spironolactone (ALDACTONE) tablet 25 mg  25 mg Oral BID  potassium chloride (KLOR-CON) packet for solution 20 mEq  20 mEq Per NG tube Q8H  propofol (DIPRIVAN) 10 mg/mL infusion  0-50 mcg/kg/min IntraVENous TITRATE  digoxin (LANOXIN) injection 125 mcg  125 mcg IntraVENous DAILY  calcium carbonate (TUMS) chewable tablet 400 mg [elemental]  400 mg Oral BID  lidocaine (XYLOCAINE) 10 mg/mL (1 %) injection    PRN  mineral oil (topical)    PRN  pantoprazole (PROTONIX) 40 mg in 0.9% sodium chloride 10 mL injection  40 mg IntraVENous BID  metoclopramide HCl (REGLAN) injection 10 mg  10 mg IntraVENous Q6H  
 hydrocortisone Sod Succ (PF) (SOLU-CORTEF) injection 50 mg  50 mg IntraVENous Q8H  
 fentaNYL (PF) 1,500 mcg/30 mL (50 mcg/mL) infusion  0-200 mcg/hr IntraVENous TITRATE  vasopressin (VASOSTRICT) 40 Units in 0.9% sodium chloride 40 mL infusion  0.04 Units/min IntraVENous CONTINUOUS  
 meropenem (MERREM) 1 g in sterile water (preservative free) 20 mL IV syringe  1 g IntraVENous Q12H  
 morphine injection 2 mg  2 mg IntraVENous Q4H PRN  
 NOREPINephrine (LEVOPHED) 8 mg in 5% dextrose 250mL (32 mcg/mL) infusion  0.5-16 mcg/min IntraVENous TITRATE  acetaminophen (TYLENOL) suppository 650 mg  650 mg Rectal Q4H PRN  
 acetaminophen (TYLENOL) tablet 650 mg  650 mg Oral Q4H PRN  
 albuterol (PROVENTIL HFA, VENTOLIN HFA, PROAIR HFA) inhaler 2 Puff  2 Puff Inhalation Z4F PRN  
 folic acid (FOLVITE) tablet 1 mg  1 mg Oral DAILY  pravastatin (PRAVACHOL) tablet 20 mg  20 mg Oral QHS  sertraline (ZOLOFT) tablet 50 mg  50 mg Oral DAILY  thiamine mononitrate (B-1) tablet 100 mg  100 mg Oral DAILY  ondansetron (ZOFRAN) injection 4 mg  4 mg IntraVENous Q6H PRN  
 hydrOXYzine pamoate (VISTARIL) capsule 25 mg  25 mg Oral TID PRN  chlorhexidine (PERIDEX) 0.12 % mouthwash 15 mL  15 mL Oral Q12H Review of Systems:  
Unobtainable 2/2 encephalopathic/acute illness Objective:  
Physical Exam:  
 
Visit Vitals /64 (BP 1 Location: Right arm, BP Patient Position: At rest) Pulse 65 Temp 99.7 °F (37.6 °C) Resp 21 Ht 6' 2\" (1.88 m) Wt 96.9 kg (213 lb 10 oz) SpO2 100% BMI 27.43 kg/m² O2 Flow Rate (L/min): 0.5 l/min O2 Device: Ventilator Temp (24hrs), Av.5 °F (36.9 °C), Min:98 °F (36.7 °C), Max:99.7 °F (37.6 °C) No intake/output data recorded.  190 -  0700 In: 2250 Out:  [[id number]] Constitutional: Currently intubated and sedated Head: Normocephalic and atraumatic. Mouth: oral mucosa dry, ET tube is in situ Neck: supple Cardiovascular:Irreg/irreg Lungs:Decreased air entry bilaterally in bilateral lower lung zones Abdominal: Soft. non tender Neurological: obtunded, moves all ext, 
Skin: Right lower shin with ulcer, yellow drainage Left arm with skin tear. +ve edema Multiple ecchymotic, skin is friable/thin Data Review:  
   
Recent Days: 
Recent Labs 20 
0430 20 
0500 20 
5222 WBC 13.3* 12.5*  --   
HGB 8.9* 8.9* 8.8* HCT 29.7* 29.6* 28.9*  
* 113*  --   
 
Recent Labs 20 
0430 20 
1630 20 
0500  20 
8601 * 151* 151*   < > 152* K 3.7 3.9 3.3*   < > 2.3*  
* 116* 116*   < > 114* CO2 26 26 30   < > 27 * 157* 168*   < > 132* BUN 52* 51* 60*   < > 54* CREA 1.32* 1.32* 1.29   < > 1.43* CA 6.3* 6.5* 6.6*   < > 6.4* MG  --   --  1.8  --   --   
PHOS 2.5*  --  2.4*  2.4*  --  1.8* ALB 2.6*  --  2.5*  --  2.8*  
 < > = values in this interval not displayed. Recent Labs  
  20 
0430 PH 7.524* PCO2 30* PO2 61* HCO3 26 FIO2 21.0  
 
 
24 Hour Results: 
Recent Results (from the past 24 hour(s)) GLUCOSE, POC Collection Time: 20 11:37 AM  
Result Value Ref Range Glucose (POC) 194 (H) 65 - 100 mg/dL Performed by Lola Caro, BASIC  Collection Time: 20  4:30 PM  
Result Value Ref Range Sodium 151 (H) 136 - 145 mmol/L Potassium 3.9 3.5 - 5.1 mmol/L Chloride 116 (H) 97 - 108 mmol/L  
 CO2 26 21 - 32 mmol/L Anion gap 9 5 - 15 mmol/L Glucose 157 (H) 65 - 100 mg/dL BUN 51 (H) 6 - 20 mg/dL Creatinine 1.32 (H) 0.70 - 1.30 mg/dL BUN/Creatinine ratio 39 (H) 12 - 20 GFR est AA >60 >60 ml/min/1.73m2 GFR est non-AA 53 (L) >60 ml/min/1.73m2 Calcium 6.5 (L) 8.5 - 10.1 mg/dL GLUCOSE, POC Collection Time: 11/30/20 12:16 AM  
Result Value Ref Range Glucose (POC) 166 (H) 65 - 100 mg/dL Performed by Uriel Rob CBC WITH AUTOMATED DIFF Collection Time: 11/30/20  4:30 AM  
Result Value Ref Range WBC 13.3 (H) 4.1 - 11.1 K/uL  
 RBC 3.33 (L) 4.10 - 5.70 M/uL HGB 8.9 (L) 12.1 - 17.0 g/dL HCT 29.7 (L) 36.6 - 50.3 % MCV 89.2 80.0 - 99.0 FL  
 MCH 26.7 26.0 - 34.0 PG  
 MCHC 30.0 30.0 - 36.5 g/dL RDW 22.1 (H) 11.5 - 14.5 % PLATELET 264 (L) 030 - 400 K/uL NRBC 1.4 (H) 0  WBC ABSOLUTE NRBC 0.19 (H) 0.00 - 0.01 K/uL NEUTROPHILS 84 (H) 32 - 75 % LYMPHOCYTES 10 (L) 12 - 49 % MONOCYTES 5 5 - 13 % EOSINOPHILS 0 0 - 7 % BASOPHILS 1 0 - 1 % IMMATURE GRANULOCYTES 20 (H) 0.0 - 0.5 % ABS. NEUTROPHILS 11.2 (H) 1.8 - 8.0 K/UL  
 ABS. LYMPHOCYTES 1.4 0.8 - 3.5 K/UL  
 ABS. MONOCYTES 0.7 0.0 - 1.0 K/UL  
 ABS. EOSINOPHILS 0.0 0.0 - 0.4 K/UL  
 ABS. BASOPHILS 0.1 0.0 - 0.1 K/UL  
 ABS. IMM. GRANS. 2.6 (H) 0.00 - 0.04 K/UL  
 DF AUTOMATED    
DIGOXIN Collection Time: 11/30/20  4:30 AM  
Result Value Ref Range Digoxin level 2.3 (H) 0.90 - 2.0 ng/mL RENAL FUNCTION PANEL Collection Time: 11/30/20  4:30 AM  
Result Value Ref Range Sodium 151 (H) 136 - 145 mmol/L Potassium 3.7 3.5 - 5.1 mmol/L Chloride 116 (H) 97 - 108 mmol/L  
 CO2 26 21 - 32 mmol/L Anion gap 9 5 - 15 mmol/L Glucose 154 (H) 65 - 100 mg/dL BUN 52 (H) 6 - 20 mg/dL Creatinine 1.32 (H) 0.70 - 1.30 mg/dL  BUN/Creatinine ratio 39 (H) 12 - 20 GFR est AA >60 >60 ml/min/1.73m2 GFR est non-AA 53 (L) >60 ml/min/1.73m2 Calcium 6.3 (LL) 8.5 - 10.1 mg/dL Phosphorus 2.5 (L) 2.6 - 4.7 mg/dL Albumin 2.6 (L) 3.5 - 5.0 g/dL GLUCOSE, POC Collection Time: 11/30/20  8:38 AM  
Result Value Ref Range Glucose (POC) 129 (H) 65 - 100 mg/dL Performed by Ileana Savage Assessment/  
 
Patient Active Problem List  
Diagnosis Code  Cellulitis of left upper extremity L03.114  
 Chronic atrial fibrillation (HCC) I48.20  
 HTN (hypertension), benign I10  
 COPD (chronic obstructive pulmonary disease) (HCC) J44.9  Dyslipidemia E78.5  Depression F32.9  
 UTI (urinary tract infection) N39.0  BRE (acute kidney injury) ([facility] N17.9  AMS (altered mental status) R41.82  Sepsis ([facility] A41.9  Hypoglycemia E16.2  
 Encephalopathy acute G93.40 Plan: 
 
Septic shockPatient presented with above-mentioned symptomatology was found to meet severe sepsis criteria secondary to combination of urinary tract infection as well as developing bilateral pneumonia, patient is COVID-19 negative, currently improving, patient been titrated off of levophed 
follow-up blood cultures Urine Culture consistent with ESBL E. Coli Continue meropenem for Abx coverage Discontinued IVF Infectious disease recommendations appreciated, will continue to follow Critical care recommendations appreciated will continue to follow Acute respiratory failure/pneumoniapatient was found to be in acute respiratory failure requiring emergent endotracheal intubation, likely secondary to developing bilateral pneumonia, patient currently off of pressors Follow-up sputum culture Follow-up blood cultures Continue Meropenem for Abx coverage Attempt weaning trial 
Pulmonology consult appreciated, will continue to follow Infectious disease recommendations appreciated, will continue to follow Hypoactive bowel soundsresolved Atrial fibrillation with RVRpatient presented with atrial fibrillation with RVR likely secondary to ongoing severe sepsis Dig level was 1.3 Transthoracic echo shows preserved ejection fraction Cardiology consult appreciated, will continue to follow Acute on chronic diastolic heart failurepatient was found to have significant bilateral lower extremity edema as well as radiographic evidence of volume overload consistent with acute on chronic diastolic heart failure Discontinued IV fluids Lasix 40 mg IV twice daily Continue to monitor intake and output Cardiology consult appreciated, will continue to follow Anemialikely multifactorial, however concern for GI bleed, s/p 2 units prbc 2 days back with appropriate response, currently hemoglobin hematocrit remained stable Continue protonix 40mg iv bid Continue to trend H/H Maintain active type and screen Follow-up gastroenterology recommendations Follow-up hematology recommendations Hypokalemiareplete K and recheck K Thrombocytopenialikely multifactorial, concern for possible early DIC, however platelet count remained stable. Hematology recommendations appreciated Patient currently does not have any active bleeding-Recommend platelet transfusion if platelet count is less than 20,000 with active bleeding Hyperlipidemiacontinue statin ProphylaxisSCDs FENcontinue tube feeding, replete potassium and magnesium DNR,  
surrogate decision-maker is patient's wife Care Plan discussed with: Patient/Family and Nurse Claire Ross MD

## 2020-11-30 NOTE — PROGRESS NOTES
Renal Progress Note Patient: Lauren Davis MRN: [id number]  SSN: [id number] YOB: 1945  Age: 67 y.o. Sex: male Admit Date: 11/15/2020 LOS: 15 days Subjective:  
Patient seen in ICU, on ventilator, sedated, Significant other in the room Off Levophed Creatinine is stable at 1.3 Current Facility-Administered Medications Medication Dose Route Frequency  calcium gluconate 1 gram in sodium chloride (ISO-OSM) 50 mL infusion  1 g IntraVENous Q6H  
 docusate (COLACE) 50 mg/5 mL oral liquid 100 mg  100 mg Per NG tube DAILY  spironolactone (ALDACTONE) tablet 25 mg  25 mg Oral BID  potassium chloride (KLOR-CON) packet for solution 20 mEq  20 mEq Per NG tube Q8H  propofol (DIPRIVAN) 10 mg/mL infusion  0-50 mcg/kg/min IntraVENous TITRATE  digoxin (LANOXIN) injection 125 mcg  125 mcg IntraVENous DAILY  calcium carbonate (TUMS) chewable tablet 400 mg [elemental]  400 mg Oral BID  lidocaine (XYLOCAINE) 10 mg/mL (1 %) injection    PRN  mineral oil (topical)    PRN  pantoprazole (PROTONIX) 40 mg in 0.9% sodium chloride 10 mL injection  40 mg IntraVENous BID  metoclopramide HCl (REGLAN) injection 10 mg  10 mg IntraVENous Q6H  
 hydrocortisone Sod Succ (PF) (SOLU-CORTEF) injection 50 mg  50 mg IntraVENous Q8H  
 fentaNYL (PF) 1,500 mcg/30 mL (50 mcg/mL) infusion  0-200 mcg/hr IntraVENous TITRATE  vasopressin (VASOSTRICT) 40 Units in 0.9% sodium chloride 40 mL infusion  0.04 Units/min IntraVENous CONTINUOUS  
 meropenem (MERREM) 1 g in sterile water (preservative free) 20 mL IV syringe  1 g IntraVENous Q12H  
 morphine injection 2 mg  2 mg IntraVENous Q4H PRN  
 NOREPINephrine (LEVOPHED) 8 mg in 5% dextrose 250mL (32 mcg/mL) infusion  0.5-16 mcg/min IntraVENous TITRATE  acetaminophen (TYLENOL) suppository 650 mg  650 mg Rectal Q4H PRN  
 acetaminophen (TYLENOL) tablet 650 mg  650 mg Oral Q4H PRN  
 albuterol (PROVENTIL HFA, VENTOLIN HFA, PROAIR HFA) inhaler 2 Puff  2 Puff Inhalation A9Z PRN  
 folic acid (FOLVITE) tablet 1 mg  1 mg Oral DAILY  pravastatin (PRAVACHOL) tablet 20 mg  20 mg Oral QHS  sertraline (ZOLOFT) tablet 50 mg  50 mg Oral DAILY  thiamine mononitrate (B-1) tablet 100 mg  100 mg Oral DAILY  ondansetron (ZOFRAN) injection 4 mg  4 mg IntraVENous Q6H PRN  
 hydrOXYzine pamoate (VISTARIL) capsule 25 mg  25 mg Oral TID PRN  chlorhexidine (PERIDEX) 0.12 % mouthwash 15 mL  15 mL Oral Q12H Vitals:  
 11/30/20 0700 11/30/20 0717 11/30/20 1100 11/30/20 1138 BP:      
Pulse:  65  (!) 54 Resp:  21  16 Temp: 98.1 °F (36.7 °C)  98 °F (36.7 °C) SpO2:  100%  100% Weight:      
Height:      
 
Objective:  
General: On ventilator, sedated, no acute distress. HEENT: no Icterus, no Pallor, ET tube in place  
neck: Neck is supple, No JVD Lungs: Decreased breath sounds at the bases, no rhonchi, few scattered rales+, CVS: heart sounds normal, no murmurs, no rubs. GI: soft, nontender, normal BS. Extremeties: no cyanosis, 1+ dependent edema+ Neuro: On ventilator, sedated Skin: normal skin turgor, no skin rashes. Intake and Output: 
Current Shift: No intake/output data recorded. Last three shifts: 11/28 1901 - 11/30 0700 In: 2250 Out: 1975 [[id number]] Lab/Data Review: 
Recent Labs 11/30/20 
0430 11/29/20 
0500 11/28/20 
6764 WBC 13.3* 12.5*  --   
HGB 8.9* 8.9* 8.8* HCT 29.7* 29.6* 28.9*  
* 113*  --   
 
Recent Labs 11/30/20 
0430 11/29/20 
1630 11/29/20 
0500  11/28/20 
4567 * 151* 151*   < > 152* K 3.7 3.9 3.3*   < > 2.3*  
* 116* 116*   < > 114* CO2 26 26 30   < > 27 * 157* 168*   < > 132* BUN 52* 51* 60*   < > 54* CREA 1.32* 1.32* 1.29   < > 1.43* CA 6.3* 6.5* 6.6*   < > 6.4* MG  --   --  1.8  --   --   
PHOS 2.5*  --  2.4*  2.4*  --  1.8* ALB 2.6*  --  2.5*  --  2.8*  
 < > = values in this interval not displayed.   
 
Recent Labs  
  11/28/20 0430  
PH 7.524* PCO2 30* PO2 61* HCO3 26 FIO2 21.0 Recent Results (from the past 24 hour(s)) METABOLIC PANEL, BASIC Collection Time: 11/29/20  4:30 PM  
Result Value Ref Range Sodium 151 (H) 136 - 145 mmol/L Potassium 3.9 3.5 - 5.1 mmol/L Chloride 116 (H) 97 - 108 mmol/L  
 CO2 26 21 - 32 mmol/L Anion gap 9 5 - 15 mmol/L Glucose 157 (H) 65 - 100 mg/dL BUN 51 (H) 6 - 20 mg/dL Creatinine 1.32 (H) 0.70 - 1.30 mg/dL BUN/Creatinine ratio 39 (H) 12 - 20 GFR est AA >60 >60 ml/min/1.73m2 GFR est non-AA 53 (L) >60 ml/min/1.73m2 Calcium 6.5 (L) 8.5 - 10.1 mg/dL GLUCOSE, POC Collection Time: 11/30/20 12:16 AM  
Result Value Ref Range Glucose (POC) 166 (H) 65 - 100 mg/dL Performed by Will Pond CBC WITH AUTOMATED DIFF Collection Time: 11/30/20  4:30 AM  
Result Value Ref Range WBC 13.3 (H) 4.1 - 11.1 K/uL  
 RBC 3.33 (L) 4.10 - 5.70 M/uL HGB 8.9 (L) 12.1 - 17.0 g/dL HCT 29.7 (L) 36.6 - 50.3 % MCV 89.2 80.0 - 99.0 FL  
 MCH 26.7 26.0 - 34.0 PG  
 MCHC 30.0 30.0 - 36.5 g/dL RDW 22.1 (H) 11.5 - 14.5 % PLATELET 398 (L) 227 - 400 K/uL NRBC 1.4 (H) 0  WBC ABSOLUTE NRBC 0.19 (H) 0.00 - 0.01 K/uL NEUTROPHILS 84 (H) 32 - 75 % LYMPHOCYTES 10 (L) 12 - 49 % MONOCYTES 5 5 - 13 % EOSINOPHILS 0 0 - 7 % BASOPHILS 1 0 - 1 % IMMATURE GRANULOCYTES 20 (H) 0.0 - 0.5 % ABS. NEUTROPHILS 11.2 (H) 1.8 - 8.0 K/UL  
 ABS. LYMPHOCYTES 1.4 0.8 - 3.5 K/UL  
 ABS. MONOCYTES 0.7 0.0 - 1.0 K/UL  
 ABS. EOSINOPHILS 0.0 0.0 - 0.4 K/UL  
 ABS. BASOPHILS 0.1 0.0 - 0.1 K/UL  
 ABS. IMM. GRANS. 2.6 (H) 0.00 - 0.04 K/UL  
 DF AUTOMATED    
DIGOXIN Collection Time: 11/30/20  4:30 AM  
Result Value Ref Range Digoxin level 2.3 (H) 0.90 - 2.0 ng/mL RENAL FUNCTION PANEL Collection Time: 11/30/20  4:30 AM  
Result Value Ref Range Sodium 151 (H) 136 - 145 mmol/L Potassium 3.7 3.5 - 5.1 mmol/L  Chloride 116 (H) 97 - 108 mmol/L  
 CO2 26 21 - 32 mmol/L Anion gap 9 5 - 15 mmol/L Glucose 154 (H) 65 - 100 mg/dL BUN 52 (H) 6 - 20 mg/dL Creatinine 1.32 (H) 0.70 - 1.30 mg/dL BUN/Creatinine ratio 39 (H) 12 - 20 GFR est AA >60 >60 ml/min/1.73m2 GFR est non-AA 53 (L) >60 ml/min/1.73m2 Calcium 6.3 (LL) 8.5 - 10.1 mg/dL Phosphorus 2.5 (L) 2.6 - 4.7 mg/dL Albumin 2.6 (L) 3.5 - 5.0 g/dL GLUCOSE, POC Collection Time: 11/30/20  8:38 AM  
Result Value Ref Range Glucose (POC) 129 (H) 65 - 100 mg/dL Performed by Justice Patel, POC Collection Time: 11/30/20 10:56 AM  
Result Value Ref Range Glucose (POC) 186 (H) 65 - 100 mg/dL Performed by Dinora Watson Assessment and Plan: #1 severe hypercalcemia. On admission corrected calcium was 14.6. Etiology unclear, hypercalcemia resolved now, 
immunoelectrophoresis studies showed small IgG monoclonal protein  
low PTH compatible with PTH independent hypercalcemia, borderline low vitamin D level Hypercalcemia resolved and repeat calcium level today is 6.3 today. Corrected calcium 7.8 Will give IV calcium gluconate 1gm x 2 doses today. Check magnesium level Continue calcium carbonate po through NG tube 2. Fluid overload/dependent edema+:  improved edema Pleural effusion+ Hold lasix for now and continue spironolactone 
monitor electrolytes and fluid xtatus 3. Acute kidney injury on ?? CKD Renal functions showed gradual improvement suspect baseline chronic kidney disease 
creatinien at 1.32 today 
urine random protein creatinine ratio is 2.0 
  
3.  hypokalemia. Improved with supplemental potassium. Check mag level  
continue to monitor and supplement as needed 4. Hypernatremia. From free water deficit.   
dced Lasix ,  And continue to monitor sodium levels 5. Acute respiratory failure, on ventilator Sepsis/UTI Pulmonary and ID following the patient Continue IV antibiotics as per ID 
  
6.   Atrial fibrillation with rapid ventricular rate : Cardiology following the patient, on amiodarone and digoxin Signed By: Leonie Brooks MD   
 November 30, 2020

## 2020-11-30 NOTE — PROGRESS NOTES
Pulmonology and Critical Care Progress Note Subjective:  
 
Patient seen and examined in his room this evening, I discussed the case in detail with the bedside nurse, the respiratory therapist, and his wife. I had another goals of care discussion with his wife this evening and I let her know that at this point I would be recommending less aggressive care. I explained to her about comfort care/hospice and how that would involve removing the ET tube. She states that she is probably leaning in this direction and that she would like her son, who lives in [place], to come and see his father prior to moving towards comfort care. Remains critically ill Intubated on mechanical ventilation Ventilator has been changed to AC//12/45%/5 No ABG or CXR today as they likely are moving toward comfort care. Post bronchoscopy 11/25, cultures no growth to date and cytology from brushings/washings are negative for malignancy. Lasix being held for persistent hypokalemia. Status post thoracentesis right side by interventional radiology 11/23 Patient results consistent with transudate Cytology negative Current Facility-Administered Medications Medication Dose Route Frequency Provider Last Rate Last Dose  calcium gluconate 1 gram in sodium chloride (ISO-OSM) 50 mL infusion  1 g IntraVENous Q6H Rey Donald MD   1,000 mg at 11/30/20 1405  docusate (COLACE) 50 mg/5 mL oral liquid 100 mg  100 mg Per NG tube DAILY Danny Trevino MD   Stopped at 11/30/20 0900  
 spironolactone (ALDACTONE) tablet 25 mg  25 mg Oral BID Francy Mariee MD   Stopped at 11/30/20 0900  potassium chloride (KLOR-CON) packet for solution 20 mEq  20 mEq Per NG tube Q8H Rey Donald MD   20 mEq at 11/30/20 6037  propofol (DIPRIVAN) 10 mg/mL infusion  0-50 mcg/kg/min IntraVENous TITRATE Cristo Warner DO 27.4 mL/hr at 11/30/20 1405 45 mcg/kg/min at 11/30/20 1405  digoxin (LANOXIN) injection 125 mcg  125 mcg IntraVENous DAILY Essence Back MD   125 mcg at 11/29/20 5609  calcium carbonate (TUMS) chewable tablet 400 mg [elemental]  400 mg Oral BID Анна Almendarez MD   400 mg at 11/30/20 7774  lidocaine (XYLOCAINE) 10 mg/mL (1 %) injection    PRN Gearlean Landau A, MD   30 mL at 11/25/20 1215  
 mineral oil (topical)    PRN Pedro Pablo Lion MD   5 mL at 11/25/20 1211  pantoprazole (PROTONIX) 40 mg in 0.9% sodium chloride 10 mL injection  40 mg IntraVENous BID Sanjay Lloyd MD   40 mg at 11/30/20 8919  metoclopramide HCl (REGLAN) injection 10 mg  10 mg IntraVENous Q6H Chaitanya Steward MD   10 mg at 11/30/20 1141  hydrocortisone Sod Succ (PF) (SOLU-CORTEF) injection 50 mg  50 mg IntraVENous Q8H Curry Warner DO   50 mg at 11/30/20 1406  fentaNYL (PF) 1,500 mcg/30 mL (50 mcg/mL) infusion  0-200 mcg/hr IntraVENous TITRATE Dino Montana DO 1 mL/hr at 11/29/20 0812 50 mcg/hr at 11/29/20 0812  
 vasopressin (VASOSTRICT) 40 Units in 0.9% sodium chloride 40 mL infusion  0.04 Units/min IntraVENous CONTINUOUS Curry Warner DO 2.4 mL/hr at 11/19/20 2033 0.04 Units/min at 11/19/20 2033  meropenem (MERREM) 1 g in sterile water (preservative free) 20 mL IV syringe  1 g IntraVENous Q12H Cristian Thomas MD   1 g at 11/30/20 0422  morphine injection 2 mg  2 mg IntraVENous Q4H PRN Magdy Cazares MD   2 mg at 11/23/20 0739  
 NOREPINephrine (LEVOPHED) 8 mg in 5% dextrose 250mL (32 mcg/mL) infusion  0.5-16 mcg/min IntraVENous TITRATE Magdy Cazares MD   Stopped at 11/23/20 0338  acetaminophen (TYLENOL) suppository 650 mg  650 mg Rectal Q4H PRN Bhavana PONCE MD   650 mg at 11/16/20 1225  acetaminophen (TYLENOL) tablet 650 mg  650 mg Oral Q4H PRN Rom Herring NP   650 mg at 11/27/20 1403  albuterol (PROVENTIL HFA, VENTOLIN HFA, PROAIR HFA) inhaler 2 Puff  2 Puff Inhalation Q4H PRN Rom Herring, ALANNA      
 folic acid (FOLVITE) tablet 1 mg  1 mg Oral DAILY Colin Herring, ALANNA 1 mg at 20 4168  pravastatin (PRAVACHOL) tablet 20 mg  20 mg Oral QHS , NP   20 mg at 20  sertraline (ZOLOFT) tablet 50 mg  50 mg Oral DAILY , NP   50 mg at 20  thiamine mononitrate (B-1) tablet 100 mg  100 mg Oral DAILY , NP   100 mg at 20 1023  ondansetron (ZOFRAN) injection 4 mg  4 mg IntraVENous Q6H PRN  A, NP      
 hydrOXYzine pamoate (VISTARIL) capsule 25 mg  25 mg Oral TID PRN , NP   25 mg at 20 1857  chlorhexidine (PERIDEX) 0.12 % mouthwash 15 mL  15 mL Oral Q12H , NP   15 mL at 20 1141 Allergies Allergen Reactions  Precedex [Dexmedetomidine] Other (comments) Severe bradycardia  Codeine Rash Review of Systems: 
Review of systems not obtained due to patient factors. Objective:  
 
Blood pressure 95/60, pulse 67, temperature 98 °F (36.7 °C), resp. rate 18, height 6' 2\" (1.88 m), weight 96.9 kg (213 lb 10 oz), SpO2 99 %. Temp (24hrs), Av.4 °F (36.9 °C), Min:98 °F (36.7 °C), Max:99.7 °F (37.6 °C) Intake and Output: 
Current Shift: No intake/output data recorded. Last 3 Shifts: 1901 -  0700 In: 2250 Out:  [[id number]:[id number]] Physical Exam:  
General appearance: Elderly male who is sedated on the ventilator, no distress, appears older than stated age, chronically ill-appearing Head: Normocephalic, without obvious abnormality, atraumatic Eyes: negative Neck: no obvious adenopathy, no carotid bruit; neck is stiff Lungs: He has few scattered rhonchi. Equal breath sounds bilaterally. Small secretions per RN. Heart: irregularly irregular rhythm, bradycardic, no rub, no obvious murmur Abdomen: soft, non-tender. Bowel sounds normal. No masses,  no organomegaly; NG tube in place. He also has diarrhea and has a rectal tube in place. He is not tolerating his tube feeds. Pulses: 2+ and symmetric Skin: Dry, intact, with bruising throughout the arms and legs. +3 pitting edema in extremities bilaterally. Lymph nodes: Cervical, supraclavicular, and axillary nodes normal. 
Neurologic: Sedated on the ventilator, not following any commands. Additional comments:None Lab/Data Review: All lab results for the last 24 hours reviewed. Recent Results (from the past 24 hour(s)) METABOLIC PANEL, BASIC Collection Time: 11/29/20  4:30 PM  
Result Value Ref Range Sodium 151 (H) 136 - 145 mmol/L Potassium 3.9 3.5 - 5.1 mmol/L Chloride 116 (H) 97 - 108 mmol/L  
 CO2 26 21 - 32 mmol/L Anion gap 9 5 - 15 mmol/L Glucose 157 (H) 65 - 100 mg/dL BUN 51 (H) 6 - 20 mg/dL Creatinine 1.32 (H) 0.70 - 1.30 mg/dL BUN/Creatinine ratio 39 (H) 12 - 20 GFR est AA >60 >60 ml/min/1.73m2 GFR est non-AA 53 (L) >60 ml/min/1.73m2 Calcium 6.5 (L) 8.5 - 10.1 mg/dL GLUCOSE, POC Collection Time: 11/30/20 12:16 AM  
Result Value Ref Range Glucose (POC) 166 (H) 65 - 100 mg/dL Performed by [employer] CBC WITH AUTOMATED DIFF Collection Time: 11/30/20  4:30 AM  
Result Value Ref Range WBC 13.3 (H) 4.1 - 11.1 K/uL  
 RBC 3.33 (L) 4.10 - 5.70 M/uL HGB 8.9 (L) 12.1 - 17.0 g/dL HCT 29.7 (L) 36.6 - 50.3 % MCV 89.2 80.0 - 99.0 FL  
 MCH 26.7 26.0 - 34.0 PG  
 MCHC 30.0 30.0 - 36.5 g/dL RDW 22.1 (H) 11.5 - 14.5 % PLATELET 633 (L) 339 - 400 K/uL NRBC 1.4 (H) 0  WBC ABSOLUTE NRBC 0.19 (H) 0.00 - 0.01 K/uL NEUTROPHILS 84 (H) 32 - 75 % LYMPHOCYTES 10 (L) 12 - 49 % MONOCYTES 5 5 - 13 % EOSINOPHILS 0 0 - 7 % BASOPHILS 1 0 - 1 % IMMATURE GRANULOCYTES 20 (H) 0.0 - 0.5 % ABS. NEUTROPHILS 11.2 (H) 1.8 - 8.0 K/UL  
 ABS. LYMPHOCYTES 1.4 0.8 - 3.5 K/UL  
 ABS. MONOCYTES 0.7 0.0 - 1.0 K/UL  
 ABS. EOSINOPHILS 0.0 0.0 - 0.4 K/UL  
 ABS. BASOPHILS 0.1 0.0 - 0.1 K/UL  
 ABS. IMM. GRANS. 2.6 (H) 0.00 - 0.04 K/UL  
 DF AUTOMATED    
DIGOXIN  Collection Time: 11/30/20  4:30 AM  
Result Value Ref Range Digoxin level 2.3 (H) 0.90 - 2.0 ng/mL RENAL FUNCTION PANEL Collection Time: 11/30/20  4:30 AM  
Result Value Ref Range Sodium 151 (H) 136 - 145 mmol/L Potassium 3.7 3.5 - 5.1 mmol/L Chloride 116 (H) 97 - 108 mmol/L  
 CO2 26 21 - 32 mmol/L Anion gap 9 5 - 15 mmol/L Glucose 154 (H) 65 - 100 mg/dL BUN 52 (H) 6 - 20 mg/dL Creatinine 1.32 (H) 0.70 - 1.30 mg/dL BUN/Creatinine ratio 39 (H) 12 - 20 GFR est AA >60 >60 ml/min/1.73m2 GFR est non-AA 53 (L) >60 ml/min/1.73m2 Calcium 6.3 (LL) 8.5 - 10.1 mg/dL Phosphorus 2.5 (L) 2.6 - 4.7 mg/dL Albumin 2.6 (L) 3.5 - 5.0 g/dL GLUCOSE, POC Collection Time: 11/30/20  8:38 AM  
Result Value Ref Range Glucose (POC) 129 (H) 65 - 100 mg/dL Performed by [address] [place] [address], POC Collection Time: 11/30/20 10:56 AM  
Result Value Ref Range Glucose (POC) 186 (H) 65 - 100 mg/dL Performed by Ivone Zelaya Chest X-Ray:  
XR CHEST PORT Final Result Stable right neck central venous catheter. Now present, there is an ET tube 4-5 
cm above the samuel. NG tube projects below the left hemidiaphragm. Improved 
aeration through the right lung; clearing patchy reticular markings. No 
interstitial or alveolar pulmonary edema. No pneumothorax or sizable pleural 
effusion. XR CHEST PORT Final Result Impression:   
Successful placement of a triple-lumen central venous catheter. The catheter is  
ready for use. IR INSERT NON TUNL CVC OVER 5 YRS Final Result Impression:   
Successful placement of a triple-lumen central venous catheter. The catheter is  
ready for use. IR Zamzam Final Result Impression:   
Successful placement of a triple-lumen central venous catheter. The catheter is  
ready for use. XR CHEST PORT Final Result Impression: The cardiomediastinal silhouette is appropriate for age, technique,  
and lung expansion. Pulmonary vasculature is not congested. The lungs are  
essentially clear. No effusion or pneumothorax is seen. US RETROPERITONEUM COMP Final Result Impression: Normal exam  
  
  
XR CHEST PORT Final Result IMPRESSION:  No evidence of an acute cardiopulmonary process. CT HEAD WO CONT Final Result IMPRESSION: Chronic findings as above. CT CHEST WO CONT    (Results Pending) CT ABD PELV WO CONT    (Results Pending) XR CHEST PORT    (Results Pending) CT imaging: CT Results  (Last 48 hours) None PFTs: PFT Results  (Last 3 results in the past 10 years) None Assessment:  
 
Patient is a 65-year-old  male with a history of atrial fibrillation, osteoarthritis, hyperlipidemia, reported COPD, and hypertension who presented to [facility] on 11/15/2020 from his nursing home for altered mental status. He has been admitted to the ICU and is currently in septic shock on vasopressors and has been intubated on 11/19/2020 for failure to protect his airway due to altered mental status. Plan:  
 
1.)  Acute respiratory failure Likely due to CHF, altered mental status, failure to protect airway Currently intubated on mechanical ventilation. Patient cannot be extubated given persistent encephalopathy. Patient is DNR and the wife at the bedside does not want tracheostomy. Intubated on 11/19/2020 ABGs and chest x-ray reviewed as outlined above Pleural fluid cytology negative Status post bronchoscopy 11/25 Results no growth to date, no malignancy noted on cytology brushing or bronchial washings. Chest x-ray improved with diuresis, but his problem is mental status. Renal Function stable; potassium is better. Continue the patient on propofol/fentanyl for comfort for now, however when he is weaned down he does nothing purposeful and does not follow commands. I had a long discussion with the patient's wife today at the bedside. The patient is DNR. She does not want a tracheostomy. She is leaning towards comfort care soon. She wants her son and other family members to visit him before the patient is made comfort care and extubated. She realizes that prognosis is very poor. 2.)  Septic shock ESBL E. Coli Now off vasopressors On parenteral antibiotic coverage Further changes in antibiotics based on clinical response and culture results Covid negative Appears quite volume overloaded clinically 3.)  Acute encephalopathy Likely multifactorial from sepsis, urinary tract infection, and multiple electrolyte abnormalities including hypernatremia, hypercalcemia, and hypokalemia. Continue on current antibiotic regimen as above; neurology/nephrology/hematology following closely. Ammonia level normal, TSH mildly elevated. 4.)  Acute kidney injury Creatinine seem to plateau around 7.08 and had very steadily been decreasing with improved calcium level and fluid administration. Likely prerenal from volume depletion and sepsis. Creatinine 1.4 today Nephrology following closely, appreciate their assistance. 5.)  Atrial fibrillation with RVR/diastolic congestive heart failure Presented with A. fib RVR Stable Likely secondary to sepsis Echo shows preserved ejection fraction Cardiology input appreciated Continue digoxin Quite volume overloaded Continue diuresis 6) Hypercalcemia Corrected calcium 14.6 on admission, this has steadily improved every day with calcitonin fluids. Nephrology following closely Possible myeloma CODE STATUS: DNR Lines/Tubes: ET tube, right IJ central line, Mcmillan catheter, NG tube Prophylaxis: 
Stress Ulcer Protocol Active: Yes, Protonix 40 mg IV twice daily Deep Vein Thrombosis Protocol Active: Yes, SCD's Disposition: DNR status is now in effect.  
 
Total critical care time spent with patient: 50 minutes with direct visualization of the patient's ventilator and management along with respiratory therapist, long discussion with the respiratory therapist and the bedside nurse regarding the plan of care. Kylah Lo MD 
[facility] 11/30/2020

## 2020-11-30 NOTE — PROGRESS NOTES
Renal Progress Note Patient: Troy Stanley MRN: [id number]  SSN: [id number] YOB: 1945  Age: 67 y.o. Sex: male Admit Date: 11/15/2020 LOS: 14 days Subjective:  
Patient seen in ICU, on ventilator, sedated, Hypokalemia improved 3.3 today, received IV kcl 100 meq and few doses of POkcl in last 24 hrs Current Facility-Administered Medications Medication Dose Route Frequency  [START ON 11/30/2020] docusate (COLACE) 50 mg/5 mL oral liquid 100 mg  100 mg Per NG tube DAILY  spironolactone (ALDACTONE) tablet 25 mg  25 mg Oral BID  potassium chloride (KLOR-CON) packet for solution 20 mEq  20 mEq Per NG tube Q8H  propofol (DIPRIVAN) 10 mg/mL infusion  0-50 mcg/kg/min IntraVENous TITRATE  digoxin (LANOXIN) injection 125 mcg  125 mcg IntraVENous DAILY  calcium carbonate (TUMS) chewable tablet 400 mg [elemental]  400 mg Oral BID  lidocaine (XYLOCAINE) 10 mg/mL (1 %) injection    PRN  mineral oil (topical)    PRN  pantoprazole (PROTONIX) 40 mg in 0.9% sodium chloride 10 mL injection  40 mg IntraVENous BID  metoclopramide HCl (REGLAN) injection 10 mg  10 mg IntraVENous Q6H  
 hydrocortisone Sod Succ (PF) (SOLU-CORTEF) injection 50 mg  50 mg IntraVENous Q8H  
 fentaNYL (PF) 1,500 mcg/30 mL (50 mcg/mL) infusion  0-200 mcg/hr IntraVENous TITRATE  vasopressin (VASOSTRICT) 40 Units in 0.9% sodium chloride 40 mL infusion  0.04 Units/min IntraVENous CONTINUOUS  
 meropenem (MERREM) 1 g in sterile water (preservative free) 20 mL IV syringe  1 g IntraVENous Q12H  
 morphine injection 2 mg  2 mg IntraVENous Q4H PRN  
 NOREPINephrine (LEVOPHED) 8 mg in 5% dextrose 250mL (32 mcg/mL) infusion  0.5-16 mcg/min IntraVENous TITRATE  acetaminophen (TYLENOL) suppository 650 mg  650 mg Rectal Q4H PRN  
 acetaminophen (TYLENOL) tablet 650 mg  650 mg Oral Q4H PRN  
 albuterol (PROVENTIL HFA, VENTOLIN HFA, PROAIR HFA) inhaler 2 Puff  2 Puff Inhalation N7K PRN  
 folic acid (FOLVITE) tablet 1 mg  1 mg Oral DAILY  pravastatin (PRAVACHOL) tablet 20 mg  20 mg Oral QHS  sertraline (ZOLOFT) tablet 50 mg  50 mg Oral DAILY  thiamine mononitrate (B-1) tablet 100 mg  100 mg Oral DAILY  ondansetron (ZOFRAN) injection 4 mg  4 mg IntraVENous Q6H PRN  
 hydrOXYzine pamoate (VISTARIL) capsule 25 mg  25 mg Oral TID PRN  chlorhexidine (PERIDEX) 0.12 % mouthwash 15 mL  15 mL Oral Q12H Vitals:  
 11/29/20 1600 11/29/20 1608 11/29/20 1700 11/29/20 1800 BP: 113/67  95/60 (!) 99/52 Pulse: 73 71 88 95 Resp: 21  30 (!) 32 Temp:      
SpO2: 98% 98% 97% 95% Weight:      
Height:      
 
Objective:  
General: On ventilator, sedated, no acute distress. HEENT: no Icterus, no Pallor, ET tube in place  
neck: Neck is supple, No JVD Lungs: Decreased breath sounds at the bases, no rhonchi, few scattered rales+, CVS: heart sounds normal, no murmurs, no rubs. GI: soft, nontender, normal BS. Extremeties: no cyanosis, 1+ dependent edema+ Neuro: On ventilator, sedated Skin: normal skin turgor, no skin rashes. Intake and Output: 
Current Shift: No intake/output data recorded. Last three shifts: 11/28 0701 - 11/29 1900 In: 4301 Out: 1400 [[id number]:[id number]] Lab/Data Review: 
Recent Labs  
  11/29/20 
0500 11/28/20 
0625 11/27/20 
0500 WBC 12.5*  --  14.2* HGB 8.9* 8.8* 9.6* HCT 29.6* 28.9* 31.6*  
*  --  105* Recent Labs  
  11/29/20 
1630 11/29/20 
0500 11/28/20 
1550 11/28/20 
0625  11/27/20 
0500 * 151* 151* 152*   < > 149*  
K 3.9 3.3* 2.5* 2.3*   < > 2.9*  
* 116* 115* 114*   < > 112* CO2 26 30 27 27   < > 27 * 168* 122* 132*   < > 140* BUN 51* 60* 53* 54*   < > 58* CREA 1.32* 1.29 1.41* 1.43*   < > 1.67* CA 6.5* 6.6* 6.1* 6.4*   < > 6.4* MG  --  1.8  --   --   --  1.8 PHOS  --  2.4*  2.4*  --  1.8*  --  4.2 ALB  --  2.5*  --  2.8*  --  3.1*  
 < > = values in this interval not displayed. Recent Labs 11/28/20 
0430 11/27/20 
0544 PH 7.524* 7.39  
PCO2 30* 42 PO2 61* 88 HCO3 26 24 FIO2 21.0 30 Recent Results (from the past 24 hour(s)) DIGOXIN Collection Time: 11/29/20  5:00 AM  
Result Value Ref Range Digoxin level 1.9 0.90 - 2.0 ng/mL Reported dose date 0 Reported dose: 0 Units CBC WITH AUTOMATED DIFF Collection Time: 11/29/20  5:00 AM  
Result Value Ref Range WBC 12.5 (H) 4.1 - 11.1 K/uL  
 RBC 3.36 (L) 4.10 - 5.70 M/uL HGB 8.9 (L) 12.1 - 17.0 g/dL HCT 29.6 (L) 36.6 - 50.3 % MCV 88.1 80.0 - 99.0 FL  
 MCH 26.5 26.0 - 34.0 PG  
 MCHC 30.1 30.0 - 36.5 g/dL RDW 21.4 (H) 11.5 - 14.5 % PLATELET 137 (L) 657 - 400 K/uL NEUTROPHILS 87 (H) 32 - 75 % LYMPHOCYTES 9 (L) 12 - 49 % MONOCYTES 4 (L) 5 - 13 % EOSINOPHILS 0 0 - 7 % BASOPHILS 1 0 - 1 % IMMATURE GRANULOCYTES 16 (H) 0.0 - 0.5 % ABS. NEUTROPHILS 10.8 (H) 1.8 - 8.0 K/UL  
 ABS. LYMPHOCYTES 1.1 0.8 - 3.5 K/UL  
 ABS. MONOCYTES 0.5 0.0 - 1.0 K/UL  
 ABS. EOSINOPHILS 0.0 0.0 - 0.4 K/UL  
 ABS. BASOPHILS 0.1 0.0 - 0.1 K/UL  
 ABS. IMM. GRANS. 2.0 (H) 0.00 - 0.04 K/UL  
 DF INACTIVE CODE. NEVER ACTIVATE/PH    
PHOSPHORUS Collection Time: 11/29/20  5:00 AM  
Result Value Ref Range Phosphorus 2.4 (L) 2.6 - 4.7 mg/dL RENAL FUNCTION PANEL Collection Time: 11/29/20  5:00 AM  
Result Value Ref Range Sodium 151 (H) 136 - 145 mmol/L Potassium 3.3 (L) 3.5 - 5.1 mmol/L Chloride 116 (H) 97 - 108 mmol/L  
 CO2 30 21 - 32 mmol/L Anion gap 5 5 - 15 mmol/L Glucose 168 (H) 65 - 100 mg/dL BUN 60 (H) 6 - 20 mg/dL Creatinine 1.29 0.70 - 1.30 mg/dL BUN/Creatinine ratio 47 (H) 12 - 20 GFR est AA >60 >60 ml/min/1.73m2 GFR est non-AA 54 (L) >60 ml/min/1.73m2 Calcium 6.6 (L) 8.5 - 10.1 mg/dL Phosphorus 2.4 (L) 2.6 - 4.7 mg/dL Albumin 2.5 (L) 3.5 - 5.0 g/dL MAGNESIUM Collection Time: 11/29/20  5:00 AM  
Result Value Ref Range Magnesium 1.8 1.6 - 2.4 mg/dL GLUCOSE, POC  
 Collection Time: 11/29/20 11:37 AM  
Result Value Ref Range Glucose (POC) 194 (H) 65 - 100 mg/dL Performed by GUERA Boggs Collection Time: 11/29/20  4:30 PM  
Result Value Ref Range Sodium 151 (H) 136 - 145 mmol/L Potassium 3.9 3.5 - 5.1 mmol/L Chloride 116 (H) 97 - 108 mmol/L  
 CO2 26 21 - 32 mmol/L Anion gap 9 5 - 15 mmol/L Glucose 157 (H) 65 - 100 mg/dL BUN 51 (H) 6 - 20 mg/dL Creatinine 1.32 (H) 0.70 - 1.30 mg/dL BUN/Creatinine ratio 39 (H) 12 - 20 GFR est AA >60 >60 ml/min/1.73m2 GFR est non-AA 53 (L) >60 ml/min/1.73m2 Calcium 6.5 (L) 8.5 - 10.1 mg/dL Assessment and Plan: #1 severe hypercalcemia.  -On admission corrected calcium was 14.6. Etiology unclear, hypercalcemia resolved now, 
 immunoelectrophoresis studies showed small IgG monoclonal protein  
low PTH compatible with hypercalcemia, borderline low vitamin D level Hypercalcemia resolved and repeat calcium level today is 6.6 today Will give IV calcium gluconate 1gm x 2 doses today Continue calcium carbonate po through NG tube 2. Fluid overload/dependent edema+:  improved edema Pleural effusion+ Hold lasix for now and continue spironolactone 
 monitor electrolytes and fluid xtatus 3. Acute kidney injury on ?? CKD Renal functions showed gradual improvement suspect baseline chronic kidney disease 
creatinien at 1.32 today 
urine random protein creatinine ratio is 2.0 
  
3.  hypokalemia. Persistent Will give 10meq kcl x 5 runs today and repeat K Continue to monitor and supplement as needed 4. Hypernatremia.  -From free water deficit.   
dced Lasix ,  And continue to monitor sodium levels 5. Acute respiratory failure, on ventilator Sepsis/UTI Pulmonary and ID following the patient Continue IV antibiotics as per ID 
  
6. Atrial fibrillation with rapid ventricular rate : Cardiology following the patient, on amiodarone and digoxin Signed By: Girma Lea MD   
 November 29, 2020

## 2020-11-30 NOTE — PROGRESS NOTES
Progress Note Patient: Merlene Waite MRN: [id number]  SSN: [id number] YOB: 1945  Age: 67 y.o. Sex: male Admit Date: 11/15/2020 LOS: 15 days Subjective:  
Patient followed for severe sepsis with altered mental status and ESBL E. Coli UTI still on Meropenem. He is afebrile with low grade temperature and WBC remains elevated. CRP and procalcitonin decreasing. Bronchial washing only grew normal respiratory aguilar. CXR yesterday again. Patient remains intubated. Family member at bedside indicated the comfort care being considered once his son arrives. Objective:  
 
Vitals:  
 11/30/20 0416 11/30/20 0500 11/30/20 0600 11/30/20 0717 BP: (!) 90/54 (!) 97/52 118/64 Pulse: 60 67 63 65 Resp: 21 20 19 21 Temp:      
SpO2: 98% 97% 97% 100% Weight:      
Height:      
  
 
Intake and Output: 
Current Shift: No intake/output data recorded. Last three shifts: 11/28 1901 - 11/30 0700 In: 2250 [facility]: 1975 [facility] Physical Exam:  
Constitutional:   
   General: He is in acute distress. Appearance: He is ill-appearing. He is not diaphoretic. HENT:  
   Head: orotracheal tube; left NG tube Neck:  supple Cardiovascular:  
   Rate and Rhythm: Normal  
   Heart sounds: No murmur. Pulmonary:  
   Breath sounds: No wheezing, rhonchi or rales. Abdominal:  
   Palpations: Abdomen is soft. Tenderness: There is no abdominal tenderness. Genitourinary: 
   Comments: Mcmillan catheter Musculoskeletal:  
   Right lower leg: No edema. Left lower leg: No edema. Comments: 2x3 cm open wound right medial calf with resolving Skin: 
   Findings: No erythema or rash. Neurological:  
   Comments: Unable to assess Psychiatric:  
   Comments: Unable to assess Lab/Data Review: WBC 13,300 ,000 Lactic acid 2.7 Procalcitonin Pending <0.22 < 0.33 < 0.62 <1.09 <1.54 <12.30 CRP Pending <2.84 <2.87 <3.13 <3.61 <4.59  <35.90 Covid-19 Not detected Blood cultures (11/15) No growth FINAL Blood cultures (11/19) No growth FINAL Urine culture (11/15) >100,000 cfu/ml ESBL E. Coli Sputum culture (11/20) Normal aguilar FINAL Body fluid Pleural culture (11/23) No growth FINAL Bronchial washing (11/25) Normal respiratory aguilar FINAL Bronchial washing fungus (11/25) Pending CXR (11/28)  Persistent right basilar atelectasis or airspace disease with new 
probable small left effusion and adjacent atelectasis. Assessment:  
 
Principal Problem: 
  Sepsis ([facility] [address] (11/16/2020) Active Problems: 
  UTI (urinary tract infection) (11/15/2020) BRE (acute kidney injury) ([facility] [address] (11/15/2020) AMS (altered mental status) (11/15/2020) Hypoglycemia (11/16/2020) Encephalopathy acute (11/16/2020) 1. Severe sepsis with tachycardia, tachypnea, leukocytosis, elevated procalcitonin and CRP, resolving 2. UTI with marked pyuria and bacteriuria, secondary to ESBL E. Coli, Day #11 IV Meropenem, resolving 3. Altered mental status, possibly secondary to above 4. Covid-19 negative 7. Acute hypoxic respiratory failure, intubated Comment:  WBC remains elevated. Plan: 1. Continue Meropenem unless placed on comfort care status 2. In am, repeat CBC, procalcitonin and CRP, done Signed By: Spring Brown MD   
 November 30, 2020

## 2020-12-01 NOTE — PROGRESS NOTES
Patient terminally extubated by RT & placed on NC. Propofol & fentanyl discontinued. IVF D5 continued at prescribed rate. Spouse, son & daughter in law at bedside. Patient is comfortable at this time. TF off & NGT removed. Mcmillan remains in place. Patient having periods of apnea at this time.

## 2020-12-01 NOTE — PROGRESS NOTES
Hospitalist Progress Note Daily Progress Note: 12/1/2020 
 
65-year-old male sent here from [facility] with reports from staff of altered mental status, lethargy, not eating and dehydration. He has a history of atrial fibrillation. UA suggestive uti, zosyn initiated. Subjective:  
Patient seen and evaluated at bedside, overnight events noted, patient currently intubated responding to painful/verbal stimuli, discussed with RN at bedside. Problem List: 
Problem List as of 12/1/2020 Date Reviewed: 11/15/2020 Codes Class Noted - Resolved * (Principal) Sepsis ([facility] [address] ICD-10-CM: A41.9 ICD-9-CM: 038.9, 995.91  11/16/2020 - Present Hypoglycemia ICD-10-CM: E16.2 ICD-9-CM: 251.2  11/16/2020 - Present Encephalopathy acute ICD-10-CM: G93.40 ICD-9-CM: 348.30  11/16/2020 - Present UTI (urinary tract infection) ICD-10-CM: N39.0 ICD-9-CM: 599.0  11/15/2020 - Present [facility] ICD-10-CM: N17.9 ICD-9-CM: 584.9  11/15/2020 - Present AMS (altered mental status) ICD-10-CM: T71.73 
ICD-9-CM: 780.97  11/15/2020 - Present Chronic atrial fibrillation (HCC) ICD-10-CM: I48.20 ICD-9-CM: 427.31  8/12/2020 - Present HTN (hypertension), benign ICD-10-CM: I10 
ICD-9-CM: 401.1  8/12/2020 - Present COPD (chronic obstructive pulmonary disease) (HCC) ICD-10-CM: J44.9 ICD-9-CM: 353  8/12/2020 - Present Dyslipidemia ICD-10-CM: E78.5 ICD-9-CM: 272.4  8/12/2020 - Present Depression ICD-10-CM: F32.9 ICD-9-CM: 769  8/12/2020 - Present Cellulitis of left upper extremity ICD-10-CM: L03.114 
ICD-9-CM: 682.3  8/11/2020 - Present Medications reviewed Current Facility-Administered Medications Medication Dose Route Frequency  docusate (COLACE) 50 mg/5 mL oral liquid 100 mg  100 mg Per NG tube DAILY  spironolactone (ALDACTONE) tablet 25 mg  25 mg Oral BID  potassium chloride (KLOR-CON) packet for solution 20 mEq  20 mEq Per NG tube Q8H  propofol (DIPRIVAN) 10 mg/mL infusion  0-50 mcg/kg/min IntraVENous TITRATE  digoxin (LANOXIN) injection 125 mcg  125 mcg IntraVENous DAILY  calcium carbonate (TUMS) chewable tablet 400 mg [elemental]  400 mg Oral BID  lidocaine (XYLOCAINE) 10 mg/mL (1 %) injection    PRN  mineral oil (topical)    PRN  pantoprazole (PROTONIX) 40 mg in 0.9% sodium chloride 10 mL injection  40 mg IntraVENous BID  metoclopramide HCl (REGLAN) injection 10 mg  10 mg IntraVENous Q6H  
 hydrocortisone Sod Succ (PF) (SOLU-CORTEF) injection 50 mg  50 mg IntraVENous Q8H  
 fentaNYL (PF) 1,500 mcg/30 mL (50 mcg/mL) infusion  0-200 mcg/hr IntraVENous TITRATE  vasopressin (VASOSTRICT) 40 Units in 0.9% sodium chloride 40 mL infusion  0.04 Units/min IntraVENous CONTINUOUS  
 meropenem (MERREM) 1 g in sterile water (preservative free) 20 mL IV syringe  1 g IntraVENous Q12H  
 morphine injection 2 mg  2 mg IntraVENous Q4H PRN  
 NOREPINephrine (LEVOPHED) 8 mg in 5% dextrose 250mL (32 mcg/mL) infusion  0.5-16 mcg/min IntraVENous TITRATE  acetaminophen (TYLENOL) suppository 650 mg  650 mg Rectal Q4H PRN  
 acetaminophen (TYLENOL) tablet 650 mg  650 mg Oral Q4H PRN  
 albuterol (PROVENTIL HFA, VENTOLIN HFA, PROAIR HFA) inhaler 2 Puff  2 Puff Inhalation U0B PRN  
 folic acid (FOLVITE) tablet 1 mg  1 mg Oral DAILY  pravastatin (PRAVACHOL) tablet 20 mg  20 mg Oral QHS  sertraline (ZOLOFT) tablet 50 mg  50 mg Oral DAILY  thiamine mononitrate (B-1) tablet 100 mg  100 mg Oral DAILY  ondansetron (ZOFRAN) injection 4 mg  4 mg IntraVENous Q6H PRN  
 hydrOXYzine pamoate (VISTARIL) capsule 25 mg  25 mg Oral TID PRN  chlorhexidine (PERIDEX) 0.12 % mouthwash 15 mL  15 mL Oral Q12H Review of Systems:  
Unobtainable 2/2 encephalopathic/acute illness Objective:  
Physical Exam:  
 
Visit Vitals /65 (BP 1 Location: Right arm, BP Patient Position: At rest) Pulse 78 Temp 98.6 °F (37 °C) Resp 18 Ht 6' 2\" (1.88 m) Wt 96.9 kg (213 lb 10 oz) SpO2 100% BMI 27.43 kg/m² O2 Flow Rate (L/min): 0.5 l/min O2 Device: Ventilator Temp (24hrs), Av.3 °F (36.8 °C), Min:98 °F (36.7 °C), Max:98.6 °F (37 °C) No intake/output data recorded.  1901 -  0700 In: 1600 Out: 1275 [[id number]] Constitutional: Currently intubated and sedated Head: Normocephalic and atraumatic. Mouth: oral mucosa dry, ET tube is in situ Neck: supple Cardiovascular:Irreg/irreg Lungs:Decreased air entry bilaterally in bilateral lower lung zones Abdominal: Soft. non tender Neurological: obtunded, moves all ext, 
Skin: Right lower shin with ulcer, yellow drainage Left arm with skin tear. +ve edema Multiple ecchymotic, skin is friable/thin Data Review:  
   
Recent Days: 
Recent Labs 20 
0406 20 
0430 20 
0500 WBC 15.6* 13.3* 12.5* HGB 9.0* 8.9* 8.9* HCT 31.4* 29.7* 29.6*  
* 113* 113* Recent Labs 20 
0418 20 
0406 20 
0430  20 
0500 * 152* 151*   < > 151*  
K 4.6 4.6 3.7   < > 3.3*  
* 118* 116*   < > 116* CO2 28 28 26   < > 30 * 127* 154*   < > 168* BUN 51* 51* 52*   < > 60* CREA 1.12 1.08 1.32*   < > 1.29  
CA 6.6* 6.6* 6.3*   < > 6.6*  
MG 1.8  --   --   --  1.8 PHOS 3.3  --  2.5*  --  2.4*  2.4* ALB 2.7*  --  2.6*  --  2.5*  
 < > = values in this interval not displayed. Recent Labs 20 
2482 PH 7.40 PCO2 40 PO2 89 HCO3 24 FIO2 45  
 
 
24 Hour Results: 
Recent Results (from the past 24 hour(s)) GLUCOSE, POC Collection Time: 20  8:38 AM  
Result Value Ref Range Glucose (POC) 129 (H) 65 - 100 mg/dL Performed by Latricia Condon, POC Collection Time: 20 10:56 AM  
Result Value Ref Range Glucose (POC) 186 (H) 65 - 100 mg/dL Performed by Latricia Condon, POC  Collection Time: 20  4:27 PM Result Value Ref Range Glucose (POC) 186 (H) 65 - 100 mg/dL Performed by Fani Garnett, POC Collection Time: 11/30/20  9:23 PM  
Result Value Ref Range Glucose (POC) 187 (H) 65 - 100 mg/dL Performed by Trisha Israel METABOLIC PANEL, BASIC Collection Time: 12/01/20  4:06 AM  
Result Value Ref Range Sodium 152 (H) 136 - 145 mmol/L Potassium 4.6 3.5 - 5.1 mmol/L Chloride 118 (H) 97 - 108 mmol/L  
 CO2 28 21 - 32 mmol/L Anion gap 6 5 - 15 mmol/L Glucose 127 (H) 65 - 100 mg/dL BUN 51 (H) 6 - 20 mg/dL Creatinine 1.08 0.70 - 1.30 mg/dL BUN/Creatinine ratio 47 (H) 12 - 20 GFR est AA >60 >60 ml/min/1.73m2 GFR est non-AA >60 >60 ml/min/1.73m2 Calcium 6.6 (L) 8.5 - 10.1 mg/dL CBC WITH AUTOMATED DIFF Collection Time: 12/01/20  4:06 AM  
Result Value Ref Range WBC 15.6 (H) 4.1 - 11.1 K/uL  
 RBC 3.44 (L) 4.10 - 5.70 M/uL HGB 9.0 (L) 12.1 - 17.0 g/dL HCT 31.4 (L) 36.6 - 50.3 % MCV 91.3 80.0 - 99.0 FL  
 MCH 26.2 26.0 - 34.0 PG  
 MCHC 28.7 (L) 30.0 - 36.5 g/dL RDW 22.7 (H) 11.5 - 14.5 % PLATELET 127 (L) 781 - 400 K/uL NRBC 1.8 (H) 0  WBC ABSOLUTE NRBC 0.27 (H) 0.00 - 0.01 K/uL NEUTROPHILS 82 (H) 32 - 75 % LYMPHOCYTES 9 (L) 12 - 49 % MONOCYTES 8 5 - 13 % EOSINOPHILS 0 0 - 7 % BASOPHILS 2 (H) 0 - 1 % IMMATURE GRANULOCYTES 25 (H) 0.0 - 0.5 % ABS. NEUTROPHILS 12.8 (H) 1.8 - 8.0 K/UL  
 ABS. LYMPHOCYTES 1.4 0.8 - 3.5 K/UL  
 ABS. MONOCYTES 1.2 (H) 0.0 - 1.0 K/UL  
 ABS. EOSINOPHILS 0.0 0.0 - 0.4 K/UL  
 ABS. BASOPHILS 0.2 (H) 0.0 - 0.1 K/UL  
 ABS. IMM. GRANS. 3.9 (H) 0.00 - 0.04 K/UL  
 DF AUTOMATED    
C REACTIVE PROTEIN, QT Collection Time: 12/01/20  4:06 AM  
Result Value Ref Range C-Reactive protein 3.07 (H) 0.00 - 0.60 mg/dL PROCALCITONIN Collection Time: 12/01/20  4:06 AM  
Result Value Ref Range Procalcitonin 0.18 (H) 0 ng/mL BLOOD GAS, ARTERIAL  Collection Time: 12/01/20  4:15 AM  
Result Value Ref Range pH 7.40 7.35 - 7.45    
 PCO2 40 35 - 45 mmHg PO2 89 75 - 100 mmHg O2 SAT 97 >95 % BICARBONATE 24 22 - 26 mmol/L  
 BASE DEFICIT 0.1 0 - 2 mmol/L  
 O2 METHOD VENT    
 FIO2 45 % MODE Assist Control/Volume Control Tidal volume 500 SET RATE 12    
 EPAP/CPAP/PEEP 5 Sample source Arterial    
 SITE Right Radial    
 DAMON'S TEST YES    
MAGNESIUM Collection Time: 12/01/20  4:18 AM  
Result Value Ref Range Magnesium 1.8 1.6 - 2.4 mg/dL RENAL FUNCTION PANEL Collection Time: 12/01/20  4:18 AM  
Result Value Ref Range Sodium 151 (H) 136 - 145 mmol/L Potassium 4.6 3.5 - 5.1 mmol/L Chloride 117 (H) 97 - 108 mmol/L  
 CO2 28 21 - 32 mmol/L Anion gap 6 5 - 15 mmol/L Glucose 130 (H) 65 - 100 mg/dL BUN 51 (H) 6 - 20 mg/dL Creatinine 1.12 0.70 - 1.30 mg/dL BUN/Creatinine ratio 46 (H) 12 - 20 GFR est AA >60 >60 ml/min/1.73m2 GFR est non-AA >60 >60 ml/min/1.73m2 Calcium 6.6 (L) 8.5 - 10.1 mg/dL Phosphorus 3.3 2.6 - 4.7 mg/dL Albumin 2.7 (L) 3.5 - 5.0 g/dL Assessment/  
 
Patient Active Problem List  
Diagnosis Code  Cellulitis of left upper extremity L03.114  
 Chronic atrial fibrillation (HCC) I48.20  
 HTN (hypertension), benign I10  
 COPD (chronic obstructive pulmonary disease) ([facility] J44.9  Dyslipidemia E78.5  Depression F32.9  
 UTI (urinary tract infection) N39.0  BRE (acute kidney injury) ([facility] [address] N17.9  AMS (altered mental status) R41.82  Sepsis ([facility] [address] A41.9  Hypoglycemia E16.2  
 Encephalopathy acute G93.40 Plan: 
 
Septic shockPatient presented with above-mentioned symptomatology was found to meet severe sepsis criteria secondary to combination of urinary tract infection as well as developing bilateral pneumonia, patient is COVID-19 negative, currently improving, patient been titrated off of levophed 
follow-up blood cultures Urine Culture consistent with ESBL E. Coli Continue meropenem for Abx coverage Infectious disease recommendations appreciated, will continue to follow Critical care recommendations appreciated will continue to follow Acute respiratory failure/CHF exacerbationpatient was found to be in acute respiratory failure requiring emergent endotracheal intubation, likely secondary to volume overload/CHF Currently holding lasix 2/2 overdiuresis, unable to extubate 2/2 continued metabolic encephalopathy Pulmonology consult appreciated, will continue to follow Infectious disease recommendations appreciated, will continue to follow Hypoactive bowel soundsresolved Atrial fibrillation with RVRpatient presented with atrial fibrillation with RVR likely secondary to ongoing severe sepsis Dig level was 1.3 Continue digoxin at current dosage Transthoracic echo shows preserved ejection fraction Cardiology consult appreciated, will continue to follow Acute on chronic diastolic heart failurepatient was found to have significant bilateral lower extremity edema as well as radiographic evidence of volume overload consistent with acute on chronic diastolic heart failure, currently improved, holding lasix at this time Continue to monitor intake and output Cardiology consult appreciated, will continue to follow Anemialikely multifactorial, however concern for GI bleed, s/p 2 units prbc, currently hemoglobin hematocrit remained stable Continue protonix 40mg iv bid Continue to trend H/H Maintain active type and screen Follow-up gastroenterology recommendations Follow-up hematology recommendations Hypokalemiaresolved Thrombocytopenialikely multifactorial, 2/2 sepsis, currently improved Continue to trend platelets Hematology recommendations appreciated, will continue to follow Hyperlipidemiacontinue statin ProphylaxisSCDs FENcontinue tube feeding, replete potassium and magnesium DNR,  
surrogate decision-maker is patient's wife, plan is to make patient comfort care later today once patient's family arrives at bedside, will have extensive goals of care discussion Critical care time spent 50 minutes involving direct patient care as well as reviewing patient's labs and coordination of care with nursing staff Care Plan discussed with: Patient/Family and Nurse/consultant Nasima Callejas MD

## 2020-12-01 NOTE — PROGRESS NOTES
The purpose of the visit was in response to a staff request concerning the comfort care of a patient. The patient's wife expressed her appreciation for the staff's care of her . The patient's daughter mentioned how they feel that the patient needs to be made comfortable and no longer suffer. They each expressed tearfully that the decision is not an easy, however they feel it's for the best. The  provided the ministry of spiritual care and the comfort of prayer. [address] [facility] [address] [facility]  can be reached by calling the  at [facility] 
[phone]

## 2020-12-01 NOTE — PROGRESS NOTES
Pulmonology and Critical Care Progress Note Subjective:  
 
Patient seen and examined in his room, I discussed the case in detail with the bedside nurse and his wife. He remains on combination of propofol and fentanyl for comfort. When sedation is decreased he becomes more restless but does not follow any commands. Remains critically ill Intubated on mechanical ventilation Ventilator settings are AC//12/45%/5 Blood gases showed 7.40/40/89. No CXR today as they likely are moving toward comfort care. Post bronchoscopy 11/25, cultures no growth to date and cytology from brushings/washings are negative for malignancy. Status post thoracentesis right side by interventional radiology 11/23 Patient results consistent with transudate Cytology negative Current Facility-Administered Medications Medication Dose Route Frequency Provider Last Rate Last Dose  docusate (COLACE) 50 mg/5 mL oral liquid 100 mg  100 mg Per NG tube DAILY Priya Greco MD   Stopped at 11/30/20 0900  
 spironolactone (ALDACTONE) tablet 25 mg  25 mg Oral BID Star Waters MD   Stopped at 11/30/20 0900  potassium chloride (KLOR-CON) packet for solution 20 mEq  20 mEq Per NG tube Q8H Rey Donald MD   20 mEq at 12/01/20 0521  
 propofol (DIPRIVAN) 10 mg/mL infusion  0-50 mcg/kg/min IntraVENous TITRATE Deondre Price DO 21.3 mL/hr at 12/01/20 0941 35 mcg/kg/min at 12/01/20 0941  digoxin (LANOXIN) injection 125 mcg  125 mcg IntraVENous DAILY Laura Kelley MD   125 mcg at 11/30/20 1754  calcium carbonate (TUMS) chewable tablet 400 mg [elemental]  400 mg Oral BID Star Waters MD   400 mg at 12/01/20 5229  lidocaine (XYLOCAINE) 10 mg/mL (1 %) injection    PRN Rosa PONCE MD   30 mL at 11/25/20 1215  
 mineral oil (topical)    PRN Susan Moore MD   5 mL at 11/25/20 1211  pantoprazole (PROTONIX) 40 mg in 0.9% sodium chloride 10 mL injection  40 mg IntraVENous BID Patrice Neumann MD   40 mg at 12/01/20 6643  metoclopramide HCl (REGLAN) injection 10 mg  10 mg IntraVENous Q6H Buzz Mack MD   10 mg at 12/01/20 1102  hydrocortisone Sod Succ (PF) (SOLU-CORTEF) injection 50 mg  50 mg IntraVENous Q8H Curry Warner DO   50 mg at 12/01/20 0520  
 fentaNYL (PF) 1,500 mcg/30 mL (50 mcg/mL) infusion  0-200 mcg/hr IntraVENous TITRATE Juliengunnar Rodriguez DO 1.5 mL/hr at 11/30/20 2333 75 mcg/hr at 11/30/20 2333  
 vasopressin (VASOSTRICT) 40 Units in 0.9% sodium chloride 40 mL infusion  0.04 Units/min IntraVENous CONTINUOUS Curry Warner DO 2.4 mL/hr at 11/19/20 2033 0.04 Units/min at 11/19/20 2033  meropenem (MERREM) 1 g in sterile water (preservative free) 20 mL IV syringe  1 g IntraVENous Q12H Ely Murguia MD   1 g at 12/01/20 5456  morphine injection 2 mg  2 mg IntraVENous Q4H PRN Shahla Marrero MD   2 mg at 11/23/20 0739  
 NOREPINephrine (LEVOPHED) 8 mg in 5% dextrose 250mL (32 mcg/mL) infusion  0.5-16 mcg/min IntraVENous TITRATE Shahla Marrero MD   Stopped at 11/23/20 0275  acetaminophen (TYLENOL) suppository 650 mg  650 mg Rectal Q4H PRN Keira PONCE MD   650 mg at 11/16/20 1225  acetaminophen (TYLENOL) tablet 650 mg  650 mg Oral Q4H PRN Rom Herring NP   650 mg at 11/27/20 1403  albuterol (PROVENTIL HFA, VENTOLIN HFA, PROAIR HFA) inhaler 2 Puff  2 Puff Inhalation Q4H PRN Rom Herring, ALANNA      
 folic acid (FOLVITE) tablet 1 mg  1 mg Oral DAILY Rom Herring, NP   1 mg at 12/01/20 1129  pravastatin (PRAVACHOL) tablet 20 mg  20 mg Oral QHS Rom Herring, NP   20 mg at 11/30/20 2146  sertraline (ZOLOFT) tablet 50 mg  50 mg Oral DAILY Nevaeh Rom GUNNAR, NP   50 mg at 12/01/20 8795  thiamine mononitrate (B-1) tablet 100 mg  100 mg Oral DAILY Nevaeh Rom GUNNAR NP   100 mg at 12/01/20 8354  ondansetron (ZOFRAN) injection 4 mg  4 mg IntraVENous Q6H PRN Rom Herring, NP      
 hydrOXYzine pamoate (VISTARIL) capsule 25 mg  25 mg Oral TID PRN Rom Herring NP   25 mg at 20 1857  chlorhexidine (PERIDEX) 0.12 % mouthwash 15 mL  15 mL Oral Q12H Rom Herring NP   15 mL at 20 1420 Allergies Allergen Reactions  Precedex [Dexmedetomidine] Other (comments) Severe bradycardia  Codeine Rash Review of Systems: 
Review of systems not obtained due to patient factors. Objective:  
 
Blood pressure 124/70, pulse 71, temperature 98.7 °F (37.1 °C), resp. rate 20, height 6' 2\" (1.88 m), weight 96.9 kg (213 lb 10 oz), SpO2 100 %. Temp (24hrs), Av.5 °F (36.9 °C), Min:98 °F (36.7 °C), Max:98.7 °F (37.1 °C) Intake and Output: 
Current Shift: No intake/output data recorded. Last 3 Shifts:  1901 -  0700 In: 1600 Out: 1275 [[id number]] Physical Exam:  
General appearance: Elderly male who is sedated on the ventilator, no distress, appears older than stated age, chronically ill-appearing Head: Normocephalic, without obvious abnormality, atraumatic Eyes: negative Neck: no obvious adenopathy, no carotid bruit Lungs: He has few scattered rhonchi. Equal breath sounds bilaterally. Small secretions per RN. Heart: irregularly irregular rhythm, bradycardic, no rub, no obvious murmur Abdomen: soft, non-tender. Bowel sounds normal. No masses,  no organomegaly; NG tube in place. He also has diarrhea and has a rectal tube in place. He is not tolerating his tube feeds. Pulses: 2+ and symmetric Skin: Dry, intact, with bruising throughout the arms and legs. +3 pitting edema in extremities bilaterally. Lymph nodes: Cervical, supraclavicular, and axillary nodes normal. 
Neurologic: Sedated on the ventilator, not following any commands. Additional comments:None Lab/Data Review: All lab results for the last 24 hours reviewed. Recent Results (from the past 24 hour(s)) GLUCOSE, POC Collection Time: 20  4:27 PM  
Result Value Ref Range Glucose (POC) 186 (H) 65 - 100 mg/dL  Performed by Jocelyne Hunter, POC Collection Time: 11/30/20  9:23 PM  
Result Value Ref Range Glucose (POC) 187 (H) 65 - 100 mg/dL Performed by Monie Leroy METABOLIC PANEL, BASIC Collection Time: 12/01/20  4:06 AM  
Result Value Ref Range Sodium 152 (H) 136 - 145 mmol/L Potassium 4.6 3.5 - 5.1 mmol/L Chloride 118 (H) 97 - 108 mmol/L  
 CO2 28 21 - 32 mmol/L Anion gap 6 5 - 15 mmol/L Glucose 127 (H) 65 - 100 mg/dL BUN 51 (H) 6 - 20 mg/dL Creatinine 1.08 0.70 - 1.30 mg/dL BUN/Creatinine ratio 47 (H) 12 - 20 GFR est AA >60 >60 ml/min/1.73m2 GFR est non-AA >60 >60 ml/min/1.73m2 Calcium 6.6 (L) 8.5 - 10.1 mg/dL CBC WITH AUTOMATED DIFF Collection Time: 12/01/20  4:06 AM  
Result Value Ref Range WBC 15.6 (H) 4.1 - 11.1 K/uL  
 RBC 3.44 (L) 4.10 - 5.70 M/uL HGB 9.0 (L) 12.1 - 17.0 g/dL HCT 31.4 (L) 36.6 - 50.3 % MCV 91.3 80.0 - 99.0 FL  
 MCH 26.2 26.0 - 34.0 PG  
 MCHC 28.7 (L) 30.0 - 36.5 g/dL RDW 22.7 (H) 11.5 - 14.5 % PLATELET 432 (L) 889 - 400 K/uL NRBC 1.8 (H) 0  WBC ABSOLUTE NRBC 0.27 (H) 0.00 - 0.01 K/uL NEUTROPHILS 82 (H) 32 - 75 % LYMPHOCYTES 9 (L) 12 - 49 % MONOCYTES 8 5 - 13 % EOSINOPHILS 0 0 - 7 % BASOPHILS 2 (H) 0 - 1 % IMMATURE GRANULOCYTES 25 (H) 0.0 - 0.5 % ABS. NEUTROPHILS 12.8 (H) 1.8 - 8.0 K/UL  
 ABS. LYMPHOCYTES 1.4 0.8 - 3.5 K/UL  
 ABS. MONOCYTES 1.2 (H) 0.0 - 1.0 K/UL  
 ABS. EOSINOPHILS 0.0 0.0 - 0.4 K/UL  
 ABS. BASOPHILS 0.2 (H) 0.0 - 0.1 K/UL  
 ABS. IMM. GRANS. 3.9 (H) 0.00 - 0.04 K/UL  
 DF AUTOMATED    
C REACTIVE PROTEIN, QT Collection Time: 12/01/20  4:06 AM  
Result Value Ref Range C-Reactive protein 3.07 (H) 0.00 - 0.60 mg/dL PROCALCITONIN Collection Time: 12/01/20  4:06 AM  
Result Value Ref Range Procalcitonin 0.18 (H) 0 ng/mL BLOOD GAS, ARTERIAL Collection Time: 12/01/20  4:15 AM  
Result Value Ref Range  pH 7.40 7.35 - 7.45    
 PCO2 40 35 - 45 mmHg PO2 89 75 - 100 mmHg O2 SAT 97 >95 % BICARBONATE 24 22 - 26 mmol/L  
 BASE DEFICIT 0.1 0 - 2 mmol/L  
 O2 METHOD VENT    
 FIO2 45 % MODE Assist Control/Volume Control Tidal volume 500 SET RATE 12    
 EPAP/CPAP/PEEP 5 Sample source Arterial    
 SITE Right Radial    
 DAMON'S TEST YES    
MAGNESIUM Collection Time: 12/01/20  4:18 AM  
Result Value Ref Range Magnesium 1.8 1.6 - 2.4 mg/dL RENAL FUNCTION PANEL Collection Time: 12/01/20  4:18 AM  
Result Value Ref Range Sodium 151 (H) 136 - 145 mmol/L Potassium 4.6 3.5 - 5.1 mmol/L Chloride 117 (H) 97 - 108 mmol/L  
 CO2 28 21 - 32 mmol/L Anion gap 6 5 - 15 mmol/L Glucose 130 (H) 65 - 100 mg/dL BUN 51 (H) 6 - 20 mg/dL Creatinine 1.12 0.70 - 1.30 mg/dL BUN/Creatinine ratio 46 (H) 12 - 20 GFR est AA >60 >60 ml/min/1.73m2 GFR est non-AA >60 >60 ml/min/1.73m2 Calcium 6.6 (L) 8.5 - 10.1 mg/dL Phosphorus 3.3 2.6 - 4.7 mg/dL Albumin 2.7 (L) 3.5 - 5.0 g/dL Chest X-Ray:  
XR CHEST PORT Final Result Stable right neck central venous catheter. Now present, there is an ET tube 4-5 
cm above the samuel. NG tube projects below the left hemidiaphragm. Improved 
aeration through the right lung; clearing patchy reticular markings. No 
interstitial or alveolar pulmonary edema. No pneumothorax or sizable pleural 
effusion. XR CHEST PORT Final Result Impression:   
Successful placement of a triple-lumen central venous catheter. The catheter is  
ready for use. IR INSERT NON TUNL CVC OVER 5 YRS Final Result Impression:   
Successful placement of a triple-lumen central venous catheter. The catheter is  
ready for use. [facility] Final Result Impression:   
Successful placement of a triple-lumen central venous catheter. The catheter is  
ready for use. XR CHEST PORT Final Result Impression: The cardiomediastinal silhouette is appropriate for age, technique,  
and lung expansion. Pulmonary vasculature is not congested. The lungs are  
essentially clear. No effusion or pneumothorax is seen. US RETROPERITONEUM COMP Final Result Impression: Normal exam  
  
  
XR CHEST PORT Final Result IMPRESSION:  No evidence of an acute cardiopulmonary process. CT HEAD WO CONT Final Result IMPRESSION: Chronic findings as above. CT CHEST WO CONT    (Results Pending) CT ABD PELV WO CONT    (Results Pending) XR CHEST PORT    (Results Pending) CT imaging: CT Results  (Last 48 hours) None PFTs: PFT Results  (Last 3 results in the past 10 years) None Assessment:  
 
Patient is a 69-year-old  male with a history of atrial fibrillation, osteoarthritis, hyperlipidemia, reported COPD, and hypertension who presented to [facility] on 11/15/[facility] from his nursing home for altered mental status. He has been admitted to the ICU and is currently in septic shock on vasopressors and has been intubated on 11/19/2020 for failure to protect his airway due to altered mental status. Plan:  
 
1.)  Acute respiratory failure Likely due to CHF, altered mental status, failure to protect airway. Currently intubated on mechanical ventilation. Patient cannot be extubated given persistent encephalopathy. Patient is DNR and the wife at the bedside does not want tracheostomy. Intubated on 11/19/2020 ABGs and chest x-ray reviewed as outlined above Pleural fluid cytology negative Status post bronchoscopy 11/25 Results no growth to date, no malignancy noted on cytology brushing or bronchial washings. Chest x-ray improved with diuresis, but his problem is mental status. Renal Function stable; potassium is better. Continue the patient on propofol/fentanyl for comfort for now, however when he is weaned down he does nothing purposeful and does not follow commands.    
 
I had a discussion with the patient's wife today at the bedside. The patient is DNR. She does not want a tracheostomy. She wants comfort care soon. She wants her son and other family members to visit him today before the patient is made comfort care and extubated. She realizes that prognosis is very poor. 2.)  Septic shock ESBL E. Coli Now off vasopressors On parenteral antibiotic coverage Further changes in antibiotics based on clinical response and culture results Covid negative Appears quite volume overloaded clinically 3.)  Acute encephalopathy Likely multifactorial from sepsis, urinary tract infection, and multiple electrolyte abnormalities including hypernatremia, hypercalcemia, and hypokalemia. Continue on current antibiotic regimen as above; neurology/nephrology/hematology following closely. Ammonia level normal, TSH mildly elevated. 4.)  Acute kidney injury Creatinine seem to plateau around 1.28 and had very steadily been decreasing with improved calcium level and fluid administration. Likely prerenal from volume depletion and sepsis. Creatinine 1.4 today Nephrology following closely, appreciate their assistance. 5.)  Atrial fibrillation with RVR/diastolic congestive heart failure Presented with A. fib RVR Likely secondary to sepsis Echo shows preserved ejection fraction Cardiology input appreciated Continue digoxin Quite volume overloaded Continue diuresis 6) Hypercalcemia Corrected calcium 14.6 on admission, this has steadily improved every day with calcitonin fluids. Nephrology following closely Possible myeloma CODE STATUS: DNR Lines/Tubes: ET tube, right IJ central line, Mcmillan catheter, NG tube Prophylaxis: 
Stress Ulcer Protocol Active: Yes, Protonix 40 mg IV twice daily Deep Vein Thrombosis Protocol Active: Yes, SCD's Disposition: DNR status is now in effect. Total critical care time spent with patient: 50 minutes Jamaal Leslie MD 
[facility] 12/1/2020

## 2020-12-01 NOTE — PROGRESS NOTES
I had an extensive goals of care discussion with patient's family who was present at bedside with regards to patient's overall critical status as well as grave overall prognosis following which patient's family made an informed decision to make patient DNR/DNI with terminal extubation as well as comfort measures only, RN was informed at bedside. Advanced care planning time spent 35 minutes

## 2020-12-01 NOTE — PROGRESS NOTES
Progress Note Patient: Chhaya Anna MRN: [id number]  SSN: [id number] YOB: 1945  Age: 67 y.o. Sex: male Admit Date: 11/15/2020 LOS: 16 days Subjective:  
Patient followed for severe sepsis with altered mental status and ESBL E. Coli UTI still on Meropenem. He is afebrile with low grade temperature and WBC remains elevated. CRP and procalcitonin decreasing. Bronchial washing grew Candida albicans and C. Krusei, felt to be likely colonization. No CXR today. Patient remains intubated. Decision has apparently been made for comfort care. Objective:  
 
Vitals:  
 12/01/20 1000 12/01/20 1100 12/01/20 1103 12/01/20 1116 BP: 124/70  136/77 Pulse:    64 Resp: 20 20 17 Temp:  98.7 °F (37.1 °C) SpO2: 100%  100% 100% Weight:      
Height:      
  
 
Intake and Output: 
Current Shift: No intake/output data recorded. Last three shifts: 11/29 1901 - 12/01 0700 In: 1600 Out: 1275 [[id number]] Physical Exam:  
Patient not re-examined given comfort care status Constitutional:   
   General: He is in acute distress. Appearance: He is ill-appearing. He is not diaphoretic. HENT:  
   Head: orotracheal tube; left NG tube Neck:  supple Cardiovascular:  
   Rate and Rhythm: Normal  
   Heart sounds: No murmur. Pulmonary:  
   Breath sounds: No wheezing, rhonchi or rales. Abdominal:  
   Palpations: Abdomen is soft. Tenderness: There is no abdominal tenderness. Genitourinary: 
   Comments: Mcmillan catheter Musculoskeletal:  
   Right lower leg: No edema. Left lower leg: No edema. Comments: 2x3 cm open wound right medial calf with resolving Skin: 
   Findings: No erythema or rash. Neurological:  
   Comments: Unable to assess Psychiatric:  
   Comments: Unable to assess Lab/Data Review: WBC 15,600 ,000 Lactic acid 2.7 Procalcitonin 0.18 <0.22 < 0.33 < 0.62 <1.09 <1.54 <12.30 CRP 3.07  <2.84 <2.87 <3.13 <3.61 <4.59 <35.90 Covid-19 Not detected Blood cultures (11/15) No growth FINAL Blood cultures (11/19) No growth FINAL Urine culture (11/15) >100,000 cfu/ml ESBL E. Coli Sputum culture (11/20) Normal aguilar FINAL Body fluid Pleural culture (11/23) No growth FINAL Bronchial washing (11/25) Normal respiratory aguilar FINAL Bronchial washing fungus (11/25) Candida albicans and C. krusei No new CXR Assessment:  
 
Principal Problem: 
  Sepsis ([facility] [address] (11/16/2020) Active Problems: 
  UTI (urinary tract infection) (11/15/2020) BRE (acute kidney injury) ([facility] [address] (11/15/2020) AMS (altered mental status) (11/15/2020) Hypoglycemia (11/16/2020) Encephalopathy acute (11/16/2020) 1. Severe sepsis with tachycardia, tachypnea, leukocytosis, elevated procalcitonin and CRP, resolving 2. UTI with marked pyuria and bacteriuria, secondary to ESBL E. Coli, Day #12 IV Meropenem, resolving 3. Altered mental status, possibly secondary to above 4. Covid-19 negative 5. Acute hypoxic respiratory failure, intubated 6. COMFORT CARE Comment:  WBC remains elevated and he is now on Solu-Cortef. CRP and procalcitonin have decreased. Plan: 1. No further recommendation given comfort care status 2. No further labs Signed By: Guadalupe Rangel MD   
 December 1, 2020

## 2020-12-01 NOTE — PROGRESS NOTES
Nutrition Note Nutrition following for Nutrition Support, however as pt transitioning to Comfort Measures/Hospice today per Dr. Bridgett Gay, nutrition to sign off at this time. Please re-consult as appropriate if nutrition interventions within goals of care. Thank you for allowing us to follow. Electronically signed by Yogesh Colindres RD on 12/1/2020 at 3:22 PM 
 
Contact: Ext [phone]

## 2020-12-01 NOTE — PROGRESS NOTES
Renal Progress Note Patient: Alfred Maki MRN: [id number]  SSN: [id number] YOB: 1945  Age: 67 y.o. Sex: male Admit Date: 11/15/2020 LOS: 16 days Subjective:  
Patient seen in ICU, on ventilator, sedated, wife is at bedside Sodium is still elevated at 151. Creatinine is improved to 1.1 Current Facility-Administered Medications Medication Dose Route Frequency  hydrocortisone Sod Succ (PF) (SOLU-CORTEF) injection 50 mg  50 mg IntraVENous Q12H  
 dextrose 5% infusion  50 mL/hr IntraVENous CONTINUOUS  
 calcium gluconate 1 gram in sodium chloride (ISO-OSM) 50 mL infusion  1 g IntraVENous ONCE  
 docusate (COLACE) 50 mg/5 mL oral liquid 100 mg  100 mg Per NG tube DAILY  spironolactone (ALDACTONE) tablet 25 mg  25 mg Oral BID  potassium chloride (KLOR-CON) packet for solution 20 mEq  20 mEq Per NG tube Q8H  propofol (DIPRIVAN) 10 mg/mL infusion  0-50 mcg/kg/min IntraVENous TITRATE  digoxin (LANOXIN) injection 125 mcg  125 mcg IntraVENous DAILY  calcium carbonate (TUMS) chewable tablet 400 mg [elemental]  400 mg Oral BID  lidocaine (XYLOCAINE) 10 mg/mL (1 %) injection    PRN  mineral oil (topical)    PRN  pantoprazole (PROTONIX) 40 mg in 0.9% sodium chloride 10 mL injection  40 mg IntraVENous BID  metoclopramide HCl (REGLAN) injection 10 mg  10 mg IntraVENous Q6H  
 fentaNYL (PF) 1,500 mcg/30 mL (50 mcg/mL) infusion  0-200 mcg/hr IntraVENous TITRATE  vasopressin (VASOSTRICT) 40 Units in 0.9% sodium chloride 40 mL infusion  0.04 Units/min IntraVENous CONTINUOUS  
 meropenem (MERREM) 1 g in sterile water (preservative free) 20 mL IV syringe  1 g IntraVENous Q12H  
 morphine injection 2 mg  2 mg IntraVENous Q4H PRN  
 NOREPINephrine (LEVOPHED) 8 mg in 5% dextrose 250mL (32 mcg/mL) infusion  0.5-16 mcg/min IntraVENous TITRATE  acetaminophen (TYLENOL) suppository 650 mg  650 mg Rectal Q4H PRN  
 acetaminophen (TYLENOL) tablet 650 mg  650 mg Oral Q4H PRN  
 albuterol (PROVENTIL HFA, VENTOLIN HFA, PROAIR HFA) inhaler 2 Puff  2 Puff Inhalation H9S PRN  
 folic acid (FOLVITE) tablet 1 mg  1 mg Oral DAILY  pravastatin (PRAVACHOL) tablet 20 mg  20 mg Oral QHS  sertraline (ZOLOFT) tablet 50 mg  50 mg Oral DAILY  thiamine mononitrate (B-1) tablet 100 mg  100 mg Oral DAILY  ondansetron (ZOFRAN) injection 4 mg  4 mg IntraVENous Q6H PRN  
 hydrOXYzine pamoate (VISTARIL) capsule 25 mg  25 mg Oral TID PRN  chlorhexidine (PERIDEX) 0.12 % mouthwash 15 mL  15 mL Oral Q12H Vitals:  
 12/01/20 1000 12/01/20 1100 12/01/20 1103 12/01/20 1116 BP: 124/70  136/77 Pulse:    64 Resp: 20 20 17 Temp:  98.7 °F (37.1 °C) SpO2: 100%  100% 100% Weight:      
Height:      
 
Objective:  
General: On ventilator, sedated, no acute distress. HEENT: no Icterus, no Pallor, ET tube in place  
neck: Neck is supple, No JVD Lungs: Decreased breath sounds at the bases, no rhonchi, few scattered rales+, CVS: heart sounds normal, no murmurs, no rubs. GI: soft, nontender, normal BS. Extremeties: no cyanosis, 1+ dependent edema+ Neuro: On ventilator, sedated Skin: normal skin turgor, no skin rashes. Intake and Output: 
Current Shift: No intake/output data recorded. Last three shifts: 11/29 1901 - 12/01 0700 In: 1600 Out: 1275 [[id number]] Lab/Data Review: 
Recent Labs 12/01/20 
0406 11/30/20 
0430 11/29/20 
0500 WBC 15.6* 13.3* 12.5* HGB 9.0* 8.9* 8.9* HCT 31.4* 29.7* 29.6*  
* 113* 113* Recent Labs 12/01/20 
0418 12/01/20 
0406 11/30/20 
0430  11/29/20 
0500 * 152* 151*   < > 151*  
K 4.6 4.6 3.7   < > 3.3*  
* 118* 116*   < > 116* CO2 28 28 26   < > 30 * 127* 154*   < > 168* BUN 51* 51* 52*   < > 60* CREA 1.12 1.08 1.32*   < > 1.29  
CA 6.6* 6.6* 6.3*   < > 6.6*  
MG 1.8  --   --   --  1.8 PHOS 3.3  --  2.5*  --  2.4*  2.4* ALB 2.7*  --  2.6*  --  2.5*  
 < > = values in this interval not displayed. Recent Labs 12/01/20 
3552 PH 7.40 PCO2 40 PO2 89 HCO3 24 FIO2 45 Recent Results (from the past 24 hour(s)) GLUCOSE, POC Collection Time: 11/30/20  4:27 PM  
Result Value Ref Range Glucose (POC) 186 (H) 65 - 100 mg/dL Performed by Justice Patel, POC Collection Time: 11/30/20  9:23 PM  
Result Value Ref Range Glucose (POC) 187 (H) 65 - 100 mg/dL Performed by Janis Ramos METABOLIC PANEL, BASIC Collection Time: 12/01/20  4:06 AM  
Result Value Ref Range Sodium 152 (H) 136 - 145 mmol/L Potassium 4.6 3.5 - 5.1 mmol/L Chloride 118 (H) 97 - 108 mmol/L  
 CO2 28 21 - 32 mmol/L Anion gap 6 5 - 15 mmol/L Glucose 127 (H) 65 - 100 mg/dL BUN 51 (H) 6 - 20 mg/dL Creatinine 1.08 0.70 - 1.30 mg/dL BUN/Creatinine ratio 47 (H) 12 - 20 GFR est AA >60 >60 ml/min/1.73m2 GFR est non-AA >60 >60 ml/min/1.73m2 Calcium 6.6 (L) 8.5 - 10.1 mg/dL CBC WITH AUTOMATED DIFF Collection Time: 12/01/20  4:06 AM  
Result Value Ref Range WBC 15.6 (H) 4.1 - 11.1 K/uL  
 RBC 3.44 (L) 4.10 - 5.70 M/uL HGB 9.0 (L) 12.1 - 17.0 g/dL HCT 31.4 (L) 36.6 - 50.3 % MCV 91.3 80.0 - 99.0 FL  
 MCH 26.2 26.0 - 34.0 PG  
 MCHC 28.7 (L) 30.0 - 36.5 g/dL RDW 22.7 (H) 11.5 - 14.5 % PLATELET 862 (L) 828 - 400 K/uL NRBC 1.8 (H) 0  WBC ABSOLUTE NRBC 0.27 (H) 0.00 - 0.01 K/uL NEUTROPHILS 82 (H) 32 - 75 % LYMPHOCYTES 9 (L) 12 - 49 % MONOCYTES 8 5 - 13 % EOSINOPHILS 0 0 - 7 % BASOPHILS 2 (H) 0 - 1 % IMMATURE GRANULOCYTES 25 (H) 0.0 - 0.5 % ABS. NEUTROPHILS 12.8 (H) 1.8 - 8.0 K/UL  
 ABS. LYMPHOCYTES 1.4 0.8 - 3.5 K/UL  
 ABS. MONOCYTES 1.2 (H) 0.0 - 1.0 K/UL  
 ABS. EOSINOPHILS 0.0 0.0 - 0.4 K/UL  
 ABS. BASOPHILS 0.2 (H) 0.0 - 0.1 K/UL  
 ABS. IMM. GRANS. 3.9 (H) 0.00 - 0.04 K/UL  
 DF AUTOMATED    
C REACTIVE PROTEIN, QT Collection Time: 12/01/20  4:06 AM  
Result Value Ref Range C-Reactive protein 3.07 (H) 0.00 - 0.60 mg/dL PROCALCITONIN Collection Time: 12/01/20  4:06 AM  
Result Value Ref Range Procalcitonin 0.18 (H) 0 ng/mL BLOOD GAS, ARTERIAL Collection Time: 12/01/20  4:15 AM  
Result Value Ref Range pH 7.40 7.35 - 7.45    
 PCO2 40 35 - 45 mmHg PO2 89 75 - 100 mmHg O2 SAT 97 >95 % BICARBONATE 24 22 - 26 mmol/L  
 BASE DEFICIT 0.1 0 - 2 mmol/L  
 O2 METHOD VENT    
 FIO2 45 % MODE Assist Control/Volume Control Tidal volume 500 SET RATE 12    
 EPAP/CPAP/PEEP 5 Sample source Arterial    
 SITE Right Radial    
 DAMON'S TEST YES    
MAGNESIUM Collection Time: 12/01/20  4:18 AM  
Result Value Ref Range Magnesium 1.8 1.6 - 2.4 mg/dL RENAL FUNCTION PANEL Collection Time: 12/01/20  4:18 AM  
Result Value Ref Range Sodium 151 (H) 136 - 145 mmol/L Potassium 4.6 3.5 - 5.1 mmol/L Chloride 117 (H) 97 - 108 mmol/L  
 CO2 28 21 - 32 mmol/L Anion gap 6 5 - 15 mmol/L Glucose 130 (H) 65 - 100 mg/dL BUN 51 (H) 6 - 20 mg/dL Creatinine 1.12 0.70 - 1.30 mg/dL BUN/Creatinine ratio 46 (H) 12 - 20 GFR est AA >60 >60 ml/min/1.73m2 GFR est non-AA >60 >60 ml/min/1.73m2 Calcium 6.6 (L) 8.5 - 10.1 mg/dL Phosphorus 3.3 2.6 - 4.7 mg/dL Albumin 2.7 (L) 3.5 - 5.0 g/dL GLUCOSE, POC Collection Time: 12/01/20 11:47 AM  
Result Value Ref Range Glucose (POC) 146 (H) 65 - 100 mg/dL Performed by Cesar Ventura Assessment and Plan: #1 severe hypercalcemia. On admission corrected calcium was 14.6. Etiology unclear, hypercalcemia resolved now, 
immunoelectrophoresis studies showed small IgG monoclonal protein  
low PTH compatible with PTH independent hypercalcemia, borderline low vitamin D level Hypercalcemia resolved and repeat calcium level today is 6.3 today. Corrected calcium 7.8 Will give IV calcium gluconate 1gm x 2 doses today. Check magnesium level Continue calcium carbonate po through NG tube 2. Fluid overload/dependent edema+:  improved edema Pleural effusion+ Continue to Hold lasix for now . I will d/c spironolactone 2/2 low bp 
monitor electrolytes and fluid xtatus 3. Acute kidney injury on ?? CKD Renal functions showed gradual improvement suspect baseline chronic kidney disease 
creatinien at 1.1today 
urine random protein creatinine ratio is 2.0 
  
3.  hypokalemia. Improved with supplemental potassium. Check mag level  
continue to monitor and supplement as needed 4. Hypernatremia. From free water deficit.   
dced Lasix ,   
I will start D5W X 24 hrs . Water flushes via NGT 
continue to monitor sodium levels 5. Acute respiratory failure, on ventilator Sepsis/UTI Pulmonary and ID following the patient Continue IV antibiotics as per ID 
  
6. Atrial fibrillation with rapid ventricular rate : Cardiology following the patient, on amiodarone and digoxin Signed By: Flores Seals MD   
 December 1, 2020

## 2020-12-02 PROBLEM — J96.01 ACUTE RESPIRATORY FAILURE WITH HYPOXIA (HCC): Status: ACTIVE | Noted: 2020-01-01

## 2020-12-02 NOTE — PROGRESS NOTES
Bedside shift change report given to Ajay Mcgovern RN (oncoming nurse) by Nestor Olivares RN (offgoing nurse). Report included the following information SBAR.

## 2020-12-02 NOTE — INTERDISCIPLINARY ROUNDS
Patient family member refused skin assessment due to the amount of pain the patient is in when being turned. Patient on comfort care.

## 2020-12-02 NOTE — PROGRESS NOTES
CM consulted for hospice. CM met with patient and wife at the bedside, patient is unresponsive at this time. Patient's wife, Edda Alcantara, is agreeable to hospice care at this time. Edda Alcantara is hoping for GIP as she does not think she can care for the patient at home. CM discussed home hospice care. Edda Alcantara would like to speak with the nurse Navigator, Kaley Steinberg, as well as an information session with hospice. No preference for hospice agency. [facility] who is going to come evaluate the patient at the bedside. CM will continue to follow.

## 2020-12-02 NOTE — NURSE NAVIGATOR
Per CM request, met with patient's wife to assist with questions about hospice care. Had discussion about patient's current condition and benefits of hospice care. Wife very receptive and appreciative of information. During visit, Lee Simmons came into room for information/evaluation session. Will follow if needed.

## 2020-12-02 NOTE — PROGRESS NOTES
Attempted to visit patient in [facility] for staff referral.  Staff were providing care to the patient. Provided silent support and prayer. Chaplains will follow up if further referrals are requested. Chaplain Jose Luis Ambriz M.Div.    can be reached by calling the  at [facility]  [phone]

## 2020-12-02 NOTE — PROGRESS NOTES
Hospitalist Progress Note               Daily Progress Note: 12/2/2020    48-year-old male sent here from [facility] with reports from staff of altered mental status, lethargy, not eating and dehydration. He has a history of atrial fibrillation. UA suggestive uti, zosyn initiated. Subjective:   Patient seen and evaluated at bedside, pt was made comfort care and terminally extubated yesterday, discussed with RN at bedside.     Problem List:  Problem List as of 12/2/2020 Date Reviewed: 11/15/2020          Codes Class Noted - Resolved    * (Principal) Sepsis ([facility] ICD-10-CM: A41.9  ICD-9-CM: 038.9, 995.91  11/16/2020 - Present        Hypoglycemia ICD-10-CM: E16.2  ICD-9-CM: 251.2  11/16/2020 - Present        Encephalopathy acute ICD-10-CM: G93.40  ICD-9-CM: 348.30  11/16/2020 - Present        UTI (urinary tract infection) ICD-10-CM: N39.0  ICD-9-CM: 599.0  11/15/2020 - Present        BRE (acute kidney injury) ([facility] ICD-10-CM: N17.9  ICD-9-CM: 584.9  11/15/2020 - Present        AMS (altered mental status) ICD-10-CM: R41.82  ICD-9-CM: 780.97  11/15/2020 - Present        Chronic atrial fibrillation ([facility] ICD-10-CM: I48.20  ICD-9-CM: 427.31  8/12/2020 - Present        HTN (hypertension), benign ICD-10-CM: I10  ICD-9-CM: 401.1  8/12/2020 - Present        COPD (chronic obstructive pulmonary disease) ([facility] ICD-10-CM: J44.9  ICD-9-CM: 738  8/12/2020 - Present        Dyslipidemia ICD-10-CM: E78.5  ICD-9-CM: 272.4  8/12/2020 - Present        Depression ICD-10-CM: F32.9  ICD-9-CM: 023  8/12/2020 - Present        Cellulitis of left upper extremity ICD-10-CM: L03.114  ICD-9-CM: 682.3  8/11/2020 - Present              Medications reviewed  Current Facility-Administered Medications   Medication Dose Route Frequency    scopolamine (TRANSDERM-SCOP) 1 mg over 3 days 1 Patch  1 Patch TransDERmal Q72H PRN    morphine injection 2 mg  2 mg IntraVENous Q1H PRN    LORazepam (ATIVAN) injection 1 mg  1 mg IntraVENous Q2H PRN       Review of Systems:   Unobtainable 2/2 encephalopathic/acute illness    Objective:   Physical Exam:     Visit Vitals  /69   Pulse 96   Temp 99.1 °F (37.3 °C)   Resp 30   Ht 6' 2\" (1.88 m)   Wt 96.9 kg (213 lb 10 oz)   SpO2 94%   BMI 27.43 kg/m²    O2 Flow Rate (L/min): 2.5 l/min(found on) O2 Device: Nasal cannula    Temp (24hrs), Av.3 °F (37.4 °C), Min:99.1 °F (37.3 °C), Max:99.5 °F (37.5 °C)    701 - 1900  In: -   Out: 423 [QRVDD:387]   1901 - 700  In: -   Out: 5567 [Urine:2575]    Constitutional: Currently intubated and sedated  Head: Normocephalic and atraumatic. Mouth: oral mucosa dry, ET tube is in situ  Neck: supple  Cardiovascular:Irreg/irreg  Lungs:Decreased air entry bilaterally in bilateral lower lung zones  Abdominal: Soft. non tender  Neurological: obtunded, moves all ext,  Skin: Right lower shin with ulcer, yellow drainage  Left arm with skin tear. +ve edema  Multiple ecchymotic, skin is friable/thin   Data Review:       Recent Days:  Recent Labs     20  0406 20  0430   WBC 15.6* 13.3*   HGB 9.0* 8.9*   HCT 31.4* 29.7*   * 113*     Recent Labs     20  0418 20  0406 20  0430   * 152* 151*   K 4.6 4.6 3.7   * 118* 116*   CO2 28 28 26   * 127* 154*   BUN 51* 51* 52*   CREA 1.12 1.08 1.32*   CA 6.6* 6.6* 6.3*   MG 1.8  --   --    PHOS 3.3  --  2.5*   ALB 2.7*  --  2.6*     Recent Labs     20  041   PH 7.40   PCO2 40   PO2 89   HCO3 24   FIO2 45       24 Hour Results:  No results found for this or any previous visit (from the past 24 hour(s)).         Assessment/     Patient Active Problem List   Diagnosis Code    Cellulitis of left upper extremity L03.114    Chronic atrial fibrillation (HCC) I48.20    HTN (hypertension), benign I10    COPD (chronic obstructive pulmonary disease) ([facility]) J44.9    Dyslipidemia E78.5    Depression F32.9    UTI (urinary tract infection) N39.0    BRE (acute kidney injury) ([facility] N17.9  AMS (altered mental status) R41.82    Sepsis ([facility] A41.9    Hypoglycemia E16.2    Encephalopathy acute G93.40           Plan:    Comfort measures onlyI had an extensive goals of care discussion with patient's wife as well as son yesterday after which a decision was made to make patient comfort measures only  Continue morphine as needed  Ativan as needed for agitation  Scopolamine patch  Artificial tears  Hospice consult for placement  Discussed with case management as well as Shen Preston discussed with: Patient/Family and Nurse/consultant        Manav Elizabeth MD

## 2020-12-02 NOTE — PROGRESS NOTES
Attempted to visit the patient in [address] [facility] for staff referral.  Patient and his wife were in the room. Patient seemed non-engaging and provided visit to his wife. She shared about some issues surrounding guilt. She expressed her trust in God who provided strength in her time of need. She also identified people who have helped her to cope along the way. I facilitated storytelling and affirmed her lenny and relationship with God. I provided empathic and reflective listening. I provided prayer per her request and assured of  support should she need further care. Chaplains will follow up if further referrals are requested. DIEGO Gottlieb.Melania.    can be reached by calling the  at [facility]  [phone]

## 2020-12-02 NOTE — PROGRESS NOTES
Progress Note    Patient: Annia Garcia MRN: [id number]  SSN: [id number]    YOB: 1945  Age: 67 y.o. Sex: male      Admit Date: 11/15/2020    LOS: 17 days     Subjective:   Patient followed for severe sepsis with altered mental status and ESBL E. Coli UTI. Yesterday he was placed on comfort care and extubated. Status has not changed. He is off antibiotics at this time. Patient transferred out of the ICU. He is unresponsive but breathing on his own. Family member at bedside. Objective:     Vitals:    12/01/20 2300 12/02/20 0300 12/02/20 0724 12/02/20 1001   BP: (!) 153/77 137/63 135/71    Pulse: 84 98 97    Resp: 26 26 (!) 32    Temp: 99.5 °F (37.5 °C) 99.2 °F (37.3 °C)     SpO2:  92% 93% 94%   Weight:       Height:            Intake and Output:  Current Shift: 12/02 0701 - 12/02 1900  In: -   Out: 300 [Urine:300]  Last three shifts: 11/30 1901 - 12/02 0700  In: -   Out: 3427 [Urine:2575]    Physical Exam:   Patient not re-examined given comfort care status  Constitutional:       General: He is in acute distress. Appearance: He is ill-appearing. He is not diaphoretic. HENT:      Head: orotracheal tube; left NG tube   Neck:  supple  Cardiovascular:      Rate and Rhythm: Normal      Heart sounds: No murmur. Pulmonary:      Breath sounds: No wheezing, rhonchi or rales. Abdominal:      Palpations: Abdomen is soft. Tenderness: There is no abdominal tenderness. Genitourinary:     Comments: Mcmillan catheter  Musculoskeletal:      Right lower leg: No edema. Left lower leg: No edema. Comments: 2x3 cm open wound right medial calf with resolving   Skin:     Findings: No erythema or rash.    Neurological:      Comments: Unable to assess   Psychiatric:      Comments: Unable to assess     Lab/Data Review:     WBC 15,600  ,000  Lactic acid 2.7    Procalcitonin 0.18 <0.22 < 0.33 < 0.62 <1.09 <1.54 <12.30  CRP 3.07  <2.84 <2.87 <3.13 <3.61 <4.59  <35.90    Covid-19 Not detected    Blood cultures (11/15) No growth FINAL  Blood cultures (11/19) No growth FINAL  Urine culture (11/15) >100,000 cfu/ml ESBL E. Coli  Sputum culture (11/20) Normal aguilar FINAL  Body fluid Pleural culture (11/23) No growth FINAL  Bronchial washing (11/25) Normal respiratory aguilar FINAL  Bronchial washing fungus (11/25) Candida albicans and C. krusei     No new CXR    Assessment:     Principal Problem:    Sepsis ([facility] (11/16/2020)    Active Problems:    UTI (urinary tract infection) (11/15/2020)      BRE (acute kidney injury) ([facility] (11/15/2020)      AMS (altered mental status) (11/15/2020)      Hypoglycemia (11/16/2020)      Encephalopathy acute (11/16/2020)    1. Severe sepsis with tachycardia, tachypnea, leukocytosis, elevated procalcitonin and CRP, resolving  2. UTI with marked pyuria and bacteriuria, secondary to ESBL E. Coli, Day #12 IV Meropenem, resolving  3. Altered mental status, possibly secondary to above  4. Covid-19 negative  5. Acute hypoxic respiratory failure, intubated  6. COMFORT CARE    Comment:  WBC remains elevated and he is now on Solu-Cortef. CRP and procalcitonin have decreased. Plan:   1. No further recommendation given comfort care status  2.  Will sign off at this time    Signed By: Hannah Alfaro MD     December 2, 2020

## 2020-12-03 NOTE — PROGRESS NOTES
Patient was brought down to the [facility] via stretcher by two RNs. This RN gave supervisor Mortality form.

## 2020-12-03 NOTE — PROGRESS NOTES
Patient was transferred to [address] [place] [address] during day shift for DNR Hospice Comfort care. Patient was lethargic at beginning of shift, responsive to voice, respirations 30 per minute, morphine IV given PRN 2mg at 20:40, respirations were less labored afterwards. Two RNs, Donell Tillman and Erin Herring, to room at 12:20am to pronounce patient's death related to the absence of chest rise and fall, and unable to palpate pulses. Time of death 12:20 upon two RN assessment and apex of the heart auscultation by both RNs. This RN called Dr. Perry Venegas to make aware of death at 12:24am as Perry Venegas is the MD on call for Dr. Paulette Mckay at this time. Supervisor Verónica Chaparro was made aware of death. [facility] was called by this RN, spoke to Jessica Antony, at 12:40am, no interventions with [facility] to be done at this time, reference number [id number]. Tony Spring, patient's wife, was called by this RN at [phone] and the call went to voicemail. Voicemail was left with callback number to [address] at 12:45am. At 1am this RN reached [employer] and made aware of death. Previous paperwork by abiel said that [facility] was [address] preferred  home, and this RN wrote that  home on the mortality form. Mcmillan catheter, Central line, and dressings removed. Incontinence care provided with post mortem care. No personal belongings at bedside. Security was called to  patient at 2:50am, security stated RNs must bring patient down to meet her at the door.

## 2020-12-03 NOTE — PROGRESS NOTES
Spiritual Care Assessment/Progress Note  [facility]      NAME: Joe Gomez      MRN: [id number]  AGE: 67 y.o. SEX: male  Druze Affiliation: No preference   Language: English     12/3/2020     Total Time (in minutes): 30     Spiritual Assessment begun in [facility] through conversation with:         [x]Patient        [] Family    [] Friend(s)        Reason for Consult: Death, Inpatient     Spiritual beliefs: (Please include comment if needed)     [] Identifies with a lenny tradition:         [] Supported by a lenny community:            [] Claims no spiritual orientation:           [] Seeking spiritual identity:                [] Adheres to an individual form of spirituality:           [x] Not able to assess:                           Identified resources for coping:      [] Prayer                               [] Music                  [] Guided Imagery     [] Family/friends                 [] Pet visits     [] Devotional reading                         [x] Unknown     [] Other:                                          Interventions offered during this visit: (See comments for more details)    Patient Interventions: Prayer (actual)           Plan of Care:     [] Support spiritual and/or cultural needs    [] Support AMD and/or advance care planning process      [] Support grieving process   [] Coordinate Rites and/or Rituals    [] Coordination with community clergy   [] No spiritual needs identified at this time   [] Detailed Plan of Care below (See Comments)  [] Make referral to Music Therapy  [] Make referral to Pet Therapy     [] Make referral to Addiction services  [] Make referral to [facility]  [] Make referral to Spiritual Care Partner  [] No future visits requested        [x] Follow up visits as needed     Comments:  visited on 5[address] 511 due to notice of patient's death.  consulted with RN, patient's wife has been contacted (voice mail), no visitors are present.  provided prayer of commendation at bedside for patient, for God's presence to be with family.  advised staff of  availability including contact information if family arrives.      Visited by General Kim Garza [facility]

## 2020-12-03 NOTE — DISCHARGE SUMMARY
Patient was admitted to [facility] was found to be in septic shock, patient was subsequently transferred to ICU secondary to hemodynamic instability, patient was emergently intubated for airway protection, patient was placed on broad-spectrum antibiotics, patient was found to have a multifocal pneumonia as well as urinary tract infection, patient was evaluated by pulmonology as well as infectious disease as well as neurology, overall patient's clinical status remains critical, patient remained intubated in the ICU on multiple pressors, following which an extensive goals of care discussion was had with patient's family who subsequently made a decision to make patient DNR/DNI with comfort measures only following which comfort measures were initiated and patient  as documented in patient's chart.

## 2021-01-10 LAB
ACID FAST STN SPEC: NEGATIVE
ACID FAST STN SPEC: NEGATIVE
MYCOBACTERIUM SPEC QL CULT: NEGATIVE
MYCOBACTERIUM SPEC QL CULT: NEGATIVE
SPECIMEN PREPARATION: NORMAL
SPECIMEN PREPARATION: NORMAL
SPECIMEN SOURCE: NORMAL
SPECIMEN SOURCE: NORMAL

## 2021-07-22 LAB
ABO + RH BLD: NORMAL
BLD PROD TYP BPU: NORMAL
BLOOD GROUP ANTIBODIES SERPL: NEGATIVE
BPU ID: NORMAL
CROSSMATCH RESULT,%XM: NORMAL
SPECIMEN EXP DATE BLD: NORMAL
STATUS OF UNIT,%ST: NORMAL
TRANSFUSION STATUS PATIENT QL: NORMAL
UNIT DIVISION, %UDIV: 0

## 2021-08-11 NOTE — PROGRESS NOTES
Problem: Mobility Impaired (Adult and Pediatric) Goal: *Acute Goals and Plan of Care (Insert Text) Description: FUNCTIONAL STATUS PRIOR TO ADMISSION: The patient was functional at the wheelchair level and required moderate assistance for transfers to the chair. HOME SUPPORT PRIOR TO ADMISSION: The patient lived with wife prior to initial hospitalization but has been at [facility] prior to this admission. Physical Therapy Goals Initiated 8/14/2020 1. Patient will move from supine to sit and sit to supine  in bed with minimal assistance/contact guard assist within 7 day(s). 2.  Patient will transfer from bed to chair and chair to bed with minimal assistance/contact guard assist using the least restrictive device within 7 day(s). 3.  Patient will perform sit to stand with minimal assistance/contact guard assist within 7 day(s). 4.  Patient will ambulate with minimal assistance/contact guard assist for 150 feet with the least restrictive device within 7 day(s). 5.  Patient will ascend/descend 4 stairs with 2 handrail(s) with minimal assistance/contact guard assist within 7 day(s). Outcome: Progressing Towards Goal 
 PHYSICAL THERAPY TREATMENT Patient: Arleen García (27 y.o. male) Date: 8/16/2020 Diagnosis: Cellulitis of left upper extremity [L03.114] Cellulitis of left upper extremity Precautions:  fall Chart, physical therapy assessment, plan of care and goals were reviewed. ASSESSMENT Patient continues with skilled PT services and is progressing towards goals. Communicated with nurse and cleared for therapy. Rolled on the edge of bed min assist, supine to sit min assist, sit to stand min assist, ambulate with rolling walker in the room and towards the recliner. Slow pace gait need some verbal cues to advance rolling walker. OOB to chair as tolerated performed some active range of motion exercise on both LE all planes.  Spouse in the room during the entire therapy sessions and agreed with all goals set for the patient. Initiated family training for maintenance of strength and range of motion. Activated and tested chair alarm, nurse agreed to monitor patient. Current Level of Function Impacting Discharge (mobility/balance): min assist with transfers and ambulation using a rolling walker Other factors to consider for discharge: fall PLAN : 
Patient continues to benefit from skilled intervention to address the above impairments. Continue treatment per established plan of care. to address goals. Recommendation for discharge: (in order for the patient to meet his/her long term goals) Therapy up to 5 days/week in SNF setting This discharge recommendation: 
Has been made in collaboration with the attending provider and/or case management IF patient discharges home will need the following DME: patient owns DME required for discharge SUBJECTIVE:  
Patient stated ok.  OBJECTIVE DATA SUMMARY:  
Critical Behavior: 
Neurologic State: Alert Orientation Level: Appropriate for age Cognition: Appropriate decision making Functional Mobility Training: 
Bed Mobility: 
Rolling: Minimum assistance Supine to Sit: Minimum assistance Sit to Supine: Minimum assistance Scooting: Minimum assistance Transfers: 
Sit to Stand: Minimum assistance Stand to Sit: Minimum assistance Stand Pivot Transfers: Minimum assistance Bed to Chair: Minimum assistance Balance: 
Sitting: Intact; High guard Standing: Impaired;Pull to stand; With support Standing - Static: Fair Standing - Dynamic : Fair Ambulation/Gait Training: 
Distance (ft): 5 Feet (ft) Assistive Device: Walker, rolling;Gait belt Ambulation - Level of Assistance: Minimal assistance Gait Description (WDL): Exceptions to [facility] Gait Abnormalities: Path deviations; Step to gait Base of Support: Widened Speed/Connie: Slow Step Length: Right shortened;Left shortened Therapeutic Exercises:  
 Instructed patient to continue active range of motion exercise on both legs while up on chair or on bed. Pain Rating: 
3/10 Activity Tolerance:  
Good Please refer to the flowsheet for vital signs taken during this treatment. After treatment patient left in no apparent distress:  
Sitting in chair, Heels elevated for pressure relief, Call bell within reach, Bed / chair alarm activated, and Caregiver / family present COMMUNICATION/COLLABORATION:  
The patients plan of care was discussed with: Registered nurse. Silvino Galvan, PT,WCC. Time Calculation: 24 mins Trend weight biweekly/yes

## 2022-06-14 NOTE — PROGRESS NOTES
Bedside and Verbal shift change report given to [employer], RN (oncoming nurse) by Ariel Domínguez. Elana Smalls RN (offgoing nurse). Report included the following information SBAR, Kardex and Accordion,Recent Results. Routed to [facility];    Please advise on US bladder from 6/9/22. Thank you.

## 2024-04-16 NOTE — PROGRESS NOTES
----- Message from Antonina Head NP sent at 4/16/2024  9:53 AM CDT -----  Please inform patient that strep test is positive, and antibiotic has been sent to their selected pharmacy to start as soon as possible. After being on antibiotic for 24 hours, no longer considered contagious, however important to throw out toothbrus  h, chapstick, etc. And avoid sharing food/drink with others until treatment completed and symptoms resolved. Encourage plenty of hot/cold fluids, honey to coat the throat, frequent salt water gargles, and PRN NSAIDs for further pain relief. Return wi  th any new or worsening symptoms in the meantime. Thanks - AT       Bedside shift change report given to Se Long  (oncoming nurse) by Liss Barros (offgoing nurse). Report included the following information SBAR.

## (undated) DEVICE — SINGLE USE SUCTION VALVE MAJ-209: Brand: SINGLE USE SUCTION VALVE (STERILE)

## (undated) DEVICE — KENDALL RADIOLUCENT FOAM MONITORING ELECTRODE -RECTANGULAR SHAPE: Brand: KENDALL

## (undated) DEVICE — BAG BELONG PT PERS CLEAR HANDL

## (undated) DEVICE — KIT COLON W/ 1.1OZ LUB AND 2 END

## (undated) DEVICE — SYRINGE MED 10CC ECC TIP W/O NDL

## (undated) DEVICE — YANKAUER,BULB TIP,W/O VENT,RIGID,STERILE: Brand: MEDLINE

## (undated) DEVICE — SYRINGE 20ML E/T: Brand: SYRINGE 20ML E/T

## (undated) DEVICE — SINGLE USE BIOPSY VALVE MAJ-210: Brand: SINGLE USE BIOPSY VALVE (STERILE)

## (undated) DEVICE — Device

## (undated) DEVICE — SNARE ENDOSCP M L240CM W27MM SHTH DIA2.4MM CHN 2.8MM OVL

## (undated) DEVICE — CANN NASAL O2 CAPNOGRAPHY AD -- FILTERLINE

## (undated) DEVICE — TRAP SUC MUCOUS 70ML -- MEDICHOICE MEDLINE

## (undated) DEVICE — THE ENDO CARRY-ON PROCEDURE KIT CONTAINS ALL OF THE SUPPLIES AND INFECTION PREVENTION PRODUCTS NEEDED FOR ENDOSCOPIC PROCEDURES: Brand: ENDO CARRY-ON PROCEDURE KIT

## (undated) DEVICE — LINE SAMP L13FT NSL ORAL O2 SHT TERM USE NONINTUBATED UNI

## (undated) DEVICE — CONTAINER SPEC 20 ML LID NEUT BUFF FORMALIN 10 % POLYPR STS

## (undated) DEVICE — 1200 GUARD II KIT W/5MM TUBE W/O VAC TUBE: Brand: GUARDIAN

## (undated) DEVICE — CATH IV AUTOGRD BC PNK 20GA 25 -- INSYTE

## (undated) DEVICE — FORCEPS BX L240CM JAW DIA2.8MM L CAP W/ NDL MIC MESH TOOTH

## (undated) DEVICE — BAG SPEC BIOHZRD 10 X 10 IN --

## (undated) DEVICE — BOWL UTIL 16OZ STRL --

## (undated) DEVICE — SOLIDIFIER MEDC 1200ML -- CONVERT TO 356117

## (undated) DEVICE — TRAP,MUCUS SPECIMEN, 80CC: Brand: MEDLINE

## (undated) DEVICE — BRUSH CYTO L150CM CHN L2MM BRIST DIA1.9MM PTFE S STL BULL

## (undated) DEVICE — ADULT SPO2 SENSOR: Brand: NELLCOR

## (undated) DEVICE — SIMPLICITY FLUFF UNDERPAD 23X36, MODERATE: Brand: SIMPLICITY